# Patient Record
Sex: FEMALE | Race: OTHER | HISPANIC OR LATINO
[De-identification: names, ages, dates, MRNs, and addresses within clinical notes are randomized per-mention and may not be internally consistent; named-entity substitution may affect disease eponyms.]

---

## 2017-01-10 ENCOUNTER — APPOINTMENT (OUTPATIENT)
Dept: CARDIOLOGY | Facility: CLINIC | Age: 72
End: 2017-01-10

## 2017-01-10 VITALS
BODY MASS INDEX: 23.9 KG/M2 | SYSTOLIC BLOOD PRESSURE: 128 MMHG | DIASTOLIC BLOOD PRESSURE: 78 MMHG | HEART RATE: 55 BPM | OXYGEN SATURATION: 100 % | HEIGHT: 64 IN | WEIGHT: 140 LBS

## 2017-04-25 ENCOUNTER — APPOINTMENT (OUTPATIENT)
Dept: CARDIOLOGY | Facility: CLINIC | Age: 72
End: 2017-04-25

## 2017-04-25 VITALS
BODY MASS INDEX: 23.05 KG/M2 | DIASTOLIC BLOOD PRESSURE: 60 MMHG | WEIGHT: 135 LBS | HEART RATE: 64 BPM | HEIGHT: 64 IN | OXYGEN SATURATION: 100 % | SYSTOLIC BLOOD PRESSURE: 98 MMHG

## 2017-04-25 VITALS — SYSTOLIC BLOOD PRESSURE: 110 MMHG | DIASTOLIC BLOOD PRESSURE: 70 MMHG

## 2017-04-25 DIAGNOSIS — A69.20 LYME DISEASE, UNSPECIFIED: ICD-10-CM

## 2017-04-26 ENCOUNTER — OUTPATIENT (OUTPATIENT)
Dept: OUTPATIENT SERVICES | Facility: HOSPITAL | Age: 72
LOS: 1 days | Discharge: HOME | End: 2017-04-26

## 2017-06-28 DIAGNOSIS — Z12.31 ENCOUNTER FOR SCREENING MAMMOGRAM FOR MALIGNANT NEOPLASM OF BREAST: ICD-10-CM

## 2017-06-28 DIAGNOSIS — Z78.0 ASYMPTOMATIC MENOPAUSAL STATE: ICD-10-CM

## 2017-06-28 DIAGNOSIS — M89.9 DISORDER OF BONE, UNSPECIFIED: ICD-10-CM

## 2017-06-28 DIAGNOSIS — Z13.820 ENCOUNTER FOR SCREENING FOR OSTEOPOROSIS: ICD-10-CM

## 2017-06-30 ENCOUNTER — OUTPATIENT (OUTPATIENT)
Dept: OUTPATIENT SERVICES | Facility: HOSPITAL | Age: 72
LOS: 1 days | Discharge: HOME | End: 2017-06-30

## 2017-06-30 DIAGNOSIS — I42.9 CARDIOMYOPATHY, UNSPECIFIED: ICD-10-CM

## 2017-06-30 DIAGNOSIS — R94.31 ABNORMAL ELECTROCARDIOGRAM [ECG] [EKG]: ICD-10-CM

## 2017-06-30 DIAGNOSIS — I10 ESSENTIAL (PRIMARY) HYPERTENSION: ICD-10-CM

## 2017-07-31 ENCOUNTER — APPOINTMENT (OUTPATIENT)
Dept: CARDIOLOGY | Facility: CLINIC | Age: 72
End: 2017-07-31

## 2017-07-31 VITALS
BODY MASS INDEX: 23.05 KG/M2 | HEART RATE: 75 BPM | WEIGHT: 135 LBS | DIASTOLIC BLOOD PRESSURE: 54 MMHG | HEIGHT: 64 IN | SYSTOLIC BLOOD PRESSURE: 93 MMHG | OXYGEN SATURATION: 100 %

## 2017-10-27 ENCOUNTER — OUTPATIENT (OUTPATIENT)
Dept: OUTPATIENT SERVICES | Facility: HOSPITAL | Age: 72
LOS: 1 days | Discharge: HOME | End: 2017-10-27

## 2017-11-07 DIAGNOSIS — K64.8 OTHER HEMORRHOIDS: ICD-10-CM

## 2017-11-07 DIAGNOSIS — Z12.11 ENCOUNTER FOR SCREENING FOR MALIGNANT NEOPLASM OF COLON: ICD-10-CM

## 2017-11-07 DIAGNOSIS — K64.4 RESIDUAL HEMORRHOIDAL SKIN TAGS: ICD-10-CM

## 2017-11-07 DIAGNOSIS — D12.2 BENIGN NEOPLASM OF ASCENDING COLON: ICD-10-CM

## 2018-01-03 ENCOUNTER — APPOINTMENT (OUTPATIENT)
Dept: CARDIOLOGY | Facility: CLINIC | Age: 73
End: 2018-01-03

## 2018-01-03 VITALS — SYSTOLIC BLOOD PRESSURE: 118 MMHG | DIASTOLIC BLOOD PRESSURE: 72 MMHG

## 2018-01-03 VITALS — BODY MASS INDEX: 23.69 KG/M2 | WEIGHT: 138 LBS

## 2018-02-09 ENCOUNTER — OUTPATIENT (OUTPATIENT)
Dept: OUTPATIENT SERVICES | Facility: HOSPITAL | Age: 73
LOS: 1 days | Discharge: HOME | End: 2018-02-09

## 2018-02-09 DIAGNOSIS — E78.5 HYPERLIPIDEMIA, UNSPECIFIED: ICD-10-CM

## 2018-02-09 DIAGNOSIS — I10 ESSENTIAL (PRIMARY) HYPERTENSION: ICD-10-CM

## 2018-02-09 DIAGNOSIS — R94.31 ABNORMAL ELECTROCARDIOGRAM [ECG] [EKG]: ICD-10-CM

## 2018-02-09 DIAGNOSIS — I42.9 CARDIOMYOPATHY, UNSPECIFIED: ICD-10-CM

## 2018-04-13 ENCOUNTER — APPOINTMENT (OUTPATIENT)
Dept: CARDIOLOGY | Facility: CLINIC | Age: 73
End: 2018-04-13

## 2018-04-13 VITALS
HEART RATE: 69 BPM | DIASTOLIC BLOOD PRESSURE: 74 MMHG | OXYGEN SATURATION: 98 % | SYSTOLIC BLOOD PRESSURE: 119 MMHG | BODY MASS INDEX: 24.03 KG/M2 | WEIGHT: 140 LBS

## 2018-09-07 ENCOUNTER — OUTPATIENT (OUTPATIENT)
Dept: OUTPATIENT SERVICES | Facility: HOSPITAL | Age: 73
LOS: 1 days | Discharge: HOME | End: 2018-09-07

## 2018-09-07 DIAGNOSIS — E11.9 TYPE 2 DIABETES MELLITUS WITHOUT COMPLICATIONS: ICD-10-CM

## 2018-09-07 DIAGNOSIS — E78.00 PURE HYPERCHOLESTEROLEMIA, UNSPECIFIED: ICD-10-CM

## 2018-09-07 DIAGNOSIS — I11.9 HYPERTENSIVE HEART DISEASE WITHOUT HEART FAILURE: ICD-10-CM

## 2018-10-23 ENCOUNTER — APPOINTMENT (OUTPATIENT)
Dept: CARDIOLOGY | Facility: CLINIC | Age: 73
End: 2018-10-23

## 2018-10-23 VITALS — BODY MASS INDEX: 25.23 KG/M2 | WEIGHT: 147 LBS | DIASTOLIC BLOOD PRESSURE: 67 MMHG | SYSTOLIC BLOOD PRESSURE: 108 MMHG

## 2019-02-26 ENCOUNTER — APPOINTMENT (OUTPATIENT)
Dept: CARDIOLOGY | Facility: CLINIC | Age: 74
End: 2019-02-26

## 2019-03-14 ENCOUNTER — OUTPATIENT (OUTPATIENT)
Dept: OUTPATIENT SERVICES | Facility: HOSPITAL | Age: 74
LOS: 1 days | Discharge: HOME | End: 2019-03-14

## 2019-03-14 DIAGNOSIS — I10 ESSENTIAL (PRIMARY) HYPERTENSION: ICD-10-CM

## 2019-03-14 DIAGNOSIS — R94.31 ABNORMAL ELECTROCARDIOGRAM [ECG] [EKG]: ICD-10-CM

## 2019-03-14 DIAGNOSIS — I42.9 CARDIOMYOPATHY, UNSPECIFIED: ICD-10-CM

## 2019-03-14 DIAGNOSIS — E78.5 HYPERLIPIDEMIA, UNSPECIFIED: ICD-10-CM

## 2019-04-05 ENCOUNTER — APPOINTMENT (OUTPATIENT)
Dept: CARDIOLOGY | Facility: CLINIC | Age: 74
End: 2019-04-05
Payer: MEDICARE

## 2019-04-05 VITALS
SYSTOLIC BLOOD PRESSURE: 110 MMHG | HEIGHT: 64 IN | HEART RATE: 75 BPM | BODY MASS INDEX: 25.1 KG/M2 | WEIGHT: 147 LBS | DIASTOLIC BLOOD PRESSURE: 60 MMHG

## 2019-04-05 DIAGNOSIS — Z87.39 PERSONAL HISTORY OF OTHER DISEASES OF THE MUSCULOSKELETAL SYSTEM AND CONNECTIVE TISSUE: ICD-10-CM

## 2019-04-05 PROCEDURE — 99213 OFFICE O/P EST LOW 20 MIN: CPT

## 2019-04-05 PROCEDURE — 93282 PRGRMG EVAL IMPLANTABLE DFB: CPT

## 2019-04-05 NOTE — PHYSICAL EXAM
[General Appearance - Well Developed] : well developed [General Appearance - Well Nourished] : well nourished [Heart Rate And Rhythm] : heart rate and rhythm were normal [Heart Sounds] : normal S1 and S2 [Systolic grade ___/6] : A grade [unfilled]/6 systolic murmur was heard. [] : no respiratory distress [Respiration, Rhythm And Depth] : normal respiratory rhythm and effort [Auscultation Breath Sounds / Voice Sounds] : lungs were clear to auscultation bilaterally [Left Infraclavicular] : left infraclavicular area [Clean] : clean [Dry] : dry [Well-Healed] : well-healed [Bowel Sounds] : normal bowel sounds [Nail Clubbing] : no clubbing of the fingernails [Cyanosis, Localized] : no localized cyanosis

## 2019-04-05 NOTE — PROCEDURE
[No] : not [NSR] : normal sinus rhythm [See Scanned Paceart Report] : See scanned paceart report [ICD] : Implantable cardioverter-defibrillator [VVI] : VVI [Voltage: ___ volts] : Voltage was [unfilled] volts [Charge Time: ___ sec] : charge time was [unfilled] seconds [Longevity: ___ months] : The estimated remaining battery life is [unfilled] months [Threshold Testing Performed] : Threshold testing was performed [Lead Imp:  ___ohms] : lead impedance was [unfilled] ohms [Sensing Amplitude ___mv] : sensing amplitude was [unfilled] mv [___V @] : [unfilled] V [___ ms] : [unfilled] ms [None] : none [Counters Reset] : the counters were reset [Sense ___ %] : Sense [unfilled]% [Pace ___ %] : Pace [unfilled]% [de-identified] : 72 bpm [de-identified] : [facility] [de-identified] : D179 [de-identified] : [id number] [de-identified] : 05/09/2016 [de-identified] : 40 [de-identified] : No events.\par

## 2019-04-05 NOTE — HISTORY OF PRESENT ILLNESS
[de-identified] : \par Cardiologist: Dr. uJstin Mckeon\par \par 72 y/o female with pmh of NICM/CHF/ NYHA II , S/P AICD, S/P AICD generator replacement on 5/9/2016, presents for a routine F/u, ICD interrogation \par \par Denies CP/SOB/palpitations \par No dizziness or syncope \par No changes in health status since her last visit . Good activity tolerance .\par No CRISTINA \par  ECG (4/5/2019) - SR at 71 bpm pr 124ms, QRS 102ms, QTC 419ms, non specific TW abnormality\par ECG ( 10/23/2018) - SR at 67 bpm, non-specific TWI , QTc 398 ms \par

## 2019-04-05 NOTE — REVIEW OF SYSTEMS
[Negative] : Psychiatric [Recent Weight Gain (___ Lbs)] : recent [unfilled] ~Ulb weight gain [Fever] : no fever [Eye Pain] : no eye pain [Eyeglasses] : not currently wearing eyeglasses [Mouth Sores] : no mouth sores [Shortness Of Breath] : no shortness of breath [Dyspnea on exertion] : not dyspnea during exertion [Chest  Pressure] : no chest pressure [Chest Pain] : no chest pain [Lower Ext Edema] : no extremity edema [Leg Claudication] : no intermittent leg claudication [Palpitations] : no palpitations [Cough] : no cough [Abdominal Pain] : no abdominal pain [Dysuria] : no dysuria [Joint Pain] : no joint pain [Joint Stiffness] : no joint stiffness [Skin: A Rash] : no rash: [Skin Lesions] : no skin lesions [Dizziness] : no dizziness [Depression] : no depression [Anxiety] : no anxiety [Under Stress] : not under stress [Excessive Thirst] : no polydipsia [Easy Bleeding] : no tendency for easy bleeding [Easy Bruising] : no tendency for easy bruising

## 2019-04-05 NOTE — END OF VISIT
[FreeTextEntry3] : I was physically present for the key portion of the evaluation and management service provided. I agree with the above history, physical and plan which I have reviewed and edited where appropriate.\par \par

## 2019-04-05 NOTE — DISCUSSION/SUMMARY
[AICD Function Normal] : normal AICD function [Patient] : the patient [FreeTextEntry1] : HFRef /NYHA class 11\par -Normal Single Chamber ICD function \par -No arrhythmia's recorded \par -All parameters stable \par - 0.1% \par -Continue OMT for NICM with ACE/BBlockers/diuretics and was started on started on Entresto by Dr. Mckeon\par - F/up in 6 mos.\par Decline remote monitoring

## 2019-04-10 ENCOUNTER — OUTPATIENT (OUTPATIENT)
Dept: OUTPATIENT SERVICES | Facility: HOSPITAL | Age: 74
LOS: 1 days | Discharge: HOME | End: 2019-04-10

## 2019-04-10 DIAGNOSIS — E11.9 TYPE 2 DIABETES MELLITUS WITHOUT COMPLICATIONS: ICD-10-CM

## 2019-04-10 DIAGNOSIS — I11.9 HYPERTENSIVE HEART DISEASE WITHOUT HEART FAILURE: ICD-10-CM

## 2019-08-22 ENCOUNTER — OUTPATIENT (OUTPATIENT)
Dept: OUTPATIENT SERVICES | Facility: HOSPITAL | Age: 74
LOS: 1 days | Discharge: HOME | End: 2019-08-22

## 2019-08-22 DIAGNOSIS — E78.2 MIXED HYPERLIPIDEMIA: ICD-10-CM

## 2019-08-22 DIAGNOSIS — Z00.00 ENCOUNTER FOR GENERAL ADULT MEDICAL EXAMINATION WITHOUT ABNORMAL FINDINGS: ICD-10-CM

## 2019-08-22 DIAGNOSIS — I10 ESSENTIAL (PRIMARY) HYPERTENSION: ICD-10-CM

## 2019-10-04 ENCOUNTER — APPOINTMENT (OUTPATIENT)
Dept: CARDIOLOGY | Facility: CLINIC | Age: 74
End: 2019-10-04
Payer: MEDICARE

## 2019-10-04 VITALS
DIASTOLIC BLOOD PRESSURE: 68 MMHG | HEART RATE: 74 BPM | SYSTOLIC BLOOD PRESSURE: 112 MMHG | WEIGHT: 143 LBS | BODY MASS INDEX: 24.55 KG/M2

## 2019-10-04 PROCEDURE — 93282 PRGRMG EVAL IMPLANTABLE DFB: CPT

## 2019-10-04 PROCEDURE — 99213 OFFICE O/P EST LOW 20 MIN: CPT

## 2019-10-04 RX ORDER — SACUBITRIL AND VALSARTAN 24; 26 MG/1; MG/1
24-26 TABLET, FILM COATED ORAL TWICE DAILY
Refills: 0 | Status: DISCONTINUED | COMMUNITY
End: 2019-10-04

## 2019-11-30 NOTE — PROCEDURE
[No] : not [NSR] : normal sinus rhythm [ICD] : Implantable cardioverter-defibrillator [VVI] : VVI [Voltage: ___ volts] : Voltage was [unfilled] volts [Charge Time: ___ sec] : charge time was [unfilled] seconds [Longevity: ___ months] : The estimated remaining battery life is [unfilled] months [Normal] : The battery status is normal. [Lead Imp:  ___ohms] : lead impedance was [unfilled] ohms [Sensing Amplitude ___mv] : sensing amplitude was [unfilled] mv [___ ms] : [unfilled] ms [___V @] : [unfilled] V [None] : none [Pace ___ %] : Pace [unfilled]% [Threshold Testing Performed] : Threshold testing was not performed [Programmed for Longevity] : output reprogrammed for improved battery longevity [Counters Reset] : the counters were not reset [de-identified] [place] [facility] [place]de[place]identified] : D163 [de-identified] : [id number] [de-identified] : 5/9/2016 [de-identified] : 40 [de-identified] : NO ARRHYTHMIAS. \par NORMAL VVI  ICD SYSTEM FUNCTION TODAY.

## 2019-11-30 NOTE — HISTORY OF PRESENT ILLNESS
[de-identified] : \par Cardiologist: Dr. Justin Mckeon\par \par 75 y/o female with pmh of NICM/CHF/ NYHA II , S/P AICD, S/P AICD generator replacement on 5/9/2016, presents for a routine F/u, ICD interrogation \par \par Denies CP/SOB/palpitations \par No dizziness or syncope \par No changes in health status since her last visit . Good activity tolerance .\par  \par ECG (10/4/2019) 74 bpm NSR\par  ECG (4/5/2019) - SR at 71 bpm pr 124ms, QRS 102ms, QTC 419ms, non specific TW abnormality\par ECG ( 10/23/2018) - SR at 67 bpm, non-specific TWI , QTc 398 ms \par

## 2019-11-30 NOTE — ASSESSMENT
[FreeTextEntry1] : Patient was seen and examined with Dr. Carvalho\par Patient report feeling good\par \par Device interrogation showed normal single chamber  ICD parameter\par No event, no shock.\par Patient on coreg, entresto and spironolactone\par \par Patient refused remote monitoring\par \par Next office follow up in 6 months\par

## 2019-11-30 NOTE — REVIEW OF SYSTEMS
[Recent Weight Gain (___ Lbs)] : recent [unfilled] ~Ulb weight gain [Negative] : Neurological [Fever] : no fever [Eye Pain] : no eye pain [Mouth Sores] : no mouth sores [Eyeglasses] : not currently wearing eyeglasses [Shortness Of Breath] : no shortness of breath [Dyspnea on exertion] : not dyspnea during exertion [Chest  Pressure] : no chest pressure [Chest Pain] : no chest pain [Lower Ext Edema] : no extremity edema [Leg Claudication] : no intermittent leg claudication [Cough] : no cough [Palpitations] : no palpitations [Abdominal Pain] : no abdominal pain [Dysuria] : no dysuria [Joint Pain] : no joint pain [Joint Stiffness] : no joint stiffness [Skin: A Rash] : no rash: [Skin Lesions] : no skin lesions [Dizziness] : no dizziness [Depression] : no depression [Anxiety] : no anxiety [Under Stress] : not under stress [Excessive Thirst] : no polydipsia [Easy Bleeding] : no tendency for easy bleeding [Easy Bruising] : no tendency for easy bruising

## 2020-08-28 ENCOUNTER — APPOINTMENT (OUTPATIENT)
Dept: CARDIOLOGY | Facility: CLINIC | Age: 75
End: 2020-08-28

## 2020-09-16 ENCOUNTER — APPOINTMENT (OUTPATIENT)
Dept: CARDIOLOGY | Facility: CLINIC | Age: 75
End: 2020-09-16
Payer: MEDICARE

## 2020-09-16 VITALS
TEMPERATURE: 97.1 F | DIASTOLIC BLOOD PRESSURE: 63 MMHG | WEIGHT: 140 LBS | SYSTOLIC BLOOD PRESSURE: 114 MMHG | BODY MASS INDEX: 23.9 KG/M2 | HEIGHT: 64 IN

## 2020-09-16 PROCEDURE — 93282 PRGRMG EVAL IMPLANTABLE DFB: CPT

## 2020-09-16 PROCEDURE — 99213 OFFICE O/P EST LOW 20 MIN: CPT

## 2020-09-16 NOTE — REVIEW OF SYSTEMS
[Fever] : no fever [Recent Weight Gain (___ Lbs)] : recent [unfilled] ~Ulb weight gain [Eyeglasses] : not currently wearing eyeglasses [Eye Pain] : no eye pain [Mouth Sores] : no mouth sores [Shortness Of Breath] : no shortness of breath [Dyspnea on exertion] : not dyspnea during exertion [Chest Pain] : no chest pain [Lower Ext Edema] : no extremity edema [Chest  Pressure] : no chest pressure [Leg Claudication] : no intermittent leg claudication [Palpitations] : no palpitations [Cough] : no cough [Dysuria] : no dysuria [Abdominal Pain] : no abdominal pain [Joint Pain] : no joint pain [Joint Stiffness] : no joint stiffness [Skin: A Rash] : no rash: [Skin Lesions] : no skin lesions [Dizziness] : no dizziness [Depression] : no depression [Anxiety] : no anxiety [Under Stress] : not under stress [Excessive Thirst] : no polydipsia [Easy Bleeding] : no tendency for easy bleeding [Easy Bruising] : no tendency for easy bruising [Negative] : Neurological

## 2020-09-16 NOTE — PHYSICAL EXAM
[General Appearance - Well Developed] : well developed [General Appearance - Well Nourished] : well nourished [Heart Sounds] : normal S1 and S2 [Heart Rate And Rhythm] : heart rate and rhythm were normal [] : no respiratory distress [Systolic grade ___/6] : A grade [unfilled]/6 systolic murmur was heard. [Auscultation Breath Sounds / Voice Sounds] : lungs were clear to auscultation bilaterally [Left Infraclavicular] : left infraclavicular area [Respiration, Rhythm And Depth] : normal respiratory rhythm and effort [Clean] : clean [Dry] : dry [Well-Healed] : well-healed [Bowel Sounds] : normal bowel sounds [Cyanosis, Localized] : no localized cyanosis [Nail Clubbing] : no clubbing of the fingernails

## 2020-09-16 NOTE — HISTORY OF PRESENT ILLNESS
[de-identified] : \par \par 76 y/o female with pmh of NICM/CHF/ NYHA II , S/P AICD, S/P AICD generator replacement on 5/9/2016, presents for a routine F/u, ICD interrogation \par \par Denies CP/SOB/palpitations \par No dizziness or syncope \par No changes in health status since her last visit . Good activity tolerance .\par  \par ECG (10/4/2019) 74 bpm NSR\par  ECG (4/5/2019) - SR at 71 bpm pr 124ms, QRS 102ms, QTC 419ms, non specific TW abnormality\par ECG ( 10/23/2018) - SR at 67 bpm, non-specific TWI , QTc 398 ms \par

## 2020-09-16 NOTE — PROCEDURE
[NSR] : normal sinus rhythm [No] : not [VVI] : VVI [ICD] : Implantable cardioverter-defibrillator [Voltage: ___ volts] : Voltage was [unfilled] volts [Charge Time: ___ sec] : charge time was [unfilled] seconds [Threshold Testing Performed] : Threshold testing was not performed [Longevity: ___ months] : The estimated remaining battery life is [unfilled] months [Normal] : The battery status is normal. [Programmed for Longevity] : output reprogrammed for improved battery longevity [Counters Reset] : the counters were not reset [Pace ___ %] : Pace [unfilled]% [de-identified] : D157 [de-identified] : [facility] [de-identified] : [id number] [de-identified] : 5/9/2016 [de-identified] : 40 [de-identified] : NO ARRHYTHMIAS. \par NORMAL VVI  ICD SYSTEM FUNCTION TODAY. \par pt refused remote

## 2021-01-19 ENCOUNTER — APPOINTMENT (OUTPATIENT)
Dept: CARDIOLOGY | Facility: CLINIC | Age: 76
End: 2021-01-19
Payer: MEDICARE

## 2021-01-19 VITALS — BODY MASS INDEX: 24.41 KG/M2 | WEIGHT: 143 LBS | HEIGHT: 64 IN | TEMPERATURE: 97.3 F

## 2021-01-19 PROCEDURE — 99072 ADDL SUPL MATRL&STAF TM PHE: CPT

## 2021-01-19 PROCEDURE — 93290 INTERROG DEV EVAL ICPMS IP: CPT

## 2021-01-19 PROCEDURE — 93282 PRGRMG EVAL IMPLANTABLE DFB: CPT

## 2021-01-19 NOTE — PROCEDURE
[No] : not [NSR] : normal sinus rhythm [ICD] : Implantable cardioverter-defibrillator [VVI] : VVI [Longevity: ___ months] : The estimated remaining battery life is [unfilled] months [Threshold Testing Performed] : Threshold testing was performed [Lead Imp:  ___ohms] : lead impedance was [unfilled] ohms [Sensing Amplitude ___mv] : sensing amplitude was [unfilled] mv [___V @] : [unfilled] V [___ ms] : [unfilled] ms [Programmed for Longevity] : output reprogrammed for improved battery longevity [Pace ___ %] : Pace [unfilled]% [de-identified] : [facility]  [de-identified] : D148 [de-identified] : [id number] [de-identified] : 08/09/2016 [de-identified] : 40 [de-identified] : Normal device function. No events noted.

## 2021-05-18 ENCOUNTER — APPOINTMENT (OUTPATIENT)
Dept: CARDIOLOGY | Facility: CLINIC | Age: 76
End: 2021-05-18

## 2021-08-04 ENCOUNTER — INPATIENT (INPATIENT)
Facility: HOSPITAL | Age: 76
LOS: 1 days | Discharge: HOME | End: 2021-08-06
Attending: INTERNAL MEDICINE | Admitting: INTERNAL MEDICINE
Payer: MEDICARE

## 2021-08-04 VITALS
OXYGEN SATURATION: 96 % | HEART RATE: 105 BPM | RESPIRATION RATE: 17 BRPM | SYSTOLIC BLOOD PRESSURE: 130 MMHG | TEMPERATURE: 98 F | DIASTOLIC BLOOD PRESSURE: 77 MMHG

## 2021-08-04 LAB
A1C WITH ESTIMATED AVERAGE GLUCOSE RESULT: 5.9 % — HIGH (ref 4–5.6)
ALBUMIN SERPL ELPH-MCNC: 3.9 G/DL — SIGNIFICANT CHANGE UP (ref 3.5–5.2)
ALP SERPL-CCNC: 88 U/L — SIGNIFICANT CHANGE UP (ref 30–115)
ALT FLD-CCNC: 10 U/L — SIGNIFICANT CHANGE UP (ref 0–41)
ANION GAP SERPL CALC-SCNC: 13 MMOL/L — SIGNIFICANT CHANGE UP (ref 7–14)
AST SERPL-CCNC: 18 U/L — SIGNIFICANT CHANGE UP (ref 0–41)
BASOPHILS # BLD AUTO: 0.02 K/UL — SIGNIFICANT CHANGE UP (ref 0–0.2)
BASOPHILS NFR BLD AUTO: 0.2 % — SIGNIFICANT CHANGE UP (ref 0–1)
BILIRUB SERPL-MCNC: 0.8 MG/DL — SIGNIFICANT CHANGE UP (ref 0.2–1.2)
BUN SERPL-MCNC: 13 MG/DL — SIGNIFICANT CHANGE UP (ref 10–20)
CALCIUM SERPL-MCNC: 9.5 MG/DL — SIGNIFICANT CHANGE UP (ref 8.5–10.1)
CHLORIDE SERPL-SCNC: 102 MMOL/L — SIGNIFICANT CHANGE UP (ref 98–110)
CO2 SERPL-SCNC: 22 MMOL/L — SIGNIFICANT CHANGE UP (ref 17–32)
CREAT SERPL-MCNC: 0.8 MG/DL — SIGNIFICANT CHANGE UP (ref 0.7–1.5)
EOSINOPHIL # BLD AUTO: 0.02 K/UL — SIGNIFICANT CHANGE UP (ref 0–0.7)
EOSINOPHIL NFR BLD AUTO: 0.2 % — SIGNIFICANT CHANGE UP (ref 0–8)
ESTIMATED AVERAGE GLUCOSE: 123 MG/DL — HIGH (ref 68–114)
GLUCOSE SERPL-MCNC: 178 MG/DL — HIGH (ref 70–99)
HCT VFR BLD CALC: 38.5 % — SIGNIFICANT CHANGE UP (ref 37–47)
HGB BLD-MCNC: 12.1 G/DL — SIGNIFICANT CHANGE UP (ref 12–16)
IMM GRANULOCYTES NFR BLD AUTO: 0.3 % — SIGNIFICANT CHANGE UP (ref 0.1–0.3)
LYMPHOCYTES # BLD AUTO: 1.31 K/UL — SIGNIFICANT CHANGE UP (ref 1.2–3.4)
LYMPHOCYTES # BLD AUTO: 14 % — LOW (ref 20.5–51.1)
MAGNESIUM SERPL-MCNC: 1.7 MG/DL — LOW (ref 1.8–2.4)
MCHC RBC-ENTMCNC: 30.9 PG — SIGNIFICANT CHANGE UP (ref 27–31)
MCHC RBC-ENTMCNC: 31.4 G/DL — LOW (ref 32–37)
MCV RBC AUTO: 98.5 FL — SIGNIFICANT CHANGE UP (ref 81–99)
MONOCYTES # BLD AUTO: 0.54 K/UL — SIGNIFICANT CHANGE UP (ref 0.1–0.6)
MONOCYTES NFR BLD AUTO: 5.8 % — SIGNIFICANT CHANGE UP (ref 1.7–9.3)
NEUTROPHILS # BLD AUTO: 7.45 K/UL — HIGH (ref 1.4–6.5)
NEUTROPHILS NFR BLD AUTO: 79.5 % — HIGH (ref 42.2–75.2)
NRBC # BLD: 0 /100 WBCS — SIGNIFICANT CHANGE UP (ref 0–0)
NT-PROBNP SERPL-SCNC: 4943 PG/ML — HIGH (ref 0–300)
PLATELET # BLD AUTO: 414 K/UL — HIGH (ref 130–400)
POTASSIUM SERPL-MCNC: 4 MMOL/L — SIGNIFICANT CHANGE UP (ref 3.5–5)
POTASSIUM SERPL-SCNC: 4 MMOL/L — SIGNIFICANT CHANGE UP (ref 3.5–5)
PROT SERPL-MCNC: 6.8 G/DL — SIGNIFICANT CHANGE UP (ref 6–8)
RBC # BLD: 3.91 M/UL — LOW (ref 4.2–5.4)
RBC # FLD: 13 % — SIGNIFICANT CHANGE UP (ref 11.5–14.5)
SARS-COV-2 RNA SPEC QL NAA+PROBE: SIGNIFICANT CHANGE UP
SODIUM SERPL-SCNC: 137 MMOL/L — SIGNIFICANT CHANGE UP (ref 135–146)
TROPONIN T SERPL-MCNC: 0.02 NG/ML — HIGH
TROPONIN T SERPL-MCNC: 0.07 NG/ML — CRITICAL HIGH
WBC # BLD: 9.37 K/UL — SIGNIFICANT CHANGE UP (ref 4.8–10.8)
WBC # FLD AUTO: 9.37 K/UL — SIGNIFICANT CHANGE UP (ref 4.8–10.8)

## 2021-08-04 PROCEDURE — 93010 ELECTROCARDIOGRAM REPORT: CPT

## 2021-08-04 PROCEDURE — 99285 EMERGENCY DEPT VISIT HI MDM: CPT

## 2021-08-04 PROCEDURE — 71045 X-RAY EXAM CHEST 1 VIEW: CPT | Mod: 26

## 2021-08-04 PROCEDURE — 93282 PRGRMG EVAL IMPLANTABLE DFB: CPT | Mod: 26

## 2021-08-04 PROCEDURE — 99223 1ST HOSP IP/OBS HIGH 75: CPT

## 2021-08-04 RX ORDER — SACUBITRIL AND VALSARTAN 24; 26 MG/1; MG/1
1 TABLET, FILM COATED ORAL
Qty: 0 | Refills: 0 | DISCHARGE

## 2021-08-04 RX ORDER — SPIRONOLACTONE 25 MG/1
1 TABLET, FILM COATED ORAL
Qty: 0 | Refills: 0 | DISCHARGE

## 2021-08-04 RX ORDER — SACUBITRIL AND VALSARTAN 24; 26 MG/1; MG/1
1 TABLET, FILM COATED ORAL
Refills: 0 | Status: DISCONTINUED | OUTPATIENT
Start: 2021-08-04 | End: 2021-08-06

## 2021-08-04 RX ORDER — CHLORHEXIDINE GLUCONATE 213 G/1000ML
1 SOLUTION TOPICAL
Refills: 0 | Status: DISCONTINUED | OUTPATIENT
Start: 2021-08-04 | End: 2021-08-06

## 2021-08-04 RX ORDER — ENOXAPARIN SODIUM 100 MG/ML
40 INJECTION SUBCUTANEOUS AT BEDTIME
Refills: 0 | Status: DISCONTINUED | OUTPATIENT
Start: 2021-08-04 | End: 2021-08-06

## 2021-08-04 RX ORDER — CARVEDILOL PHOSPHATE 80 MG/1
6.25 CAPSULE, EXTENDED RELEASE ORAL EVERY 12 HOURS
Refills: 0 | Status: DISCONTINUED | OUTPATIENT
Start: 2021-08-04 | End: 2021-08-05

## 2021-08-04 RX ORDER — FUROSEMIDE 40 MG
40 TABLET ORAL DAILY
Refills: 0 | Status: DISCONTINUED | OUTPATIENT
Start: 2021-08-05 | End: 2021-08-05

## 2021-08-04 RX ORDER — ATORVASTATIN CALCIUM 80 MG/1
1 TABLET, FILM COATED ORAL
Qty: 0 | Refills: 0 | DISCHARGE

## 2021-08-04 RX ORDER — ATORVASTATIN CALCIUM 80 MG/1
20 TABLET, FILM COATED ORAL AT BEDTIME
Refills: 0 | Status: DISCONTINUED | OUTPATIENT
Start: 2021-08-04 | End: 2021-08-06

## 2021-08-04 RX ORDER — FUROSEMIDE 40 MG
60 TABLET ORAL ONCE
Refills: 0 | Status: COMPLETED | OUTPATIENT
Start: 2021-08-04 | End: 2021-08-04

## 2021-08-04 RX ORDER — SPIRONOLACTONE 25 MG/1
25 TABLET, FILM COATED ORAL DAILY
Refills: 0 | Status: DISCONTINUED | OUTPATIENT
Start: 2021-08-04 | End: 2021-08-06

## 2021-08-04 RX ORDER — MAGNESIUM SULFATE 500 MG/ML
2 VIAL (ML) INJECTION ONCE
Refills: 0 | Status: COMPLETED | OUTPATIENT
Start: 2021-08-04 | End: 2021-08-04

## 2021-08-04 RX ADMIN — Medication 50 GRAM(S): at 09:06

## 2021-08-04 RX ADMIN — CARVEDILOL PHOSPHATE 6.25 MILLIGRAM(S): 80 CAPSULE, EXTENDED RELEASE ORAL at 20:05

## 2021-08-04 RX ADMIN — Medication 60 MILLIGRAM(S): at 08:56

## 2021-08-04 RX ADMIN — ENOXAPARIN SODIUM 40 MILLIGRAM(S): 100 INJECTION SUBCUTANEOUS at 21:36

## 2021-08-04 RX ADMIN — ATORVASTATIN CALCIUM 20 MILLIGRAM(S): 80 TABLET, FILM COATED ORAL at 21:36

## 2021-08-04 RX ADMIN — SACUBITRIL AND VALSARTAN 1 TABLET(S): 24; 26 TABLET, FILM COATED ORAL at 20:05

## 2021-08-04 NOTE — H&P ADULT - NSHPLABSRESULTS_GEN_ALL_CORE
12.1   9.37  )-----------( 414      ( 04 Aug 2021 07:51 )             38.5       08-04    137  |  102  |  13  ----------------------------<  178<H>  4.0   |  22  |  0.8    Ca    9.5      04 Aug 2021 07:51  Mg     1.7     08-04    TPro  6.8  /  Alb  3.9  /  TBili  0.8  /  DBili  x   /  AST  18  /  ALT  10  /  AlkPhos  88  08-04          CARDIAC MARKERS ( 04 Aug 2021 07:51 )  x     / 0.02 ng/mL / x     / x     / x

## 2021-08-04 NOTE — H&P ADULT - ATTENDING COMMENTS
Patient seen and evaluated with son at bedside. Has h/o cardiomyopathy s/p PPM, h/o combined systolic and diastolic CHF. her cardiologist is Dr OLEGARIO manning.     Acute on chronic combined CHF/ Cardiomyopathy s/p PPM  Consult her cardiologist  Continue cardiac medications  IV lasix  Patient and son educated at bedside  She is full code

## 2021-08-04 NOTE — ED PROVIDER NOTE - CLINICAL SUMMARY MEDICAL DECISION MAKING FREE TEXT BOX
76 yr old f that presents with sob, concern for CHF exacerbation   -labs  -ekg  -cxr  -lasix  -consider admission

## 2021-08-04 NOTE — H&P ADULT - HISTORY OF PRESENT ILLNESS
75 yo F with PMHx of HTN, HFrEF s/p ICD and PPM presents to the ED for worsening shortness of breath and fatigue. SOB started one week ago and got worse today, endorses orthopnea and feeling tired this morning, her son called EMS. She states she feels short of breath after talking for long, much better after coming to the hospital. Adheres to a low salt diet, checks weight at home and denies noticing any change.  In ED, VS within normal limits except , spo2 96% on RA. CXR shows ICD and BL pleural effusions. Pt received 60mg IV lasix and Mg 2g.

## 2021-08-04 NOTE — H&P ADULT - ASSESSMENT
75 yo F with PMHx of HTN, HFrEF s/p ICD and PPM presents to the ED for worsening shortness of breath, orthopnea and fatigue.    #Acute HFrEF Exacerbation (EF unknown) s/p AICD   #Stage C, NYHA class II-III  #Troponemia, no acute ischemic changes on EKG   BNP 4943, CXR shows BL pleural effusions (f/u official read), EKG shows sinus tachycardia, SOB is better after IV lasix in ED  Daily weights, Strict Is and Os, fluid restriction, trend Cardiac enzymes, f/u EKG in am. Monitor on tele  Lasix 40mg IV push daily  Cardio consult, Dr. Frank   EP consult for interrogation of device   C/w entresto, carvedilol, spironolactone     #HTN  c/w home meds    #HLD  c/w atorvastatin  check lipid profile     #Misc  - DVT Prophylaxis: lovenox   - GI Prophylaxis: Not indicated   - Diet: DASH, fluid restriction   - Activity: AAT  - Code Status: Full code  77 yo F with PMHx of HTN, HFrEF s/p ICD and PPM presents to the ED for worsening shortness of breath, orthopnea and fatigue.    #Acute HFrEF Exacerbation (EF unknown) s/p AICD   #Stage C, NYHA class II-III  #Troponemia, no acute ischemic changes on EKG   BNP 4943, CXR shows BL pleural effusions (f/u official read), EKG shows sinus tachycardia, SOB is better after IV lasix in ED  Daily weights, Strict Is and Os, fluid restriction, trend Cardiac enzymes, f/u EKG in am. F/u Echo. Monitor on tele  Lasix 40mg IV push daily  Cardio consult, Dr. Frank   EP consult for interrogation of device   C/w entresto, carvedilol, spironolactone     #HTN  c/w home meds    #HLD  c/w atorvastatin  check lipid profile     #Misc  - DVT Prophylaxis: lovenox   - GI Prophylaxis: Not indicated   - Diet: DASH, fluid restriction   - Activity: AAT  - Code Status: Full code  75 yo F with PMHx of HTN, HFrEF s/p ICD and PPM presents to the ED for worsening shortness of breath, orthopnea and fatigue.    #Acute HFrEF Exacerbation (EF unknown) s/p AICD   #Stage C, NYHA class II-III  #Troponemia, no acute ischemic changes on EKG   BNP 4943, CXR shows BL pleural effusions (f/u official read), EKG shows sinus tachycardia, SOB is better after IV lasix in ED  Daily weights, Strict Is and Os, fluid restriction, trend Cardiac enzymes, f/u EKG in am. F/u Echo. Monitor on tele  Patient does not appear to be in fluid overload on my exam, no JVD, no LE edema, only mild crackles and blunting of costophrenic angles on CXR. US exam of IVC shows that it is >50% collapsable with breathing   Lasix 40mg IV push daily, Assess need for IV lasix based on exam tomorrow   Cardio consult, Dr. Frank   EP consult for interrogation of device   C/w entresto, carvedilol, spironolactone     #HTN  c/w home meds    #HLD  c/w atorvastatin  check lipid profile     #Misc  - DVT Prophylaxis: lovenox   - GI Prophylaxis: Not indicated   - Diet: DASH, fluid restriction   - Activity: AAT  - Code Status: Full code

## 2021-08-04 NOTE — H&P ADULT - NSICDXFAMILYHX_GEN_ALL_CORE_FT
FAMILY HISTORY:  Mother  Still living? Unknown  FH: heart failure, Age at diagnosis: Age Unknown

## 2021-08-04 NOTE — ED ADULT TRIAGE NOTE - CHIEF COMPLAINT QUOTE
Pt BIBA with hx CHF, pacemaker with defib complaining of SOB and weakness x 1  week and dyspnea on exertion worsening today. denies chest pain. denies fever.

## 2021-08-04 NOTE — ED PROVIDER NOTE - NS ED ROS FT
CONST: No fever, chills or bodyaches  EYES: No pain, redness, drainage or visual changes.  ENT: No ear pain or discharge, nasal discharge or congestion. No sore throat  CARD: No chest pain, palpitations  RESP: see HPI  GI: No abdominal pain, N/V/D  MS: No joint pain, back pain or extremity pain/injury  SKIN: No rashes  NEURO: No headache, dizziness, paresthesias or LOC

## 2021-08-04 NOTE — H&P ADULT - NSHPPHYSICALEXAM_GEN_ALL_CORE
PHYSICAL EXAM:  GENERAL: NAD, well-groomed, well-developed  HEAD:  Atraumatic, Normocephalic  EYES: EOMI, PERRLA, conjunctiva and sclera clear  NECK: Supple, No JVD, Normal thyroid  HEART: slightly tachycardic   RESPIRATORY: Mild crackles in BL lower lung fields posteriorly   ABDOMEN: Soft, Nontender, Nondistended; Bowel sounds present  NEUROLOGY: A&Ox3, nonfocal, moving all extremities  EXTREMITIES: No clubbing, cyanosis, or edema Vital Signs Last 24 Hrs  T(C): 36.7 (04 Aug 2021 07:14), Max: 36.7 (04 Aug 2021 07:14)  T(F): 98 (04 Aug 2021 07:14), Max: 98 (04 Aug 2021 07:14)  HR: 112 (04 Aug 2021 07:45) (105 - 112)  BP: 130/77 (04 Aug 2021 07:14) (130/77 - 130/77)  BP(mean): --  RR: 18 (04 Aug 2021 07:45) (17 - 18)  SpO2: 97% (04 Aug 2021 07:45) (96% - 97%)    PHYSICAL EXAM:  GENERAL: NAD, well-groomed, well-developed  HEAD:  Atraumatic, Normocephalic  EYES: EOMI, PERRLA, conjunctiva and sclera clear  NECK: Supple, No JVD, Normal thyroid  HEART: slightly tachycardic   RESPIRATORY: Mild crackles in BL lower lung fields posteriorly   ABDOMEN: Soft, Nontender, Nondistended; Bowel sounds present  NEUROLOGY: A&Ox3, nonfocal, moving all extremities  EXTREMITIES: No clubbing, cyanosis, or edema

## 2021-08-04 NOTE — CHART NOTE - NSCHARTNOTEFT_GEN_A_CORE
Electrophysiology    Pts device ___single lead ICD ([place])___ was interrogated on 08-04-21  Device working properly  Events: no events  Preliminary results d/w with primary team  Awaiting / Reviewed by attending    Contact EP ACP with any questions [phone]    follow up with Ann Joshi NP Electrophysiology   on 9/16 @[address] [place] [address], [address]  [phone]  Dr Carvalho office

## 2021-08-04 NOTE — ED PROVIDER NOTE - OBJECTIVE STATEMENT
75yo female with PMHx cardiomyopathy, PPM, CHF presents c/o worsening SOB x 1 week, and acutely worsened this morning that woke her from her sleep, associated with dizziness. Pt tried to place multiple pillows under head without relief. Son noted seemingly losing breath when conversing with him x 1 week. Pt denies CP, syncope, back pain, cough, fever or chills. Takes Lasix 20mg daily. Follows with Dr. Guerrero, reports had echo approx 2mths prior

## 2021-08-04 NOTE — ED PROVIDER NOTE - ATTENDING CONTRIBUTION TO CARE
76 yr old f w/ a pmh significant for htn, hld, cardiomyopathy, chf who presents with sob. Pt states that this morning she woke feeling sob. Pt attempted to place pillows underneath her head without any relief of symptoms. Pt denies any chest pain, however, also states that she has been tachypneic for the past week. Pt denies any other medical complaints.     VITAL SIGNS: I have reviewed nursing notes and confirm.  CONSTITUTIONAL: non-toxic, well appearing  SKIN: no rash, no petechiae.  EYES: EOMI, pink conjunctiva, anicteric  ENT: tongue midline, no exudates, MMM  NECK: Supple; no meningismus, no JVD  CARD: RRR, no murmurs, equal radial pulses bilaterally 2+  RESP: tachypneic, crackles noted at the base   ABD: Soft, non-tender, non-distended, no peritoneal signs, no HSM, no CVA tenderness  EXT: Normal ROM x4. No edema. No calves tenderness  NEURO: Alert, oriented. CN2-12 intact, equal strength bilaterally.  PSYCH: Cooperative, appropriate.    a/p  76 yr old f that presents with sob, concern for CHF exacerbation   -labs  -ekg  -cxr  -lasix  -consider admission

## 2021-08-04 NOTE — ED PROVIDER NOTE - PHYSICAL EXAMINATION
CONST: Well appearing in NAD  EYES: PERRL, EOMI, Sclera and conjunctiva clear.   ENT: No nasal discharge.   NECK: Non-tender  CARD: Normal S1 S2; Normal rate and rhythm  RESP: Equal BS B/L, tachypneic completing sentences, soft crackles bilateral bases  GI: Soft, non-tender, non-distended.  MS: Normal ROM in all extremities. No midline spinal tenderness.  SKIN: Warm, dry, no acute rashes. Good turgor  PVS: No pedal edema  NEURO: A&Ox3, No focal deficits. Strength 5/5 with no sensory deficits.

## 2021-08-04 NOTE — ED PROVIDER NOTE - CARE PLAN
Principal Discharge DX:	Fluid overload   Principal Discharge DX:	Fluid overload  Secondary Diagnosis:	Dyspnea on exertion   Principal Discharge DX:	Fluid overload  Secondary Diagnosis:	Dyspnea on exertion  Secondary Diagnosis:	Hypomagnesemia

## 2021-08-05 DIAGNOSIS — I50.9 HEART FAILURE, UNSPECIFIED: ICD-10-CM

## 2021-08-05 DIAGNOSIS — I10 ESSENTIAL (PRIMARY) HYPERTENSION: ICD-10-CM

## 2021-08-05 LAB
ALBUMIN SERPL ELPH-MCNC: 3.5 G/DL — SIGNIFICANT CHANGE UP (ref 3.5–5.2)
ALP SERPL-CCNC: 78 U/L — SIGNIFICANT CHANGE UP (ref 30–115)
ALT FLD-CCNC: 10 U/L — SIGNIFICANT CHANGE UP (ref 0–41)
ANION GAP SERPL CALC-SCNC: 8 MMOL/L — SIGNIFICANT CHANGE UP (ref 7–14)
AST SERPL-CCNC: 17 U/L — SIGNIFICANT CHANGE UP (ref 0–41)
BASOPHILS # BLD AUTO: 0.03 K/UL — SIGNIFICANT CHANGE UP (ref 0–0.2)
BASOPHILS NFR BLD AUTO: 0.4 % — SIGNIFICANT CHANGE UP (ref 0–1)
BILIRUB SERPL-MCNC: 0.8 MG/DL — SIGNIFICANT CHANGE UP (ref 0.2–1.2)
BUN SERPL-MCNC: 15 MG/DL — SIGNIFICANT CHANGE UP (ref 10–20)
CALCIUM SERPL-MCNC: 9 MG/DL — SIGNIFICANT CHANGE UP (ref 8.5–10.1)
CHLORIDE SERPL-SCNC: 104 MMOL/L — SIGNIFICANT CHANGE UP (ref 98–110)
CHOLEST SERPL-MCNC: 150 MG/DL — SIGNIFICANT CHANGE UP
CK MB CFR SERPL CALC: 3.2 NG/ML — SIGNIFICANT CHANGE UP (ref 0.6–6.3)
CK SERPL-CCNC: 121 U/L — SIGNIFICANT CHANGE UP (ref 0–225)
CO2 SERPL-SCNC: 27 MMOL/L — SIGNIFICANT CHANGE UP (ref 17–32)
COVID-19 SPIKE DOMAIN AB INTERP: POSITIVE
COVID-19 SPIKE DOMAIN ANTIBODY RESULT: >250 U/ML — HIGH
CREAT SERPL-MCNC: 0.7 MG/DL — SIGNIFICANT CHANGE UP (ref 0.7–1.5)
EOSINOPHIL # BLD AUTO: 0.11 K/UL — SIGNIFICANT CHANGE UP (ref 0–0.7)
EOSINOPHIL NFR BLD AUTO: 1.4 % — SIGNIFICANT CHANGE UP (ref 0–8)
GLUCOSE SERPL-MCNC: 106 MG/DL — HIGH (ref 70–99)
HCT VFR BLD CALC: 34.8 % — LOW (ref 37–47)
HDLC SERPL-MCNC: 46 MG/DL — LOW
HGB BLD-MCNC: 11 G/DL — LOW (ref 12–16)
IMM GRANULOCYTES NFR BLD AUTO: 0.4 % — HIGH (ref 0.1–0.3)
LIPID PNL WITH DIRECT LDL SERPL: 90 MG/DL — SIGNIFICANT CHANGE UP
LYMPHOCYTES # BLD AUTO: 1.99 K/UL — SIGNIFICANT CHANGE UP (ref 1.2–3.4)
LYMPHOCYTES # BLD AUTO: 26.1 % — SIGNIFICANT CHANGE UP (ref 20.5–51.1)
MAGNESIUM SERPL-MCNC: 2 MG/DL — SIGNIFICANT CHANGE UP (ref 1.8–2.4)
MCHC RBC-ENTMCNC: 30.9 PG — SIGNIFICANT CHANGE UP (ref 27–31)
MCHC RBC-ENTMCNC: 31.6 G/DL — LOW (ref 32–37)
MCV RBC AUTO: 97.8 FL — SIGNIFICANT CHANGE UP (ref 81–99)
MONOCYTES # BLD AUTO: 0.78 K/UL — HIGH (ref 0.1–0.6)
MONOCYTES NFR BLD AUTO: 10.2 % — HIGH (ref 1.7–9.3)
NEUTROPHILS # BLD AUTO: 4.69 K/UL — SIGNIFICANT CHANGE UP (ref 1.4–6.5)
NEUTROPHILS NFR BLD AUTO: 61.5 % — SIGNIFICANT CHANGE UP (ref 42.2–75.2)
NON HDL CHOLESTEROL: 104 MG/DL — SIGNIFICANT CHANGE UP
NRBC # BLD: 0 /100 WBCS — SIGNIFICANT CHANGE UP (ref 0–0)
PLATELET # BLD AUTO: 395 K/UL — SIGNIFICANT CHANGE UP (ref 130–400)
POTASSIUM SERPL-MCNC: 3.8 MMOL/L — SIGNIFICANT CHANGE UP (ref 3.5–5)
POTASSIUM SERPL-SCNC: 3.8 MMOL/L — SIGNIFICANT CHANGE UP (ref 3.5–5)
PROT SERPL-MCNC: 5.8 G/DL — LOW (ref 6–8)
RBC # BLD: 3.56 M/UL — LOW (ref 4.2–5.4)
RBC # FLD: 12.9 % — SIGNIFICANT CHANGE UP (ref 11.5–14.5)
SARS-COV-2 IGG+IGM SERPL QL IA: >250 U/ML — HIGH
SARS-COV-2 IGG+IGM SERPL QL IA: POSITIVE
SODIUM SERPL-SCNC: 139 MMOL/L — SIGNIFICANT CHANGE UP (ref 135–146)
TRIGL SERPL-MCNC: 82 MG/DL — SIGNIFICANT CHANGE UP
TROPONIN T SERPL-MCNC: 0.01 NG/ML — SIGNIFICANT CHANGE UP
WBC # BLD: 7.63 K/UL — SIGNIFICANT CHANGE UP (ref 4.8–10.8)
WBC # FLD AUTO: 7.63 K/UL — SIGNIFICANT CHANGE UP (ref 4.8–10.8)

## 2021-08-05 PROCEDURE — 99233 SBSQ HOSP IP/OBS HIGH 50: CPT

## 2021-08-05 PROCEDURE — 71045 X-RAY EXAM CHEST 1 VIEW: CPT | Mod: 26

## 2021-08-05 RX ORDER — FUROSEMIDE 40 MG
40 TABLET ORAL DAILY
Refills: 0 | Status: DISCONTINUED | OUTPATIENT
Start: 2021-08-06 | End: 2021-08-06

## 2021-08-05 RX ORDER — CARVEDILOL PHOSPHATE 80 MG/1
3.12 CAPSULE, EXTENDED RELEASE ORAL EVERY 12 HOURS
Refills: 0 | Status: DISCONTINUED | OUTPATIENT
Start: 2021-08-05 | End: 2021-08-06

## 2021-08-05 RX ADMIN — SACUBITRIL AND VALSARTAN 1 TABLET(S): 24; 26 TABLET, FILM COATED ORAL at 05:33

## 2021-08-05 RX ADMIN — CHLORHEXIDINE GLUCONATE 1 APPLICATION(S): 213 SOLUTION TOPICAL at 05:16

## 2021-08-05 RX ADMIN — SACUBITRIL AND VALSARTAN 1 TABLET(S): 24; 26 TABLET, FILM COATED ORAL at 17:30

## 2021-08-05 RX ADMIN — SPIRONOLACTONE 25 MILLIGRAM(S): 25 TABLET, FILM COATED ORAL at 05:33

## 2021-08-05 RX ADMIN — CARVEDILOL PHOSPHATE 3.12 MILLIGRAM(S): 80 CAPSULE, EXTENDED RELEASE ORAL at 17:30

## 2021-08-05 RX ADMIN — ATORVASTATIN CALCIUM 20 MILLIGRAM(S): 80 TABLET, FILM COATED ORAL at 21:18

## 2021-08-05 NOTE — PROGRESS NOTE ADULT - ASSESSMENT
75 yo F with PMHx of HTN, HFrEF s/p ICD and PPM presents to the ED for worsening shortness of breath, orthopnea and fatigue.    # Acute HFrEF Exacerbation (EF unknown) s/p AICD   # Stage C, NYHA class II-III  # Troponemia, no acute ischemic changes on EKG   - BNP 4943, CXR shows BL pleural effusions (f/u official read), EKG shows sinus tachycardia, SOB is better after IV lasix in ED  - Daily weights, Strict Is and Os, fluid restriction, trend Cardiac enzymes, f/u EKG in am. F/u Echo. Monitor on tele  - Euvolemic on exam today  - Lasix IV - switch to PO  - Follow up cardio consult  - EP eval appreciated - Device interrogation done - no events; Follow up outpatient  - C/w entresto, spironolactone   - Decrease carvedilol dose to 3.125 BID    # HTN  - c/w home meds    # HLD  - c/w atorvastatin  - check lipid profile     #Misc  - DVT Prophylaxis: lovenox   - GI Prophylaxis: Not indicated   - Diet: DASH, fluid restriction   - Activity: AAT  - Code Status: Full code

## 2021-08-05 NOTE — CONSULT NOTE ADULT - PROBLEM SELECTOR RECOMMENDATION 9
pt with non ischemic cm > 10 yrs with first chf exacerbation since dx.  pt has done very well until now.  pt will require a rt and left heart cath possibly out pt  echo  agrees change lasix to 40 po, decrease coreg to 3.125 po q12, cont entresto and aldactone and lipitor  bmp in am

## 2021-08-05 NOTE — PROGRESS NOTE ADULT - ASSESSMENT
77 yo F with PMHx of HTN, HFrEF s/p ICD and PPM presents to the ED for worsening shortness of breath, orthopnea and fatigue.    # Acute HFrEF Exacerbation (EF unknown) s/p AICD   # Stage C, NYHA class II-III  - Troponin have trended down   - ST depression seen on EKG, no previous EKG to compare, this was discussed with cardiologist Dr. Gonzalez who said he would compare to previous EKG he has access to  - BNP 4943, CXR shows BL pleural effusions (f/u official read), EKG shows sinus tachycardia, SOB is better after IV lasix in ED  - Daily weights, Strict Is and Os, fluid restriction, trend Cardiac enzymes, f/u EKG in am. F/u Echo. Monitor on tele  - near Euvolemic on exam today  - Lasix IV - switch to PO  - Follow up cardio consult  - EP eval appreciated - Device interrogation done - no events; Follow up outpatient  - C/w entresto, spironolactone   - Decrease carvedilol dose to 3.125 BID during diuresis     # HTN  - c/w home meds    # HLD  - c/w atorvastatin  - check lipid profile     #Misc  - DVT Prophylaxis: lovenox   - GI Prophylaxis: Not indicated   - Diet: DASH, fluid restriction   - Activity: AAT  - Code Status: Full code   Dispo: Home pending review of previous ekg, fluid status in the AM and BP status in the am

## 2021-08-06 ENCOUNTER — TRANSCRIPTION ENCOUNTER (OUTPATIENT)
Age: 76
End: 2021-08-06

## 2021-08-06 VITALS
RESPIRATION RATE: 18 BRPM | DIASTOLIC BLOOD PRESSURE: 75 MMHG | HEART RATE: 101 BPM | SYSTOLIC BLOOD PRESSURE: 111 MMHG | TEMPERATURE: 96 F

## 2021-08-06 PROCEDURE — 99239 HOSP IP/OBS DSCHRG MGMT >30: CPT

## 2021-08-06 PROCEDURE — 70450 CT HEAD/BRAIN W/O DYE: CPT | Mod: 26

## 2021-08-06 PROCEDURE — 93306 TTE W/DOPPLER COMPLETE: CPT | Mod: 26

## 2021-08-06 PROCEDURE — 99221 1ST HOSP IP/OBS SF/LOW 40: CPT

## 2021-08-06 RX ORDER — CARVEDILOL PHOSPHATE 80 MG/1
1 CAPSULE, EXTENDED RELEASE ORAL
Qty: 28 | Refills: 0
Start: 2021-08-06 | End: 2021-08-19

## 2021-08-06 RX ORDER — CARVEDILOL PHOSPHATE 80 MG/1
1 CAPSULE, EXTENDED RELEASE ORAL
Qty: 0 | Refills: 0 | DISCHARGE

## 2021-08-06 RX ORDER — FUROSEMIDE 40 MG
1 TABLET ORAL
Qty: 14 | Refills: 0
Start: 2021-08-06 | End: 2021-08-19

## 2021-08-06 RX ORDER — FUROSEMIDE 40 MG
1 TABLET ORAL
Qty: 0 | Refills: 0 | DISCHARGE

## 2021-08-06 RX ADMIN — SACUBITRIL AND VALSARTAN 1 TABLET(S): 24; 26 TABLET, FILM COATED ORAL at 17:16

## 2021-08-06 RX ADMIN — CARVEDILOL PHOSPHATE 3.12 MILLIGRAM(S): 80 CAPSULE, EXTENDED RELEASE ORAL at 05:18

## 2021-08-06 RX ADMIN — Medication 40 MILLIGRAM(S): at 05:20

## 2021-08-06 RX ADMIN — CARVEDILOL PHOSPHATE 3.12 MILLIGRAM(S): 80 CAPSULE, EXTENDED RELEASE ORAL at 17:16

## 2021-08-06 RX ADMIN — SACUBITRIL AND VALSARTAN 1 TABLET(S): 24; 26 TABLET, FILM COATED ORAL at 05:18

## 2021-08-06 RX ADMIN — SPIRONOLACTONE 25 MILLIGRAM(S): 25 TABLET, FILM COATED ORAL at 05:20

## 2021-08-06 NOTE — DISCHARGE NOTE PROVIDER - CARE PROVIDERS DIRECT ADDRESSES
,[email or web address] [email or web address]

## 2021-08-06 NOTE — DISCHARGE NOTE PROVIDER - CARE PROVIDER_API CALL
Justin Bosch  CARDIOVASCULAR DISEASE  [address] [facility] [address]  [place] [address]  Phone: [phone]  Fax: [phone]  Follow Up Time:     Emi Seals (DO)  Family Medicine  [address] [place] [id number]  Phone: [phone]  Fax: [phone]  Follow Up Time:

## 2021-08-06 NOTE — CONSULT NOTE ADULT - ASSESSMENT
76F w/ h/o HTN and HFrEF admitted to the hospital for acute HF exacerbation. Hospital course c/b single transient episode of AMS. Physical exam shows no focal neurologic deficits in cognition. Given transient and isolated nature of episode and pt's age, pt's confusion likely explained by hospital-induced delirium. Must still r/o other cause though, such as intracranial hemorrhage.    Plan  1. CT Head w/o contrast  2. If CTH show no acute intracranial pathology, then there is no contraindication for discharge from a neurologic standpoint

## 2021-08-06 NOTE — PROGRESS NOTE ADULT - ASSESSMENT
75 yo F with PMHx of HTN, HFrEF s/p ICD and PPM presents to the ED for worsening shortness of breath, orthopnea and fatigue.    # Acute HFrEF Exacerbation (EF unknown) s/p AICD   # Stage C, NYHA class II-III  - Troponin have trended down   - ST depression seen on EKG, patient's cardiologist Dr. Gonzalez reports these are not new  - BNP 4943, CXR shows BL pleural effusions (f/u official read), EKG shows sinus tachycardia, SOB is better after IV lasix in ED  - Daily weights, Strict Is and Os, fluid restriction, trend Cardiac enzymes, f/u EKG in am. F/u Echo. Monitor on tele  -  Euvolemic on exam today  - Lasix IV - switcedh to PO 8/5  - EP eval appreciated - Device interrogation done - no events; Follow up outpatient  - C/w entresto, spironolactone   - Decrease carvedilol dose to 3.125 BID during diuresis   - TTE shows EF 20-25%    # HTN  - reduced carvedilol to 3.125mg bid    #Hospital acquired delirium  - patient became confused 8/5 during the evening  - limit stimuli  - neuro consulted      # HLD  - c/w atorvastatin    #Misc  - DVT Prophylaxis: lovenox   - GI Prophylaxis: Not indicated   - Diet: DASH, fluid restriction   - Activity: AAT  - Code Status: Full code   Dispo: discharge home today pending neuro consult  77 yo F with PMHx of HTN, HFrEF s/p ICD and PPM presents to the ED for worsening shortness of breath, orthopnea and fatigue.    # Acute HFrEF Exacerbation (EF unknown) s/p AICD   # Stage C, NYHA class II-III  - Troponin have trended down   - ST depression seen on EKG, patient's cardiologist Dr. Frank reports these are not new  - BNP 4943, CXR shows BL pleural effusions (f/u official read), EKG shows sinus tachycardia, SOB is better after IV lasix in ED  - Daily weights, Strict Is and Os, fluid restriction, trend Cardiac enzymes, f/u EKG in am. F/u Echo. Monitor on tele  -  Euvolemic on exam today  - Lasix IV - switcedh to PO 8/5  - EP eval appreciated - Device interrogation done - no events; Follow up outpatient  - C/w entresto, spironolactone   - Decrease carvedilol dose to 3.125 BID during diuresis   - TTE shows EF 20-25%    # HTN  - reduced carvedilol to 3.125mg bid    #Hospital acquired delirium  - patient became confused 8/5 during the evening  - limit stimuli  - neuro consulted      # HLD  - c/w atorvastatin    #Misc  - DVT Prophylaxis: lovenox   - GI Prophylaxis: Not indicated   - Diet: DASH, fluid restriction   - Activity: AAT  - Code Status: Full code   Dispo: discharge home today pending neuro consult

## 2021-08-06 NOTE — DISCHARGE NOTE PROVIDER - NSDCCPCAREPLAN_GEN_ALL_CORE_FT
PRINCIPAL DISCHARGE DIAGNOSIS  Diagnosis: Fluid overload  Assessment and Plan of Treatment: Your fluid overload was secondary to an exacerbation of your heart failure.  You were treated with IV lasix and responded well to this therapy.  On discharge you are asked to take lasix 40mg instead of your regular 20mg.  And reduce your carvedilol from 6.25mg twice a day to 3.125mg twice a day.  Please follow up with your cardiologist.      SECONDARY DISCHARGE DIAGNOSES  Diagnosis: Dyspnea on exertion  Assessment and Plan of Treatment: Your shortness of breath was secondary to your fluid overload.    Diagnosis: Hypomagnesemia  Assessment and Plan of Treatment: Your magnesium was low and we gave you magnesemia supplementation.    Diagnosis: Confusion  Assessment and Plan of Treatment: You became confused in the hospital.  You were seen by a neurologist and had a CT of your brain.  Your confusion cleared up.

## 2021-08-06 NOTE — PROGRESS NOTE ADULT - SUBJECTIVE AND OBJECTIVE BOX
MIKHAIL JOHNSON 76y Female  MRN#: [id number]   CODE STATUS:    Hospital Day: 1d    Pt is currently admitted with the primary diagnosis of acute exacerbation of CHF      SUBJECTIVE    75 yo F with PMHx of HTN, HFrEF s/p ICD and PPM presents to the ED for worsening shortness of breath and fatigue. SOB started one week ago and got worse today, endorses orthopnea and feeling tired this morning, her son called EMS. She states she feels short of breath after talking for long, much better after coming to the hospital. Adheres to a low salt diet, checks weight at home and denies noticing any change.  In ED, VS within normal limits except , spo2 96% on RA. CXR shows ICD and BL pleural effusions. Pt received 60mg IV lasix and Mg 2g.  (04 Aug 2021 10:00)      Overnight events/Subjective complaints  Patient was seen at the bedside this morning. She is lying comfortably on the bed. There were no acute events overnight.   She denies any chest pain, shortness of breath, cough, fever, chills, abdominal pain, nausea, vomiting, diarrhea, dizziness or headache.                                               ----------------------------------------------------------  OBJECTIVE  PAST MEDICAL & SURGICAL HISTORY  CHF, stage C    HTN (hypertension)                                              -----------------------------------------------------------  ALLERGIES:  epinephrine (Unknown)                                            ------------------------------------------------------------    HOME MEDICATIONS  Home Medications:  atorvastatin 20 mg oral tablet: 1 tab(s) orally once a day (04 Aug 2021 10:05)  carvedilol 6.25 mg oral tablet: 1 tab(s) orally 2 times a day (04 Aug 2021 10:05)  Entresto 49 mg-51 mg oral tablet: 1 tab(s) orally 2 times a day (04 Aug 2021 10:05)  Lasix 20 mg oral tablet: 1 tab(s) orally once a day (04 Aug 2021 10:05)  spironolactone 25 mg oral tablet: 1 tab(s) orally once a day (04 Aug 2021 10:05)                           MEDICATIONS:  STANDING MEDICATIONS  atorvastatin 20 milliGRAM(s) Oral at bedtime  carvedilol 3.125 milliGRAM(s) Oral every 12 hours  chlorhexidine 4% Liquid 1 Application(s) Topical <User Schedule>  enoxaparin Injectable 40 milliGRAM(s) SubCutaneous at bedtime  sacubitril 49 mG/valsartan 51 mG 1 Tablet(s) Oral two times a day  spironolactone 25 milliGRAM(s) Oral daily    PRN MEDICATIONS                                            ------------------------------------------------------------  VITAL SIGNS: Last 24 Hours  T(C): 35.8 (05 Aug 2021 13:13), Max: 36.9 (05 Aug 2021 05:09)  T(F): 96.4 (05 Aug 2021 13:13), Max: 98.4 (05 Aug 2021 05:09)  HR: 91 (05 Aug 2021 13:13) (81 - 111)  BP: 106/62 (05 Aug 2021 13:13) (97/59 - 127/72)  BP(mean): --  RR: 18 (05 Aug 2021 13:13) (18 - 18)  SpO2: 98% (04 Aug 2021 21:11) (98% - 98%)      08-04-21 @ 07:01  -  08-05-21 @ 07:00  --------------------------------------------------------  IN: 160 mL / OUT: 0 mL / NET: 160 mL    08-05-21 @ 07:01  -  08-05-21 @ 13:43  --------------------------------------------------------  IN: 360 mL / OUT: 0 mL / NET: 360 mL        Daily Height in cm: 162.56 (04 Aug 2021 21:11), Height in cm: 162.56 (04 Aug 2021 07:57), Height in cm: 162.56 (04 Aug 2021 07:57)    Daily                                         --------------------------------------------------------------  LABS:                        11.0   7.63  )-----------( 395      ( 05 Aug 2021 07:52 )             34.8                         12.1   9.37  )-----------( 414      ( 04 Aug 2021 07:51 )             38.5     08-05 @ 07:52    139  |  104  |  15  ----------------------------<  106<H>  3.8   |  27  |  0.7  08-04 @ 07:51    137  |  102  |  13  ----------------------------<  178<H>  4.0   |  22  |  0.8    Ca    9.0      08-05 @ 07:52  Ca    9.5      08-04 @ 07:51  Mg     2.0     08-05  Mg     1.7     08-04    TPro  5.8<L>  /  Alb  3.5  /  TBili  0.8  /  DBili  x   /  AST  17  /  ALT  10  /  AlkPhos  78  08-05  TPro  6.8  /  Alb  3.9  /  TBili  0.8  /  DBili  x   /  AST  18  /  ALT  10  /  AlkPhos  88  08-04    Creatine Kinase, Serum: 121 (08-05 @ 07:52)    CARDIAC MARKERS ( 08-05-21 @ 07:52 )  x     / 0.01 ng/mL / 121 U/L / x     / 3.2 ng/mL  CARDIAC MARKERS ( 08-04-21 @ 11:16 )  x     / 0.07 ng/mL / x     / x     / x      CARDIAC MARKERS ( 08-04-21 @ 07:51 )  x     / 0.02 ng/mL / x     / x     / x                                                  -------------------------------------------------------------  RADIOLOGY:                                            --------------------------------------------------------------    PHYSICAL EXAM:  General: No acute distress; Pallor (-), Icterus (-), Cyanosis (-), Clubbing (-)  HEENT: Normocephalic, atraumatic, PERRLA, EOMI  PULM: Bilaterally equal and clear breath sounds, wheeze (-), rubs (-), mild bassal crackles (+)  CVS: Normal S1 and S2, murmurs (-), rubs (-), gallops (-)   GI: Soft, nondistended, nontender, BS +  MSK: Edema (-), no muscle, bone or joint tenderness noted  SKIN: Warm and well perfused, no rashes noted  NEURO:  Alert and Oriented x 3; No gross focal neurological deficit noted                                           --------------------------------------------------------------
Patient is a 76y old  Female who presents with a chief complaint of SOB (05 Aug 2021 17:09)      Patient seen and examined at bedside.  Patient denies any chest pain and reports that breathing is much easier.  Reports that she has been urinating a significant amount.   ALLERGIES:  epinephrine (Unknown)    MEDICATIONS:  atorvastatin 20 milliGRAM(s) Oral at bedtime  carvedilol 3.125 milliGRAM(s) Oral every 12 hours  chlorhexidine 4% Liquid 1 Application(s) Topical <User Schedule>  enoxaparin Injectable 40 milliGRAM(s) SubCutaneous at bedtime  sacubitril 49 mG/valsartan 51 mG 1 Tablet(s) Oral two times a day  spironolactone 25 milliGRAM(s) Oral daily    Vital Signs Last 24 Hrs  T(F): 96.4 (05 Aug 2021 13:13), Max: 98.4 (05 Aug 2021 05:09)  HR: 95 (05 Aug 2021 17:31) (81 - 111)  BP: 106/67 (05 Aug 2021 17:31) (97/59 - 127/72)  RR: 18 (05 Aug 2021 17:31) (18 - 18)  SpO2: 98% (04 Aug 2021 21:11) (98% - 98%)  I&O's Summary    04 Aug 2021 07:01  -  05 Aug 2021 07:00  --------------------------------------------------------  IN: 160 mL / OUT: 0 mL / NET: 160 mL    05 Aug 2021 07:01  -  05 Aug 2021 18:30  --------------------------------------------------------  IN: 700 mL / OUT: 0 mL / NET: 700 mL        PHYSICAL EXAM:  General: NAD, A/O x 3  ENT: MMM  Neck: Supple, No JVD  Lungs: right lower crackle otherwise lungs sound clear   Cardio: RRR, S1/S2, 2/6 systolic murmur   Abdomen: Soft, Nontender, Nondistended; Bowel sounds present  Extremities: No cyanosis, No edema    LABS:                        11.0   7.63  )-----------( 395      ( 05 Aug 2021 07:52 )             34.8     08-05    139  |  104  |  15  ----------------------------<  106  3.8   |  27  |  0.7    Ca    9.0      05 Aug 2021 07:52  Mg     2.0     08-05    TPro  5.8  /  Alb  3.5  /  TBili  0.8  /  DBili  x   /  AST  17  /  ALT  10  /  AlkPhos  78  08-05    eGFR if Non African American: 84 mL/min/1.73M2 (08-05-21 @ 07:52)  eGFR if : 97 mL/min/1.73M2 (08-05-21 @ 07:52)        CARDIAC MARKERS ( 05 Aug 2021 07:52 )  x     / 0.01 ng/mL / 121 U/L / x     / 3.2 ng/mL  CARDIAC MARKERS ( 04 Aug 2021 11:16 )  x     / 0.07 ng/mL / x     / x     / x      CARDIAC MARKERS ( 04 Aug 2021 07:51 )  x     / 0.02 ng/mL / x     / x     / x          08-05 Chol 150 mg/dL LDL -- HDL 46 mg/dL Trig 82 mg/dL                < from: 12 Lead ECG (08.04.21 @ 07:23) >  Diagnosis Line Sinus tachycardia  Nonspecific ST and T wave abnormality  Abnormal ECG    < end of copied text >        COVID-19 PCR: Kayla (08-04-21 @ 09:34)      RADIOLOGY & ADDITIONAL TESTS:    Care Discussed with Consultants/Other Providers: 
Patient is a 76y old  Female who presents with a chief complaint of SOB (06 Aug 2021 08:57)      Patient seen and examined at bedside.  Patient denies any chest pain or shortness of breath.    ALLERGIES:  epinephrine (Unknown)    MEDICATIONS:  atorvastatin 20 milliGRAM(s) Oral at bedtime  carvedilol 3.125 milliGRAM(s) Oral every 12 hours  chlorhexidine 4% Liquid 1 Application(s) Topical <User Schedule>  enoxaparin Injectable 40 milliGRAM(s) SubCutaneous at bedtime  furosemide    Tablet 40 milliGRAM(s) Oral daily  sacubitril 49 mG/valsartan 51 mG 1 Tablet(s) Oral two times a day  spironolactone 25 milliGRAM(s) Oral daily    Vital Signs Last 24 Hrs  T(F): 96.3 (06 Aug 2021 13:12), Max: 96.3 (06 Aug 2021 05:34)  HR: 101 (06 Aug 2021 13:12) (92 - 101)  BP: 111/75 (06 Aug 2021 13:12) (106/67 - 117/76)  RR: 18 (06 Aug 2021 13:12) (18 - 18)  SpO2: --  I&O's Summary    05 Aug 2021 07:01  -  06 Aug 2021 07:00  --------------------------------------------------------  IN: 700 mL / OUT: 0 mL / NET: 700 mL        PHYSICAL EXAM:  General: NAD, A/O x 2  ENT: MMM  Neck: Supple, No JVD  Lungs: right lower fine crackle otherwise lungs are clear   Cardio: RRR, S1/S2, 2/6 systolic murmur   Abdomen: Soft, Nontender, Nondistended; Bowel sounds present  Extremities: No cyanosis, No edema    LABS:                        11.0   7.63  )-----------( 395      ( 05 Aug 2021 07:52 )             34.8     08-05    139  |  104  |  15  ----------------------------<  106  3.8   |  27  |  0.7    Ca    9.0      05 Aug 2021 07:52  Mg     2.0     08-05    TPro  5.8  /  Alb  3.5  /  TBili  0.8  /  DBili  x   /  AST  17  /  ALT  10  /  AlkPhos  78  08-05    eGFR if Non African American: 84 mL/min/1.73M2 (08-05-21 @ 07:52)  eGFR if : 97 mL/min/1.73M2 (08-05-21 @ 07:52)        CARDIAC MARKERS ( 05 Aug 2021 07:52 )  x     / 0.01 ng/mL / 121 U/L / x     / 3.2 ng/mL  CARDIAC MARKERS ( 04 Aug 2021 11:16 )  x     / 0.07 ng/mL / x     / x     / x      CARDIAC MARKERS ( 04 Aug 2021 07:51 )  x     / 0.02 ng/mL / x     / x     / x          08-05 Chol 150 mg/dL LDL -- HDL 46 mg/dL Trig 82 mg/dL                      COVID-19 PCR: NotDetec (08-04-21 @ 09:34)      RADIOLOGY & ADDITIONAL TESTS:  < from: TTE Echo Complete w/o Contrast w/ Doppler (08.06.21 @ 08:48) >  Summary:   1. Left ventricular ejection fraction, by visual estimation, is 20 to 25%.   2. Multiple left ventricular regional wall motion abnormalities exist. See wall motion findings.   3. The left ventricular diastolic function could not be assessed in this study.   4. Moderately enlarged left atrium.   5. Normal right atrial size.   6. Mild mitral valve regurgitation.   7. The mitral valve leaflets are tethered which is due to reduced systolic function and elevated LVDP.   8. Mild tricuspid regurgitation.   9. Estimated pulmonary artery systolic pressure is 42.9 mmHg assuming a right atrial pressure of3 mmHg, which is consistent with mild pulmonary hypertension.  10. Pulmonary hypertension is present.  11. LA volume Index is 38.5 ml/m² ml/m2.  12. Peak transaortic gradient equals 10.0 mmHg, mean transaortic gradient equals 4.8 mmHg, the calculatedaortic valve area equals 1.44 cm² by the continuity equation consistent with moderate aortic stenosis.    PHYSICIAN INTERPRETATION:  Left Ventricle: Left ventricular ejection fraction, by visual estimation, is 20 to 25%. The left ventricular diastolic function could not be assessed in this study.    < end of copied text >    Care Discussed with Consultants/Other Providers: 
      Subjective: pt feels well and is ambulating with no cp nor sob.  pt wishes to go home      MEDICATIONS  (STANDING):  atorvastatin 20 milliGRAM(s) Oral at bedtime  carvedilol 3.125 milliGRAM(s) Oral every 12 hours  chlorhexidine 4% Liquid 1 Application(s) Topical <User Schedule>  enoxaparin Injectable 40 milliGRAM(s) SubCutaneous at bedtime  furosemide    Tablet 40 milliGRAM(s) Oral daily  sacubitril 49 mG/valsartan 51 mG 1 Tablet(s) Oral two times a day  spironolactone 25 milliGRAM(s) Oral daily    MEDICATIONS  (PRN):            Vital Signs Last 24 Hrs  T(C): 35.7 (06 Aug 2021 05:34), Max: 35.8 (05 Aug 2021 13:13)  T(F): 96.3 (06 Aug 2021 05:34), Max: 96.4 (05 Aug 2021 13:13)  HR: 92 (06 Aug 2021 05:34) (91 - 95)  BP: 117/76 (06 Aug 2021 05:34) (106/62 - 117/76)  BP(mean): --  RR: 18 (06 Aug 2021 05:34) (18 - 18)  SpO2: --             REVIEW OF SYSTEMS:  CONSTITUTIONAL: no fever, no chills, no diaphoresis  CARDIOLOGY: no chest pain, no SOB, no palpitation, no diaphoresis, no faint   RESPIRATORY: no dyspnea, no wheeze, no orthopnea, no PND   NEUROLOGICAL: no dizziness, headache, focal deficits to report.  GI: no abdominal pain, no dyspepsia, no nausea, no vomiting, no diarrhea.    HEENT: no congestion, no nasal bleeding  SKIN: no ecchymosis, no petechia             PHYSICAL EXAM:  · CONSTITUTIONAL: Looks stable, in no dstiress  . NECK: Supple, no JVD, no bruit on either carotid side   · RESPIRATORY: Normal air entry to lung base, no wheeze, no crackle, no wet rales  · CARDIOVASCULAR: Normal S1, A2, P2, no murmur, no click, regular rate,  no rub,  · EXTREMITIES: No cyanosis, no clubbing, no edema  · VASCULAR: Pulses are regular, equal, bilateral in upper and lower extremities  	  TELEMETRY: nsr, ns vt yesterday 5 beats    ECG:  Diagnosis Line Sinus tachycardia  Nonspecific ST and T wave abnormality  Abnormal ECG  no change from prior ecgs    TTE:  pending  LABS:                        11.0   7.63  )-----------( 395      ( 05 Aug 2021 07:52 )             34.8     08-05    139  |  104  |  15  ----------------------------<  106<H>  3.8   |  27  |  0.7    Ca    9.0      05 Aug 2021 07:52  Mg     2.0     08-05    TPro  5.8<L>  /  Alb  3.5  /  TBili  0.8  /  DBili  x   /  AST  17  /  ALT  10  /  AlkPhos  78  08-05    CARDIAC MARKERS ( 05 Aug 2021 07:52 )  x     / 0.01 ng/mL / 121 U/L / x     / 3.2 ng/mL  CARDIAC MARKERS ( 04 Aug 2021 11:16 )  x     / 0.07 ng/mL / x     / x     / x              I&O's Summary    05 Aug 2021 07:01  -  06 Aug 2021 07:00  --------------------------------------------------------  IN: 700 mL / OUT: 0 mL / NET: 700 mL      BNP  RADIOLOGY & ADDITIONAL STUDIES:    IMPRESSION AND PLAN:

## 2021-08-06 NOTE — DISCHARGE NOTE NURSING/CASE MANAGEMENT/SOCIAL WORK - PATIENT PORTAL LINK FT
You can access the FollowMyHealth Patient Portal offered by [facility] by registering at the following website: [email or web address]. By joining [email or web address]’s FollowMyHealth portal, you will also be able to view your health information using other applications (apps) compatible with our system.

## 2021-08-06 NOTE — DISCHARGE NOTE PROVIDER - HOSPITAL COURSE
75 yo F with PMHx of HTN, HFrEF s/p ICD and PPM presents to the ED for worsening shortness of breath and fatigue. SOB started one week ago and got worse today, endorsed orthopnea and feeling weak.  She was diagnosed with acute on chronic systolic CHF.  She diuresed significantly with one dose of IV lasix.  She was then switched to PO lasix up from her home dose of 20mg qd to 40mg qd.  Her carvediolol was reduced from her home dose of 6.25mg bid to 3.125mg.  During her hospital stay she was cared for by her primary cardiologist Dr. Albert.    Troponins were low and trended down, thought to be 2/2 to stress from HFrEF exacerbation. Her EKG showed ST depression that was not new.  A TTE showed an EF of 20-25%.  EP interrogated her ICD and no dysrhythmia was found.  Patient maintained on tele monitoring for over 24 hours and no arrythmia was found.   On the second night of her hospitalization she became confused and could not remember why she was in the hospital.  She was reviewed by neurology and a CT of head was done.      She will follow with her cardiologist Dr. Frank, who is planning an elective heart cath.

## 2021-08-06 NOTE — CONSULT NOTE ADULT - SUBJECTIVE AND OBJECTIVE BOX
Neurology Consult    Patient is a 76y old  Female who presents with a chief complaint of SOB (06 Aug 2021 14:56)      HPI:  77 yo F with PMHx of HTN, HFrEF s/p ICD and PPM presents to the ED for worsening shortness of breath and fatigue. SOB started one week ago and got worse today, endorses orthopnea and feeling tired this morning, her son called EMS. She states she feels short of breath after talking for long, much better after coming to the hospital. Adheres to a low salt diet, checks weight at home and denies noticing any change.  In ED, VS within normal limits except , spo2 96% on RA. CXR shows ICD and BL pleural effusions. Pt received 60mg IV lasix and Mg 2g.  (04 Aug 2021 10:00)    Neurology consulted for single episode of AMS overnight. Patient became transiently confused, unaware of where she was, looking for her . This morning, pt back to baseline and aware that she was not acting normal last night. Episode of confusion was not a/w LOC, HA, or head trauma. Pt reports no prior episodes of transient confusion or delirium. Pt now back to baseline per pt's  and son, who are both at bedside.    PAST MEDICAL & SURGICAL HISTORY:  CHF, stage C    HTN (hypertension)        FAMILY HISTORY:  FH: heart failure (Mother)        Social History: (-) x 3    Allergies    epinephrine (Unknown)    Intolerances        MEDICATIONS  (STANDING):  atorvastatin 20 milliGRAM(s) Oral at bedtime  carvedilol 3.125 milliGRAM(s) Oral every 12 hours  chlorhexidine 4% Liquid 1 Application(s) Topical <User Schedule>  enoxaparin Injectable 40 milliGRAM(s) SubCutaneous at bedtime  furosemide    Tablet 40 milliGRAM(s) Oral daily  sacubitril 49 mG/valsartan 51 mG 1 Tablet(s) Oral two times a day  spironolactone 25 milliGRAM(s) Oral daily    MEDICATIONS  (PRN):      Review of systems:    Constitutional: as per HPI  Eyes: No eye pain or discharge  ENMT:  No difficulty hearing; No sinus or throat pain  Neck: No pain or stiffness  Respiratory: No cough, wheezing, chills or hemoptysis  Cardiovascular: No chest pain, palpitations, shortness of breath, dyspnea on exertion  Gastrointestinal: No abdominal pain, nausea, vomiting or hematemesis; No diarrhea or constipation.   Genitourinary: No dysuria, frequency, hematuria or incontinence  Neurological: As per HPI  Skin: No rashes or lesions   Endocrine: No heat or cold intolerance; No hair loss  Musculoskeletal: No joint pain or swelling  Psychiatric: No depression, anxiety, mood swings  Heme/Lymph: No easy bruising or bleeding gums    Vital Signs Last 24 Hrs  T(C): 35.7 (06 Aug 2021 13:12), Max: 35.7 (06 Aug 2021 05:34)  T(F): 96.3 (06 Aug 2021 13:12), Max: 96.3 (06 Aug 2021 05:34)  HR: 101 (06 Aug 2021 13:12) (92 - 101)  BP: 111/75 (06 Aug 2021 13:12) (106/67 - 117/76)  BP(mean): --  RR: 18 (06 Aug 2021 13:12) (18 - 18)  SpO2: --    Examination:  General:  Appearance is consistent with chronologic age.  No abnormal facies.  Gross skin survey within normal limits.    Cognitive/Language:  The patient is oriented to person, place, time and date.  Recent and remote memory intact.  Fund of knowledge is intact and normal.  Language with normal repetition, comprehension and naming.  Nondysarthric.    Eyes: intact VA, VFF.  EOMI w/o nystagmus, skew or reported double vision.  PERRL.  No ptosis/weakness of eyelid closure.    Face:  Facial sensation normal V1 - 3, no facial asymmetry.    Ears/Nose/Throat:  Hearing grossly intact b/l.  Palate elevates midline.  Tongue and uvula midline.   Motor examination:   Normal tone, bulk and range of motion.  No tenderness, twitching, tremors or involuntary movements.  Formal Muscle Strength Testing: (MRC grade R/L) 5/5 UE; 5/5 LE.  No observable drift.  Reflexes:   2+ b/l pectoralis, biceps, triceps, brachioradialis, patella and Achilles.  Plantar response downgoing b/l.  Jaw jerk, Brooke, clonus absent.  Sensory examination:   Intact to light touch and pinprick, pain, temperature and proprioception and vibration in all extremities.  Cerebellum:   FTN/HKS intact with normal BRISA in all limbs.  No dysmetria or dysdiadokinesia.  Gait narrow based and normal.    Respiratory:  no audible wheezing or inspiratory stridor.  no use of accessory muscles.   Cardiac: pulse palpable, no audible bruits  Abdomen: supple, no guarding, no TTP    Labs:   CBC Full  -  ( 05 Aug 2021 07:52 )  WBC Count : 7.63 K/uL  RBC Count : 3.56 M/uL  Hemoglobin : 11.0 g/dL  Hematocrit : 34.8 %  Platelet Count - Automated : 395 K/uL  Mean Cell Volume : 97.8 fL  Mean Cell Hemoglobin : 30.9 pg  Mean Cell Hemoglobin Concentration : 31.6 g/dL  Auto Neutrophil # : 4.69 K/uL  Auto Lymphocyte # : 1.99 K/uL  Auto Monocyte # : 0.78 K/uL  Auto Eosinophil # : 0.11 K/uL  Auto Basophil # : 0.03 K/uL  Auto Neutrophil % : 61.5 %  Auto Lymphocyte % : 26.1 %  Auto Monocyte % : 10.2 %  Auto Eosinophil % : 1.4 %  Auto Basophil % : 0.4 %    08-05    139  |  104  |  15  ----------------------------<  106<H>  3.8   |  27  |  0.7    Ca    9.0      05 Aug 2021 07:52  Mg     2.0     08-05    TPro  5.8<L>  /  Alb  3.5  /  TBili  0.8  /  DBili  x   /  AST  17  /  ALT  10  /  AlkPhos  78  08-05    LIVER FUNCTIONS - ( 05 Aug 2021 07:52 )  Alb: 3.5 g/dL / Pro: 5.8 g/dL / ALK PHOS: 78 U/L / ALT: 10 U/L / AST: 17 U/L / GGT: x      
Patient is a 76y old  Female who presents with a chief complaint of SOB (05 Aug 2021 13:43)      HPI:  75 yo F with PMHx of HTN, hyperlipidemia, non ischemic cm s/p ICD  presents to the ED for worsening shortness of breath and fatigue. SOB started one week ago and got worse yesterday, endorses orthopnea and feeling tired in am.  pt's son called EMS. She states she feels short of breath after talking for long, much better after coming to the hospital and getting iv lasix. Adheres to a low salt diet, checks weight at home and denies noticing any change.  In ED, VS within normal limits except , spo2 96% on RA. CXR shows ICD and BL pleural effusions. Pt received 60mg IV lasix and Mg 2g.  (04 Aug 2021 10:00)      PAST MEDICAL & SURGICAL HISTORY:  CHF, stage C    HTN (hypertension)        PREVIOUS DIAGNOSTIC TESTING:      ECHO  FINDINGS: ef 25 to 30%    CATHETERIZATION  FINDINGS: no significant cad > 10 yrs ago.     MEDICATIONS  (STANDING):  atorvastatin 20 milliGRAM(s) Oral at bedtime  carvedilol 3.125 milliGRAM(s) Oral every 12 hours  chlorhexidine 4% Liquid 1 Application(s) Topical <User Schedule>  enoxaparin Injectable 40 milliGRAM(s) SubCutaneous at bedtime  sacubitril 49 mG/valsartan 51 mG 1 Tablet(s) Oral two times a day  spironolactone 25 milliGRAM(s) Oral daily    MEDICATIONS  (PRN):      FAMILY HISTORY:  FH: heart failure (Mother)  son avr/mr        SOCIAL HISTORY:  CIGARETTES: none  ALCOHOL: none  DRUGS: none                      REVIEW OF SYSTEMS:  CONSTITUTIONAL: No distress, Looks stable  NECK: No pain or stiffness  RESPIRATORY: No cough, wheezing,(+) shortness of breath  CARDIOVASCULAR: No chest pain, (+)SOB, no  palpitations, leg swelling  GASTROINTESTINAL: No abdominal or epigastric pain. No nausea, vomiting, or hematemesis;  No melena.  NEUROLOGICAL: No dizziness, headaches, memory loss, loss of strength  SKIN: No itching, burning, rashes, or lesions   ENDOCRINE: No heat or cold intolerance  MUSCULOSKELETAL: No joint pain, No  swelling; No muscle pain  PSYCHIATRIC: No depression, anxiety, mood swings, or difficulty sleeping  ALLERGY: No hives, itching, rash          Vital Signs Last 24 Hrs  T(C): 35.8 (05 Aug 2021 13:13), Max: 36.9 (05 Aug 2021 05:09)  T(F): 96.4 (05 Aug 2021 13:13), Max: 98.4 (05 Aug 2021 05:09)  HR: 91 (05 Aug 2021 13:13) (81 - 111)  BP: 106/62 (05 Aug 2021 13:13) (97/59 - 127/72)  BP(mean): --  RR: 18 (05 Aug 2021 13:13) (18 - 18)  SpO2: 98% (04 Aug 2021 21:11) (98% - 98%)                      PHYSICAL EXAM:  GENERAL: No distress, well developed  HEAD:  Atraumatic, Normocephalic  NECK: Supple, No JVD, No Bruit of either carotid arteries  NERVOUS SYSTEM:  Alert, Awake, Oriented to time, place, person; Normal memory and speech; Normal motor Strength 5/5 B/L upper and lower extremities  CHEST/LUNG: Normal air entry ith decreased bs at bases; No wheeze, crackle, rales, rhonchi  HEART: Regular heart beat, S1, A2, P2, No S3, No S4, No gallop, No murmur  ABDOMEN: Soft, Non tender, Non distended; Bowel sounds present  EXTREMITIES:  2+ Peripheral Pulses, No clubbing, No edema  SKIN: No rashes or lesions    TELEMETRY:5 beat vt, nsr    ECG:Diagnosis Line Sinus tachycardia  Nonspecific ST and T wave abnormality  Abnormal ECG      I&O's Detail    04 Aug 2021 07:01  -  05 Aug 2021 07:00  --------------------------------------------------------  IN:    Oral Fluid: 160 mL  Total IN: 160 mL    OUT:  Total OUT: 0 mL    Total NET: 160 mL      05 Aug 2021 07:01  -  05 Aug 2021 17:10  --------------------------------------------------------  IN:    Oral Fluid: 700 mL  Total IN: 700 mL    OUT:  Total OUT: 0 mL    Total NET: 700 mL          LABS:                        11.0   7.63  )-----------( 395      ( 05 Aug 2021 07:52 )             34.8     08-05    139  |  104  |  15  ----------------------------<  106<H>  3.8   |  27  |  0.7    Ca    9.0      05 Aug 2021 07:52  Mg     2.0     08-05    TPro  5.8<L>  /  Alb  3.5  /  TBili  0.8  /  DBili  x   /  AST  17  /  ALT  10  /  AlkPhos  78  08-05    CARDIAC MARKERS ( 05 Aug 2021 07:52 )  x     / 0.01 ng/mL / 121 U/L / x     / 3.2 ng/mL  CARDIAC MARKERS ( 04 Aug 2021 11:16 )  x     / 0.07 ng/mL / x     / x     / x      CARDIAC MARKERS ( 04 Aug 2021 07:51 )  x     / 0.02 ng/mL / x     / x     / x              I&O's Summary    04 Aug 2021 07:01  -  05 Aug 2021 07:00  --------------------------------------------------------  IN: 160 mL / OUT: 0 mL / NET: 160 mL    05 Aug 2021 07:01  -  05 Aug 2021 17:10  --------------------------------------------------------  IN: 700 mL / OUT: 0 mL / NET: 700 mL        RADIOLOGY & ADDITIONAL STUDIES:

## 2021-08-06 NOTE — DISCHARGE NOTE PROVIDER - NSDCMRMEDTOKEN_GEN_ALL_CORE_FT
atorvastatin 20 mg oral tablet: 1 tab(s) orally once a day  carvedilol 3.125 mg oral tablet: 1 tab(s) orally every 12 hours  Entresto 49 mg-51 mg oral tablet: 1 tab(s) orally 2 times a day  furosemide 40 mg oral tablet: 1 tab(s) orally once a day  spironolactone 25 mg oral tablet: 1 tab(s) orally once a day

## 2021-08-06 NOTE — PROGRESS NOTE ADULT - PROBLEM SELECTOR PLAN 1
pt with known non ischemic cm for > 10 yrs with icd who had first chf episode since initially presenting with a cm.  pt responded well to iv lasix and appears euvolemic.    change lasix to 40 po q12  daily wts  cont other meds  f/u on echo and if no change from prior then consider d/c home and possible rt and left heart cath and f/u with heart failure team as an out pt

## 2021-08-06 NOTE — CONSULT NOTE ADULT - ATTENDING COMMENTS
I have personally seen and examined this patient.  I have fully participated in the care of this patient.  I have reviewed all pertinent clinical information, including history, physical exam, plan and note. Patient is alert and oriented with no deficit on exam, Most likely transient delirium. Recommend CT head. No further work up if CT head is normal.   I have reviewed all pertinent clinical information and reviewed all relevant imaging and diagnostic studies personally.  Recommendations as above.  Agree with above assessment except as noted.

## 2021-08-16 DIAGNOSIS — E78.5 HYPERLIPIDEMIA, UNSPECIFIED: ICD-10-CM

## 2021-08-16 DIAGNOSIS — I50.43 ACUTE ON CHRONIC COMBINED SYSTOLIC (CONGESTIVE) AND DIASTOLIC (CONGESTIVE) HEART FAILURE: ICD-10-CM

## 2021-08-16 DIAGNOSIS — Z95.810 PRESENCE OF AUTOMATIC (IMPLANTABLE) CARDIAC DEFIBRILLATOR: ICD-10-CM

## 2021-08-16 DIAGNOSIS — F05 DELIRIUM DUE TO KNOWN PHYSIOLOGICAL CONDITION: ICD-10-CM

## 2021-08-16 DIAGNOSIS — Z88.8 ALLERGY STATUS TO OTHER DRUGS, MEDICAMENTS AND BIOLOGICAL SUBSTANCES: ICD-10-CM

## 2021-08-16 DIAGNOSIS — I11.0 HYPERTENSIVE HEART DISEASE WITH HEART FAILURE: ICD-10-CM

## 2021-08-16 DIAGNOSIS — I42.8 OTHER CARDIOMYOPATHIES: ICD-10-CM

## 2021-08-16 DIAGNOSIS — R06.02 SHORTNESS OF BREATH: ICD-10-CM

## 2021-08-16 DIAGNOSIS — E83.42 HYPOMAGNESEMIA: ICD-10-CM

## 2021-08-16 PROBLEM — I50.9 HEART FAILURE, UNSPECIFIED: Chronic | Status: ACTIVE | Noted: 2021-08-04

## 2021-08-16 PROBLEM — I10 ESSENTIAL (PRIMARY) HYPERTENSION: Chronic | Status: ACTIVE | Noted: 2021-08-04

## 2021-09-16 ENCOUNTER — RX RENEWAL (OUTPATIENT)
Age: 76
End: 2021-09-16

## 2021-09-16 ENCOUNTER — APPOINTMENT (OUTPATIENT)
Dept: CARDIOLOGY | Facility: CLINIC | Age: 76
End: 2021-09-16
Payer: MEDICARE

## 2021-09-16 ENCOUNTER — NON-APPOINTMENT (OUTPATIENT)
Age: 76
End: 2021-09-16

## 2021-09-16 VITALS
HEIGHT: 64 IN | BODY MASS INDEX: 24.41 KG/M2 | WEIGHT: 143 LBS | DIASTOLIC BLOOD PRESSURE: 68 MMHG | HEART RATE: 90 BPM | SYSTOLIC BLOOD PRESSURE: 120 MMHG | TEMPERATURE: 98.1 F | RESPIRATION RATE: 16 BRPM | OXYGEN SATURATION: 100 %

## 2021-09-16 DIAGNOSIS — Z45.02 ENCOUNTER FOR ADJUSTMENT AND MANAGEMENT OF AUTOMATIC IMPLANTABLE CARDIAC DEFIBRILLATOR: ICD-10-CM

## 2021-09-16 PROCEDURE — 93000 ELECTROCARDIOGRAM COMPLETE: CPT | Mod: 59

## 2021-09-16 PROCEDURE — 99214 OFFICE O/P EST MOD 30 MIN: CPT | Mod: 25

## 2021-09-16 PROCEDURE — 93289 INTERROG DEVICE EVAL HEART: CPT

## 2021-09-16 NOTE — PROCEDURE
[No] : not [NSR] : normal sinus rhythm [ICD] : Implantable cardioverter-defibrillator [VVI] : VVI [Voltage: ___ volts] : Voltage was [unfilled] volts [Charge Time: ___ sec] : charge time was [unfilled] seconds [Longevity: ___ months] : The estimated remaining battery life is [unfilled] months [Normal] : The battery status is normal. [Lead Imp:  ___ohms] : lead impedance was [unfilled] ohms [Sensing Amplitude ___mv] : sensing amplitude was [unfilled] mv [___V @] : [unfilled] V [___ ms] : [unfilled] ms [Programmed for Longevity] : output reprogrammed for improved battery longevity [Pace ___ %] : Pace [unfilled]% [Threshold Testing Performed] : Threshold testing was not performed [Counters Reset] : the counters were not reset [de-identified] : [facility] [de-identified] : D141 [de-identified] : [id number] [de-identified] : 5/9/2016 [de-identified] : 40 [de-identified] : NO ARRHYTHMIAS. \par NORMAL VVI  ICD SYSTEM FUNCTION TODAY. \par

## 2021-09-16 NOTE — HISTORY OF PRESENT ILLNESS
[de-identified] : 75 y/o female with pmh of NICM/CHF/ NYHA II , S/P AICD 7/2008, S/P AICD generator replacement on 5/9/2016, presents for a routine follow up/device interrogation. \par Recently was admitted to [facility] for HF exacerbation (8/7/2021) \par \par She denies CP/SOB/palpitations. No dizziness, syncope or AICD shocks. SOB and CRISTINA improved. \par \par Cardiac tests: \par ECG (9/16/2021): SR at 89 bpm, inc RBBB, PVC, non -specific T wave abnormal ( no changes c/w prior)   \par ECG (10/4/2019) 74 bpm NSR\par ECG (4/5/2019) - SR at 71 bpm pr 124ms, QRS 102ms, QTC 419ms, non specific TW abnormality\par ECG ( 10/23/2018) - SR at 67 bpm, non-specific TWI , QTc 398 ms \par

## 2021-09-16 NOTE — ASSESSMENT
[FreeTextEntry1] : ## Non- ischemic cardiomyopathy s/p \par ## HF exacerbation \par \par Device interrogation showed normal single chamber  ICD function. Stable parameters. No events or arrhythmias. \par \par Remote monitoring discussed, patient agreed. Potential co-pays associated with remote service discussed. \par She was enrolled on [employer] website and [employer] chart created.  \par I've ordered a wireless connector, which will be shipped to her house in 1-2 weeks \par \par She reports c/w meds.  Discharge from the hospital med list reviewed. \par Currently is taking Lasix 20 mg instead of 40 mg.  Coreg 6.25 instead of 3.125 mg BID. \par On Entresto and Spironolactone.  \par \par I refilled meds as per hospital discharge. \par \par Low sodium and daily weights discussed.  \par \par \par Plan \par -Continue GDMT for CM \par -Remote monitoring q 3 months\par -RTC in 6 months \par -Follow up with cardio/ . \par \par \par

## 2021-09-16 NOTE — PHYSICAL EXAM
[General Appearance - Well Developed] : well developed [General Appearance - Well Nourished] : well nourished [Heart Rate And Rhythm] : heart rate and rhythm were normal [Heart Sounds] : normal S1 and S2 [Systolic grade ___/6] : A grade [unfilled]/6 systolic murmur was heard. [] : no respiratory distress [Respiration, Rhythm And Depth] : normal respiratory rhythm and effort [Auscultation Breath Sounds / Voice Sounds] : lungs were clear to auscultation bilaterally [Left Infraclavicular] : left infraclavicular area [Clean] : clean [Dry] : dry [Well-Healed] : well-healed [Bowel Sounds] : normal bowel sounds [Cyanosis, Localized] : no localized cyanosis [Nail Clubbing] : no clubbing of the fingernails

## 2021-09-17 NOTE — ED ADULT TRIAGE NOTE - TEMPERATURE IN CELSIUS (DEGREES C)
[facility] Internal Medicine Teaching Service  Progress Note    Patient: Precious Acharya Date: 9/17/2021   YOB: 1945 Admission Date: 9/7/2021   MRN: [id number] Attending: Sudhir Yoder MD     SUMMARY OF HOSPITALIZATION: Precious Acharya is a 76 year old years old female patient with PMHx of ESRD on hemodialysis TTS, hypertension, anemia, multiple sclerosis, secondary hyperparathyroidism, Hx of DVT on warfarin,  urinary retention s/p bilateral neph tubes, and a sacral decubitus ulcer admitted 9/8 with episodes of altered mental status for 2 days per her daughters report. Pertinent ER labs revealed leukocytosis, WBC 15,000, and hypoglycemia with a blood sugar of 58. Chest x-ray with no acute findings, blood cultures in process. CT showed cerebellar cystic mass lesion initially noted in 2015; no records available.  Nephrology, wound care, and I&D consulted. Colorectal to see patient today to consider diverting ostomy bag due to sacral wound location and need for wound vac. Per wound care, patient would need diverting ostomy prior to wound vac placement due to risk for infection. Daughter Charline declines subacute placement at this time, preferring to take patient home. [facility] determined [facility] would not be able to take patient back onto service \"due to her case being too complex and too unsafe to be managed in the home.\"  informed Charline [facility] will not service patient upon hospital discharge. Charline is agreeable to an alternate home care agency. RNCM updated. Per colorectal surgery, given Yaquelin's hx of constipation they recommend screening colonoscopy prior to ostomy placement and Charline not amenable to \"more tubes.\"     OVERNIGHT EVENTS:   No acute events.  This a.m. patient's hemoglobin was 6.2.    PHYSICAL EXAM  Vital signs:    Visit Vitals  /68   Pulse 92   Temp 98.4 °F (36.9 °C) (Oral)   Resp 18   Ht 5' 6\" (1.676 m)   Wt 64.3 kg   SpO2 94%   BMI 22.88 kg/m²     General:  In  no acute distress  HEENT: Extraocular movements intact, no scleral icterus or conjunctival pallor  Cardiovascular:  Normal rate and rhythm   Respiratory:  CTAB  Gastrointestinal:  +BS, soft, no tenderness or distension.  Musculoskeletal:   ROM limited by progressive MS. No clubbing, edema, or cyanosis.  Skin:  No rashes/lesions/ecchymoses/jaundice. Large sacral decubitus ulcer. Dressing clean and dry.   Neurologic: A/O 3. EOMI. No facial droop or dysarthria.   Psychiatric: Normal affect and appropriate mood    I&O'S / LABS / IMAGING  I/Os:      Intake/Output Summary (Last 24 hours) at 9/17/2021 1416  Last data filed at 9/17/2021 1313  Gross per 24 hour   Intake 836 ml   Output 170 ml   Net 666 ml       LAB RESULTS  Recent Labs   Lab 09/17/21 0342 09/16/21  0342   WBC 8.5 8.3   HCT 21.1* 24.4*   HGB 6.2* 7.2*    209     Recent Labs   Lab 09/17/21 0342 09/16/21  0342   SODIUM 137 138   POTASSIUM 4.4 4.1   CHLORIDE 102 103   CO2 30 28   GLUCOSE 83 84   BUN 15 26*   CREATININE 2.46* 3.89*       IMAGING  XR Chest AP or PA  Result Date: 9/7/2021  IMPRESSION: 1.  No acute cardiopulmonary findings. 2.  Persistent left costophrenic and retrocardiac opacification.  If the opacity fails to clear further evaluation with chest CT should be considered. I, Attending Radiologist Elizabeth Anyaa MD, have reviewed the images and report and concur with these findings interpreted by Resident Radiologist, Kyler Sage DO.     XR Chest AP or PA  Result Date: 8/30/2021   IMPRESSION: 1.  Probable small left pleural effusion and adjacent airspace opacification which may represent some adjacent atelectasis. There is pre-existing prominent pericardial fat pad which limits the specificity of this finding. 2.  Unchanged mild cardiomegaly. I, Attending Radiologist Gary Tom MD, have reviewed the images and report and concur with these findings interpreted by Resident Radiologist, Aftab Cohen MD.     XR CHEST PA AND  LATERAL  Result Date: 8/30/2021  IMPRESSION: 1.  Persistent mild opacification in the left costophrenic angle representing a variable combination of small effusion with underlying atelectasis.    XR CHEST PA AND LATERAL  Result Date: 8/20/2021  Narrative: EXAM:  XR CHEST PA AND LATERAL DATE OF EXAM:  8/19/2021 10:52 AM. HISTORY:  Idiopathic sleep related nonobstructive alveolar hypoventilation, Shortness of breath. COMPARISON:  711. FINDINGS:  AP and lateral chest in wheelchair. There is a poor inspiration. There is a dialysis catheter on the right. There is cardiomegaly. There are some atelectatic changes over the lower right hilum. There is some haziness the left lung base related to position and overlying soft tissues   IMPRESSION:  Haziness the left base. Atelectatic changes over the right hilum. Cardiomegaly. The upper lungs appear grossly clear.     CT HEAD WO CONTRAST  Result Date: 9/7/2021  IMPRESSION:   1.  No acute intracranial abnormality. 2.  2.7 x 2.8 x 2.6 cm cystic lesion within the left cerebellar hemisphere with small focus of coarsely calcified eccentric mural soft tissue along the posterior margin. No significant surrounding vasogenic edema, mass effect or fourth ventricular effacement. A similar lesion was described on outside reports dating back to 2015 although comparison images are not available for review. Differential considerations include indolent/low-grade neoplasm (i.e. hemangioblastoma or less likely pilocytic astrocytoma), tumefactive perivascular space, atypical neuroglial cyst or stigmata of prior infection among other etiologies. This could be further evaluated with MRI brain with and without contrast. 3.  Moderately extensive patchy white matter disease, potentially on the basis of patient's known multiple sclerosis and/or small vessel ischemic disease.    IR Nephrostomy Tube  Result Date: 9/3/2021  Impression: IMPRESSION: Bilateral cutaneous nephrostomy tube exchange as detailed  above. Urine cultures were sent from each kidney. DISPOSITION:Maintain tubes to gravity drainage. Await urine culture results.    MRI 9/10/21  IMPRESSION: 1.  No acute intracranial findings. 2.  2.9 cm predominantly cystic lesion in the left cerebellar hemisphere with partially calcified mural nodule and cyst contents which follows CSF on all sequences. Given long-term stability as well as absence of significant edema/mass effect, low-grade neoplasm is felt less likely and stigmata of prior infection/inflammation is favored with tumefactive perivascular space and atypical neuroglial cyst also felt less likely but not excluded. 3.  Moderately extensive nonspecific white matter signal changes likely representing a combination of known multiple sclerosis and superimposed small vessel ischemic disease. 4.  Tiny chronic right occipital lobe infarct.      ASSESSMENT & PLAN  Precious Acharya is a 76 year old female admitted on 9/7/2021 with a diagnosis of multifactorial hypoactive delirium after presenting with AMS.     Further evaluation and work up as follows:     ## Altered mental status/delirium superimposed on? Underlying Cognitive impairment: multifactorial- Hypoglycemia vs. Cefepime neurotoxicity  ## Fraility severe deconditioning and complex medical conditions  ## Stage IV sacral ulcer s/p wound debridement with Dr. Grant 9/1/21  ## Severe protein calorie/poor oral intake  - presented with altered mental status following recent hospital discharge for sepsis secondary to stage IV sacral decubitus ulcer at which time patient was discharged with cefepime (renal dosed) patient was not eating and hypoglycemic in the 50s.  Unremarkable workup including blood cultures from HD sent, ESR/CRP, EMG/EEG, CT head/MRI (which showed cystic cerebellar lesion unchanged from 2015), chest x-ray and initial UA.  - neurology consulted; EMU/EEG video monitoring ordered, planning MRI  - Received IV cefepime at dialysis Saturday; did  not receive today; holding abx for now per ID  -  ID and wound consulted   - Dialysis disequilibrium syndrome is EM use/EEG diagnosis of exclusion, patient has neurologic condition (MS) which is a risk factor  - Geriatrics consulted: multifactorial hypoactive delirium   -Neurology following EEG results and MRI   -CT head without contrast on 09/07/2021 showed no acute intracranial abnormality.     -Agree to hold Cefepime.   -Monitor fluid and electrolyte balance.     -Monitor blood glucose;   -Maintain sleep/wake cycle with nonpharmacologic strategies.  Cognitive dysfunction in the setting of delirium.   -No advance directives scanned in electronic health record.   -Cognitive screening test/MoCA blind per speech therapy, the patient attained a score of 5/22 on 09/04/2021; involve family   - obtain consensus with the health care team and patient's family when formulating treatment plan.  - 9/10/21 MRI Impression: No acute intracranial findings. 2.9 cm predominantly cystic lesion in the left cerebellar hemisphere with partially calcified mural nodule and cyst contents which follows CSF on all sequences most likely f prior infection/inflammation is favored with tumefactive perivascular space and atypical neuroglial cyst also felt less likely but not excluded. Moderately extensive nonspecific white matter signal changes likely representing a combination of known multiple sclerosis and superimposed small vessel ischemic disease. Tiny chronic right occipital lobe infarct.  - 9/11 nephrology ordered U/A due to cloudy urine  - 9/13 U/A from bag came back gram - bacillus; I&D consulted - treating with ceftazidime; appreciate recommendations   - 9/13 neurology signing off; appreciate recommendations    - 9/14 colorectal surgery consulted to consider ostomy diversion due to large ulcer extending towards rectum  - 9/15 colorectal surgery recommends screening colonoscopy 2/2 constipation before further considering diverting  ostomy; per note, Charline is hesitant to add more tubes   - Wound care following, appreciate recommendations.  - will consult palliative Care for further discussion of goals of care    #Hypoglycemia:   - On Hypoglycemia protocol     ## Severe protein calorie/poor oral intake  -  albumin 2.0- consulted nutrition  - nutrition recommended Lex tissue nourishment and cardio/renal nourishment  - feeding tube in place, currently on continuous feeding.    ## Anemia in ESRD  - baseline hemoglobin around 7.3-7.5, current hemoglobin 6.2.  - no ongoing bleeding.  - transfuse 1U PRBC 9/17  - daily CBC, keep hemoglobin above 7   - nephrology managing BONNIE    ## ESRD on HD: management per nephrology  - Access: permcath RIJ   - F180, 3.5hrs, EDW 67.5kg  - Unit: [address]  - On HD TTS,     ## Secondary Hyperparathyroidism- on Hectorol at HD, resume cholecalciferol  ## Hyperphosphatemia- had stopped Renvela because daughter was concerned could be contributing to AMS; however patient can only take crushed/powder meds so will need to switch back to Renvela    ## Hx DVT  - pharmacy to dose and manage warfarin  - INR goal 2-3    ## Hyponatremia, iatrogenic-2/2 D5W,  - resolved    Disposition/discharge planning: [facility] with post acute rehab and speech therapy for potential diet advancement if desired by patient; Daughter Charline wants to take her home         Nutrition:    GI Prophylaxis: currently none; discontinued  DVT Prophylaxis: Warfarin PO  VTE Pharmacologic Prophylaxis: Holding  VTE Mechanical Prophylaxis: Yes                   Quality Indicators     AMI With Heart Failure with Reduced LVEF (<40%)? no  Heart failure?  No  Stroke measures indicated? no  AMI? No  DVT/VTE Prophylaxis:  VTE Pharmacologic Prophylaxis: Holding  VTE Mechanical Prophylaxis: Yes  Severe sepsis with septic shock?  No      This patient was discussed with Sudhir Yoder MD who agrees with the above findings, assessment and plan.    Maddison Zimmerman,  MD  Internal Medicine PGY-3  [phone]  9/17/2021 2:16 PM    Please contact the cross cover pager  for [facility] or () for [place] for questions between 7PM to 7AM Monday through Friday and between 11AM to 7AM on the weekends. Thank you.    Staffing Note:    I have interviewed and evaluated Precious Acharya and discussed her case with resident doctor.  I have also reviewed the note as documented and agree with it.  Allergies, radiology reports, labs and other clinical notes reviewed.   Attempted again to talk with Charline @ about 4:35, left VM.   Please refer to full note above for further details.  Sudhir Yoder MD  DOS:  9/17/2021         36.7

## 2021-09-29 ENCOUNTER — APPOINTMENT (OUTPATIENT)
Dept: CARDIOLOGY | Facility: CLINIC | Age: 76
End: 2021-09-29
Payer: MEDICARE

## 2021-09-29 VITALS
BODY MASS INDEX: 23.39 KG/M2 | HEART RATE: 111 BPM | DIASTOLIC BLOOD PRESSURE: 80 MMHG | HEIGHT: 64 IN | WEIGHT: 137 LBS | SYSTOLIC BLOOD PRESSURE: 120 MMHG | TEMPERATURE: 98.1 F | OXYGEN SATURATION: 98 %

## 2021-09-29 DIAGNOSIS — I50.20 UNSPECIFIED SYSTOLIC (CONGESTIVE) HEART FAILURE: ICD-10-CM

## 2021-09-29 DIAGNOSIS — Z00.00 ENCOUNTER FOR GENERAL ADULT MEDICAL EXAMINATION W/OUT ABNORMAL FINDINGS: ICD-10-CM

## 2021-09-29 PROCEDURE — 99215 OFFICE O/P EST HI 40 MIN: CPT

## 2021-09-29 PROCEDURE — 93000 ELECTROCARDIOGRAM COMPLETE: CPT

## 2021-09-30 PROBLEM — I50.20 HFREF (HEART FAILURE WITH REDUCED EJECTION FRACTION): Status: ACTIVE | Noted: 2019-04-05

## 2021-09-30 NOTE — ASSESSMENT
[FreeTextEntry1] : 77 y/o F with PMH of NICM, NYHA class II s/p AICD since 2007 presented for initial evaluation of congestive heart failure\par \par Assessment:\par NICM, NYHA Class II\par Chronic systolic heart failure\par Sinus tachycardia with PVCs\par Euvolemic on exam\par \par Plan:\par \par Increase coreg to 6.25 mg po BID\par Start Farxiga 10 mg po daily\par Continue Entresto 49-51 mg po BID\par Continue with spironolactone 25 mg po daily \par Decrease furosemide 20 mg po daily\par Check weight daily, take an extra lasix for weight gain of 2 lbs or more\par Counseled on low salt diet\par Continue with aerobic exercise\par Blood work for iron studies\par Follow up with EP and Dr. Mead\par RTO in 6 weeks \par \par \par I spent 62 minutes with the patient. More than half of this time was spent face-to-face with the patient discussing about diagnosis, treatment plan, prognosis, answering questions and providing counseling regarding importance of low sodium diet and monitoring daily weight and vital signs at home. The rest of the time was spent reviewing labs, imaging studies and discussing with other providers.\par \par \par \par Giuliano Pride MD, FACC, [facility] \par Advanced Heart Failure/ Mechanical Circulatory Support\par Pulmonary Hypertension and Cardiac Amyloidosis \par [facility]\par [facility]  \par \par

## 2021-09-30 NOTE — HISTORY OF PRESENT ILLNESS
[FreeTextEntry1] : 77 y/o F with PMH of NICM, NYHA class II s/p AICD since 2007 presented for evaluation of congestive heart failure. Patient was in hospital recently in August with CHF exacerbation. Patient walks up to an hour without significant symptoms. Pt denies chest pain, orthopnea, PND, SOB, cough, abdominal pain, LE swelling. She follows up with Dr. Frank. She is on Entresto, spironolactone and low dose carvedilol.

## 2021-09-30 NOTE — PHYSICAL EXAM
[Well Developed] : well developed [Well Nourished] : well nourished [No Acute Distress] : no acute distress [Normal Conjunctiva] : normal conjunctiva [Normal Venous Pressure] : normal venous pressure [No Carotid Bruit] : no carotid bruit [Normal S1, S2] : normal S1, S2 [No Murmur] : no murmur [Clear Lung Fields] : clear lung fields [Good Air Entry] : good air entry [No Respiratory Distress] : no respiratory distress  [Soft] : abdomen soft [Non Tender] : non-tender [Normal] : no edema, no cyanosis, no clubbing, no varicosities [No Edema] : no edema [No Varicosities] : no varicosities [No Rash] : no rash [No Skin Lesions] : no skin lesions [Moves all extremities] : moves all extremities [No Focal Deficits] : no focal deficits [de-identified] : Tachycardia

## 2021-10-01 ENCOUNTER — APPOINTMENT (OUTPATIENT)
Dept: CARDIOLOGY | Facility: CLINIC | Age: 76
End: 2021-10-01

## 2021-11-19 ENCOUNTER — APPOINTMENT (OUTPATIENT)
Dept: CARDIOLOGY | Facility: CLINIC | Age: 76
End: 2021-11-19

## 2022-03-10 ENCOUNTER — APPOINTMENT (OUTPATIENT)
Dept: CARDIOLOGY | Facility: CLINIC | Age: 77
End: 2022-03-10
Payer: MEDICARE

## 2022-03-10 VITALS
HEIGHT: 64 IN | WEIGHT: 138 LBS | BODY MASS INDEX: 23.56 KG/M2 | TEMPERATURE: 98.1 F | SYSTOLIC BLOOD PRESSURE: 98 MMHG | DIASTOLIC BLOOD PRESSURE: 60 MMHG

## 2022-03-10 PROCEDURE — 93282 PRGRMG EVAL IMPLANTABLE DFB: CPT

## 2022-03-17 ENCOUNTER — APPOINTMENT (OUTPATIENT)
Dept: CARDIOLOGY | Facility: CLINIC | Age: 77
End: 2022-03-17

## 2022-04-16 NOTE — PROCEDURE
[No] : not [ICD] : Implantable cardioverter-defibrillator [VVI] : VVI [Longevity: ___ months] : The estimated remaining battery life is [unfilled] months [Lead Imp:  ___ohms] : lead impedance was [unfilled] ohms [Sensing Amplitude ___mv] : sensing amplitude was [unfilled] mv [___V @] : [unfilled] V [___ ms] : [unfilled] ms [Sense ___ %] : Sense [unfilled]% [Programmed for Longevity] : output reprogrammed for improved battery longevity [de-identified] : Low EF [de-identified] : [place] [de-identified] : D152 [de-identified] : [id number] [de-identified] : 05/09/2016 [de-identified] : 40 [de-identified] :  0%\par No episode \par No fluid\par Normal Device function

## 2022-04-16 NOTE — HISTORY OF PRESENT ILLNESS
[de-identified] : \par Cardiologist: Dr. Justin Mckeon\par \par 74 y/o female with pmh of NICM/CHF/ NYHA II , S/P AICD, S/P AICD generator replacement on 5/9/2016, presents for a routine F/u, ICD interrogation \par \par Denies CP/SOB/palpitations \par No dizziness or syncope \par No changes in health status since her last visit . Good activity tolerance .\par No CRISTINA \par  ECG (4/5/2019) - SR at 71 bpm pr 124ms, QRS 102ms, QTC 419ms, non specific TW abnormality\par ECG ( 10/23/2018) - SR at 67 bpm, non-specific TWI , QTc 398 ms \par

## 2022-09-22 ENCOUNTER — APPOINTMENT (OUTPATIENT)
Dept: CARDIOLOGY | Facility: CLINIC | Age: 77
End: 2022-09-22

## 2022-09-22 VITALS
SYSTOLIC BLOOD PRESSURE: 120 MMHG | DIASTOLIC BLOOD PRESSURE: 70 MMHG | TEMPERATURE: 98 F | RESPIRATION RATE: 16 BRPM | BODY MASS INDEX: 23.56 KG/M2 | WEIGHT: 138 LBS | HEIGHT: 64 IN

## 2022-09-22 DIAGNOSIS — I42.8 OTHER CARDIOMYOPATHIES: ICD-10-CM

## 2022-09-22 DIAGNOSIS — E78.5 HYPERLIPIDEMIA, UNSPECIFIED: ICD-10-CM

## 2022-09-22 DIAGNOSIS — I50.22 CHRONIC SYSTOLIC (CONGESTIVE) HEART FAILURE: ICD-10-CM

## 2022-09-22 PROCEDURE — 93282 PRGRMG EVAL IMPLANTABLE DFB: CPT

## 2022-09-22 PROCEDURE — 99214 OFFICE O/P EST MOD 30 MIN: CPT

## 2022-09-22 RX ORDER — SACUBITRIL AND VALSARTAN 49; 51 MG/1; MG/1
49-51 TABLET, FILM COATED ORAL TWICE DAILY
Refills: 0 | Status: ACTIVE | COMMUNITY

## 2022-09-22 RX ORDER — DAPAGLIFLOZIN 10 MG/1
10 TABLET, FILM COATED ORAL
Qty: 90 | Refills: 3 | Status: ACTIVE | COMMUNITY
Start: 2021-09-29

## 2022-09-22 RX ORDER — CARVEDILOL 3.12 MG/1
3.12 TABLET, FILM COATED ORAL TWICE DAILY
Qty: 360 | Refills: 0 | Status: ACTIVE | COMMUNITY
Start: 2021-09-16

## 2022-09-22 NOTE — ASSESSMENT
[FreeTextEntry1] : CHF\par - cont entresto 49-51 q12\par - cont coreg\par \par HLD\par - cont lipitor 20 mg qd\par - FU with cardiology\par \par ·	I have spent 35 minutes of time on the encounter excluding separately reported services.\par \par

## 2022-09-22 NOTE — HISTORY OF PRESENT ILLNESS
[de-identified] : \par Cardiologist: Dr. Justin delgado\par \par 78 y/o female with pmh of NICM/CHF/ NYHA II , S/P AICD, S/P AICD generator replacement on 5/9/2016, presents for a routine F/u, ICD interrogation \par \par Denies CP/SOB/palpitations \par No dizziness or syncope \par No changes in health status since her last visit . Good activity tolerance .\par No CRISTINA \par  ECG (4/5/2019) - SR at 71 bpm pr 124ms, QRS 102ms, QTC 419ms, non specific TW abnormality\par ECG ( 10/23/2018) - SR at 67 bpm, non-specific TWI , QTc 398 ms \par

## 2022-09-22 NOTE — PROCEDURE
[No] : not [ICD] : Implantable cardioverter-defibrillator [VVI] : VVI [Longevity: ___ months] : The estimated remaining battery life is [unfilled] months [Sensing Amplitude ___mv] : sensing amplitude was [unfilled] mv [Programmed for Longevity] : output reprogrammed for improved battery longevity [Sense ___ %] : Sense [unfilled]% [de-identified] : Low EF [de-identified] : [place] [de-identified] : D104 [de-identified] : [id number] [de-identified] : 05/09/2016 [de-identified] : 40 [de-identified] :  0%\par No episode \par No fluid\par Normal Device function

## 2022-11-19 ENCOUNTER — NON-APPOINTMENT (OUTPATIENT)
Age: 77
End: 2022-11-19

## 2023-03-23 ENCOUNTER — APPOINTMENT (OUTPATIENT)
Dept: ELECTROPHYSIOLOGY | Facility: CLINIC | Age: 78
End: 2023-03-23

## 2023-04-21 ENCOUNTER — INPATIENT (INPATIENT)
Facility: HOSPITAL | Age: 78
LOS: 25 days | DRG: 286 | End: 2023-05-17
Attending: INTERNAL MEDICINE | Admitting: INTERNAL MEDICINE
Payer: MEDICARE

## 2023-04-21 VITALS
TEMPERATURE: 98 F | HEIGHT: 63 IN | SYSTOLIC BLOOD PRESSURE: 116 MMHG | DIASTOLIC BLOOD PRESSURE: 73 MMHG | WEIGHT: 139.99 LBS | RESPIRATION RATE: 16 BRPM | OXYGEN SATURATION: 99 % | HEART RATE: 110 BPM

## 2023-04-21 DIAGNOSIS — R91.8 OTHER NONSPECIFIC ABNORMAL FINDING OF LUNG FIELD: ICD-10-CM

## 2023-04-21 LAB
ALBUMIN SERPL ELPH-MCNC: 3.4 G/DL — LOW (ref 3.5–5.2)
ALP SERPL-CCNC: 135 U/L — HIGH (ref 30–115)
ALT FLD-CCNC: 114 U/L — HIGH (ref 0–41)
ANION GAP SERPL CALC-SCNC: 20 MMOL/L — HIGH (ref 7–14)
APTT BLD: 27 SEC — SIGNIFICANT CHANGE UP (ref 27–39.2)
AST SERPL-CCNC: 151 U/L — HIGH (ref 0–41)
BASE EXCESS BLDV CALC-SCNC: -3.9 MMOL/L — LOW (ref -2–3)
BASOPHILS # BLD AUTO: 0.01 K/UL — SIGNIFICANT CHANGE UP (ref 0–0.2)
BASOPHILS NFR BLD AUTO: 0.1 % — SIGNIFICANT CHANGE UP (ref 0–1)
BILIRUB SERPL-MCNC: 2.3 MG/DL — HIGH (ref 0.2–1.2)
BUN SERPL-MCNC: 42 MG/DL — HIGH (ref 10–20)
CA-I SERPL-SCNC: 1.16 MMOL/L — SIGNIFICANT CHANGE UP (ref 1.15–1.33)
CALCIUM SERPL-MCNC: 9.1 MG/DL — SIGNIFICANT CHANGE UP (ref 8.4–10.5)
CHLORIDE SERPL-SCNC: 92 MMOL/L — LOW (ref 98–110)
CO2 SERPL-SCNC: 16 MMOL/L — LOW (ref 17–32)
CREAT SERPL-MCNC: 1.4 MG/DL — SIGNIFICANT CHANGE UP (ref 0.7–1.5)
D DIMER BLD IA.RAPID-MCNC: 2606 NG/ML DDU — HIGH
EGFR: 39 ML/MIN/1.73M2 — LOW
EOSINOPHIL # BLD AUTO: 0.01 K/UL — SIGNIFICANT CHANGE UP (ref 0–0.7)
EOSINOPHIL NFR BLD AUTO: 0.1 % — SIGNIFICANT CHANGE UP (ref 0–8)
GAS PNL BLDV: 128 MMOL/L — LOW (ref 136–145)
GAS PNL BLDV: SIGNIFICANT CHANGE UP
GLUCOSE SERPL-MCNC: 142 MG/DL — HIGH (ref 70–99)
HCO3 BLDV-SCNC: 21 MMOL/L — LOW (ref 22–29)
HCT VFR BLD CALC: 45 % — SIGNIFICANT CHANGE UP (ref 37–47)
HCT VFR BLDA CALC: 42 % — SIGNIFICANT CHANGE UP (ref 39–51)
HGB BLD CALC-MCNC: 13.9 G/DL — SIGNIFICANT CHANGE UP (ref 12.6–17.4)
HGB BLD-MCNC: 14.7 G/DL — SIGNIFICANT CHANGE UP (ref 12–16)
IMM GRANULOCYTES NFR BLD AUTO: 0.3 % — SIGNIFICANT CHANGE UP (ref 0.1–0.3)
INR BLD: 1.73 RATIO — HIGH (ref 0.65–1.3)
LACTATE BLDV-MCNC: 4.6 MMOL/L — CRITICAL HIGH (ref 0.5–2)
LACTATE SERPL-SCNC: 5.8 MMOL/L — CRITICAL HIGH (ref 0.7–2)
LYMPHOCYTES # BLD AUTO: 1.5 K/UL — SIGNIFICANT CHANGE UP (ref 1.2–3.4)
LYMPHOCYTES # BLD AUTO: 19 % — LOW (ref 20.5–51.1)
MCHC RBC-ENTMCNC: 31.2 PG — HIGH (ref 27–31)
MCHC RBC-ENTMCNC: 32.7 G/DL — SIGNIFICANT CHANGE UP (ref 32–37)
MCV RBC AUTO: 95.5 FL — SIGNIFICANT CHANGE UP (ref 81–99)
MONOCYTES # BLD AUTO: 0.64 K/UL — HIGH (ref 0.1–0.6)
MONOCYTES NFR BLD AUTO: 8.1 % — SIGNIFICANT CHANGE UP (ref 1.7–9.3)
NEUTROPHILS # BLD AUTO: 5.71 K/UL — SIGNIFICANT CHANGE UP (ref 1.4–6.5)
NEUTROPHILS NFR BLD AUTO: 72.4 % — SIGNIFICANT CHANGE UP (ref 42.2–75.2)
NRBC # BLD: 0 /100 WBCS — SIGNIFICANT CHANGE UP (ref 0–0)
NT-PROBNP SERPL-SCNC: HIGH PG/ML (ref 0–300)
PCO2 BLDV: 36 MMHG — LOW (ref 39–42)
PH BLDV: 7.37 — SIGNIFICANT CHANGE UP (ref 7.32–7.43)
PLATELET # BLD AUTO: 139 K/UL — SIGNIFICANT CHANGE UP (ref 130–400)
PMV BLD: 12.9 FL — HIGH (ref 7.4–10.4)
PO2 BLDV: 21 MMHG — SIGNIFICANT CHANGE UP
POTASSIUM BLDV-SCNC: 4.3 MMOL/L — SIGNIFICANT CHANGE UP (ref 3.5–5.1)
POTASSIUM SERPL-MCNC: SIGNIFICANT CHANGE UP MMOL/L (ref 3.5–5)
POTASSIUM SERPL-SCNC: SIGNIFICANT CHANGE UP MMOL/L (ref 3.5–5)
PROT SERPL-MCNC: 6.8 G/DL — SIGNIFICANT CHANGE UP (ref 6–8)
PROTHROM AB SERPL-ACNC: 20.1 SEC — HIGH (ref 9.95–12.87)
RBC # BLD: 4.71 M/UL — SIGNIFICANT CHANGE UP (ref 4.2–5.4)
RBC # FLD: 13.7 % — SIGNIFICANT CHANGE UP (ref 11.5–14.5)
SAO2 % BLDV: 22.8 % — SIGNIFICANT CHANGE UP
SODIUM SERPL-SCNC: 128 MMOL/L — LOW (ref 135–146)
TROPONIN T SERPL-MCNC: 0.01 NG/ML — SIGNIFICANT CHANGE UP
WBC # BLD: 7.89 K/UL — SIGNIFICANT CHANGE UP (ref 4.8–10.8)
WBC # FLD AUTO: 7.89 K/UL — SIGNIFICANT CHANGE UP (ref 4.8–10.8)

## 2023-04-21 PROCEDURE — 97110 THERAPEUTIC EXERCISES: CPT | Mod: GO

## 2023-04-21 PROCEDURE — 86803 HEPATITIS C AB TEST: CPT

## 2023-04-21 PROCEDURE — 80307 DRUG TEST PRSMV CHEM ANLYZR: CPT

## 2023-04-21 PROCEDURE — 86376 MICROSOMAL ANTIBODY EACH: CPT

## 2023-04-21 PROCEDURE — 82330 ASSAY OF CALCIUM: CPT

## 2023-04-21 PROCEDURE — 82803 BLOOD GASES ANY COMBINATION: CPT

## 2023-04-21 PROCEDURE — 71045 X-RAY EXAM CHEST 1 VIEW: CPT

## 2023-04-21 PROCEDURE — 84100 ASSAY OF PHOSPHORUS: CPT

## 2023-04-21 PROCEDURE — 80061 LIPID PANEL: CPT

## 2023-04-21 PROCEDURE — 86644 CMV ANTIBODY: CPT

## 2023-04-21 PROCEDURE — 97166 OT EVAL MOD COMPLEX 45 MIN: CPT | Mod: GO

## 2023-04-21 PROCEDURE — 88172 CYTP DX EVAL FNA 1ST EA SITE: CPT

## 2023-04-21 PROCEDURE — 36415 COLL VENOUS BLD VENIPUNCTURE: CPT

## 2023-04-21 PROCEDURE — 86038 ANTINUCLEAR ANTIBODIES: CPT

## 2023-04-21 PROCEDURE — 86900 BLOOD TYPING SEROLOGIC ABO: CPT

## 2023-04-21 PROCEDURE — 71275 CT ANGIOGRAPHY CHEST: CPT

## 2023-04-21 PROCEDURE — 71045 X-RAY EXAM CHEST 1 VIEW: CPT | Mod: 26

## 2023-04-21 PROCEDURE — 84295 ASSAY OF SERUM SODIUM: CPT

## 2023-04-21 PROCEDURE — 86255 FLUORESCENT ANTIBODY SCREEN: CPT

## 2023-04-21 PROCEDURE — 87799 DETECT AGENT NOS DNA QUANT: CPT

## 2023-04-21 PROCEDURE — 74018 RADEX ABDOMEN 1 VIEW: CPT

## 2023-04-21 PROCEDURE — 86664 EPSTEIN-BARR NUCLEAR ANTIGEN: CPT

## 2023-04-21 PROCEDURE — 84550 ASSAY OF BLOOD/URIC ACID: CPT

## 2023-04-21 PROCEDURE — 82390 ASSAY OF CERULOPLASMIN: CPT

## 2023-04-21 PROCEDURE — 84540 ASSAY OF URINE/UREA-N: CPT

## 2023-04-21 PROCEDURE — C1894: CPT

## 2023-04-21 PROCEDURE — P9047: CPT

## 2023-04-21 PROCEDURE — U0003: CPT

## 2023-04-21 PROCEDURE — 84155 ASSAY OF PROTEIN SERUM: CPT

## 2023-04-21 PROCEDURE — 87641 MR-STAPH DNA AMP PROBE: CPT

## 2023-04-21 PROCEDURE — 83935 ASSAY OF URINE OSMOLALITY: CPT

## 2023-04-21 PROCEDURE — 93010 ELECTROCARDIOGRAM REPORT: CPT | Mod: 77

## 2023-04-21 PROCEDURE — 93456 R HRT CORONARY ARTERY ANGIO: CPT

## 2023-04-21 PROCEDURE — 93975 VASCULAR STUDY: CPT

## 2023-04-21 PROCEDURE — 84466 ASSAY OF TRANSFERRIN: CPT

## 2023-04-21 PROCEDURE — 97530 THERAPEUTIC ACTIVITIES: CPT | Mod: GP

## 2023-04-21 PROCEDURE — 83036 HEMOGLOBIN GLYCOSYLATED A1C: CPT

## 2023-04-21 PROCEDURE — 86787 VARICELLA-ZOSTER ANTIBODY: CPT

## 2023-04-21 PROCEDURE — 93970 EXTREMITY STUDY: CPT

## 2023-04-21 PROCEDURE — C1887: CPT

## 2023-04-21 PROCEDURE — 97116 GAIT TRAINING THERAPY: CPT | Mod: GP

## 2023-04-21 PROCEDURE — 88173 CYTOPATH EVAL FNA REPORT: CPT

## 2023-04-21 PROCEDURE — 84300 ASSAY OF URINE SODIUM: CPT

## 2023-04-21 PROCEDURE — 86665 EPSTEIN-BARR CAPSID VCA: CPT

## 2023-04-21 PROCEDURE — 93005 ELECTROCARDIOGRAM TRACING: CPT

## 2023-04-21 PROCEDURE — 82570 ASSAY OF URINE CREATININE: CPT

## 2023-04-21 PROCEDURE — P9045: CPT

## 2023-04-21 PROCEDURE — 86850 RBC ANTIBODY SCREEN: CPT

## 2023-04-21 PROCEDURE — 99223 1ST HOSP IP/OBS HIGH 75: CPT

## 2023-04-21 PROCEDURE — 82164 ANGIOTENSIN I ENZYME TEST: CPT

## 2023-04-21 PROCEDURE — 94760 N-INVAS EAR/PLS OXIMETRY 1: CPT

## 2023-04-21 PROCEDURE — 83615 LACTATE (LD) (LDH) ENZYME: CPT

## 2023-04-21 PROCEDURE — 84134 ASSAY OF PREALBUMIN: CPT

## 2023-04-21 PROCEDURE — 71275 CT ANGIOGRAPHY CHEST: CPT | Mod: 26,MA

## 2023-04-21 PROCEDURE — 86790 VIRUS ANTIBODY NOS: CPT

## 2023-04-21 PROCEDURE — 84133 ASSAY OF URINE POTASSIUM: CPT

## 2023-04-21 PROCEDURE — 86334 IMMUNOFIX E-PHORESIS SERUM: CPT

## 2023-04-21 PROCEDURE — 87517 HEPATITIS B DNA QUANT: CPT

## 2023-04-21 PROCEDURE — 86692 HEPATITIS DELTA AGENT ANTBDY: CPT

## 2023-04-21 PROCEDURE — 84480 ASSAY TRIIODOTHYRONINE (T3): CPT

## 2023-04-21 PROCEDURE — 87640 STAPH A DNA AMP PROBE: CPT

## 2023-04-21 PROCEDURE — 83605 ASSAY OF LACTIC ACID: CPT

## 2023-04-21 PROCEDURE — 76770 US EXAM ABDO BACK WALL COMP: CPT

## 2023-04-21 PROCEDURE — 86901 BLOOD TYPING SEROLOGIC RH(D): CPT

## 2023-04-21 PROCEDURE — C1751: CPT

## 2023-04-21 PROCEDURE — 85730 THROMBOPLASTIN TIME PARTIAL: CPT

## 2023-04-21 PROCEDURE — 80053 COMPREHEN METABOLIC PANEL: CPT

## 2023-04-21 PROCEDURE — 85018 HEMOGLOBIN: CPT

## 2023-04-21 PROCEDURE — 83930 ASSAY OF BLOOD OSMOLALITY: CPT

## 2023-04-21 PROCEDURE — 97162 PT EVAL MOD COMPLEX 30 MIN: CPT | Mod: GP

## 2023-04-21 PROCEDURE — C1769: CPT

## 2023-04-21 PROCEDURE — 93306 TTE W/DOPPLER COMPLETE: CPT

## 2023-04-21 PROCEDURE — 80048 BASIC METABOLIC PNL TOTAL CA: CPT

## 2023-04-21 PROCEDURE — 83550 IRON BINDING TEST: CPT

## 2023-04-21 PROCEDURE — 92526 ORAL FUNCTION THERAPY: CPT | Mod: GN

## 2023-04-21 PROCEDURE — 83540 ASSAY OF IRON: CPT

## 2023-04-21 PROCEDURE — 84436 ASSAY OF TOTAL THYROXINE: CPT

## 2023-04-21 PROCEDURE — 80321 ALCOHOLS BIOMARKERS 1OR 2: CPT

## 2023-04-21 PROCEDURE — 87635 SARS-COV-2 COVID-19 AMP PRB: CPT

## 2023-04-21 PROCEDURE — 36573 INSJ PICC RS&I 5 YR+: CPT

## 2023-04-21 PROCEDURE — 82728 ASSAY OF FERRITIN: CPT

## 2023-04-21 PROCEDURE — 82248 BILIRUBIN DIRECT: CPT

## 2023-04-21 PROCEDURE — 71250 CT THORAX DX C-: CPT

## 2023-04-21 PROCEDURE — U0005: CPT

## 2023-04-21 PROCEDURE — 86708 HEPATITIS A ANTIBODY: CPT

## 2023-04-21 PROCEDURE — 86663 EPSTEIN-BARR ANTIBODY: CPT

## 2023-04-21 PROCEDURE — 84132 ASSAY OF SERUM POTASSIUM: CPT

## 2023-04-21 PROCEDURE — 85027 COMPLETE CBC AUTOMATED: CPT

## 2023-04-21 PROCEDURE — 86695 HERPES SIMPLEX TYPE 1 TEST: CPT

## 2023-04-21 PROCEDURE — 97167 OT EVAL HIGH COMPLEX 60 MIN: CPT | Mod: GO

## 2023-04-21 PROCEDURE — 82784 ASSAY IGA/IGD/IGG/IGM EACH: CPT

## 2023-04-21 PROCEDURE — 88305 TISSUE EXAM BY PATHOLOGIST: CPT

## 2023-04-21 PROCEDURE — 97535 SELF CARE MNGMENT TRAINING: CPT | Mod: GO

## 2023-04-21 PROCEDURE — 84439 ASSAY OF FREE THYROXINE: CPT

## 2023-04-21 PROCEDURE — 86706 HEP B SURFACE ANTIBODY: CPT

## 2023-04-21 PROCEDURE — 88177 CYTP FNA EVAL EA ADDL: CPT

## 2023-04-21 PROCEDURE — 93970 EXTREMITY STUDY: CPT | Mod: 26

## 2023-04-21 PROCEDURE — 84165 PROTEIN E-PHORESIS SERUM: CPT

## 2023-04-21 PROCEDURE — 86381 MITOCHONDRIAL ANTIBODY EACH: CPT

## 2023-04-21 PROCEDURE — 92612 ENDOSCOPY SWALLOW (FEES) VID: CPT | Mod: GN

## 2023-04-21 PROCEDURE — 84156 ASSAY OF PROTEIN URINE: CPT

## 2023-04-21 PROCEDURE — 82140 ASSAY OF AMMONIA: CPT

## 2023-04-21 PROCEDURE — 74177 CT ABD & PELVIS W/CONTRAST: CPT | Mod: 26,MA

## 2023-04-21 PROCEDURE — 86696 HERPES SIMPLEX TYPE 2 TEST: CPT

## 2023-04-21 PROCEDURE — C1889: CPT

## 2023-04-21 PROCEDURE — 86704 HEP B CORE ANTIBODY TOTAL: CPT

## 2023-04-21 PROCEDURE — 85610 PROTHROMBIN TIME: CPT

## 2023-04-21 PROCEDURE — 83735 ASSAY OF MAGNESIUM: CPT

## 2023-04-21 PROCEDURE — 87340 HEPATITIS B SURFACE AG IA: CPT

## 2023-04-21 PROCEDURE — C1760: CPT

## 2023-04-21 PROCEDURE — 85025 COMPLETE CBC W/AUTO DIFF WBC: CPT

## 2023-04-21 PROCEDURE — 84484 ASSAY OF TROPONIN QUANT: CPT

## 2023-04-21 PROCEDURE — 83520 IMMUNOASSAY QUANT NOS NONAB: CPT

## 2023-04-21 PROCEDURE — 85014 HEMATOCRIT: CPT

## 2023-04-21 PROCEDURE — 99285 EMERGENCY DEPT VISIT HI MDM: CPT

## 2023-04-21 PROCEDURE — 82977 ASSAY OF GGT: CPT

## 2023-04-21 PROCEDURE — 92610 EVALUATE SWALLOWING FUNCTION: CPT | Mod: GN

## 2023-04-21 PROCEDURE — 80076 HEPATIC FUNCTION PANEL: CPT

## 2023-04-21 PROCEDURE — 87389 HIV-1 AG W/HIV-1&-2 AB AG IA: CPT

## 2023-04-21 PROCEDURE — 93451 RIGHT HEART CATH: CPT

## 2023-04-21 PROCEDURE — 84145 PROCALCITONIN (PCT): CPT

## 2023-04-21 PROCEDURE — 84443 ASSAY THYROID STIM HORMONE: CPT

## 2023-04-21 RX ORDER — LANOLIN ALCOHOL/MO/W.PET/CERES
5 CREAM (GRAM) TOPICAL AT BEDTIME
Refills: 0 | Status: DISCONTINUED | OUTPATIENT
Start: 2023-04-21 | End: 2023-05-05

## 2023-04-21 RX ORDER — HEPARIN SODIUM 5000 [USP'U]/ML
5000 INJECTION INTRAVENOUS; SUBCUTANEOUS ONCE
Refills: 0 | Status: DISCONTINUED | OUTPATIENT
Start: 2023-04-21 | End: 2023-04-21

## 2023-04-21 RX ORDER — CHLORHEXIDINE GLUCONATE 213 G/1000ML
1 SOLUTION TOPICAL DAILY
Refills: 0 | Status: DISCONTINUED | OUTPATIENT
Start: 2023-04-21 | End: 2023-05-05

## 2023-04-21 RX ORDER — FAMOTIDINE 10 MG/ML
20 INJECTION INTRAVENOUS ONCE
Refills: 0 | Status: COMPLETED | OUTPATIENT
Start: 2023-04-21 | End: 2023-04-21

## 2023-04-21 RX ORDER — HEPARIN SODIUM 5000 [USP'U]/ML
INJECTION INTRAVENOUS; SUBCUTANEOUS
Qty: 25000 | Refills: 0 | Status: DISCONTINUED | OUTPATIENT
Start: 2023-04-21 | End: 2023-04-21

## 2023-04-21 RX ADMIN — FAMOTIDINE 20 MILLIGRAM(S): 10 INJECTION INTRAVENOUS at 13:18

## 2023-04-21 RX ADMIN — Medication 30 MILLILITER(S): at 13:18

## 2023-04-21 NOTE — ED PROVIDER NOTE - OBJECTIVE STATEMENT
77 year old female with a history of HTN, HFrEF s/p ICD and PPM  follows 77 year old female with a history of HTN, HFrEF s/p ICD and PPM follows with Dr. Frank presents to the ED with shortness of breath for the past few weeks. Shortness of breath started after she drove to [place] a few weeks ago, worse with exertion associated with a dry cough. Admits to epigastric pain described as burning. Denies fever, chills, abdominal pain, nausea, vomiting, diarrhea, constipation, dysuria, hematuria, lower extremity swelling, rash.

## 2023-04-21 NOTE — ED PROVIDER NOTE - PROGRESS NOTE DETAILS
D-Dimar markedly elevated. Bedside US reveals heart strain with wall motion abnormalities. consulted cardio/ccu and ICU.  pending recommendation re: anticoagulation and admission D-Dimar markedly elevated. Bedside US reveals decreased contractility consulted cardio/ccu and ICU.  pending recommendation re: anticoagulation and admission  Consulted CT surgery pending recs. Ultimately approved for Stepdown. Hold AC at this time.

## 2023-04-21 NOTE — CONSULT NOTE ADULT - SUBJECTIVE AND OBJECTIVE BOX
GENERAL SURGERY CONSULT NOTE    Patient: MIKHAIL JOHNSON , 77y ([birth year])Female   MRN: [id number]  Location: [facility] ED Hold 044 A  Visit: 04-21-23 Inpatient  Date: 04-21-23 @ 19:27    HPI:  77 year old woman with pmh of HTN, HLD, nonischemic cardiomyopathy, HFrEF, s/p ICD and PPM presents to the ED with 2 weeks of worsening shortness of breath. Patient and her son state patient has been experiencing shortness of breath that has progressed from present on activity to now shortness of breath during conversation. Patient also notes an increasing dry cough over this time period. Denies history of smoking, fevers/chills, chest pain, nausea/vomiting, bloody sputum, dark/tarry/bloody bowel movements, upper respiratory symptoms. Upon arrival, patient is HD stable and saturating 98% on RA, talking in complete sentences, no increased work of breathing.  CT C/A/P revealing cardiomegaly with evidence of right heart strain, R hilar lymphadenopathy with R perihilar soft tissue density with indentation/possible invasion of right pulmonary artery, and narrowing of proximal R lobar and segmental branches with associated consolidation of right lower lobe. 2.2 x 2.8 cm filling defect noted in apex of left ventricle. Patient follows with Dr. Frank, last ECHO 8/2021 revealing EF 20-25%, mild Mr, mild TR, mild Pulm HTN.    PAST MEDICAL & SURGICAL HISTORY:  CHF, stage C      HTN (hypertension)          Home Medications:  atorvastatin 20 mg oral tablet: 1 tab(s) orally once a day (04 Aug 2021 10:05)  Entresto 49 mg-51 mg oral tablet: 1 tab(s) orally 2 times a day (04 Aug 2021 10:05)  spironolactone 25 mg oral tablet: 1 tab(s) orally once a day (04 Aug 2021 10:05)        VITALS:  T(F): 97.2 (04-21-23 @ 15:26), Max: 97.5 (04-21-23 @ 10:56)  HR: 106 (04-21-23 @ 15:26) (106 - 110)  BP: 112/73 (04-21-23 @ 15:26) (112/73 - 116/73)  RR: 17 (04-21-23 @ 15:26) (16 - 17)  SpO2: 98% (04-21-23 @ 15:26) (98% - 99%)    PHYSICAL EXAM:  General: NAD, AAOx3, calm and cooperative  HEENT: NCAT, WANDA, EOMI, Trachea ML, Neck supple  Cardiac: RRR S1, S2, no Murmurs, rubs or gallops  Respiratory: CTAB, normal respiratory effort, breath sounds equal BL, no wheeze, rhonchi or crackles  Abdomen: Soft, non-distended, non-tender  Ext: warm and well-perfused    MEDICATIONS  (STANDING):    MEDICATIONS  (PRN):      LAB/STUDIES:                        14.7   7.89  )-----------( 139      ( 21 Apr 2023 11:43 )             45.0     04-21    128<L>  |  92<L>  |  42<H>  ----------------------------<  142<H>  TNP   |  16<L>  |  1.4    Ca    9.1      21 Apr 2023 11:43    TPro  6.8  /  Alb  3.4<L>  /  TBili  2.3<H>  /  DBili  x   /  AST  151<H>  /  ALT  114<H>  /  AlkPhos  135<H>  04-21    PT/INR - ( 21 Apr 2023 16:17 )   PT: 20.10 sec;   INR: 1.73 ratio         PTT - ( 21 Apr 2023 16:17 )  PTT:27.0 sec  LIVER FUNCTIONS - ( 21 Apr 2023 11:43 )  Alb: 3.4 g/dL / Pro: 6.8 g/dL / ALK PHOS: 135 U/L / ALT: 114 U/L / AST: 151 U/L / GGT: x             CARDIAC MARKERS ( 21 Apr 2023 11:43 )  x     / 0.01 ng/mL / x     / x     / x                  IMAGING:    < from: CT Angio Chest PE Protocol w/ IV Cont (04.21.23 @ 14:27) >    IMPRESSION:    No evidence of pulmonary embolism. (See discussion below for more   findings.)    Marked cardiomegaly.    There is a 2.2 x 2.8 cm rounded filling defect in the region of the apex   of the left ventricle (3/11; 607/95). Differential diagnosis includes an   intracardiac mass or thrombus.    The main pulmonary artery is dilated, measuring 3.6 cm in diameter, which   may be seen with pulmonary hypertension. There is reflux of contrast   material into the IVC and hepatic veins compatible with right heart   dysfunction.    Right hilar lymphadenopathy and right perihilar soft tissue density is   noted. There is segmental narrowing and consolidation noted in the medial   aspect of the right lower lobe. There is a small right pleural effusion.   There is extrinsic indentation and possible invasion of the right main   pulmonary artery (604/142; 605/129; 2/42). There is associated narrowing   of the proximal right lobar and segmental branches. A right hilar / right   lower lobe mass with postobstructive pneumonitis should be ruled out.      The findings were discussed with Dr. Leone at 4:10 PM 4/21/2023, with   read back.    --- End of Report ---    < end of copied text >

## 2023-04-21 NOTE — CONSULT NOTE ADULT - ASSESSMENT
ASSESSMENT:  77 year old woman with pmh of HTN, HLD, nonischemic cardiomyopathy, HFrEF, s/p ICD and PPM presents to the ED with 2 weeks of worsening shortness of breath. Patient and her son state patient has been experiencing shortness of breath that has progressed from present on activity to now shortness of breath during conversation. Patient also notes an increasing dry cough over this time period. Denies history of smoking, fevers/chills, chest pain, nausea/vomiting, bloody sputum, dark/tarry/bloody bowel movements, upper respiratory symptoms. Upon arrival, patient is HD stable and saturating 98% on RA, talking in complete sentences, no increased work of breathing.  CT C/A/P revealing cardiomegaly with evidence of right heart strain, R hilar lymphadenopathy with R perihilar soft tissue density with indentation/possible invasion of right pulmonary artery, and narrowing of proximal R lobar and segmental branches with associated consolidation of right lower lobe. 2.2 x 2.8 cm filling defect noted in apex of left ventricle. Patient follows with Dr. Frank, last ECHO 8/2021 revealing EF 20-25%, mild Mr, mild TR, mild Pulm HTN.    PLAN:  -Echocardiogram to evaluate right heart strain and mass  -Will likely need bronchoscopy with EBUS and FNA  -attending to see    Lines/Tubes: PIV, Midline, Central Line, A-Line, Chest tubes, Erwin/GRACE drains, Saenz Catheter.    Above plan discussed with Attending Surgeon Dr. Bobo Soler, patient, patient family, and Primary team  04-21-23 @ 19:27    CONSULT SPECTRA: [id number]

## 2023-04-21 NOTE — ED PROVIDER NOTE - ATTENDING APP SHARED VISIT CONTRIBUTION OF CARE
77-year-old female with past medical history of hypertension, nonischemic cardiomyopathy, EF about 25%, pacemaker, sent in by Dr. Frank for worsening shortness of breath.  Patient admits started after driving to and from [place] about 3 to 4 weeks ago.  Patient since then has been complaining of worsening dyspnea on exertion.  Patient denies any leg swelling or leg pain.  Patient denies any chest pain.  Patient denies any fever or chills.  Patient admits mild epigastric pain.  No vomiting or diarrhea.  No urinary symptoms.  Exam: nad, ncat, perrl, eomi, mmm, rrr, ctab, abd soft, minimally tender to palpation epigastrium, no rebound, no guarding, nd aox3, no calf tenderness, no pitting edema impression: Patient with worsening dyspnea on exertion, recent long drive to [place], will check labs, CT chest rule out PE, and CT abdomen, EKG    Attending Statement: I have personally provided the amount of critical care time documented below excluding time spent on separate procedures.     Critical Care Time Spent (min) Must be 30 or more minutes to qualify: 35.

## 2023-04-21 NOTE — CONSULT NOTE ADULT - ASSESSMENT
Pt with, lung mass, post obstructive pneumonia sepsis, possible lv thrombus, non ischemic cm with sob, wheezing.  pt clinically is euvolemic.    echo with contrast eval lv thrombus  iv heparin  cont home cardiac meds  pulmonary eval for possible bx and tx of pneumonia and sepsis  f/u thoracic surgery

## 2023-04-21 NOTE — CONSULT NOTE ADULT - ATTENDING COMMENTS
CT surgery attending note    Covering for Dr. Mirza Soler    Patient was seen and examined along with the resident as described above    In summary the patient is a 77-year-old lady with an extensive cardiac history and a known diagnosis of nonischemic cardiomyopathy with a very low ejection fraction for which she follows up with her cardiologist Dr. Justin Frank at least for the last 15 years.  She has had chronic symptoms of congestive heart failure and has a pacemaker with an AICD in place.    Recently she was complaining of worsening symptoms of shortness of breath with dyspnea on exertion and some vague chest discomfort and she presented with decompensated symptoms.    She is a non-smoker and has no past history of any cancers.    Also present at the patient's bedside with the patient's  who contributed to her history.    She had the CT scan of the chest which is described above which is suspicious for a right hilar mass with possible invasion of the pulmonary artery.  I had a chance to review the CT scan of the chest along with the radiologist.    I explained to the patient and her family that she has this new finding on the CT scan and it will probably need to be investigated and we discussed the overlap of symptoms between cardiac issues and respiratory issues.  Her care will be discussed in the multidisciplinary setting along with the cardiologist and the intensive care physicians and most importantly the pulmonologist and we will come up with a plan and possibly a bronchoscopy.    Also concerning is that her echocardiogram is suspicious for left ventricular thrombus and she is on anticoagulation for the same and this probably also helps in the management of the pulmonary lesion.    I spent a significant amount of time with the patient's bedside as the family and the patient had multiple questions and these were all answered. CT surgery attending note    Covering for Dr. Mirza Soler    Patient was seen and examined along with the resident as described above    In summary the patient is a 77-year-old lady with an extensive cardiac history and a known diagnosis of nonischemic cardiomyopathy with a very low ejection fraction for which she follows up with her cardiologist Dr. Justin Frank at least for the last 15 years.  She has had chronic symptoms of congestive heart failure and has a pacemaker with an AICD in place.    Recently she was complaining of worsening symptoms of shortness of breath with dyspnea on exertion and some vague chest discomfort and she presented with decompensated symptoms.    She is a non-smoker and has no past history of any cancers.    Also present at the patient's bedside with the patient's  who contributed to her history..    She had the CT scan of the chest which is described above which is suspicious for a right hilar mass with possible invasion of the pulmonary artery.  I had a chance to review the CT scan of the chest along with the radiologist.    I explained to the patient and her family that she has this new finding on the CT scan and it will probably need to be investigated and we discussed the overlap of symptoms between cardiac issues and respiratory issues.  Her care will be discussed in the multidisciplinary setting along with the cardiologist and the intensive care physicians and most importantly the pulmonologist and we will come up with a plan and possibly a bronchoscopy.    Also concerning is that her echocardiogram is suspicious for left ventricular thrombus and she is on anticoagulation for the same and this probably also helps in the management of the pulmonary lesion.    I spent a significant amount of time with the patient's bedside as the family and the patient had multiple questions and these were all answered.

## 2023-04-21 NOTE — ED PROVIDER NOTE - CLINICAL SUMMARY MEDICAL DECISION MAKING FREE TEXT BOX
Patient with worsening shortness of breath for 4 weeks, sent in by cardiology for evaluation, patient was found to have a right-sided lung mass that is invading the pulmonary artery, and an LV thrombus versus intracardiac mass.  Cardiology consulted, CCU consulted, ICU consulted.  Patient was accepted to stepdown unit.  Any ordered labs and EKG were reviewed.  Any imaging was ordered and reviewed by me.  Appropriate medications for patient's presenting complaints were ordered and effects were reassessed.  Patient's records (prior hospital, ED visit, and/or nursing home notes if available) were reviewed.  Additional history was obtained from EMS, family, and/or PCP (where available).  Escalation to admission/observation was considered.  Patient requires inpatient hospitalization - monitored setting.

## 2023-04-21 NOTE — PATIENT PROFILE ADULT - FALL HARM RISK - HARM RISK INTERVENTIONS

## 2023-04-21 NOTE — CONSULT NOTE ADULT - SUBJECTIVE AND OBJECTIVE BOX
Patient is a 77y old  Female who presents with a chief complaint of     HPI:      PAST MEDICAL & SURGICAL HISTORY:  CHF, stage C      HTN (hypertension)          PREVIOUS DIAGNOSTIC TESTING:      ECHO  FINDINGS:    STRESS TEST  FINDINGS:    CATHETERIZATION  FINDINGS:    MEDICATIONS  (STANDING):  chlorhexidine 2% Cloths 1 Application(s) Topical daily  melatonin 5 milliGRAM(s) Oral at bedtime    MEDICATIONS  (PRN):      FAMILY HISTORY:  FH: heart failure (Mother)        SOCIAL HISTORY:  CIGARETTES:  ALCOHOL:  DRUGS:                      REVIEW OF SYSTEMS:  CONSTITUTIONAL:coughs with talking  NECK: No pain or stiffnes  RESPIRATORY: (+) cough,(+) wheezing, (+)shortness of breath  CARDIOVASCULAR: No chest pain, (+)SOB, no palpitations,no  leg swelling  GASTROINTESTINAL: No abdominal or epigastric pain. No nausea, vomiting, or hematemesis;  No melena.  NEUROLOGICAL: No dizziness, headaches, memory loss, loss of strength  SKIN: No itching, burning, rashes, or lesions   ENDOCRINE: No heat or cold intolerance  MUSCULOSKELETAL: No joint pain, No  swelling; No muscle pain  PSYCHIATRIC: No depression, anxiety, mood swings, or difficulty sleeping  ALLERGY: No hives, itching, rash          Vital Signs Last 24 Hrs  T(C): 36.2 (21 Apr 2023 22:23), Max: 36.4 (21 Apr 2023 10:56)  T(F): 97.2 (21 Apr 2023 22:23), Max: 97.5 (21 Apr 2023 10:56)  HR: 109 (21 Apr 2023 22:23) (106 - 110)  BP: 124/60 (21 Apr 2023 22:23) (112/73 - 124/60)  BP(mean): --  RR: 20 (21 Apr 2023 22:23) (16 - 20)  SpO2: 100% (21 Apr 2023 22:23) (98% - 100%)    Parameters below as of 21 Apr 2023 10:56  Patient On (Oxygen Delivery Method): room air                          PHYSICAL EXAM:  GENERAL: No distress, well developed  HEAD:  Atraumatic, Normocephalic  NECK: Supple, No JVD, No Bruit of either carotid arteries  NERVOUS SYSTEM:  Alert, Awake, Oriented to time, place, person; Normal memory and speech; Normal motor Strength 5/5 B/L upper and lower extremities  CHEST/LUNG: decreased breath sounds rt base, (+) wheeze, no crackle, rales, rhonchi  HEART: Regular heart beat, S1, A2, P2, No S3, No S4, No gallop, No murmur  ABDOMEN: Soft, Non tender, Non distended; Bowel sounds present  EXTREMITIES:  2+ Peripheral Pulses, No clubbing, No edema  SKIN: No rashes or lesions    TELEMETRY:nsr    ECG:Diagnosis Line NSR with PVC's  Right axis deviation  Nonspecific ST abnormality  Abnormal ECG      I&O's Detail      LABS:                        14.7   7.89  )-----------( 139      ( 21 Apr 2023 11:43 )             45.0     04-21    128<L>  |  92<L>  |  42<H>  ----------------------------<  142<H>  TNP   |  16<L>  |  1.4    Ca    9.1      21 Apr 2023 11:43    TPro  6.8  /  Alb  3.4<L>  /  TBili  2.3<H>  /  DBili  x   /  AST  151<H>  /  ALT  114<H>  /  AlkPhos  135<H>  04-21    CARDIAC MARKERS ( 21 Apr 2023 11:43 )  x     / 0.01 ng/mL / x     / x     / x          PT/INR - ( 21 Apr 2023 16:17 )   PT: 20.10 sec;   INR: 1.73 ratio         PTT - ( 21 Apr 2023 16:17 )  PTT:27.0 sec    I&O's Summary      RADIOLOGY & ADDITIONAL STUDIES:

## 2023-04-22 LAB
ALBUMIN SERPL ELPH-MCNC: 3 G/DL — LOW (ref 3.5–5.2)
ALBUMIN SERPL ELPH-MCNC: 3.3 G/DL — LOW (ref 3.5–5.2)
ALP SERPL-CCNC: 132 U/L — HIGH (ref 30–115)
ALP SERPL-CCNC: 137 U/L — HIGH (ref 30–115)
ALT FLD-CCNC: 692 U/L — HIGH (ref 0–41)
ALT FLD-CCNC: 893 U/L — HIGH (ref 0–41)
ANION GAP SERPL CALC-SCNC: 17 MMOL/L — HIGH (ref 7–14)
ANION GAP SERPL CALC-SCNC: 21 MMOL/L — HIGH (ref 7–14)
APAP SERPL-MCNC: <5 UG/ML — LOW (ref 10–30)
APTT BLD: 105 SEC — CRITICAL HIGH (ref 27–39.2)
AST SERPL-CCNC: 1660 U/L — HIGH (ref 0–41)
AST SERPL-CCNC: 2216 U/L — HIGH (ref 0–41)
BASE EXCESS BLDA CALC-SCNC: -6 MMOL/L — LOW (ref -2–3)
BASOPHILS # BLD AUTO: 0.01 K/UL — SIGNIFICANT CHANGE UP (ref 0–0.2)
BASOPHILS NFR BLD AUTO: 0.1 % — SIGNIFICANT CHANGE UP (ref 0–1)
BILIRUB DIRECT SERPL-MCNC: 1.3 MG/DL — HIGH (ref 0–0.3)
BILIRUB SERPL-MCNC: 3 MG/DL — HIGH (ref 0.2–1.2)
BILIRUB SERPL-MCNC: 3.1 MG/DL — HIGH (ref 0.2–1.2)
BUN SERPL-MCNC: 54 MG/DL — HIGH (ref 10–20)
BUN SERPL-MCNC: 56 MG/DL — HIGH (ref 10–20)
CALCIUM SERPL-MCNC: 8.7 MG/DL — SIGNIFICANT CHANGE UP (ref 8.4–10.5)
CALCIUM SERPL-MCNC: 9.1 MG/DL — SIGNIFICANT CHANGE UP (ref 8.4–10.5)
CHLORIDE SERPL-SCNC: 93 MMOL/L — LOW (ref 98–110)
CHLORIDE SERPL-SCNC: 95 MMOL/L — LOW (ref 98–110)
CO2 SERPL-SCNC: 18 MMOL/L — SIGNIFICANT CHANGE UP (ref 17–32)
CO2 SERPL-SCNC: 19 MMOL/L — SIGNIFICANT CHANGE UP (ref 17–32)
CREAT SERPL-MCNC: 1.5 MG/DL — SIGNIFICANT CHANGE UP (ref 0.7–1.5)
CREAT SERPL-MCNC: 1.5 MG/DL — SIGNIFICANT CHANGE UP (ref 0.7–1.5)
EGFR: 35 ML/MIN/1.73M2 — LOW
EGFR: 35 ML/MIN/1.73M2 — LOW
EOSINOPHIL # BLD AUTO: 0 K/UL — SIGNIFICANT CHANGE UP (ref 0–0.7)
EOSINOPHIL NFR BLD AUTO: 0 % — SIGNIFICANT CHANGE UP (ref 0–8)
GLUCOSE SERPL-MCNC: 119 MG/DL — HIGH (ref 70–99)
GLUCOSE SERPL-MCNC: 122 MG/DL — HIGH (ref 70–99)
HCO3 BLDA-SCNC: 16 MMOL/L — LOW (ref 21–28)
HCT VFR BLD CALC: 40.5 % — SIGNIFICANT CHANGE UP (ref 37–47)
HCT VFR BLD CALC: 41.8 % — SIGNIFICANT CHANGE UP (ref 37–47)
HGB BLD-MCNC: 13.2 G/DL — SIGNIFICANT CHANGE UP (ref 12–16)
HGB BLD-MCNC: 13.8 G/DL — SIGNIFICANT CHANGE UP (ref 12–16)
IMM GRANULOCYTES NFR BLD AUTO: 0.4 % — HIGH (ref 0.1–0.3)
INR BLD: 2.83 RATIO — HIGH (ref 0.65–1.3)
LACTATE SERPL-SCNC: 4.8 MMOL/L — CRITICAL HIGH (ref 0.7–2)
LYMPHOCYTES # BLD AUTO: 1.66 K/UL — SIGNIFICANT CHANGE UP (ref 1.2–3.4)
LYMPHOCYTES # BLD AUTO: 16.6 % — LOW (ref 20.5–51.1)
MAGNESIUM SERPL-MCNC: 2.2 MG/DL — SIGNIFICANT CHANGE UP (ref 1.8–2.4)
MCHC RBC-ENTMCNC: 30.6 PG — SIGNIFICANT CHANGE UP (ref 27–31)
MCHC RBC-ENTMCNC: 31 PG — SIGNIFICANT CHANGE UP (ref 27–31)
MCHC RBC-ENTMCNC: 32.6 G/DL — SIGNIFICANT CHANGE UP (ref 32–37)
MCHC RBC-ENTMCNC: 33 G/DL — SIGNIFICANT CHANGE UP (ref 32–37)
MCV RBC AUTO: 93.8 FL — SIGNIFICANT CHANGE UP (ref 81–99)
MCV RBC AUTO: 93.9 FL — SIGNIFICANT CHANGE UP (ref 81–99)
MONOCYTES # BLD AUTO: 0.87 K/UL — HIGH (ref 0.1–0.6)
MONOCYTES NFR BLD AUTO: 8.7 % — SIGNIFICANT CHANGE UP (ref 1.7–9.3)
MRSA PCR RESULT.: NEGATIVE — SIGNIFICANT CHANGE UP
NEUTROPHILS # BLD AUTO: 7.44 K/UL — HIGH (ref 1.4–6.5)
NEUTROPHILS NFR BLD AUTO: 74.2 % — SIGNIFICANT CHANGE UP (ref 42.2–75.2)
NRBC # BLD: 0 /100 WBCS — SIGNIFICANT CHANGE UP (ref 0–0)
NRBC # BLD: 0 /100 WBCS — SIGNIFICANT CHANGE UP (ref 0–0)
OSMOLALITY SERPL: 292 MOS/KG — SIGNIFICANT CHANGE UP (ref 280–301)
PCO2 BLDA: 23 MMHG — LOW (ref 25–48)
PH BLDA: 7.45 — SIGNIFICANT CHANGE UP (ref 7.35–7.45)
PLATELET # BLD AUTO: 117 K/UL — LOW (ref 130–400)
PLATELET # BLD AUTO: 129 K/UL — LOW (ref 130–400)
PMV BLD: 13 FL — HIGH (ref 7.4–10.4)
PMV BLD: 13 FL — HIGH (ref 7.4–10.4)
PO2 BLDA: 106 MMHG — SIGNIFICANT CHANGE UP (ref 83–108)
POTASSIUM SERPL-MCNC: 5 MMOL/L — SIGNIFICANT CHANGE UP (ref 3.5–5)
POTASSIUM SERPL-MCNC: 5.1 MMOL/L — HIGH (ref 3.5–5)
POTASSIUM SERPL-SCNC: 5 MMOL/L — SIGNIFICANT CHANGE UP (ref 3.5–5)
POTASSIUM SERPL-SCNC: 5.1 MMOL/L — HIGH (ref 3.5–5)
PROCALCITONIN SERPL-MCNC: 1.77 NG/ML — HIGH (ref 0.02–0.1)
PROT SERPL-MCNC: 5.7 G/DL — LOW (ref 6–8)
PROT SERPL-MCNC: 6 G/DL — SIGNIFICANT CHANGE UP (ref 6–8)
PROTHROM AB SERPL-ACNC: 33.3 SEC — HIGH (ref 9.95–12.87)
RBC # BLD: 4.32 M/UL — SIGNIFICANT CHANGE UP (ref 4.2–5.4)
RBC # BLD: 4.45 M/UL — SIGNIFICANT CHANGE UP (ref 4.2–5.4)
RBC # FLD: 13.7 % — SIGNIFICANT CHANGE UP (ref 11.5–14.5)
RBC # FLD: 13.8 % — SIGNIFICANT CHANGE UP (ref 11.5–14.5)
SAO2 % BLDA: 99.4 % — HIGH (ref 94–98)
SODIUM SERPL-SCNC: 129 MMOL/L — LOW (ref 135–146)
SODIUM SERPL-SCNC: 134 MMOL/L — LOW (ref 135–146)
WBC # BLD: 10.02 K/UL — SIGNIFICANT CHANGE UP (ref 4.8–10.8)
WBC # BLD: 9.59 K/UL — SIGNIFICANT CHANGE UP (ref 4.8–10.8)
WBC # FLD AUTO: 10.02 K/UL — SIGNIFICANT CHANGE UP (ref 4.8–10.8)
WBC # FLD AUTO: 9.59 K/UL — SIGNIFICANT CHANGE UP (ref 4.8–10.8)

## 2023-04-22 PROCEDURE — 99221 1ST HOSP IP/OBS SF/LOW 40: CPT

## 2023-04-22 PROCEDURE — 99223 1ST HOSP IP/OBS HIGH 75: CPT

## 2023-04-22 PROCEDURE — 99233 SBSQ HOSP IP/OBS HIGH 50: CPT

## 2023-04-22 PROCEDURE — 76770 US EXAM ABDO BACK WALL COMP: CPT | Mod: 26

## 2023-04-22 PROCEDURE — 71045 X-RAY EXAM CHEST 1 VIEW: CPT | Mod: 26

## 2023-04-22 PROCEDURE — 93306 TTE W/DOPPLER COMPLETE: CPT | Mod: 26

## 2023-04-22 RX ORDER — PIPERACILLIN AND TAZOBACTAM 4; .5 G/20ML; G/20ML
3.38 INJECTION, POWDER, LYOPHILIZED, FOR SOLUTION INTRAVENOUS ONCE
Refills: 0 | Status: COMPLETED | OUTPATIENT
Start: 2023-04-22 | End: 2023-04-22

## 2023-04-22 RX ORDER — FUROSEMIDE 40 MG
1 TABLET ORAL
Refills: 0 | DISCHARGE

## 2023-04-22 RX ORDER — HEPARIN SODIUM 5000 [USP'U]/ML
INJECTION INTRAVENOUS; SUBCUTANEOUS
Qty: 25000 | Refills: 0 | Status: DISCONTINUED | OUTPATIENT
Start: 2023-04-22 | End: 2023-04-23

## 2023-04-22 RX ORDER — PANTOPRAZOLE SODIUM 20 MG/1
40 TABLET, DELAYED RELEASE ORAL
Refills: 0 | Status: DISCONTINUED | OUTPATIENT
Start: 2023-04-22 | End: 2023-05-05

## 2023-04-22 RX ORDER — SPIRONOLACTONE 25 MG/1
12.5 TABLET, FILM COATED ORAL ONCE
Refills: 0 | Status: COMPLETED | OUTPATIENT
Start: 2023-04-22 | End: 2023-04-22

## 2023-04-22 RX ORDER — HEPARIN SODIUM 5000 [USP'U]/ML
2500 INJECTION INTRAVENOUS; SUBCUTANEOUS EVERY 6 HOURS
Refills: 0 | Status: DISCONTINUED | OUTPATIENT
Start: 2023-04-22 | End: 2023-04-27

## 2023-04-22 RX ORDER — DAPAGLIFLOZIN 10 MG/1
10 TABLET, FILM COATED ORAL EVERY 24 HOURS
Refills: 0 | Status: DISCONTINUED | OUTPATIENT
Start: 2023-04-22 | End: 2023-04-22

## 2023-04-22 RX ORDER — CARVEDILOL PHOSPHATE 80 MG/1
1 CAPSULE, EXTENDED RELEASE ORAL
Refills: 0 | DISCHARGE

## 2023-04-22 RX ORDER — ATORVASTATIN CALCIUM 80 MG/1
20 TABLET, FILM COATED ORAL AT BEDTIME
Refills: 0 | Status: DISCONTINUED | OUTPATIENT
Start: 2023-04-22 | End: 2023-04-22

## 2023-04-22 RX ORDER — HEPARIN SODIUM 5000 [USP'U]/ML
5000 INJECTION INTRAVENOUS; SUBCUTANEOUS EVERY 6 HOURS
Refills: 0 | Status: DISCONTINUED | OUTPATIENT
Start: 2023-04-22 | End: 2023-04-27

## 2023-04-22 RX ORDER — PIPERACILLIN AND TAZOBACTAM 4; .5 G/20ML; G/20ML
3.38 INJECTION, POWDER, LYOPHILIZED, FOR SOLUTION INTRAVENOUS EVERY 8 HOURS
Refills: 0 | Status: COMPLETED | OUTPATIENT
Start: 2023-04-22 | End: 2023-04-27

## 2023-04-22 RX ORDER — SACUBITRIL AND VALSARTAN 24; 26 MG/1; MG/1
1 TABLET, FILM COATED ORAL
Refills: 0 | Status: DISCONTINUED | OUTPATIENT
Start: 2023-04-22 | End: 2023-04-22

## 2023-04-22 RX ORDER — GUAIFENESIN/DEXTROMETHORPHAN 600MG-30MG
15 TABLET, EXTENDED RELEASE 12 HR ORAL ONCE
Refills: 0 | Status: COMPLETED | OUTPATIENT
Start: 2023-04-22 | End: 2023-04-27

## 2023-04-22 RX ORDER — VANCOMYCIN HCL 1 G
1000 VIAL (EA) INTRAVENOUS EVERY 12 HOURS
Refills: 0 | Status: DISCONTINUED | OUTPATIENT
Start: 2023-04-22 | End: 2023-04-22

## 2023-04-22 RX ORDER — DAPAGLIFLOZIN 10 MG/1
1 TABLET, FILM COATED ORAL
Refills: 0 | DISCHARGE

## 2023-04-22 RX ORDER — FUROSEMIDE 40 MG
40 TABLET ORAL ONCE
Refills: 0 | Status: COMPLETED | OUTPATIENT
Start: 2023-04-22 | End: 2023-04-22

## 2023-04-22 RX ORDER — SODIUM CHLORIDE 9 MG/ML
250 INJECTION INTRAMUSCULAR; INTRAVENOUS; SUBCUTANEOUS ONCE
Refills: 0 | Status: COMPLETED | OUTPATIENT
Start: 2023-04-22 | End: 2023-04-22

## 2023-04-22 RX ORDER — CARVEDILOL PHOSPHATE 80 MG/1
6.25 CAPSULE, EXTENDED RELEASE ORAL EVERY 12 HOURS
Refills: 0 | Status: DISCONTINUED | OUTPATIENT
Start: 2023-04-22 | End: 2023-04-22

## 2023-04-22 RX ADMIN — HEPARIN SODIUM 1000 UNIT(S)/HR: 5000 INJECTION INTRAVENOUS; SUBCUTANEOUS at 17:14

## 2023-04-22 RX ADMIN — SODIUM CHLORIDE 500 MILLILITER(S): 9 INJECTION INTRAMUSCULAR; INTRAVENOUS; SUBCUTANEOUS at 01:50

## 2023-04-22 RX ADMIN — CARVEDILOL PHOSPHATE 6.25 MILLIGRAM(S): 80 CAPSULE, EXTENDED RELEASE ORAL at 08:19

## 2023-04-22 RX ADMIN — PIPERACILLIN AND TAZOBACTAM 25 GRAM(S): 4; .5 INJECTION, POWDER, LYOPHILIZED, FOR SOLUTION INTRAVENOUS at 21:04

## 2023-04-22 RX ADMIN — Medication 5 MILLIGRAM(S): at 21:04

## 2023-04-22 RX ADMIN — DAPAGLIFLOZIN 10 MILLIGRAM(S): 10 TABLET, FILM COATED ORAL at 08:18

## 2023-04-22 RX ADMIN — Medication 40 MILLIGRAM(S): at 21:04

## 2023-04-22 RX ADMIN — PIPERACILLIN AND TAZOBACTAM 25 GRAM(S): 4; .5 INJECTION, POWDER, LYOPHILIZED, FOR SOLUTION INTRAVENOUS at 13:55

## 2023-04-22 RX ADMIN — SPIRONOLACTONE 12.5 MILLIGRAM(S): 25 TABLET, FILM COATED ORAL at 21:30

## 2023-04-22 RX ADMIN — CHLORHEXIDINE GLUCONATE 1 APPLICATION(S): 213 SOLUTION TOPICAL at 13:54

## 2023-04-22 RX ADMIN — HEPARIN SODIUM 1100 UNIT(S)/HR: 5000 INJECTION INTRAVENOUS; SUBCUTANEOUS at 08:26

## 2023-04-22 RX ADMIN — PANTOPRAZOLE SODIUM 40 MILLIGRAM(S): 20 TABLET, DELAYED RELEASE ORAL at 08:18

## 2023-04-22 NOTE — H&P ADULT - NSHPLABSRESULTS_GEN_ALL_CORE
(04-21 @ 11:43)                      14.7  7.89 )-----------( 139                 45.0    Neutrophils = 5.71 (72.4%)  Lymphocytes = 1.50 (19.0%)  Eosinophils = 0.01 (0.1%)  Basophils = 0.01 (0.1%)  Monocytes = 0.64 (8.1%)  Bands = --%    04-21    128<L>  |  92<L>  |  42<H>  ----------------------------<  142<H>  TNP   |  16<L>  |  1.4    Ca    9.1      21 Apr 2023 11:43    TPro  6.8  /  Alb  3.4<L>  /  TBili  2.3<H>  /  DBili  x   /  AST  151<H>  /  ALT  114<H>  /  AlkPhos  135<H>  04-21    ( 21 Apr 2023 16:17 )   PT: 20.10 sec;   INR: 1.73 ratio;       PTT:27.0 sec  CARDIAC MARKERS ( 21 Apr 2023 11:43 )  Trop 0.01 ng/mL / CK x     / CKMB x           RVP:    Venous Blood Gas:  04-21 @ 12:53  7.37/36/21/21/22.8  VBG Lactate: 4.60  Venous Blood Gas:  04-21 @ 11:43  7.38/36/15/21/13.9  VBG Lactate: 6.10        Tox:         < from: CT Abdomen and Pelvis w/ IV Cont (04.21.23 @ 14:26) >    IMPRESSION:    No evidence of pulmonary embolism. (See discussion below for more   findings.)    Marked cardiomegaly.    There is a 2.2 x 2.8 cm rounded filling defect in the region of the apex   of the left ventricle (3/11; 607/95). Differential diagnosis includes an   intracardiac mass or thrombus.    The main pulmonary artery is dilated, measuring 3.6 cm in diameter, which   may be seen with pulmonary hypertension. There is reflux of contrast   material into the IVC and hepatic veins compatible with right heart   dysfunction.    Right hilar lymphadenopathy and right perihilar soft tissue density is   noted. There is segmental narrowing and consolidation noted in the medial   aspect of the right lower lobe. There is a small right pleural effusion.   There is extrinsic indentation and possible invasion of the right main   pulmonary artery (604/142; 605/129; 2/42). There is associated narrowing   of the proximal right lobar and segmental branches. A right hilar / right   lower lobe mass with postobstructive pneumonitis should be ruled out.      The findings were discussed with Dr. Leone at 4:10 PM 4/21/2023, with   read back.    --- End of Report ---    < end of copied text >

## 2023-04-22 NOTE — PROGRESS NOTE ADULT - NS ATTEND AMEND GEN_ALL_CORE FT
Ms. eTllez is a 77-year-old female admitted for worsening shortness of breath, CT chest revealed right hilar adenopathy with possible involvement of the pulmonary artery. Significant pmh of HTN, HLD, nonischemic cardiomyopathy, HFrEF, s/p ICD and PPM. Patient currently in ICU undergoing cardiology assessment and treatment. I reviewed the CT images, not well defined hilar mass and does not seem amenable for EBUS/FNA but once patient is more compensated will benefit from bronchoscopy for diagnosis.   04/22/23: Undergoing cardiology treatment for decompensated CHF, improving clinically. I discussed with the patient CT findings and recommended bronchoscopy when she is more stable  Plan: will follow.

## 2023-04-22 NOTE — PROGRESS NOTE ADULT - ASSESSMENT
77 year old woman with PMHx of HTN, HLD, nonischemic cardiomyopathy, HFrEF s/p AICD presents to the ED with 2 weeks of worsening shortness of breath. Patient and her son state patient has been experiencing shortness of breath that has progressed from present on activity to now shortness of breath during conversation. Patient also notes an increasing dry cough over this time period.     Suspected post obstructive PNA  Lung Mass  Suspected LV thrombus  NICM, HFrEF s/p AICD  - hemodynamically Stable, on room air  CT-  C/A/P revealing cardiomegaly with evidence of right heart strain, R hilar lymphadenopathy with R perihilar soft tissue density with indentation/possible invasion of right pulmonary artery, and narrowing of proximal R lobar and segmental branches with associated consolidation of right lower lobe. 2.2 x 2.8 cm filling defect noted in apex of left ventricle  - Duplex venous LE negative for DVT; CTA chest negative for PE  - seen by CTSx and cardio (Dr Guerrero) in ED  - c/w heparin gtt; Goal PTTs   - F/u ECHO with contrast to evaluate LV thrombus  - Pulmonary following for possible bronch with EBUS and FNA  - Nasal MRSA neg, fu  procal, continue with Zosyn for now   - Strict Is and Os  - hold Lasix, spironolactone and Entresto for now     HAGMA 2/2 elevated lactate  DAVID on CKD  - AG 20, Lactate 5.8-->4.6  - S/p small IVF bolus, repeat lactate  - Scr 1.4, baseline 0.7 in 2021  - Trend lactate and BMP    Hypotonic hyponatremia  - Na+ 129  - F/u serum osmolarity, urine osmolarity, urine sodium  - Trend Na+ daily  - Holding IVF    Markedly elevated LFTs, ischemic v congestive   Elevated INR  - CT A/P showed no dilation of CBD  - check RUQ, trend LFTs, hepatology eval     HTN- BP borderline, decrease coreg to 3.125 Q12H     HLD - hold lipitor 20mg     Full code, overall prognosis is guarded, continue monitoring in SDU

## 2023-04-22 NOTE — CONSULT NOTE ADULT - ASSESSMENT
77 year old woman with PMHx of HTN, HLD, nonischemic cardiomyopathy, HFrEF s/p AICD presents to the ED with 2 weeks of worsening shortness of breath currently admitted for perihilar sift tissue mass in mediastinum. hepatology was consulted for elevated LFT. She denies any previous infections of the liver oe chronic liver disease in family, denies any herbal medications or supplements or abx or travel out side.   Has been taking tylenol intermittently as needed but denies excess intake.     #)Elevated liver enzymes predominantly hepatocellular (AST>ALT) with hyperbilurubinemia r/o ischemia vs DILI vs other CLD   -Normal LFt in 9/2022  -Admitted with LFT 2.3/135/151/114  -LFT inc to 3/132/1660/692  -No hypotensive episodes documented in the system   -Denies any recent abx or herbal meds or OTC medications  -h/o tylenol intake as needed  -CT abdomen showed Hepatic steatosis is noted. A 1 cm hypodensity is noted within the right lobe of the liver, too small for further characterization.  -echo 8/2021 low EF 20 to 25%, mild TR, mod AS   -INR 1.73, no encephalopathy      Recs:   -check US abdomen and CK levels   -check HAV IgM/IgG, HBsAg, HBsAb, HBcAb IgM/IgG, HCV Ab, Anti HEV, Serum Ferritin, Transferrin Saturation, Ceruloplasmin level, ALE, SMA, immunoglobulins panel, AMA, Anti LK microsomal Ab, anti-soluble liver Ag, EBV, HSV, CMV, ACE levels   -Trend LFT, INR  -Avoid hepatotoxic medications   -repeat echo pending        77 year old woman with PMHx of HTN, HLD, nonischemic cardiomyopathy, HFrEF s/p AICD presents to the ED with 2 weeks of worsening shortness of breath currently admitted for perihilar sift tissue mass in mediastinum. hepatology was consulted for elevated LFT. She denies any previous infections of the liver oe chronic liver disease in family, denies any herbal medications or supplements or abx or travel out side.   Has been taking tylenol intermittently as needed but denies excess intake.     #)Elevated liver enzymes predominantly hepatocellular (AST>ALT) with hyperbilurubinemia r/o ischemia vs congestive hepatopathy vs DILI vs other CLD   -Normal LFt in 9/2022  -Admitted with LFT 2.3/135/151/114  -LFT inc to 3/132/1660/692  -No hypotensive episodes documented in the system   -Denies any recent abx or herbal meds or OTC medications  -h/o tylenol intake as needed  -CT abdomen showed Hepatic steatosis is noted. A 1 cm hypodensity is noted within the right lobe of the liver, too small for further characterization.  -echo 8/2021 low EF 20 to 25%, mild TR, mod AS   -INR 1.73, no encephalopathy      Recs:   -check US abdomen and CK levels   -check HAV IgM/IgG, HBsAg, HBsAb, HBcAb IgM/IgG, HCV Ab, Anti HEV, Serum Ferritin, Transferrin Saturation, Ceruloplasmin level, ALE, SMA, immunoglobulins panel, AMA, Anti LK microsomal Ab, anti-soluble liver Ag, EBV, HSV, CMV, ACE levels   -Trend LFT, INR  -Avoid hepatotoxic medications   -repeat echo pending

## 2023-04-22 NOTE — H&P ADULT - ATTENDING COMMENTS
77 year old woman with PMHx of HTN, HLD, nonischemic cardiomyopathy, HFrEF s/p AICD presents to the ED with 2 weeks of worsening shortness of breath. Patient and her son state patient has been experiencing shortness of breath that has progressed from present on activity to now shortness of breath during conversation. Patient also notes an increasing dry cough over this time period. Admitted  for Intracardiac filling defect - tumor Vs. clot complicate by Elevated lactate, Hyponatremia Pneumonia?  lymphadenopathy, DAVID and   suspected right heart Strain. Patient is clinically Asymptomatic.    CTA:  No evidence of pulmonary embolism. (See discussion below for more findings.)  Marked cardiomegaly.  There is a 2.2 x 2.8 cm rounded filling defect in the region of the apex of the left ventricle (3/11; 607/95). Differential diagnosis includes an intracardiac mass or thrombus.  The main pulmonary artery is dilated, measuring 3.6 cm in diameter, which may be seen with pulmonary hypertension. There is reflux of contrast material into the IVC and hepatic veins compatible with right heart dysfunction.  Right hilar lymphadenopathy and right perihilar soft tissue density is noted. There is segmental narrowing and consolidation noted in the medial aspect of the right lower lobe. There is a small right pleural effusion. There is extrinsic indentation and possible invasion of the right main pulmonary artery (604/142; 605/129; 2/42). There is associated narrowing of the proximal right lobar and segmental branches. A right hilar / right lower lobe mass with postobstructive pneumonitis should be ruled out.        VITAL SIGNS: AFebrile, vital signs stable  CONSTITUTIONAL: Well-developed; well-nourished; in no acute distress.  SKIN: Skin exam is warm and dry, no acute rash.  HEAD: Normocephalic; atraumatic.  EYES: Pupils  reactive to light, Extraocular movements intact; conjunctiva and sclera clear.  ENT: No nasal discharge; airway clear. Moist mucus membranes.  NECK: Supple; non tender. No rigidity  CARD: Rregular rate and rhythm. Normal S1, S2; no murmurs, gallops, or rubs.  RESP: CT  auscultation bilaterally. No wheezes, rales or rhonchi.  ABD: Abdomen soft; non-tender; non-distended  EXT: Normal ROM. No clubbing, cyanosis or edema.   NEURO: Alert and oriented x 3. No focal deficits.  PSYCH: cooperative, appropriate.     Vital Signs (24 Hrs):  T(C): 36.2 (04-21-23 @ 22:23), Max: 36.4 (04-21-23 @ 10:56)  HR: 109 (04-21-23 @ 22:23) (106 - 110)  BP: 124/60 (04-21-23 @ 22:23) (112/73 - 124/60)  RR: 20 (04-21-23 @ 22:23) (16 - 20)  SpO2: 100% (04-21-23 @ 22:23) (98% - 100%)  Wt(kg): --  Daily Height in cm: 160.02 (21 Apr 2023 10:56)            Seen on  04/21/23

## 2023-04-22 NOTE — CONSULT NOTE ADULT - SUBJECTIVE AND OBJECTIVE BOX
Patient is a 77y old  Female who presents with a chief complaint of intra cardiac mass (22 Apr 2023 01:10)      HPI:  77 year old woman with PMHx of HTN, HLD, nonischemic cardiomyopathy, HFrEF s/p AICD presents to the ED with 2 weeks of worsening shortness of breath. Patient and her son state patient has been experiencing shortness of breath that has progressed from present on activity to now shortness of breath during conversation. Patient also notes an increasing dry cough over this time period. Denies history of smoking, fevers/chills, chest pain, nausea/vomiting, bloody sputum, dark/tarry/bloody bowel movements, upper respiratory symptoms.     In ED, patient's HR is 110 and saturating 98% on RA  CT C/A/P revealing cardiomegaly with evidence of right heart strain, R hilar lymphadenopathy with R perihilar soft tissue density with indentation/possible invasion of right pulmonary artery, and narrowing of proximal R lobar and segmental branches with associated consolidation of right lower lobe. 2.2 x 2.8 cm filling defect noted in apex of left ventricle.   Last ECHO 8/2021 revealing EF 20-25%, mild Mr, mild TR, mild Pulm HTN  Dimer 2600, trops negative BNP 38648  Duplex venous LE negative for DVT; CTA chest negative for PE  Cr 1.4 (0.7 in 2021), Na+ 128, T.Benjamin 2.3, LFTs elevated (hemolyzed)  Lactate 5.8-->4.6  Admitted to SDU (22 Apr 2023 01:10)      PAST MEDICAL & SURGICAL HISTORY:  CHF, stage C      HTN (hypertension)          SOCIAL HX:   Smoking                         ETOH                            Other    FAMILY HISTORY:  FH: heart failure (Mother)    :  No known cardiovacular family hisotry     Review Of Systems:     All ROS are negative except per HPI       Allergies    epinephrine (Unknown)    Intolerances          PHYSICAL EXAM    ICU Vital Signs Last 24 Hrs  T(C): 36.4 (22 Apr 2023 07:35), Max: 36.4 (21 Apr 2023 10:56)  T(F): 97.5 (22 Apr 2023 07:35), Max: 97.5 (21 Apr 2023 10:56)  HR: 100 (22 Apr 2023 07:35) (100 - 110)  BP: 126/94 (22 Apr 2023 07:35) (108/82 - 126/94)  BP(mean): 106 (22 Apr 2023 07:35) (91 - 106)  ABP: --  ABP(mean): --  RR: 20 (22 Apr 2023 02:00) (16 - 20)  SpO2: 97% (22 Apr 2023 07:35) (96% - 100%)    O2 Parameters below as of 22 Apr 2023 07:35  Patient On (Oxygen Delivery Method): room air            CONSTITUTIONAL:  Well nourished.  NAD    ENT:   Airway patent,   Mouth with normal mucosa.   No thrush    EYES:   pupils equal,   round and reactive to light.    CARDIAC:   Normal rate,   Regular rhythm.    Heart sounds S1, S2.   No edema      Vascular:   normal systolic impulse  no bruits    RESPIRATORY:   No wheezing   Normal chest expansion  No use of accessory muscles    GASTROINTESTINAL:  Abdomen soft   Non-tender,   No guarding,   + BS    GENITOURINARY  normal genitalia for sex  no edema    MUSCULOSKELETAL:   Range of motion is not limited,  Nno clubbing, cyanosis    NEUROLOGICAL:   Alert and oriented   No motor or sensory deficits.  Pertinent DTRs normal    SKIN:   Skin normal color for race,   Warm and dry  No evidence of rash.    PSYCHIATRIC:   Normal mood and affect.   No apparent risk to self or others.    HEME LYMPH:   No cervical  lymphadenopathy.  No inguinal lymphadenopathy              LABS:                          13.8   10.02 )-----------( 129      ( 22 Apr 2023 07:07 )             41.8                                               04-22    134<L>  |  95<L>  |  54<H>  ----------------------------<  119<H>  5.0   |  18  |  1.5    Ca    9.1      22 Apr 2023 07:07  Mg     2.2     04-22    TPro  6.0  /  Alb  3.3<L>  /  TBili  3.0<H>  /  DBili  1.3<H>  /  AST  1660<H>  /  ALT  692<H>  /  AlkPhos  132<H>  04-22      PT/INR - ( 21 Apr 2023 16:17 )   PT: 20.10 sec;   INR: 1.73 ratio         PTT - ( 21 Apr 2023 16:17 )  PTT:27.0 sec                                           CARDIAC MARKERS ( 21 Apr 2023 11:43 )  x     / 0.01 ng/mL / x     / x     / x                                                LIVER FUNCTIONS - ( 22 Apr 2023 07:07 )  Alb: 3.3 g/dL / Pro: 6.0 g/dL / ALK PHOS: 132 U/L / ALT: 692 U/L / AST: 1660 U/L / GGT: x                                                                                                                                       X-Rays reviewed:                                                                                    ECHO    CXR interpreted by me:    MEDICATIONS  (STANDING):  carvedilol 6.25 milliGRAM(s) Oral every 12 hours  chlorhexidine 2% Cloths 1 Application(s) Topical daily  guaifenesin/dextromethorphan Oral Liquid 15 milliLiter(s) Oral once  heparin  Infusion.  Unit(s)/Hr (11 mL/Hr) IV Continuous <Continuous>  melatonin 5 milliGRAM(s) Oral at bedtime  pantoprazole    Tablet 40 milliGRAM(s) Oral before breakfast  piperacillin/tazobactam IVPB.. 3.375 Gram(s) IV Intermittent every 8 hours  vancomycin  IVPB 1000 milliGRAM(s) IV Intermittent every 12 hours    MEDICATIONS  (PRN):  heparin   Injectable 5000 Unit(s) IV Push every 6 hours PRN For aPTT less than 40  heparin   Injectable 2500 Unit(s) IV Push every 6 hours PRN For aPTT between 40 - 57      < from: CT Angio Chest PE Protocol w/ IV Cont (04.21.23 @ 14:27) >    IMPRESSION:    No evidence of pulmonary embolism. (See discussion below for more   findings.)    Marked cardiomegaly.    There is a 2.2 x 2.8 cm rounded filling defect in the region of the apex   of the left ventricle (3/11; 607/95). Differential diagnosis includes an   intracardiac mass or thrombus.    The main pulmonary artery is dilated, measuring 3.6 cm in diameter, which   may be seen with pulmonary hypertension. There is reflux of contrast   material into the IVC and hepatic veins compatible with right heart   dysfunction.    Right hilar lymphadenopathy and right perihilar soft tissue density is   noted. There is segmental narrowing and consolidation noted in the medial   aspect of the right lower lobe. There is a small right pleural effusion.   There is extrinsic indentation and possible invasion of the right main   pulmonary artery (604/142; 605/129; 2/42). There is associated narrowing   of the proximal right lobar and segmental branches. A right hilar / right   lower lobe mass with postobstructive pneumonitis should be ruled out.      The findings were discussed with Dr. Leone at 4:10 PM 4/21/2023, with   read back.    --- End of Report ---      < end of copied text >   Patient is a 77y old  Female who presents with a chief complaint of SOB (22 Apr 2023 01:10)    77 year old woman with PMHx of HTN, HLD, nonischemic cardiomyopathy, HFrEF s/p AICD presents to the ED with 2 weeks of worsening shortness of breath. Patient and her son state patient has been experiencing shortness of breath that has progressed from present on activity to now shortness of breath during conversation. Patient also notes an increasing dry cough over this time period. Denies history of smoking, fevers/chills, chest pain, nausea/vomiting, bloody sputum, dark/tarry/bloody bowel movements, upper respiratory symptoms.     In ED, patient's HR is 110 and saturating 98% on RA  CT C/A/P revealing cardiomegaly with evidence of right heart strain, R hilar lymphadenopathy with R perihilar soft tissue density with indentation/possible invasion of right pulmonary artery, and narrowing of proximal R lobar and segmental branches with associated consolidation of right lower lobe. 2.2 x 2.8 cm filling defect noted in apex of left ventricle.   Last ECHO 8/2021 revealing EF 20-25%, mild Mr, mild TR, mild Pulm HTN  Dimer 2600, trops negative BNP 10815  Duplex venous LE negative for DVT; CTA chest negative for PE  Cr 1.4 (0.7 in 2021), Na+ 128, T.Benjamin 2.3, LFTs elevated (hemolyzed)  Lactate 5.8-->4.6  Admitted to SDU (22 Apr 2023 01:10)      PAST MEDICAL & SURGICAL HISTORY:  CHF      HTN (hypertension)          SOCIAL HX:   Smoking -    FAMILY HISTORY:  FH: heart failure (Mother)    :  No known cardiovacular family hisotry     Review Of Systems:     All ROS are negative except per HPI       Allergies    epinephrine (Unknown)    Intolerances          PHYSICAL EXAM    ICU Vital Signs Last 24 Hrs  T(C): 36.4 (22 Apr 2023 07:35), Max: 36.4 (21 Apr 2023 10:56)  T(F): 97.5 (22 Apr 2023 07:35), Max: 97.5 (21 Apr 2023 10:56)  HR: 100 (22 Apr 2023 07:35) (100 - 110)  BP: 126/94 (22 Apr 2023 07:35) (108/82 - 126/94)  BP(mean): 106 (22 Apr 2023 07:35) (91 - 106)  RR: 20 (22 Apr 2023 02:00) (16 - 20)  SpO2: 97% (22 Apr 2023 07:35) (96% - 100%)    O2 Parameters below as of 22 Apr 2023 07:35  Patient On (Oxygen Delivery Method): room air            CONSTITUTIONAL:  ill looking    ENT:   Airway patent,   Mouth with normal mucosa.   No thrush    EYES:   pupils equal,   round and reactive to light.    CARDIAC:   HIWOT 2.6      RESPIRATORY:   DEC BS R BASE    GASTROINTESTINAL:  Abdomen soft   Non-tender,   No guarding,   + BS        MUSCULOSKELETAL:   Range of motion is not limited,      NEUROLOGICAL:   Alert and oriented   No motor or sensory deficits.  Pertinent DTRs normal    SKIN:   Skin normal color for race,   Warm and dry  No evidence of rash.                  LABS:                          13.8   10.02 )-----------( 129      ( 22 Apr 2023 07:07 )             41.8                                               04-22    134<L>  |  95<L>  |  54<H>  ----------------------------<  119<H>  5.0   |  18  |  1.5    Ca    9.1      22 Apr 2023 07:07  Mg     2.2     04-22    TPro  6.0  /  Alb  3.3<L>  /  TBili  3.0<H>  /  DBili  1.3<H>  /  AST  1660<H>  /  ALT  692<H>  /  AlkPhos  132<H>  04-22      PT/INR - ( 21 Apr 2023 16:17 )   PT: 20.10 sec;   INR: 1.73 ratio         PTT - ( 21 Apr 2023 16:17 )  PTT:27.0 sec                                           CARDIAC MARKERS ( 21 Apr 2023 11:43 )  x     / 0.01 ng/mL / x     / x     / x                                                LIVER FUNCTIONS - ( 22 Apr 2023 07:07 )  Alb: 3.3 g/dL / Pro: 6.0 g/dL / ALK PHOS: 132 U/L / ALT: 692 U/L / AST: 1660 U/L / GGT: x                                                                                                                                     MEDICATIONS  (STANDING):  carvedilol 6.25 milliGRAM(s) Oral every 12 hours  chlorhexidine 2% Cloths 1 Application(s) Topical daily  guaifenesin/dextromethorphan Oral Liquid 15 milliLiter(s) Oral once  heparin  Infusion.  Unit(s)/Hr (11 mL/Hr) IV Continuous <Continuous>  melatonin 5 milliGRAM(s) Oral at bedtime  pantoprazole    Tablet 40 milliGRAM(s) Oral before breakfast  piperacillin/tazobactam IVPB.. 3.375 Gram(s) IV Intermittent every 8 hours  vancomycin  IVPB 1000 milliGRAM(s) IV Intermittent every 12 hours    MEDICATIONS  (PRN):  heparin   Injectable 5000 Unit(s) IV Push every 6 hours PRN For aPTT less than 40  heparin   Injectable 2500 Unit(s) IV Push every 6 hours PRN For aPTT between 40 - 57      < from: CT Angio Chest PE Protocol w/ IV Cont (04.21.23 @ 14:27) >    IMPRESSION:    No evidence of pulmonary embolism. (See discussion below for more   findings.)    Marked cardiomegaly.    There is a 2.2 x 2.8 cm rounded filling defect in the region of the apex   of the left ventricle (3/11; 607/95). Differential diagnosis includes an   intracardiac mass or thrombus.    The main pulmonary artery is dilated, measuring 3.6 cm in diameter, which   may be seen with pulmonary hypertension. There is reflux of contrast   material into the IVC and hepatic veins compatible with right heart   dysfunction.    Right hilar lymphadenopathy and right perihilar soft tissue density is   noted. There is segmental narrowing and consolidation noted in the medial   aspect of the right lower lobe. There is a small right pleural effusion.   There is extrinsic indentation and possible invasion of the right main   pulmonary artery (604/142; 605/129; 2/42). There is associated narrowing   of the proximal right lobar and segmental branches. A right hilar / right   lower lobe mass with postobstructive pneumonitis should be ruled out.      The findings were discussed with Dr. Leone at 4:10 PM 4/21/2023, with   read back.    --- End of Report ---      < end of copied text >

## 2023-04-22 NOTE — H&P ADULT - HISTORY OF PRESENT ILLNESS
77 year old woman with PMHx of HTN, HLD, nonischemic cardiomyopathy, HFrEF s/p AICD presents to the ED with 2 weeks of worsening shortness of breath. Patient and her son state patient has been experiencing shortness of breath that has progressed from present on activity to now shortness of breath during conversation. Patient also notes an increasing dry cough over this time period. Denies history of smoking, fevers/chills, chest pain, nausea/vomiting, bloody sputum, dark/tarry/bloody bowel movements, upper respiratory symptoms.     In ED, patient's HR is 110 and saturating 98% on RA  CT C/A/P revealing cardiomegaly with evidence of right heart strain, R hilar lymphadenopathy with R perihilar soft tissue density with indentation/possible invasion of right pulmonary artery, and narrowing of proximal R lobar and segmental branches with associated consolidation of right lower lobe. 2.2 x 2.8 cm filling defect noted in apex of left ventricle.   Last ECHO 8/2021 revealing EF 20-25%, mild Mr, mild TR, mild Pulm HTN  Dimer 2600, trops negative BNP 51271  Duplex venous LE negative for DVT; CTA chest negative for PE  Cr 1.4 (0.7 in 2021), Na+ 128, T.Benjamin 2.3, LFTs elevated (hemolyzed)  Lactate 5.8-->4.6  Admitted to SDU

## 2023-04-22 NOTE — PROGRESS NOTE ADULT - SUBJECTIVE AND OBJECTIVE BOX
JOHNSONMIKHAIL  77y Female    CHIEF COMPLAINT:    Patient is a 77y old  Female who presents with a chief complaint of intra cardiac mass (22 Apr 2023 11:43)    INTERVAL HPI/OVERNIGHT EVENTS:    Patient seen and examined. Confused overnight, attempting to leave unit    ROS: All other systems are negative.    Vital Signs:    T(F): 97.2 (04-22-23 @ 11:08), Max: 97.5 (04-22-23 @ 07:35)  HR: 83 (04-22-23 @ 11:08) (83 - 109)  BP: 88/61 (04-22-23 @ 11:08) (88/61 - 126/94)  RR: 18 (04-22-23 @ 11:08) (17 - 20)  SpO2: 100% (04-22-23 @ 11:08) (96% - 100%)    PHYSICAL EXAM:    GENERAL:  NAD  SKIN: No rashes or lesions  HEENT: Atraumatic. Normocephalic.   NECK: Supple, No JVD.   PULMONARY: decreased breath sounds B/L. No wheezing.   CVS: Normal S1, S2. Rate and Rhythm are regular.    ABDOMEN/GI: Soft, Nontender, Nondistended   MSK:  No clubbing or cyanosis   NEUROLOGIC:  moves all extremities   PSYCH: Awake and alert, confused     Consultant(s) Notes Reviewed:  [x ] YES  [ ] NO  Care Discussed with Consultants/Other Providers [ x] YES  [ ] NO    LABS:                        13.2   9.59  )-----------( 117      ( 22 Apr 2023 11:24 )             40.5     129<L>  |  93<L>  |  56<H>  ----------------------------<  122<H>  5.1<H>   |  19  |  1.5    Ca    8.7      22 Apr 2023 11:24  Mg     2.2     04-22    TPro  5.7<L>  /  Alb  3.0<L>  /  TBili  3.1<H>  /  DBili  x   /  AST  2216<H>  /  ALT  893<H>  /  AlkPhos  137<H>  04-22    PT/INR - ( 22 Apr 2023 11:24 )   PT: 33.30 sec;   INR: 2.83 ratio       PTT - ( 21 Apr 2023 16:17 )  PTT:27.0 sec    Trop 0.01, CKMB --, CK --, 04-21-23 @ 11:43    RADIOLOGY & ADDITIONAL TESTS:  Imaging or report Personally Reviewed:  [x] YES  [ ] NO  EKG reviewed: [x] YES  [ ] NO    Medications:  Standing  carvedilol 6.25 milliGRAM(s) Oral every 12 hours  chlorhexidine 2% Cloths 1 Application(s) Topical daily  guaifenesin/dextromethorphan Oral Liquid 15 milliLiter(s) Oral once  heparin  Infusion.  Unit(s)/Hr IV Continuous <Continuous>  melatonin 5 milliGRAM(s) Oral at bedtime  pantoprazole    Tablet 40 milliGRAM(s) Oral before breakfast  piperacillin/tazobactam IVPB.. 3.375 Gram(s) IV Intermittent every 8 hours    PRN Meds  heparin   Injectable 5000 Unit(s) IV Push every 6 hours PRN  heparin   Injectable 2500 Unit(s) IV Push every 6 hours PRN             246

## 2023-04-22 NOTE — H&P ADULT - NSHPPHYSICALEXAM_GEN_ALL_CORE
Constitutional: No acute distress.   Eyes: Conjunctiva pink, Sclera clear, PERRLA, EOMI..   Cardiovascular: Regular rate, regular rhythm. No noted murmurs rubs or gallops.  Respiratory: unlabored respiratory effort, clear to auscultation bilaterally no wheezing, rales or rhonchi  Gastrointestinal: Normal bowel sounds. soft, non distended, non-tender abdomen.   Musculoskeletal: supple neck, no midline tenderness. No joint or bony deformity.   Neurologic: awake, alert, cranial nerves II-XII grossly intact, extremities’ motor and sensory functions grossly intact

## 2023-04-22 NOTE — H&P ADULT - ASSESSMENT
77 year old woman with PMHx of HTN, HLD, nonischemic cardiomyopathy, HFrEF s/p AICD presents to the ED with 2 weeks of worsening shortness of breath. Patient and her son state patient has been experiencing shortness of breath that has progressed from present on activity to now shortness of breath during conversation. Patient also notes an increasing dry cough over this time period. Denies history of smoking, fevers/chills, chest pain, nausea/vomiting, bloody sputum, dark/tarry/bloody bowel movements, upper respiratory symptoms.     #Intracardiac filling defect - tumor Vs clot?  #Post obstructive pneumonia  #NICM, HFrEF s/p AICD  - slightly tachy, afebrile and normal WBC  - Appears euvolemic on exam  - Dimer 2600, trops negative BNP 92326  - Duplex venous LE negative for DVT; CTA chest negative for PE  - CT C/A/P revealing cardiomegaly with evidence of right heart strain, R hilar lymphadenopathy with R perihilar soft tissue density with indentation/possible invasion of right pulmonary artery, and narrowing of proximal R lobar and segmental branches with associated consolidation of right lower lobe. 2.2 x 2.8 cm filling defect noted in apex of left ventricle.   - Last ECHO 8/2021 revealing EF 20-25%, mild Mr, mild TR, mild Pulm HTN  - seen by CTSx and cardio (Dr Guerrero) in ED  - c/w heparin gtt; Goal PTTs   - F/u ECHO with contrast to evaluate LV thrombus  - Pulmonary eval for possible bronch with EBUS and FNA  - F/u MRSA nares, procal  - c/w vanc 1g iv q12hr and zosyn 3.375g iv q8hr; D/c vanc if MRSA negative  - Strict Is and Os    #HAGMA 2/2 elevated lactate  - AG 20, Lactate 5.8-->4.6  - will give IVF 250cc bolus only as pt has EF 20-25% (pt is euvolemic on exam tho)  - Trend lactate    #DAVID on CKD Vs progression of CKD  - Cr 1.4 (0.7 in 2021)  - F/u RBUS  - F/u urine lytes  - Trend Cr daily  - will avoid giving maintenance fluids for now concerning HFrEF EF 20-25%    #Hypotonic hyponatremia  - Na+ 128  - calculated serum osmolarity: 279  - F/u serum osmolarity, urine osmolarity, urine sodium  - Trend Na+ daily  - will avoid giving maintenance fluids for now concerning HFrEF EF 20-25%    #Elevated T.Benjamin - likely 2/2 congestive hepatopathy (from right heart strain)  - T.Benjamin 2.3, LFTs elevated (hemolyzed)  - F/u AM CMP  - F/u direct bilirubin  - CT A/P showed no dilation of CBD    #HTN  -     #HLD  -     DVT px - heparin gtt  GI px - protonix  Diet - DASH  Activity - AAT  Dispo - SDU  *MED REC -      77 year old woman with PMHx of HTN, HLD, nonischemic cardiomyopathy, HFrEF s/p AICD presents to the ED with 2 weeks of worsening shortness of breath. Patient and her son state patient has been experiencing shortness of breath that has progressed from present on activity to now shortness of breath during conversation. Patient also notes an increasing dry cough over this time period. Denies history of smoking, fevers/chills, chest pain, nausea/vomiting, bloody sputum, dark/tarry/bloody bowel movements, upper respiratory symptoms.     #Intracardiac filling defect - tumor Vs clot?  #Post obstructive pneumonia  #NICM, HFrEF s/p AICD  - slightly tachy, afebrile and normal WBC  - Appears euvolemic on exam  - Dimer 2600, trops negative BNP 00944  - Duplex venous LE negative for DVT; CTA chest negative for PE  - CT C/A/P revealing cardiomegaly with evidence of right heart strain, R hilar lymphadenopathy with R perihilar soft tissue density with indentation/possible invasion of right pulmonary artery, and narrowing of proximal R lobar and segmental branches with associated consolidation of right lower lobe. 2.2 x 2.8 cm filling defect noted in apex of left ventricle.   - Last ECHO 8/2021 revealing EF 20-25%, mild Mr, mild TR, mild Pulm HTN  - seen by CTSx and cardio (Dr Guerrero) in ED  - c/w heparin gtt; Goal PTTs   - F/u ECHO with contrast to evaluate LV thrombus  - Pulmonary eval for possible bronch with EBUS and FNA  - F/u MRSA nares, procal  - c/w vanc 1g iv q12hr and zosyn 3.375g iv q8hr; D/c vanc if MRSA negative  - Strict Is and Os  - hold Lasix, spironolactone and Entresto for now and reassess (pt appears to be euvolemic but has DAVID and hyponatremia)    #HAGMA 2/2 elevated lactate  - AG 20, Lactate 5.8-->4.6  - will give IVF 250cc bolus only as pt has EF 20-25% (pt is euvolemic on exam tho)  - Trend lactate    #DAVID on CKD Vs progression of CKD  - Cr 1.4 (0.7 in 2021)  - F/u RBUS  - F/u urine lytes  - Trend Cr daily  - will avoid giving maintenance fluids for now concerning HFrEF EF 20-25%    #Hypotonic hyponatremia  - Na+ 128  - calculated serum osmolarity: 279  - F/u serum osmolarity, urine osmolarity, urine sodium  - Trend Na+ daily  - will avoid giving maintenance fluids for now concerning HFrEF EF 20-25%    #Elevated T.Benjamin - likely 2/2 congestive hepatopathy (from right heart strain)  - T.Benjamin 2.3, LFTs elevated (hemolyzed)  - F/u AM CMP  - F/u direct bilirubin  - CT A/P showed no dilation of CBD    #HTN  - c/w carvedilol 6.25mg BID    #HLD  - c/w lipitor 20mg daily    DVT px - heparin gtt  GI px - protonix  Diet - DASH  Activity - AAT  Dispo - SDU  *MED REC - confirmed with pt's spouse     77 year old woman with PMHx of HTN, HLD, nonischemic cardiomyopathy, HFrEF s/p AICD presents to the ED with 2 weeks of worsening shortness of breath. Patient and her son state patient has been experiencing shortness of breath that has progressed from present on activity to now shortness of breath during conversation. Patient also notes an increasing dry cough over this time period. Denies history of smoking, fevers/chills, chest pain, nausea/vomiting, bloody sputum, dark/tarry/bloody bowel movements, upper respiratory symptoms.         #Intracardiac filling defect - tumor Vs clot?  #Post obstructive pneumonia  #NICM, HFrEF s/p AICD  - slightly tachy, afebrile and normal WBC  - Appears euvolemic on exam  - Dimer 2600, trops negative BNP 01004  - Duplex venous LE negative for DVT; CTA chest negative for PE  - CT C/A/P revealing cardiomegaly with evidence of right heart strain, R hilar lymphadenopathy with R perihilar soft tissue density with indentation/possible invasion of right pulmonary artery, and narrowing of proximal R lobar and segmental branches with associated consolidation of right lower lobe. 2.2 x 2.8 cm filling defect noted in apex of left ventricle.   - Last ECHO 8/2021 revealing EF 20-25%, mild Mr, mild TR, mild Pulm HTN  - seen by CTSx and cardio (Dr Guerrero) in ED  - c/w heparin gtt; Goal PTTs   - F/u ECHO with contrast to evaluate LV thrombus  - Pulmonary eval for possible bronch with EBUS and FNA  - F/u MRSA nares, procal  - c/w vanc 1g iv q12hr and zosyn 3.375g iv q8hr; D/c vanc if MRSA negative  - Strict Is and Os  - hold Lasix, spironolactone and Entresto for now and reassess (pt appears to be euvolemic but has DAVID and hyponatremia)    #HAGMA 2/2 elevated lactate  - AG 20, Lactate 5.8-->4.6  - will give IVF 250cc bolus only as pt has EF 20-25% (pt is euvolemic on exam tho)  - Trend lactate    #DAVID on CKD Vs progression of CKD  - likely Prerenal   - Cr 1.4 (0.7 in 2021)  - F/u RBUS  - F/u urine lytes  - Trend Cr daily  - will avoid giving maintenance fluids for now concerning HFrEF EF 20-25%    #Hypotonic hyponatremia  - Na+ 128  - calculated serum osmolarity: 279  - F/u serum osmolarity, urine osmolarity, urine sodium  - Trend Na+ daily  - will avoid giving maintenance fluids for now concerning HFrEF EF 20-25%    #Elevated T.Benjamin - likely 2/2 congestive hepatopathy (from right heart strain)  - T.Benjamin 2.3, LFTs elevated (hemolyzed)  - F/u AM CMP  - F/u direct bilirubin  - CT A/P showed no dilation of CBD    #HTN  - c/w carvedilol 6.25mg BID    #HLD  - c/w lipitor 20mg daily    DVT px - heparin gtt  GI px - protonix  Diet - DASH  Activity - AAT  Dispo - SDU  *MED REC - confirmed with pt's spouse  Patient  Understands the risk and benefits  of  going forward with medical  treatment plan and agrees to plan of care.   Seen  04/21/23   77 year old woman with PMHx of HTN, HLD, nonischemic cardiomyopathy, HFrEF s/p AICD presents to the ED with 2 weeks of worsening shortness of breath. Patient and her son state patient has been experiencing shortness of breath that has progressed from present on activity to now shortness of breath during conversation. Patient also notes an increasing dry cough over this time period. Denies history of smoking, fevers/chills, chest pain, nausea/vomiting, bloody sputum, dark/tarry/bloody bowel movements, upper respiratory symptoms.         #Intracardiac filling defect - tumor Vs clot?  #Post obstructive pneumonia  #NICM, HFrEF s/p AICD  - hemodynamically Stable   - slightly tachy, afebrile and normal WBC  - Appears euvolemic on exam  - Dimer 2600, trops negative BNP 65676  - Duplex venous LE negative for DVT; CTA chest negative for PE  - CT C/A/P revealing cardiomegaly with evidence of right heart strain, R hilar lymphadenopathy with R perihilar soft tissue density with indentation/possible invasion of right pulmonary artery, and narrowing of proximal R lobar and segmental branches with associated consolidation of right lower lobe. 2.2 x 2.8 cm filling defect noted in apex of left ventricle.   - Last ECHO 8/2021 revealing EF 20-25%, mild Mr, mild TR, mild Pulm HTN  - seen by CTSx and cardio (Dr Guerrero) in ED  - c/w heparin gtt; Goal PTTs   - F/u ECHO with contrast to evaluate LV thrombus  - Pulmonary eval for possible bronch with EBUS and FNA  - F/u MRSA nares, procal  - c/w vanc 1g iv q12hr and zosyn 3.375g iv q8hr; D/c vanc if MRSA negative  - Strict Is and Os  - hold Lasix, spironolactone and Entresto for now and reassess (pt appears to be euvolemic but has DAVID and hyponatremia)    #HAGMA 2/2 elevated lactate  - AG 20, Lactate 5.8-->4.6  - will give IVF 250cc bolus only as pt has EF 20-25% (pt is euvolemic on exam tho)  - Trend lactate    #DAVID on CKD Vs progression of CKD  - likely Prerenal   - Cr 1.4 (0.7 in 2021)  - F/u RBUS  - F/u urine lytes  - Trend Cr daily  - will avoid giving maintenance fluids for now concerning HFrEF EF 20-25%    #Hypotonic hyponatremia  - Na+ 128  - calculated serum osmolarity: 279  - F/u serum osmolarity, urine osmolarity, urine sodium  - Trend Na+ daily  - will avoid giving maintenance fluids for now concerning HFrEF EF 20-25%    #Elevated T.Benjamin - likely 2/2 congestive hepatopathy (from right heart strain)  - T.Benjamin 2.3, LFTs elevated (hemolyzed)  - F/u AM CMP  - F/u direct bilirubin  - CT A/P showed no dilation of CBD    #HTN  - c/w carvedilol 6.25mg BID    #HLD  - c/w lipitor 20mg daily    DVT px - heparin gtt  GI px - protonix  Diet - DASH  Activity - AAT  Dispo - SDU  *MED REC - confirmed with pt's spouse  Patient  Understands the risk and benefits  of  going forward with medical  treatment plan and agrees to plan of care.   Seen  04/21/23

## 2023-04-22 NOTE — PROGRESS NOTE ADULT - ASSESSMENT
ASSESSMENT:  77 year old woman with pmh of HTN, HLD, nonischemic cardiomyopathy, HFrEF, s/p ICD and PPM presents to the ED with 2 weeks of worsening shortness of breath. Patient and her son state patient has been experiencing shortness of breath that has progressed from present on activity to now shortness of breath during conversation. Patient also notes an increasing dry cough over this time period. Denies history of smoking, fevers/chills, chest pain, nausea/vomiting, bloody sputum, dark/tarry/bloody bowel movements, upper respiratory symptoms. Upon arrival, patient is HD stable and saturating 98% on RA, talking in complete sentences, no increased work of breathing.  CT C/A/P revealing cardiomegaly with evidence of right heart strain, R hilar lymphadenopathy with R perihilar soft tissue density with indentation/possible invasion of right pulmonary artery, and narrowing of proximal R lobar and segmental branches with associated consolidation of right lower lobe. 2.2 x 2.8 cm filling defect noted in apex of left ventricle. Patient follows with Dr. Frank, last ECHO 8/2021 revealing EF 20-25%, mild Mr, mild TR, mild Pulm HTN.    PLAN:  -Echocardiogram to evaluate right heart strain and mass  -Will likely need bronchoscopy with EBUS and FNA  -attending to see in AM

## 2023-04-22 NOTE — PROGRESS NOTE ADULT - SUBJECTIVE AND OBJECTIVE BOX
GENERAL SURGERY PROGRESS NOTE    Patient: MIKHAIL JOHNSON , 77y ([birth year])Female   MRN: [id number]  Location: [place] [address] A  Visit: 04-21-23 Inpatient  Date: 04-22-23 @ 00:47    Hospital Day #: 2      Events of past 24 hours: CT surgery consulted for perihilar mass with possible right pulm artery invasion / right main bronchus invasion     PAST MEDICAL & SURGICAL HISTORY:  CHF, stage C      HTN (hypertension)          Vitals:   T(F): 97.2 (04-21-23 @ 22:23), Max: 97.5 (04-21-23 @ 10:56)  HR: 109 (04-21-23 @ 22:23)  BP: 124/60 (04-21-23 @ 22:23)  RR: 20 (04-21-23 @ 22:23)  SpO2: 100% (04-21-23 @ 22:23)          Fluids:     I & O's:    Bowel Movement: : [] YES [] NO  Flatus: : [] YES [] NO    PHYSICAL EXAM:  General: NAD, AAOx3, calm and cooperative  HEENT: NCAT, WANDA, EOMI, Trachea ML, Neck supple  Cardiac: RRR S1, S2, no Murmurs, rubs or gallops  Respiratory: CTAB, normal respiratory effort, breath sounds equal BL, no wheeze, rhonchi or crackles  Abdomen: Soft, non-distended, non-tender  Ext: warm and well-perfused    MEDICATIONS  (STANDING):  chlorhexidine 2% Cloths 1 Application(s) Topical daily  melatonin 5 milliGRAM(s) Oral at bedtime    MEDICATIONS  (PRN):      DVT PROPHYLAXIS:   GI PROPHYLAXIS:   ANTICOAGULATION:   ANTIBIOTICS:            LAB/STUDIES:  Labs:  CAPILLARY BLOOD GLUCOSE                              14.7   7.89  )-----------( 139      ( 21 Apr 2023 11:43 )             45.0       Auto Neutrophil %: 72.4 % (04-21-23 @ 11:43)  Auto Immature Granulocyte %: 0.3 % (04-21-23 @ 11:43)    04-21    128<L>  |  92<L>  |  42<H>  ----------------------------<  142<H>  TNP   |  16<L>  |  1.4      Calcium, Total Serum: 9.1 mg/dL (04-21-23 @ 11:43)      LFTs:             6.8  | 2.3  | 151      ------------------[135     ( 21 Apr 2023 11:43 )  3.4  | x    | 114         Lipase:x      Amylase:x         Blood Gas Venous - Lactate: 4.60 mmol/L (04-21-23 @ 12:53)  Lactate, Blood: 5.8 mmol/L (04-21-23 @ 12:11)  Blood Gas Venous - Lactate: 6.10 mmol/L (04-21-23 @ 11:43)      Coags:     20.10  ----< 1.73    ( 21 Apr 2023 16:17 )     27.0        CARDIAC MARKERS ( 21 Apr 2023 11:43 )  x     / 0.01 ng/mL / x     / x     / x                          IMAGING:

## 2023-04-22 NOTE — CONSULT NOTE ADULT - ATTENDING COMMENTS
events noted, HFrEF, SOB, RLL opacity/ hilar mass/ ? clots ? mass left ventricle/ IV hep, abx, diuresis as tolerated, CT sx noted, Hepatology, eval, GOC

## 2023-04-22 NOTE — CONSULT NOTE ADULT - SUBJECTIVE AND OBJECTIVE BOX
Gastroenterology Consultation:    Patient is a 77y old  Female who presents with a chief complaint of intra cardiac mass (22 Apr 2023 09:19)      Admitted on: 04-21-23  HPI:  77 year old woman with PMHx of HTN, HLD, nonischemic cardiomyopathy, HFrEF s/p AICD presents to the ED with 2 weeks of worsening shortness of breath currently admitted for perihilar sift tissue mass in mediastinum. hepatology was consulted for elevated LFT. She denies any previous infections of the liver oe chronic liver disease in family, denies any herbal medications or supplements or abx or travel out side.   Has been taking tylenol intermittently as needed but denies excess intake.           PAST MEDICAL & SURGICAL HISTORY:  CHF, stage C      HTN (hypertension)          FAMILY HISTORY:  FH: heart failure (Mother)        Social History:  Tobacco:N  Alcohol: n  Drugs: n    Home Medications:  atorvastatin 20 mg oral tablet: 1 tab(s) orally once a day (04 Aug 2021 10:05)  carvedilol 6.25 mg oral tablet: 1 tab(s) orally 2 times a day (22 Apr 2023 02:02)  Entresto 49 mg-51 mg oral tablet: 1 tab(s) orally 2 times a day (04 Aug 2021 10:05)  Farxiga 10 mg oral tablet: 1 tab(s) orally once a day (22 Apr 2023 02:05)  Lasix 20 mg oral tablet: 1 tab(s) orally once a day (22 Apr 2023 02:04)  spironolactone 25 mg oral tablet: 1 tab(s) orally once a day (04 Aug 2021 10:05)    MEDICATIONS  (STANDING):  carvedilol 6.25 milliGRAM(s) Oral every 12 hours  chlorhexidine 2% Cloths 1 Application(s) Topical daily  guaifenesin/dextromethorphan Oral Liquid 15 milliLiter(s) Oral once  heparin  Infusion.  Unit(s)/Hr (11 mL/Hr) IV Continuous <Continuous>  melatonin 5 milliGRAM(s) Oral at bedtime  pantoprazole    Tablet 40 milliGRAM(s) Oral before breakfast  piperacillin/tazobactam IVPB.. 3.375 Gram(s) IV Intermittent every 8 hours    MEDICATIONS  (PRN):  heparin   Injectable 5000 Unit(s) IV Push every 6 hours PRN For aPTT less than 40  heparin   Injectable 2500 Unit(s) IV Push every 6 hours PRN For aPTT between 40 - 57      Allergies  epinephrine (Unknown)      Review of Systems:   Constitutional:  No Fever, No Chills  ENT/Mouth:  No Hearing Changes,  No Difficulty Swallowing  Eyes:  No Eye Pain, No Vision Changes  Cardiovascular:  No Chest Pain, No Palpitations  Respiratory:  No Cough, No Dyspnea  Gastrointestinal:  As described in HPI  Musculoskeletal:  No Joint Swelling, No Back Pain  Skin:  No Skin Lesions, No Jaundice  Neuro:  No Syncope, No Dizziness  Heme/Lymph:  No Bruising, No Bleeding.          Physical Examination:  T(C): 36.2 (04-22-23 @ 11:08), Max: 36.4 (04-22-23 @ 07:35)  HR: 83 (04-22-23 @ 11:08) (83 - 109)  BP: 88/61 (04-22-23 @ 11:08) (88/61 - 126/94)  RR: 18 (04-22-23 @ 11:08) (17 - 20)  SpO2: 100% (04-22-23 @ 11:08) (96% - 100%)        Constitutional: No acute distress.  Eyes:. Conjunctivae are clear, Sclera is non-icteric.  Ears Nose and Throat: The external ears are normal appearing,  Oral mucosa is pink and moist.  Respiratory:  No signs of respiratory distress. Lung sounds are clear bilaterally.  Cardiovascular:  S1 S2, Regular rate and rhythm.  GI: Abdomen is soft, symmetric, and non-tender without distention. There are no visible lesions or scars. Bowel sounds are present and normoactive in all four quadrants. No masses, hepatomegaly, or splenomegaly are noted.   Neuro: No Tremor, No involuntary movements  Skin: No rashes, No Jaundice.          Data:                        13.8   10.02 )-----------( 129      ( 22 Apr 2023 07:07 )             41.8     Hgb Trend:  13.8  04-22-23 @ 07:07  14.7  04-21-23 @ 11:43      04-22    134<L>  |  95<L>  |  54<H>  ----------------------------<  119<H>  5.0   |  18  |  1.5    Ca    9.1      22 Apr 2023 07:07  Mg     2.2     04-22    TPro  6.0  /  Alb  3.3<L>  /  TBili  3.0<H>  /  DBili  1.3<H>  /  AST  1660<H>  /  ALT  692<H>  /  AlkPhos  132<H>  04-22    Liver panel trend:  TBili 3.0   /   AST 1660   /      /   AlkP 132   /   Tptn 6.0   /   Alb 3.3    /   DBili 1.3      04-22  TBili 2.3   /      /      /   AlkP 135   /   Tptn 6.8   /   Alb 3.4    /   DBili --      04-21      PT/INR - ( 21 Apr 2023 16:17 )   PT: 20.10 sec;   INR: 1.73 ratio         PTT - ( 21 Apr 2023 16:17 )  PTT:27.0 sec        Radiology:  CT Abdomen and Pelvis w/ IV Cont:   ACC: [id number] EXAM:  CT ANGIO CHEST PULM ART WAWIC   ORDERED BY: TOM APRAICIO     ACC: [id number] EXAM:  CT ABDOMEN AND PELVIS IC   ORDERED BY: ADY BRISENO     PROCEDURE DATE:  04/21/2023          INTERPRETATION:  CLINICAL HISTORY / REASON FOR EXAM: SOB; Epigastric   pain. Pulmonary embolus evaluation.  WBC 7.89   PMHX of HTN, HFrEF s/p  ICD and PPM    TECHNIQUE:   Contiguous axial CT images were obtained from the thoracic   inlet to the pubic symphysis with IV contrast. 100 ml of Omnipaque 350   was administered IV with 0 ml discarded.  Thin sections were   reconstructed through the pulmonary vasculature. Reformatted images in   the coronal and sagittal planes were acquired, as well as 3D, Volume   rendering, and MIP reconstructed images.      COMPARISON: None      FINDINGS / CHEST:    TUBES/LINES: A left-sided ICD and leads are noted.    PULMONARY ARTERY CIRCULATION: No evidence of central or segmental   pulmonary embolus.    GREAT VESSELS/CARDIAC STRUCTURES/PERICARDIUM: Cardiomegaly. Trace   pericardial effusion. Normal caliber aorta. There is no evidence of   aortic aneurysm. Aortic and coronary artery calcifications are noted.    The main pulmonary artery is dilated, measuring 3.6 cm in diameter, which   may be seen with pulmonary hypertension. There is reflux of contrast   material into the IVC and hepatic veins compatible with right heart   dysfunction.    MEDIASTINUM: Right hilar lymphadenopathy. There are no suspicious   mediastinal or axillary lymph nodes. The visualized portion of the   thyroid gland is unremarkable.    AIRWAYS, LUNGS AND PLEURA: The central tracheobronchial tree is patent.    Right hilar lymphadenopathy and right perihilar soft tissue density is   noted. There is segmental narrowing and consolidation noted in the medial   aspect of the right lower lobe. There is a small right pleural effusion.   There is extrinsic indentation and possible invasion of the right main   pulmonary artery (604/142; 605/129; 2/42). There is associated narrowing   of the proximal right lobar and segmental branches. A right hilar / right   lower lobe mass with postobstructive pneumonitis should be ruled out.    There are atelectatic changes at the left lung base and lingula. There is   no pneumothorax.    BONES AND SOFT TISSUES: Degenerative changes of the spine.        FINDINGS / ABDOMEN AND PELVIS    HEPATIC: The liver is normal in size with no evidence of solid mass or   bile duct dilatation. Hepatic steatosis is noted. A 1 cm hypodensity is   noted within the right lobe of the liver, too small for further   characterization. The portal vein is patent. The hepatic veins are   opacified.    BILIARY: Cholelithiasis.    SPLEEN: Unremarkable.    PANCREAS: Moderate pancreatic atrophy. No evidence of mass or   pancreatitis.    ADRENAL GLANDS: Unremarkable.    KIDNEYS: There is symmetric bilateral renal enhancement.  No evidence of   hydronephrosis, calcified stones, or solid mass. Right renal cyst.    ABDOMINOPELVIC NODES: Unremarkable    PELVIC ORGANS:No evidence of pelvic mass, lymphadenopathy, or fluid   collection.    BLADDER: Unremarkable.    PERITONEUM/MESENTERY/BOWEL: No evidence of obstruction, colitis,   inflammatory process within the abdomen or pelvis, or ascites. The   appendix is normal in appearance.    BONES/SOFT TISSUES: Degenerative changes of the spine and hips.  Spinal   stenosis at L4-5.    OTHER: Aortoiliac calcifications with no evidence of abdominal aortic   aneurysm.    IMPRESSION:    No evidence of pulmonary embolism. (See discussion below for more   findings.)    Marked cardiomegaly.    There is a 2.2 x 2.8 cm rounded filling defect in the region of the apex   of the left ventricle (3/11; 607/95). Differential diagnosis includes an   intracardiac mass or thrombus.    The main pulmonary artery is dilated, measuring 3.6 cm in diameter, which   may be seen with pulmonary hypertension. There is reflux of contrast   material into the IVC and hepatic veins compatible with right heart   dysfunction.    Right hilar lymphadenopathy and right perihilar soft tissue density is   noted. There is segmental narrowing and consolidation noted in the medial   aspect of the right lower lobe. There is a small right pleural effusion.   There is extrinsic indentation and possible invasion of the right main   pulmonary artery (604/142; 605/129; 2/42). There is associated narrowing   of the proximal right lobar and segmental branches. A right hilar / right   lower lobe mass with postobstructive pneumonitis should be ruled out.      The findings were discussed with Dr. Briseno at 4:10 PM 4/21/2023, with   read back.    --- End of Report ---            FIDEL ZHANG MD; Attending Interventional Radiologist  This document has been electronically signed. Apr 21 2023  4:12PM (04-21-23 @ 14:26)

## 2023-04-22 NOTE — CONSULT NOTE ADULT - ASSESSMENT
Impression:    Right perihilar mass  Post obstructive pneumonia    PLAN:    CNS: Spontaneous awakening trial    HEENT: Oral care    PULMONARY:  HOB @ 45 degrees.  On RA    CARDIOVASCULAR: FU Echo.     GI: GI prophylaxis.  Feeding.  Bowel regimen . US liver with dupplex. Hepatology eval. Trend LFTs    RENAL:  Follow up lytes.  Correct as needed    INFECTIOUS DISEASE: Follow up cultures  monitor off abx. Hold vanc    HEMATOLOGICAL:  DVT prophylaxis. cw heparin gtt    ENDOCRINE:  Follow up FS.  Insulin protocol if needed.    MUSCULOSKELETAL: OOBTC    SDU     Impression:    SOB/ RLL opacity/ Pneumonia  Right hilar opacity. ro malignancy  left venticle/ clot/mass  worsening transaminitis  HFrEF    PLAN:    CNS: Avoid CNS depressant    HEENT: Oral care    PULMONARY:  HOB @ 45 degrees.  On RA, aspiration precaution, keep Sao2 92 to96%, ct sx already called    CARDIOVASCULAR: FU Echo. ac, fup LA, diuresis as tolerated    GI: GI prophylaxis.  Feeding.  Bowel regimen . US liver with dupplex. Hepatology eval. Trend LFTs    RENAL:  Follow up lytes.  Correct as needed    INFECTIOUS DISEASE: dc vanco    HEMATOLOGICAL:  DVT prophylaxis. cw heparin gtt    ENDOCRINE:  Follow up FS.  Insulin protocol if needed.    MUSCULOSKELETAL: OOBTC    SDU

## 2023-04-22 NOTE — CONSULT NOTE ADULT - ATTENDING COMMENTS
Hepatitis without hepatic failure (INR could be due to cholestasis, vitK def or other). Pattern favors an ischemic etiology such as ischemia due to a hypotensive event or thromboembolic event in light of the vascular involvement in the mediastinum. COntinue to trend the hepatic pannel, obtain an US RUQ and trend mental status along side with the INR. Will follow up with hepatology

## 2023-04-23 LAB
ALBUMIN SERPL ELPH-MCNC: 2.6 G/DL — LOW (ref 3.5–5.2)
ALBUMIN SERPL ELPH-MCNC: 2.9 G/DL — LOW (ref 3.5–5.2)
ALP SERPL-CCNC: 122 U/L — HIGH (ref 30–115)
ALP SERPL-CCNC: 128 U/L — HIGH (ref 30–115)
ALT FLD-CCNC: 722 U/L — HIGH (ref 0–41)
ALT FLD-CCNC: 917 U/L — HIGH (ref 0–41)
ANION GAP SERPL CALC-SCNC: 13 MMOL/L — SIGNIFICANT CHANGE UP (ref 7–14)
ANION GAP SERPL CALC-SCNC: 16 MMOL/L — HIGH (ref 7–14)
ANION GAP SERPL CALC-SCNC: 18 MMOL/L — HIGH (ref 7–14)
APTT BLD: 188.6 SEC — CRITICAL HIGH (ref 27–39.2)
APTT BLD: 47.9 SEC — HIGH (ref 27–39.2)
APTT BLD: 52.1 SEC — HIGH (ref 27–39.2)
AST SERPL-CCNC: 2051 U/L — HIGH (ref 0–41)
AST SERPL-CCNC: 831 U/L — HIGH (ref 0–41)
BASE EXCESS BLDA CALC-SCNC: -2 MMOL/L — SIGNIFICANT CHANGE UP (ref -2–3)
BASE EXCESS BLDMV CALC-SCNC: -0.5 MMOL/L — SIGNIFICANT CHANGE UP
BASE EXCESS BLDV CALC-SCNC: -1.3 MMOL/L — SIGNIFICANT CHANGE UP (ref -2–3)
BASOPHILS # BLD AUTO: 0.01 K/UL — SIGNIFICANT CHANGE UP (ref 0–0.2)
BASOPHILS # BLD AUTO: 0.01 K/UL — SIGNIFICANT CHANGE UP (ref 0–0.2)
BASOPHILS NFR BLD AUTO: 0.1 % — SIGNIFICANT CHANGE UP (ref 0–1)
BASOPHILS NFR BLD AUTO: 0.1 % — SIGNIFICANT CHANGE UP (ref 0–1)
BILIRUB SERPL-MCNC: 2.3 MG/DL — HIGH (ref 0.2–1.2)
BILIRUB SERPL-MCNC: 3 MG/DL — HIGH (ref 0.2–1.2)
BUN SERPL-MCNC: 55 MG/DL — HIGH (ref 10–20)
BUN SERPL-MCNC: 61 MG/DL — CRITICAL HIGH (ref 10–20)
BUN SERPL-MCNC: 63 MG/DL — CRITICAL HIGH (ref 10–20)
CA-I SERPL-SCNC: 1.07 MMOL/L — LOW (ref 1.15–1.33)
CALCIUM SERPL-MCNC: 8.1 MG/DL — LOW (ref 8.4–10.5)
CALCIUM SERPL-MCNC: 8.2 MG/DL — LOW (ref 8.4–10.5)
CALCIUM SERPL-MCNC: 8.6 MG/DL — SIGNIFICANT CHANGE UP (ref 8.4–10.5)
CHLORIDE SERPL-SCNC: 93 MMOL/L — LOW (ref 98–110)
CO2 SERPL-SCNC: 17 MMOL/L — SIGNIFICANT CHANGE UP (ref 17–32)
CO2 SERPL-SCNC: 17 MMOL/L — SIGNIFICANT CHANGE UP (ref 17–32)
CO2 SERPL-SCNC: 22 MMOL/L — SIGNIFICANT CHANGE UP (ref 17–32)
CREAT SERPL-MCNC: 1.3 MG/DL — SIGNIFICANT CHANGE UP (ref 0.7–1.5)
CREAT SERPL-MCNC: 1.7 MG/DL — HIGH (ref 0.7–1.5)
CREAT SERPL-MCNC: 1.7 MG/DL — HIGH (ref 0.7–1.5)
EGFR: 30 ML/MIN/1.73M2 — LOW
EGFR: 30 ML/MIN/1.73M2 — LOW
EGFR: 42 ML/MIN/1.73M2 — LOW
EOSINOPHIL # BLD AUTO: 0 K/UL — SIGNIFICANT CHANGE UP (ref 0–0.7)
EOSINOPHIL # BLD AUTO: 0.01 K/UL — SIGNIFICANT CHANGE UP (ref 0–0.7)
EOSINOPHIL NFR BLD AUTO: 0 % — SIGNIFICANT CHANGE UP (ref 0–8)
EOSINOPHIL NFR BLD AUTO: 0.1 % — SIGNIFICANT CHANGE UP (ref 0–8)
GAS PNL BLDA: SIGNIFICANT CHANGE UP
GAS PNL BLDV: 124 MMOL/L — LOW (ref 136–145)
GAS PNL BLDV: SIGNIFICANT CHANGE UP
GLUCOSE SERPL-MCNC: 115 MG/DL — HIGH (ref 70–99)
GLUCOSE SERPL-MCNC: 124 MG/DL — HIGH (ref 70–99)
GLUCOSE SERPL-MCNC: 93 MG/DL — SIGNIFICANT CHANGE UP (ref 70–99)
HCO3 BLDA-SCNC: 20 MMOL/L — LOW (ref 21–28)
HCO3 BLDMV-SCNC: 23 MMOL/L — SIGNIFICANT CHANGE UP
HCO3 BLDV-SCNC: 23 MMOL/L — SIGNIFICANT CHANGE UP (ref 22–29)
HCT VFR BLD CALC: 37.8 % — SIGNIFICANT CHANGE UP (ref 37–47)
HCT VFR BLD CALC: 38.7 % — SIGNIFICANT CHANGE UP (ref 37–47)
HCT VFR BLD CALC: 38.8 % — SIGNIFICANT CHANGE UP (ref 37–47)
HCT VFR BLDA CALC: 40 % — SIGNIFICANT CHANGE UP (ref 39–51)
HGB BLD CALC-MCNC: 13.3 G/DL — SIGNIFICANT CHANGE UP (ref 12.6–17.4)
HGB BLD-MCNC: 12.7 G/DL — SIGNIFICANT CHANGE UP (ref 12–16)
HGB BLD-MCNC: 13 G/DL — SIGNIFICANT CHANGE UP (ref 12–16)
HGB BLD-MCNC: 13.1 G/DL — SIGNIFICANT CHANGE UP (ref 12–16)
IMM GRANULOCYTES NFR BLD AUTO: 0.4 % — HIGH (ref 0.1–0.3)
IMM GRANULOCYTES NFR BLD AUTO: 0.6 % — HIGH (ref 0.1–0.3)
INR BLD: 2.02 RATIO — HIGH (ref 0.65–1.3)
INR BLD: 2.6 RATIO — HIGH (ref 0.65–1.3)
LACTATE BLDV-MCNC: 2 MMOL/L — SIGNIFICANT CHANGE UP (ref 0.5–2)
LACTATE SERPL-SCNC: 2.1 MMOL/L — HIGH (ref 0.7–2)
LACTATE SERPL-SCNC: 3.1 MMOL/L — HIGH (ref 0.7–2)
LACTATE SERPL-SCNC: 3.2 MMOL/L — HIGH (ref 0.7–2)
LDH SERPL L TO P-CCNC: 1401 — HIGH (ref 50–242)
LYMPHOCYTES # BLD AUTO: 1.19 K/UL — LOW (ref 1.2–3.4)
LYMPHOCYTES # BLD AUTO: 1.75 K/UL — SIGNIFICANT CHANGE UP (ref 1.2–3.4)
LYMPHOCYTES # BLD AUTO: 11.9 % — LOW (ref 20.5–51.1)
LYMPHOCYTES # BLD AUTO: 19.9 % — LOW (ref 20.5–51.1)
MAGNESIUM SERPL-MCNC: 2.3 MG/DL — SIGNIFICANT CHANGE UP (ref 1.8–2.4)
MCHC RBC-ENTMCNC: 30.3 PG — SIGNIFICANT CHANGE UP (ref 27–31)
MCHC RBC-ENTMCNC: 30.9 PG — SIGNIFICANT CHANGE UP (ref 27–31)
MCHC RBC-ENTMCNC: 30.9 PG — SIGNIFICANT CHANGE UP (ref 27–31)
MCHC RBC-ENTMCNC: 33.6 G/DL — SIGNIFICANT CHANGE UP (ref 32–37)
MCHC RBC-ENTMCNC: 33.6 G/DL — SIGNIFICANT CHANGE UP (ref 32–37)
MCHC RBC-ENTMCNC: 33.8 G/DL — SIGNIFICANT CHANGE UP (ref 32–37)
MCV RBC AUTO: 89.6 FL — SIGNIFICANT CHANGE UP (ref 81–99)
MCV RBC AUTO: 91.9 FL — SIGNIFICANT CHANGE UP (ref 81–99)
MCV RBC AUTO: 92 FL — SIGNIFICANT CHANGE UP (ref 81–99)
MONOCYTES # BLD AUTO: 0.64 K/UL — HIGH (ref 0.1–0.6)
MONOCYTES # BLD AUTO: 0.77 K/UL — HIGH (ref 0.1–0.6)
MONOCYTES NFR BLD AUTO: 7.3 % — SIGNIFICANT CHANGE UP (ref 1.7–9.3)
MONOCYTES NFR BLD AUTO: 7.7 % — SIGNIFICANT CHANGE UP (ref 1.7–9.3)
NEUTROPHILS # BLD AUTO: 6.36 K/UL — SIGNIFICANT CHANGE UP (ref 1.4–6.5)
NEUTROPHILS # BLD AUTO: 7.98 K/UL — HIGH (ref 1.4–6.5)
NEUTROPHILS NFR BLD AUTO: 72.1 % — SIGNIFICANT CHANGE UP (ref 42.2–75.2)
NEUTROPHILS NFR BLD AUTO: 79.8 % — HIGH (ref 42.2–75.2)
NRBC # BLD: 0 /100 WBCS — SIGNIFICANT CHANGE UP (ref 0–0)
O2 CT VFR BLD CALC: 37 MMHG — SIGNIFICANT CHANGE UP
PCO2 BLDA: 27 MMHG — SIGNIFICANT CHANGE UP (ref 25–48)
PCO2 BLDMV: 34 MMHG — SIGNIFICANT CHANGE UP
PCO2 BLDV: 35 MMHG — LOW (ref 39–42)
PH BLDA: 7.48 — HIGH (ref 7.35–7.45)
PH BLDMV: 7.44 — SIGNIFICANT CHANGE UP
PH BLDV: 7.42 — SIGNIFICANT CHANGE UP (ref 7.32–7.43)
PLATELET # BLD AUTO: 130 K/UL — SIGNIFICANT CHANGE UP (ref 130–400)
PLATELET # BLD AUTO: 157 K/UL — SIGNIFICANT CHANGE UP (ref 130–400)
PLATELET # BLD AUTO: 160 K/UL — SIGNIFICANT CHANGE UP (ref 130–400)
PMV BLD: 12.8 FL — HIGH (ref 7.4–10.4)
PMV BLD: 13 FL — HIGH (ref 7.4–10.4)
PMV BLD: 13.1 FL — HIGH (ref 7.4–10.4)
PO2 BLDA: 95 MMHG — SIGNIFICANT CHANGE UP (ref 83–108)
PO2 BLDV: 38 MMHG — SIGNIFICANT CHANGE UP
POTASSIUM BLDV-SCNC: 3.6 MMOL/L — SIGNIFICANT CHANGE UP (ref 3.5–5.1)
POTASSIUM SERPL-MCNC: 3.9 MMOL/L — SIGNIFICANT CHANGE UP (ref 3.5–5)
POTASSIUM SERPL-MCNC: 4.7 MMOL/L — SIGNIFICANT CHANGE UP (ref 3.5–5)
POTASSIUM SERPL-MCNC: 4.8 MMOL/L — SIGNIFICANT CHANGE UP (ref 3.5–5)
POTASSIUM SERPL-SCNC: 3.9 MMOL/L — SIGNIFICANT CHANGE UP (ref 3.5–5)
POTASSIUM SERPL-SCNC: 4.7 MMOL/L — SIGNIFICANT CHANGE UP (ref 3.5–5)
POTASSIUM SERPL-SCNC: 4.8 MMOL/L — SIGNIFICANT CHANGE UP (ref 3.5–5)
PROT SERPL-MCNC: 5.3 G/DL — LOW (ref 6–8)
PROT SERPL-MCNC: 5.3 G/DL — LOW (ref 6–8)
PROTHROM AB SERPL-ACNC: 23.5 SEC — HIGH (ref 9.95–12.87)
PROTHROM AB SERPL-ACNC: 30.5 SEC — HIGH (ref 9.95–12.87)
RBC # BLD: 4.11 M/UL — LOW (ref 4.2–5.4)
RBC # BLD: 4.21 M/UL — SIGNIFICANT CHANGE UP (ref 4.2–5.4)
RBC # BLD: 4.33 M/UL — SIGNIFICANT CHANGE UP (ref 4.2–5.4)
RBC # FLD: 13.3 % — SIGNIFICANT CHANGE UP (ref 11.5–14.5)
RBC # FLD: 13.6 % — SIGNIFICANT CHANGE UP (ref 11.5–14.5)
RBC # FLD: 13.8 % — SIGNIFICANT CHANGE UP (ref 11.5–14.5)
SAO2 % BLDA: 98.6 % — HIGH (ref 94–98)
SAO2 % BLDMV: 57.8 % — SIGNIFICANT CHANGE UP
SAO2 % BLDV: 59.8 % — SIGNIFICANT CHANGE UP
SODIUM SERPL-SCNC: 126 MMOL/L — LOW (ref 135–146)
SODIUM SERPL-SCNC: 128 MMOL/L — LOW (ref 135–146)
SODIUM SERPL-SCNC: 128 MMOL/L — LOW (ref 135–146)
WBC # BLD: 10 K/UL — SIGNIFICANT CHANGE UP (ref 4.8–10.8)
WBC # BLD: 8.81 K/UL — SIGNIFICANT CHANGE UP (ref 4.8–10.8)
WBC # BLD: 9.2 K/UL — SIGNIFICANT CHANGE UP (ref 4.8–10.8)
WBC # FLD AUTO: 10 K/UL — SIGNIFICANT CHANGE UP (ref 4.8–10.8)
WBC # FLD AUTO: 8.81 K/UL — SIGNIFICANT CHANGE UP (ref 4.8–10.8)
WBC # FLD AUTO: 9.2 K/UL — SIGNIFICANT CHANGE UP (ref 4.8–10.8)

## 2023-04-23 PROCEDURE — 71045 X-RAY EXAM CHEST 1 VIEW: CPT | Mod: 26

## 2023-04-23 PROCEDURE — 71045 X-RAY EXAM CHEST 1 VIEW: CPT | Mod: 26,77

## 2023-04-23 PROCEDURE — 93975 VASCULAR STUDY: CPT | Mod: 26

## 2023-04-23 PROCEDURE — 99233 SBSQ HOSP IP/OBS HIGH 50: CPT

## 2023-04-23 PROCEDURE — 99231 SBSQ HOSP IP/OBS SF/LOW 25: CPT

## 2023-04-23 RX ORDER — HEPARIN SODIUM 5000 [USP'U]/ML
1400 INJECTION INTRAVENOUS; SUBCUTANEOUS
Qty: 25000 | Refills: 0 | Status: DISCONTINUED | OUTPATIENT
Start: 2023-04-23 | End: 2023-04-23

## 2023-04-23 RX ORDER — FUROSEMIDE 40 MG
60 TABLET ORAL ONCE
Refills: 0 | Status: COMPLETED | OUTPATIENT
Start: 2023-04-23 | End: 2023-04-23

## 2023-04-23 RX ORDER — DOPAMINE HYDROCHLORIDE 40 MG/ML
2.5 INJECTION, SOLUTION, CONCENTRATE INTRAVENOUS
Qty: 400 | Refills: 0 | Status: DISCONTINUED | OUTPATIENT
Start: 2023-04-23 | End: 2023-04-23

## 2023-04-23 RX ORDER — HEPARIN SODIUM 5000 [USP'U]/ML
800 INJECTION INTRAVENOUS; SUBCUTANEOUS
Qty: 25000 | Refills: 0 | Status: DISCONTINUED | OUTPATIENT
Start: 2023-04-23 | End: 2023-04-27

## 2023-04-23 RX ORDER — MILRINONE LACTATE 1 MG/ML
0.12 INJECTION, SOLUTION INTRAVENOUS
Qty: 20 | Refills: 0 | Status: DISCONTINUED | OUTPATIENT
Start: 2023-04-23 | End: 2023-04-24

## 2023-04-23 RX ORDER — LIDOCAINE HCL 20 MG/ML
10 VIAL (ML) INJECTION ONCE
Refills: 0 | Status: COMPLETED | OUTPATIENT
Start: 2023-04-23 | End: 2023-04-23

## 2023-04-23 RX ORDER — HEPARIN SODIUM 5000 [USP'U]/ML
1000 INJECTION INTRAVENOUS; SUBCUTANEOUS
Qty: 25000 | Refills: 0 | Status: DISCONTINUED | OUTPATIENT
Start: 2023-04-23 | End: 2023-04-23

## 2023-04-23 RX ORDER — NOREPINEPHRINE BITARTRATE/D5W 8 MG/250ML
0.05 PLASTIC BAG, INJECTION (ML) INTRAVENOUS
Qty: 8 | Refills: 0 | Status: DISCONTINUED | OUTPATIENT
Start: 2023-04-23 | End: 2023-04-24

## 2023-04-23 RX ADMIN — HEPARIN SODIUM 800 UNIT(S)/HR: 5000 INJECTION INTRAVENOUS; SUBCUTANEOUS at 20:53

## 2023-04-23 RX ADMIN — HEPARIN SODIUM 800 UNIT(S)/HR: 5000 INJECTION INTRAVENOUS; SUBCUTANEOUS at 12:33

## 2023-04-23 RX ADMIN — Medication 10 MILLILITER(S): at 21:01

## 2023-04-23 RX ADMIN — HEPARIN SODIUM 1000 UNIT(S)/HR: 5000 INJECTION INTRAVENOUS; SUBCUTANEOUS at 03:46

## 2023-04-23 RX ADMIN — Medication 60 MILLIGRAM(S): at 20:53

## 2023-04-23 RX ADMIN — Medication 5 MILLIGRAM(S): at 21:54

## 2023-04-23 RX ADMIN — PIPERACILLIN AND TAZOBACTAM 25 GRAM(S): 4; .5 INJECTION, POWDER, LYOPHILIZED, FOR SOLUTION INTRAVENOUS at 05:19

## 2023-04-23 RX ADMIN — PIPERACILLIN AND TAZOBACTAM 25 GRAM(S): 4; .5 INJECTION, POWDER, LYOPHILIZED, FOR SOLUTION INTRAVENOUS at 13:03

## 2023-04-23 RX ADMIN — CHLORHEXIDINE GLUCONATE 1 APPLICATION(S): 213 SOLUTION TOPICAL at 16:40

## 2023-04-23 RX ADMIN — HEPARIN SODIUM 0 UNIT(S)/HR: 5000 INJECTION INTRAVENOUS; SUBCUTANEOUS at 11:14

## 2023-04-23 RX ADMIN — PANTOPRAZOLE SODIUM 40 MILLIGRAM(S): 20 TABLET, DELAYED RELEASE ORAL at 16:39

## 2023-04-23 RX ADMIN — DOPAMINE HYDROCHLORIDE 5.95 MICROGRAM(S)/KG/MIN: 40 INJECTION, SOLUTION, CONCENTRATE INTRAVENOUS at 01:41

## 2023-04-23 RX ADMIN — PIPERACILLIN AND TAZOBACTAM 25 GRAM(S): 4; .5 INJECTION, POWDER, LYOPHILIZED, FOR SOLUTION INTRAVENOUS at 21:54

## 2023-04-23 RX ADMIN — MILRINONE LACTATE 2.38 MICROGRAM(S)/KG/MIN: 1 INJECTION, SOLUTION INTRAVENOUS at 15:16

## 2023-04-23 NOTE — PROGRESS NOTE ADULT - ASSESSMENT
77 year old woman with PMHx of HTN, HLD, nonischemic cardiomyopathy, HFrEF s/p AICD presents to the ED with 2 weeks of worsening shortness of breath. Patient and her son state patient has been experiencing shortness of breath that has progressed from present on activity to now shortness of breath during conversation. Patient also notes an increasing dry cough over this time period.     Possible Cardiogenic SHock   Suspected LV thrombus  NICM, HFrEF s/p AICD  Suspected post obstructive PNA  Lung Mass  -  s/ p lasix x1, started on Dopamine drip overnight  CT-  C/A/P revealing cardiomegaly with evidence of right heart strain, R hilar lymphadenopathy with R perihilar soft tissue density with indentation/possible invasion of right pulmonary artery, and narrowing of proximal R lobar and segmental branches with associated consolidation of right lower lobe. 2.2 x 2.8 cm filling defect noted in apex of left ventricle  - Duplex venous LE negative for DVT; CTA chest negative for PE  - seen by CTSx and cardio (Dr Guerrero) in ED  - c/w heparin gtt; Goal PTTs   - F/u ECHO with contrast to evaluate LV thrombus  -- hold Lasix, spironolactone and Entresto for now   - Pulmonary following for possible bronch with EBUS and FNA\  - Nasal MRSA neg, fu  procal, continue with Zosyn for now   - Strict Is and Os  - due to concern for cardiogenic shock and multiorgan failure patient is being transferred to CCU for further management     HAGMA 2/2 elevated lactate  DAVID on CKD  - AG 20, Lactate 5.8-->4.6  - S/p small IVF bolus, repeat lactate 3.1  - Scr 1.4, baseline 0.7 in 2021  - Trend lactate and BMP    Hypotonic hyponatremia  - Na+ 129  - Trend Na+ daily  - Holding IVF    Markedly elevated LFTs, ischemic v congestive   Elevated INR  - CT A/P showed no dilation of CBD  - check RUQ, trend LFTs, hepatology eval appreciated    HTN- BP low, hold cored    HLD - hold lipitor 20mg     Full code, overall prognosis is poor, high risk of decompensation, pending transfer to CCU

## 2023-04-23 NOTE — PROGRESS NOTE ADULT - ASSESSMENT
77 year old woman with PMHx of HTN, HLD, nonischemic cardiomyopathy, HFrEF s/p AICD presents to the ED with 2 weeks of worsening shortness of breath currently admitted for perihilar sift tissue mass in mediastinum. hepatology was consulted for elevated LFT. She denies any previous infections of the liver oe chronic liver disease in family, denies any herbal medications or supplements or abx or travel out side.   Has been taking tylenol intermittently as needed but denies excess intake.     #)Elevated liver enzymes predominantly hepatocellular (AST>ALT) with hyperbilurubinemia r/o ischemia vs congestive hepatopathy vs DILI vs other CLD   -Normal LFt in 9/2022  -Admitted with LFT 2.3/135/151/114  -No hypotensive episodes documented in the system   -Denies any recent abx or herbal meds or OTC medications  -h/o tylenol intake as needed  -CT abdomen showed Hepatic steatosis is noted. A 1 cm hypodensity is noted within the right lobe of the liver, too small for further characterization.  -echo 8/2021 low EF 20 to 25%, mild TR, mod AS   -inc INR on heparin drip  -No encephalopathy (no asterixis)  -AST trending down     Recs:   -check US abdomen and CK and tylenol levels   -check HAV IgM/IgG, HBsAg, HBsAb, HBcAb IgM/IgG, HCV Ab, Anti HEV, Serum Ferritin, Transferrin Saturation, Ceruloplasmin level, ALE, SMA, immunoglobulins panel, AMA, Anti LK microsomal Ab, anti-soluble liver Ag, EBV, HSV, CMV, ACE levels   -Trend LFT, INR  -Avoid hepatotoxic medications   -repeat echo pending

## 2023-04-23 NOTE — PROGRESS NOTE ADULT - SUBJECTIVE AND OBJECTIVE BOX
Gastroenterology progress note:     Patient is a 77y old  Female who presents with a chief complaint of intra cardiac mass (23 Apr 2023 09:39)       Admitted on: 04-21-23    We are following the patient for elevated LFT      Interval History:  denies any abdominal pain, nausea, vomiting        PAST MEDICAL & SURGICAL HISTORY:  CHF, stage C      HTN (hypertension)          MEDICATIONS  (STANDING):  chlorhexidine 2% Cloths 1 Application(s) Topical daily  DOPamine Infusion 2.499 MICROgram(s)/kG/Min (5.95 mL/Hr) IV Continuous <Continuous>  guaifenesin/dextromethorphan Oral Liquid 15 milliLiter(s) Oral once  heparin  Infusion. 1000 Unit(s)/Hr (10 mL/Hr) IV Continuous <Continuous>  melatonin 5 milliGRAM(s) Oral at bedtime  pantoprazole    Tablet 40 milliGRAM(s) Oral before breakfast  piperacillin/tazobactam IVPB.. 3.375 Gram(s) IV Intermittent every 8 hours    MEDICATIONS  (PRN):  heparin   Injectable 5000 Unit(s) IV Push every 6 hours PRN For aPTT less than 40  heparin   Injectable 2500 Unit(s) IV Push every 6 hours PRN For aPTT between 40 - 57      Allergies  epinephrine (Unknown)      Review of Systems:   Cardiovascular:  No Chest Pain, No Palpitations  Respiratory:  No Cough, No Dyspnea  Gastrointestinal:  As described in HPI    Physical Examination:  T(C): 36.3 (04-23-23 @ 07:46), Max: 36.3 (04-23-23 @ 07:46)  HR: 74 (04-23-23 @ 07:46) (69 - 82)  BP: 90/69 (04-23-23 @ 10:50) (81/55 - 115/58)  RR: 18 (04-23-23 @ 10:50) (18 - 19)  SpO2: 95% (04-23-23 @ 10:50) (92% - 97%)      04-22-23 @ 07:01  -  04-23-23 @ 07:00  --------------------------------------------------------  IN: 165.7 mL / OUT: 0 mL / NET: 165.7 mL    04-23-23 @ 07:01  -  04-23-23 @ 11:49  --------------------------------------------------------  IN: 0 mL / OUT: 300 mL / NET: -300 mL      Constitutional: No acute distress.  Respiratory:  No signs of respiratory distress. Lung sounds are clear bilaterally.  Cardiovascular:  S1 S2, Regular rate and rhythm.  Abdominal: Abdomen is soft, symmetric, and non-tender without distention        Data:                        12.7   8.81  )-----------( 130      ( 23 Apr 2023 02:04 )             37.8     Hgb trend:  12.7  04-23-23 @ 02:04  13.2  04-22-23 @ 11:24  13.8  04-22-23 @ 07:07  14.7  04-21-23 @ 11:43        04-23    126<L>  |  93<L>  |  61<HH>  ----------------------------<  124<H>  4.7   |  17  |  1.7<H>    Ca    8.2<L>      23 Apr 2023 02:04  Mg     2.3     04-23    TPro  5.3<L>  /  Alb  2.9<L>  /  TBili  3.0<H>  /  DBili  x   /  AST  2051<H>  /  ALT  917<H>  /  AlkPhos  128<H>  04-22    Liver panel trend:  TBili 3.0   /   AST 2051   /      /   AlkP 128   /   Tptn 5.3   /   Alb 2.9    /   DBili --      04-22  TBili 3.1   /   AST 2216   /      /   AlkP 137   /   Tptn 5.7   /   Alb 3.0    /   DBili --      04-22  TBili 3.0   /   AST 1660   /      /   AlkP 132   /   Tptn 6.0   /   Alb 3.3    /   DBili 1.3      04-22  TBili 2.3   /      /      /   AlkP 135   /   Tptn 6.8   /   Alb 3.4    /   DBili --      04-21      PT/INR - ( 23 Apr 2023 02:04 )   PT: 30.50 sec;   INR: 2.60 ratio         PTT - ( 23 Apr 2023 08:14 )  PTT:188.6 sec       Radiology:

## 2023-04-23 NOTE — PROGRESS NOTE ADULT - SUBJECTIVE AND OBJECTIVE BOX
CHIEF COMPLAINT:  Patient is a 77y old  Female who presents with a chief complaint of intra cardiac mass (23 Apr 2023 11:49)      INTERVAL HISTORY/OVERNIGHT EVENTS:  Hypotensive overnight. Started on Dopamine ggt.     ======================  MEDICATIONS:  chlorhexidine 2% Cloths 1 Application(s) Topical daily  guaifenesin/dextromethorphan Oral Liquid 15 milliLiter(s) Oral once  melatonin 5 milliGRAM(s) Oral at bedtime  pantoprazole    Tablet 40 milliGRAM(s) Oral before breakfast  piperacillin/tazobactam IVPB.. 3.375 Gram(s) IV Intermittent every 8 hours    DRIPS:  DOPamine Infusion 2.499 MICROgram(s)/kG/Min (5.95 mL/Hr) IV Continuous <Continuous>  heparin  Infusion. 1000 Unit(s)/Hr (10 mL/Hr) IV Continuous <Continuous>  milrinone Infusion 0.125 MICROgram(s)/kG/Min (2.38 mL/Hr) IV Continuous <Continuous>    ======================  PHYSICAL EXAMINATION:  GEN:  nad.   HEENT:  eomi. ncat  PULM:  b/l lung sounds   CARD: s1, s2  ABD: +bs. ntnd  EXT:  no new rashes.    NEURO:  no new focal deficits.   ======================  OBJECTIVE:        VS:  T(F): 97.3 (04-23 @ 07:46), Max: 97.3 (04-23 @ 07:46)  HR: 75 (04-23 @ 12:46) (69 - 82)  BP: 91/63 (04-23 @ 12:46) (81/55 - 115/58)  RR: 18 (04-23 @ 12:46) (18 - 19)  SpO2: 96% (04-23 @ 12:46) (92% - 97%)      I/O:      04-22 @ 07:01  -  04-23 @ 07:00  --------------------------------------------------------  IN: 165.7 mL / OUT: 0 mL / NET: 165.7 mL    04-23 @ 07:01  -  04-23 @ 14:34  --------------------------------------------------------  IN: 0 mL / OUT: 300 mL / NET: -300 mL        Weight trend:  Weight (kg): 63.5 (04-21)    ======================    LABS:  ABG - ( 22 Apr 2023 16:29 )  pH, Arterial: 7.45  pH, Blood: x     /  pCO2: 23    /  pO2: 106   / HCO3: 16    / Base Excess: -6.0  /  SaO2: 99.4                                    12.7   8.81  )-----------( 130      ( 23 Apr 2023 02:04 )             37.8     04-23    126<L>  |  93<L>  |  61<HH>  ----------------------------<  124<H>  4.7   |  17  |  1.7<H>    Ca    8.2<L>      23 Apr 2023 02:04  Mg     2.3     04-23    TPro  5.3<L>  /  Alb  2.9<L>  /  TBili  3.0<H>  /  DBili  x   /  AST  2051<H>  /  ALT  917<H>  /  AlkPhos  128<H>  04-22    LIVER FUNCTIONS - ( 22 Apr 2023 20:54 )  Alb: 2.9 g/dL / Pro: 5.3 g/dL / ALK PHOS: 128 U/L / ALT: 917 U/L / AST: 2051 U/L / GGT: x           PT/INR - ( 23 Apr 2023 02:04 )   PT: 30.50 sec;   INR: 2.60 ratio         PTT - ( 23 Apr 2023 08:14 )  PTT:188.6 sec              Cultures:         CHIEF COMPLAINT:    Patient is a 77y old  Female who presents with a chief complaint of intra cardiac mass (23 Apr 2023 11:49)      INTERVAL HISTORY/OVERNIGHT EVENTS:  Hypotensive overnight. Started on Dopamine ggt. LFT's severely elevated. Transfered to CCU for concern of cardiogenic shock.    ======================  MEDICATIONS:  chlorhexidine 2% Cloths 1 Application(s) Topical daily  guaifenesin/dextromethorphan Oral Liquid 15 milliLiter(s) Oral once  melatonin 5 milliGRAM(s) Oral at bedtime  pantoprazole    Tablet 40 milliGRAM(s) Oral before breakfast  piperacillin/tazobactam IVPB.. 3.375 Gram(s) IV Intermittent every 8 hours    DRIPS:  DOPamine Infusion 2.499 MICROgram(s)/kG/Min (5.95 mL/Hr) IV Continuous <Continuous>  heparin  Infusion. 1000 Unit(s)/Hr (10 mL/Hr) IV Continuous <Continuous>  milrinone Infusion 0.125 MICROgram(s)/kG/Min (2.38 mL/Hr) IV Continuous <Continuous>    ======================  PHYSICAL EXAMINATION:  GEN:  nad.   HEENT:  eomi. ncat  PULM:  b/l lung sounds   CARD: s1, s2  ABD: +bs. ntnd  EXT:  no new rashes.    NEURO:  no new focal deficits.   ======================  OBJECTIVE:        VS:  T(F): 97.3 (04-23 @ 07:46), Max: 97.3 (04-23 @ 07:46)  HR: 75 (04-23 @ 12:46) (69 - 82)  BP: 91/63 (04-23 @ 12:46) (81/55 - 115/58)  RR: 18 (04-23 @ 12:46) (18 - 19)  SpO2: 96% (04-23 @ 12:46) (92% - 97%)      I/O:      04-22 @ 07:01  -  04-23 @ 07:00  --------------------------------------------------------  IN: 165.7 mL / OUT: 0 mL / NET: 165.7 mL    04-23 @ 07:01  -  04-23 @ 14:34  --------------------------------------------------------  IN: 0 mL / OUT: 300 mL / NET: -300 mL        Weight trend:  Weight (kg): 63.5 (04-21)    ======================    LABS:  ABG - ( 22 Apr 2023 16:29 )  pH, Arterial: 7.45  pH, Blood: x     /  pCO2: 23    /  pO2: 106   / HCO3: 16    / Base Excess: -6.0  /  SaO2: 99.4                                    12.7   8.81  )-----------( 130      ( 23 Apr 2023 02:04 )             37.8     04-23    126<L>  |  93<L>  |  61<HH>  ----------------------------<  124<H>  4.7   |  17  |  1.7<H>    Ca    8.2<L>      23 Apr 2023 02:04  Mg     2.3     04-23    TPro  5.3<L>  /  Alb  2.9<L>  /  TBili  3.0<H>  /  DBili  x   /  AST  2051<H>  /  ALT  917<H>  /  AlkPhos  128<H>  04-22    LIVER FUNCTIONS - ( 22 Apr 2023 20:54 )  Alb: 2.9 g/dL / Pro: 5.3 g/dL / ALK PHOS: 128 U/L / ALT: 917 U/L / AST: 2051 U/L / GGT: x           PT/INR - ( 23 Apr 2023 02:04 )   PT: 30.50 sec;   INR: 2.60 ratio         PTT - ( 23 Apr 2023 08:14 )  PTT:188.6 sec              Cultures:

## 2023-04-23 NOTE — CHART NOTE - NSCHARTNOTEFT_GEN_A_CORE
CEU Transfer Note    Transfer from: CEU    Transfer to: (  ) Medicine    (  ) Telemetry    (  ) RCU                               (  ) Palliative    (  ) Stroke Unit    (  ) MICU    (X)  CCU    Accepting Physician: Dr. Hensley     Signout given to:     HPI / CEU COURSE:    77 year old woman with PMHx of HTN, HLD, nonischemic cardiomyopathy, HFrEF s/p AICD presents to the ED with 2 weeks of worsening shortness of breath. Patient and her son state patient has been experiencing shortness of breath that has progressed from present on activity to now shortness of breath during conversation. Patient also notes an increasing dry cough over this time period. Denies history of smoking, fevers/chills, chest pain, nausea/vomiting, bloody sputum, dark/tarry/bloody bowel movements, upper respiratory symptoms.     In ED, patient's HR is 110 and saturating 98% on RA  CT C/A/P revealing cardiomegaly with evidence of right heart strain, R hilar lymphadenopathy with R perihilar soft tissue density with indentation/possible invasion of right pulmonary artery, and narrowing of proximal R lobar and segmental branches with associated consolidation of right lower lobe. 2.2 x 2.8 cm filling defect noted in apex of left ventricle.   Last ECHO 8/2021 revealing EF 20-25%, mild Mr, mild TR, mild Pulm HTN  Dimer 2600, trops negative BNP 22138  Duplex venous LE negative for DVT; CTA chest negative for PE  Cr 1.4 (0.7 in 2021), Na+ 128, T.Benjamin 2.3, LFTs elevated (hemolyzed)  Lactate 5.8-->4.6  Admitted to SDU    CEU Course:   - Cardiology O'[facility] consulted, TTE confirmed EF <20% w/ LV thrombus - started on heparin IV  - Pulmonary team consulted for eval of EBUS - pending clinical improvement  - Overnight patient developed worsening DAVID and suspected ischemic hepatitis in the setting of reduced BP ~70s/50s  - Patient started on Dopamine ggt with improvement of BP   - 4/23 AM - patient asymptomatic - bedside echo revealed non-compressible IVC and reduced EF  - Spoke w/ Dr. Hensley and Dr. Yeager - accepted to CCU   - plan follows below       Vital Signs Last 24 Hrs  T(C): 36.3 (23 Apr 2023 07:46), Max: 36.3 (23 Apr 2023 07:46)  T(F): 97.3 (23 Apr 2023 07:46), Max: 97.3 (23 Apr 2023 07:46)  HR: 75 (23 Apr 2023 12:46) (69 - 82)  BP: 91/63 (23 Apr 2023 12:46) (81/55 - 115/58)  BP(mean): 72 (23 Apr 2023 10:50) (64 - 74)  RR: 18 (23 Apr 2023 12:46) (18 - 19)  SpO2: 96% (23 Apr 2023 12:46) (92% - 97%)    Parameters below as of 23 Apr 2023 12:46  Patient On (Oxygen Delivery Method): room air        I&O's Summary    22 Apr 2023 07:01  -  23 Apr 2023 07:00  --------------------------------------------------------  IN: 165.7 mL / OUT: 0 mL / NET: 165.7 mL    23 Apr 2023 07:01  -  23 Apr 2023 13:12  --------------------------------------------------------  IN: 0 mL / OUT: 300 mL / NET: -300 mL        Physical Exam:     GENERAL:  NAD  SKIN: No rashes or lesions  HEENT: Atraumatic. Normocephalic.   NECK: Supple, No JVD.   PULMONARY: decreased breath sounds B/L. No wheezing.   CVS: Normal S1, S2. Rate and Rhythm are regular.    ABDOMEN/GI: Soft, Nontender, Nondistended   MSK:  No clubbing or cyanosis   NEUROLOGIC:  moves all extremities   PSYCH: Awake and alert, confused     LABS:                               12.7   8.81  )-----------( 130      ( 23 Apr 2023 02:04 )             37.8       04-23    126<L>  |  93<L>  |  61<HH>  ----------------------------<  124<H>  4.7   |  17  |  1.7<H>    Ca    8.2<L>      23 Apr 2023 02:04  Mg     2.3     04-23    TPro  5.3<L>  /  Alb  2.9<L>  /  TBili  3.0<H>  /  DBili  x   /  AST  2051<H>  /  ALT  917<H>  /  AlkPhos  128<H>  04-22      PT/INR - ( 23 Apr 2023 02:04 )   PT: 30.50 sec;   INR: 2.60 ratio         PTT - ( 23 Apr 2023 08:14 )  PTT:188.6 sec    ABG - ( 22 Apr 2023 16:29 )  pH, Arterial: 7.45  pH, Blood: x     /  pCO2: 23    /  pO2: 106   / HCO3: 16    / Base Excess: -6.0  /  SaO2: 99.4                    Telemetry: intermittent PVCs overnight     Imaging:    CTA Chest/abdomen/pelvis 4/21  No evidence of pulmonary embolism. (See discussion below for more   findings.)    Marked cardiomegaly.    There is a 2.2 x 2.8 cm rounded filling defect in the region of the apex   of the left ventricle (3/11; 607/95). Differential diagnosis includes an   intracardiac mass or thrombus.    The main pulmonary artery is dilated, measuring 3.6 cm in diameter, which   may be seen with pulmonary hypertension. There is reflux of contrast   material into the IVC and hepatic veins compatible with right heart   dysfunction.    Right hilar lymphadenopathy and right perihilar soft tissue density is   noted. There is segmental narrowing and consolidation noted in the medial   aspect of the right lower lobe. There is a small right pleural effusion.   There is extrinsic indentation and possible invasion of the right main   pulmonary artery (604/142; 605/129; 2/42). There is associated narrowing   of the proximal right lobar and segmental branches. A right hilar / right   lower lobe mass with postobstructive pneumonitis should be ruled out.    US Kidney Bladder 4/22  No sonographic evidence of hydronephrosis.    b/l le venous duplex 4/21  No evidence of deep venous thrombosis in either lower extremity.    CXR 4/23  Increased bibasilar opacities.    TTE 4/22:   1. Left ventricular ejection fraction, by visual estimation, is <20%.   2. Severely decreased global left ventricular systolic function.   3. Severely enlarged left atrium.   4. Elevated mean left atrial pressure.   5. Large, fixed thrombus vs. mass on the apical wall of the left   ventricle.   6. Spectral Doppler shows pseudonormal pattern of left ventricular   myocardial filling (Grade II diastolic dysfunction).   7. Moderately reduced RV systolic function.   8. Mildly enlarged right atrium.   9. Moderate mitral valve regurgitation.  10. Moderate-severe tricuspid regurgitation.  11. Mild pulmonic valve regurgitation.  12. Estimated pulmonary artery systolic pressure is 42.9 mmHg assuming a   right atrial pressure of 15 mmHg, which is consistent with mild pulmonary   hypertension.  13. Endocardial visualization was enhanced with intravenous echo contrast.      ASSESSMENT & PLAN:     77y F CEU Transfer Note    Transfer from: CEU    Transfer to: (  ) Medicine    (  ) Telemetry    (  ) RCU                               (  ) Palliative    (  ) Stroke Unit    (  ) MICU    (X)  CCU    Accepting Physician: Dr. Hensley     Signout given to:     HPI / CEU COURSE:    77 year old woman with PMHx of HTN, HLD, nonischemic cardiomyopathy, HFrEF s/p AICD presents to the ED with 2 weeks of worsening shortness of breath. Patient and her son state patient has been experiencing shortness of breath that has progressed from present on activity to now shortness of breath during conversation. Patient also notes an increasing dry cough over this time period. Denies history of smoking, fevers/chills, chest pain, nausea/vomiting, bloody sputum, dark/tarry/bloody bowel movements, upper respiratory symptoms.     In ED, patient's HR is 110 and saturating 98% on RA  CT C/A/P revealing cardiomegaly with evidence of right heart strain, R hilar lymphadenopathy with R perihilar soft tissue density with indentation/possible invasion of right pulmonary artery, and narrowing of proximal R lobar and segmental branches with associated consolidation of right lower lobe. 2.2 x 2.8 cm filling defect noted in apex of left ventricle.   Last ECHO 8/2021 revealing EF 20-25%, mild Mr, mild TR, mild Pulm HTN  Dimer 2600, trops negative BNP 88941  Duplex venous LE negative for DVT; CTA chest negative for PE  Cr 1.4 (0.7 in 2021), Na+ 128, T.Benjamin 2.3, LFTs elevated (hemolyzed)  Lactate 5.8-->4.6  Admitted to SDU    CEU Course:   - Cardiology [facility] consulted, TTE confirmed EF <20% w/ LV thrombus - started on heparin IV  - Pulmonary team consulted for eval of EBUS - pending clinical improvement  - Overnight patient developed worsening DAVID and suspected ischemic hepatitis in the setting of reduced BP ~70s/50s  - Patient started on Dopamine ggt with improvement of BP   - 4/23 AM - patient asymptomatic - bedside echo revealed non-compressible IVC and reduced EF  - Spoke w/ Dr. Hensley and Dr. Yeager - accepted to CCU   - plan follows below       Vital Signs Last 24 Hrs  T(C): 36.3 (23 Apr 2023 07:46), Max: 36.3 (23 Apr 2023 07:46)  T(F): 97.3 (23 Apr 2023 07:46), Max: 97.3 (23 Apr 2023 07:46)  HR: 75 (23 Apr 2023 12:46) (69 - 82)  BP: 91/63 (23 Apr 2023 12:46) (81/55 - 115/58)  BP(mean): 72 (23 Apr 2023 10:50) (64 - 74)  RR: 18 (23 Apr 2023 12:46) (18 - 19)  SpO2: 96% (23 Apr 2023 12:46) (92% - 97%)    Parameters below as of 23 Apr 2023 12:46  Patient On (Oxygen Delivery Method): room air        I&O's Summary    22 Apr 2023 07:01  -  23 Apr 2023 07:00  --------------------------------------------------------  IN: 165.7 mL / OUT: 0 mL / NET: 165.7 mL    23 Apr 2023 07:01  -  23 Apr 2023 13:12  --------------------------------------------------------  IN: 0 mL / OUT: 300 mL / NET: -300 mL        Physical Exam:     GENERAL:  NAD  SKIN: No rashes or lesions  HEENT: Atraumatic. Normocephalic.   NECK: Supple, No JVD.   PULMONARY: decreased breath sounds B/L. No wheezing.   CVS: Normal S1, S2. Rate and Rhythm are regular.    ABDOMEN/GI: Soft, Nontender, Nondistended   MSK:  No clubbing or cyanosis   NEUROLOGIC:  moves all extremities   PSYCH: Awake and alert, confused     LABS:                               12.7   8.81  )-----------( 130      ( 23 Apr 2023 02:04 )             37.8       04-23    126<L>  |  93<L>  |  61<HH>  ----------------------------<  124<H>  4.7   |  17  |  1.7<H>    Ca    8.2<L>      23 Apr 2023 02:04  Mg     2.3     04-23    TPro  5.3<L>  /  Alb  2.9<L>  /  TBili  3.0<H>  /  DBili  x   /  AST  2051<H>  /  ALT  917<H>  /  AlkPhos  128<H>  04-22      PT/INR - ( 23 Apr 2023 02:04 )   PT: 30.50 sec;   INR: 2.60 ratio         PTT - ( 23 Apr 2023 08:14 )  PTT:188.6 sec    ABG - ( 22 Apr 2023 16:29 )  pH, Arterial: 7.45  pH, Blood: x     /  pCO2: 23    /  pO2: 106   / HCO3: 16    / Base Excess: -6.0  /  SaO2: 99.4                    Telemetry: intermittent PVCs overnight     Imaging:    CTA Chest/abdomen/pelvis 4/21  No evidence of pulmonary embolism. (See discussion below for more   findings.)    Marked cardiomegaly.    There is a 2.2 x 2.8 cm rounded filling defect in the region of the apex   of the left ventricle (3/11; 607/95). Differential diagnosis includes an   intracardiac mass or thrombus.    The main pulmonary artery is dilated, measuring 3.6 cm in diameter, which   may be seen with pulmonary hypertension. There is reflux of contrast   material into the IVC and hepatic veins compatible with right heart   dysfunction.    Right hilar lymphadenopathy and right perihilar soft tissue density is   noted. There is segmental narrowing and consolidation noted in the medial   aspect of the right lower lobe. There is a small right pleural effusion.   There is extrinsic indentation and possible invasion of the right main   pulmonary artery (604/142; 605/129; 2/42). There is associated narrowing   of the proximal right lobar and segmental branches. A right hilar / right   lower lobe mass with postobstructive pneumonitis should be ruled out.    US Kidney Bladder 4/22  No sonographic evidence of hydronephrosis.    b/l le venous duplex 4/21  No evidence of deep venous thrombosis in either lower extremity.    CXR 4/23  Increased bibasilar opacities.    TTE 4/22:   1. Left ventricular ejection fraction, by visual estimation, is <20%.   2. Severely decreased global left ventricular systolic function.   3. Severely enlarged left atrium.   4. Elevated mean left atrial pressure.   5. Large, fixed thrombus vs. mass on the apical wall of the left   ventricle.   6. Spectral Doppler shows pseudonormal pattern of left ventricular   myocardial filling (Grade II diastolic dysfunction).   7. Moderately reduced RV systolic function.   8. Mildly enlarged right atrium.   9. Moderate mitral valve regurgitation.  10. Moderate-severe tricuspid regurgitation.  11. Mild pulmonic valve regurgitation.  12. Estimated pulmonary artery systolic pressure is 42.9 mmHg assuming a   right atrial pressure of 15 mmHg, which is consistent with mild pulmonary   hypertension.  13. Endocardial visualization was enhanced with intravenous echo contrast.      ASSESSMENT & PLAN:     77y F pmh htn, hld, NICM, HFrEF s/p AICD EF<25% presenting with SOB x2 weeks admitted for suspected post obstructive PNA c/b LV thrombus and cardiogenic shock.     #Suspected Cardiogenic Shock   #LV thrombus  #NICM  #HFrEF 25% s/p AICD  #HTN/HLD  - SOB x2 weeks  - CT CAP - cardiomegaly w/ RH strain  - TTE EF <20%, large fixed thrombus vs mass on apical LV wall, GIIDD, mod-sev TR, mod MR, mild MO, mild PH  - LE venous duplex -ve  - overnight BP 80s/60s -> given 1x dose lasix, started on dopamine ggt  - CCU consulted - now upgraded  - R IJ placed, started on milrinone ggt .125mcg/kg  - Current BP 110s/80s  - c/w heparin ggt  - holding lasix, spironolactone, entresto, coreg  - CTS and Cardio following ([place])    #Post obstructive pneumonia  #Suspected lung mass   - CT CAP - R hilar LAD, R perihilar soft tissue density w/ indentation/possible invasion of R pulmonary artery and narrowing of proximal R lobar and segmental branches w/ consolidation of RLL  - MRSA -ve, procal 1.77  - c/w zosyn   - pulm following for EBUS - needs medical optimization     #HAGMA  #Lactic Acidosis  #DAVID on CKD   #Ischemic hepatitis   - AG 20, lactate 5.8-->4.6 -> 3.1   - Cr 1.4-> 1.7, BUN 42-> 61 - bsl Cr 0.7 2021  - AST/ALT 2051/917, LDH 1401 -> previous AST//114  - INR 2.6  - s/p IVF bolus - bedside echo non-collapsable IVC - hold on IVF for now   - repeat lactate and bmp   - pending RUQ US, CK, tylenol level   - pending HAV IgM/IgG, HBsAg, HBsAb, HBcAb IgM/IgG, HCV Ab, Anti HEV, Serum Ferritin, Transferrin Saturation, Ceruloplasmin level, ALE, SMA, immunoglobulins panel, AMA, Anti LK microsomal Ab, anti-soluble liver Ag, EBV, HSV, CMV, ACE levels   - GI following   - holding lipitor    #Hypotonic Hyponatremia   - Na 129, holding IVF  - trend Na     # To follow up  - will confirm central line placement  - repeat lactate, hepatitis workup, CMP   - monitor BP  - RUQ US     # Misc  - DVT Prophylaxis: heparin  Infusion.  - GI Prophylaxis: pantoprazole    Tablet 40 milliGRAM(s) Oral before breakfast  - Diet: Diet, DASH/TLC  - Activity: Activity - Ambulate as Tolerated  - Code Status: Full     Edward Chin  PGY1, Internal Medicine   [facility]

## 2023-04-23 NOTE — PROGRESS NOTE ADULT - ASSESSMENT
IMPRESSION  Cardiogenic shock on dopamine and milrinone likely exacerbated by underlying sepsis PNA  HFrEF 20%, NICM  RLL Mass/opacity/ possible Pneumonia r/o malignancy  LV Mass likely Thrombus  worsening transaminitis in the setting of hypotension, shock liver      PLAN:    CNS: Avoid CNS depressant    HEENT: Oral care    PULMONARY: HOB @ 45 degrees. On RA, aspiration precaution, keep Sao2 92 to96%, ct sx following     CARDIOVASCULAR: c/w dopamine ggt @ 2.5, start milrinone ggt @ 0.25 mcg/kg/min  repeat lactate, check central venous O2 to calculate Radha CO  I/V Lasix once BP is better  Heparin ggt for LV Thrombus    GI: GI prophylaxis. Feeding. Bowel regimen. F/U US liver with duplex. Hepatology eval. Trend LFTs    RENAL: Follow up lytes. Correct as needed    INFECTIOUS DISEASE: Continue zosyn - renal dose. F/u cultures.    HEMATOLOGICAL: DVT prophylaxis. cw heparin gtt    ENDOCRINE: Follow up FS. Insulin protocol if needed.    MUSCULOSKELETAL: OOBTC    CCU

## 2023-04-23 NOTE — PROGRESS NOTE ADULT - SUBJECTIVE AND OBJECTIVE BOX
Patient is a 77y old  Female who presents with a chief complaint of intra cardiac mass (23 Apr 2023 00:50)        Over Night Events: On dopamine 2.4. Off oxygen.        ROS:     All pertinent ROS are negative except HPI         PHYSICAL EXAM    ICU Vital Signs Last 24 Hrs  T(C): 36.3 (23 Apr 2023 07:46), Max: 36.3 (23 Apr 2023 07:46)  T(F): 97.3 (23 Apr 2023 07:46), Max: 97.3 (23 Apr 2023 07:46)  HR: 74 (23 Apr 2023 07:46) (69 - 83)  BP: 94/57 (23 Apr 2023 07:46) (81/55 - 115/58)  BP(mean): 71 (23 Apr 2023 07:46) (64 - 74)  RR: 18 (23 Apr 2023 07:46) (18 - 19)  SpO2: 95% (23 Apr 2023 07:46) (92% - 100%)    O2 Parameters below as of 23 Apr 2023 07:46  Patient On (Oxygen Delivery Method): room air            CONSTITUTIONAL:  NAD    ENT:   Airway patent,   Mouth with normal mucosa.   No thrush    EYES:   Pupils equal,   Round and reactive to light.    CARDIAC:   Normal rate,   Regular rhythm.      RESPIRATORY:   No wheezing  Bilateral BS  Normal chest expansion  Not tachypneic,  No use of accessory muscles    GASTROINTESTINAL:  Abdomen soft,   Non-tender,   No guarding,   + BS    MUSCULOSKELETAL:   Range of motion is not limited,  No clubbing, cyanosis    NEUROLOGICAL:   Alert and oriented   No motor deficits.    SKIN:   Skin normal color for race,   Warm and dry  No evidence of rash.    PSYCHIATRIC:   No apparent risk to self or others.        04-22-23 @ 07:01  -  04-23-23 @ 07:00  --------------------------------------------------------  IN:    DOPamine Infusion: 35.7 mL    Heparin Infusion: 90 mL    Heparin Infusion: 40 mL  Total IN: 165.7 mL    OUT:  Total OUT: 0 mL    Total NET: 165.7 mL      04-23-23 @ 07:01  -  04-23-23 @ 09:41  --------------------------------------------------------  IN:  Total IN: 0 mL    OUT:    Voided (mL): 300 mL  Total OUT: 300 mL    Total NET: -300 mL          LABS:                            12.7   8.81  )-----------( 130      ( 23 Apr 2023 02:04 )             37.8                                               04-23    126<L>  |  93<L>  |  61<HH>  ----------------------------<  124<H>  4.7   |  17  |  1.7<H>    Ca    8.2<L>      23 Apr 2023 02:04  Mg     2.3     04-23    TPro  5.3<L>  /  Alb  2.9<L>  /  TBili  3.0<H>  /  DBili  x   /  AST  2051<H>  /  ALT  917<H>  /  AlkPhos  128<H>  04-22      PT/INR - ( 23 Apr 2023 02:04 )   PT: 30.50 sec;   INR: 2.60 ratio         PTT - ( 22 Apr 2023 15:30 )  PTT:105.0 sec                                           CARDIAC MARKERS ( 21 Apr 2023 11:43 )  x     / 0.01 ng/mL / x     / x     / x                                                LIVER FUNCTIONS - ( 22 Apr 2023 20:54 )  Alb: 2.9 g/dL / Pro: 5.3 g/dL / ALK PHOS: 128 U/L / ALT: 917 U/L / AST: 2051 U/L / GGT: x                                                                                                                                   ABG - ( 22 Apr 2023 16:29 )  pH, Arterial: 7.45  pH, Blood: x     /  pCO2: 23    /  pO2: 106   / HCO3: 16    / Base Excess: -6.0  /  SaO2: 99.4                MEDICATIONS  (STANDING):  chlorhexidine 2% Cloths 1 Application(s) Topical daily  DOPamine Infusion 2.499 MICROgram(s)/kG/Min (5.95 mL/Hr) IV Continuous <Continuous>  guaifenesin/dextromethorphan Oral Liquid 15 milliLiter(s) Oral once  heparin  Infusion. 1000 Unit(s)/Hr (10 mL/Hr) IV Continuous <Continuous>  melatonin 5 milliGRAM(s) Oral at bedtime  pantoprazole    Tablet 40 milliGRAM(s) Oral before breakfast  piperacillin/tazobactam IVPB.. 3.375 Gram(s) IV Intermittent every 8 hours    MEDICATIONS  (PRN):  heparin   Injectable 5000 Unit(s) IV Push every 6 hours PRN For aPTT less than 40  heparin   Injectable 2500 Unit(s) IV Push every 6 hours PRN For aPTT between 40 - 57      New X-rays reviewed:                                                                                  ECHO    CXR interpreted by me:       Patient is a 77y old  Female who presents with a chief complaint of intra cardiac mass (23 Apr 2023 00:50)        Over Night Events: On dopamine 2.4. Off oxygen. afebrile      PHYSICAL EXAM    ICU Vital Signs Last 24 Hrs  T(C): 36.3 (23 Apr 2023 07:46), Max: 36.3 (23 Apr 2023 07:46)  T(F): 97.3 (23 Apr 2023 07:46), Max: 97.3 (23 Apr 2023 07:46)  HR: 74 (23 Apr 2023 07:46) (69 - 83)  BP: 94/57 (23 Apr 2023 07:46) (81/55 - 115/58)  BP(mean): 71 (23 Apr 2023 07:46) (64 - 74)  RR: 18 (23 Apr 2023 07:46) (18 - 19)  SpO2: 95% (23 Apr 2023 07:46) (92% - 100%)    O2 Parameters below as of 23 Apr 2023 07:46  Patient On (Oxygen Delivery Method): room air            CONSTITUTIONAL:  NAD    ENT:   Airway patent,   Mouth with normal mucosa.   No thrush    EYES:   Pupils equal,   Round and reactive to light.    CARDIAC:   HIWOT 2.6    RESPIRATORY:   dec bs both bases    GASTROINTESTINAL:  Abdomen soft,   Non-tender,   No guarding,   + BS    MUSCULOSKELETAL:   Range of motion is not limited,  No clubbing, cyanosis    NEUROLOGICAL:   Alert and oriented   No motor deficits.    SKIN:   Skin normal color for race,   Warm and dry        04-22-23 @ 07:01  -  04-23-23 @ 07:00  --------------------------------------------------------  IN:    DOPamine Infusion: 35.7 mL    Heparin Infusion: 90 mL    Heparin Infusion: 40 mL  Total IN: 165.7 mL    OUT:  Total OUT: 0 mL    Total NET: 165.7 mL      04-23-23 @ 07:01  -  04-23-23 @ 09:41  --------------------------------------------------------  IN:  Total IN: 0 mL    OUT:    Voided (mL): 300 mL  Total OUT: 300 mL    Total NET: -300 mL          LABS:                            12.7   8.81  )-----------( 130      ( 23 Apr 2023 02:04 )             37.8                                               04-23    126<L>  |  93<L>  |  61<HH>  ----------------------------<  124<H>  4.7   |  17  |  1.7<H>    Ca    8.2<L>      23 Apr 2023 02:04  Mg     2.3     04-23    TPro  5.3<L>  /  Alb  2.9<L>  /  TBili  3.0<H>  /  DBili  x   /  AST  2051<H>  /  ALT  917<H>  /  AlkPhos  128<H>  04-22      PT/INR - ( 23 Apr 2023 02:04 )   PT: 30.50 sec;   INR: 2.60 ratio         PTT - ( 22 Apr 2023 15:30 )  PTT:105.0 sec                                           CARDIAC MARKERS ( 21 Apr 2023 11:43 )  x     / 0.01 ng/mL / x     / x     / x                                                LIVER FUNCTIONS - ( 22 Apr 2023 20:54 )  Alb: 2.9 g/dL / Pro: 5.3 g/dL / ALK PHOS: 128 U/L / ALT: 917 U/L / AST: 2051 U/L / GGT: x                                                                                                                                   ABG - ( 22 Apr 2023 16:29 )  pH, Arterial: 7.45  pH, Blood: x     /  pCO2: 23    /  pO2: 106   / HCO3: 16    / Base Excess: -6.0  /  SaO2: 99.4                MEDICATIONS  (STANDING):  chlorhexidine 2% Cloths 1 Application(s) Topical daily  DOPamine Infusion 2.499 MICROgram(s)/kG/Min (5.95 mL/Hr) IV Continuous <Continuous>  guaifenesin/dextromethorphan Oral Liquid 15 milliLiter(s) Oral once  heparin  Infusion. 1000 Unit(s)/Hr (10 mL/Hr) IV Continuous <Continuous>  melatonin 5 milliGRAM(s) Oral at bedtime  pantoprazole    Tablet 40 milliGRAM(s) Oral before breakfast  piperacillin/tazobactam IVPB.. 3.375 Gram(s) IV Intermittent every 8 hours    MEDICATIONS  (PRN):  heparin   Injectable 5000 Unit(s) IV Push every 6 hours PRN For aPTT less than 40  heparin   Injectable 2500 Unit(s) IV Push every 6 hours PRN For aPTT between 40 - 57

## 2023-04-23 NOTE — PROGRESS NOTE ADULT - ASSESSMENT
77 year old woman with pmh of HTN, HLD, nonischemic cardiomyopathy, HFrEF, s/p ICD and PPM presents to the ED with 2 weeks of worsening shortness of breath. Patient and her son state patient has been experiencing shortness of breath that has progressed from present on activity to now shortness of breath during conversation. Patient also notes an increasing dry cough over this time period. Denies history of smoking, fevers/chills, chest pain, nausea/vomiting, bloody sputum, dark/tarry/bloody bowel movements, upper respiratory symptoms. Upon arrival, patient is HD stable and saturating 98% on RA, talking in complete sentences, no increased work of breathing.  CT C/A/P revealing cardiomegaly with evidence of right heart strain, R hilar lymphadenopathy with R perihilar soft tissue density with indentation/possible invasion of right pulmonary artery, and narrowing of proximal R lobar and segmental branches with associated consolidation of right lower lobe. 2.2 x 2.8 cm filling defect noted in apex of left ventricle. Patient follows with Dr. Frank, last ECHO 8/2021 revealing EF 20-25%, mild Mr, mild TR, mild Pulm HTN.    PLAN:  -Echocardiogram completed  -Patient being evaluated for elevated liver enzymes  -Will likely need bronchoscopy with EBUS and FNA  -attending to see in AM

## 2023-04-23 NOTE — PROGRESS NOTE ADULT - SUBJECTIVE AND OBJECTIVE BOX
SURGERY PROGRESS NOTE     Patient: MIKHAIL JOHNSON , 77y ([birth year])Female   MRN: [id number]  Location: [place] [address] A  Visit: 04-21-23 Inpatient  Date: 04-23-23 @ 00:51       Events of past 24 hours:  Patient seen resting comfortably in bed. No acute events overnight. Hemodynamically stable.     PAST MEDICAL & SURGICAL HISTORY:  CHF, stage C      HTN (hypertension)           Vitals:   T(F): 96.7 (04-22-23 @ 17:00), Max: 97.5 (04-22-23 @ 07:35)  HR: 70 (04-22-23 @ 20:40)  BP: 115/58 (04-22-23 @ 20:40)  RR: 18 (04-22-23 @ 20:00)  SpO2: 95% (04-22-23 @ 20:00)      Diet, DASH/TLC:   Sodium & Cholesterol Restricted      Fluids:     I & O's:       PHYSICAL EXAM:   General: NAD, AAOx3, calm and cooperative  Cardiac: RRR S1, S2  Respiratory: CTAB, normal respiratory effort  Abdomen: Soft, non-distended, non-tender, no rebound, no guarding. +BS.    MEDICATIONS  (STANDING):  chlorhexidine 2% Cloths 1 Application(s) Topical daily  guaifenesin/dextromethorphan Oral Liquid 15 milliLiter(s) Oral once  heparin  Infusion.  Unit(s)/Hr (11 mL/Hr) IV Continuous <Continuous>  melatonin 5 milliGRAM(s) Oral at bedtime  pantoprazole    Tablet 40 milliGRAM(s) Oral before breakfast  piperacillin/tazobactam IVPB.. 3.375 Gram(s) IV Intermittent every 8 hours    MEDICATIONS  (PRN):  heparin   Injectable 5000 Unit(s) IV Push every 6 hours PRN For aPTT less than 40  heparin   Injectable 2500 Unit(s) IV Push every 6 hours PRN For aPTT between 40 - 57      DVT PROPHYLAXIS: heparin   Injectable 5000 Unit(s) IV Push every 6 hours PRN  heparin   Injectable 2500 Unit(s) IV Push every 6 hours PRN  heparin  Infusion.  Unit(s)/Hr IV Continuous <Continuous>    GI PROPHYLAXIS: pantoprazole    Tablet 40 milliGRAM(s) Oral before breakfast    ANTICOAGULATION:   ANTIBIOTICS:  piperacillin/tazobactam IVPB.. 3.375 Gram(s)            LAB/STUDIES:  Labs:  CAPILLARY BLOOD GLUCOSE                              13.2   9.59  )-----------( 117      ( 22 Apr 2023 11:24 )             40.5       Auto Neutrophil %: 74.2 % (04-22-23 @ 07:07)  Auto Immature Granulocyte %: 0.4 % (04-22-23 @ 07:07)    04-22    128<L>  |  93<L>  |  x   ----------------------------<  115<H>  4.8   |  17  |  1.7<H>      Calcium, Total Serum: 8.6 mg/dL (04-22-23 @ 20:54)      LFTs:             5.3  | 3.0  | 2051     ------------------[128     ( 22 Apr 2023 20:54 )  2.9  | x    | 917         Lipase:x      Amylase:x         Blood Gas Arterial, Lactate: 3.50 mmol/L (04-22-23 @ 16:29)  Lactate, Blood: 4.8 mmol/L (04-22-23 @ 07:07)  Blood Gas Venous - Lactate: 4.60 mmol/L (04-21-23 @ 12:53)  Lactate, Blood: 5.8 mmol/L (04-21-23 @ 12:11)  Blood Gas Venous - Lactate: 6.10 mmol/L (04-21-23 @ 11:43)    ABG - ( 22 Apr 2023 16:29 )  pH: 7.45  /  pCO2: 23    /  pO2: 106   / HCO3: 16    / Base Excess: -6.0  /  SaO2: 99.4              Coags:     x      ----< x       ( 22 Apr 2023 15:30 )     105.0       CARDIAC MARKERS ( 21 Apr 2023 11:43 )  x     / 0.01 ng/mL / x     / x     / x            IMAGING:   < from: US Kidney and Bladder (04.22.23 @ 16:58) >  IMPRESSION:    No sonographic evidence of hydronephrosis.    --- End ofReport ---    < end of copied text >  < from: Xray Chest 1 View- PORTABLE-Urgent (Xray Chest 1 View- PORTABLE-Urgent .) (04.22.23 @ 16:34) >  Impression:    Right lower lobe opacity unchanged. No pleural effusion or air leak.        --- End of Report ---    < end of copied text >  < from: TTE Echo Complete w/ Contrast w/ Doppler (04.22.23 @ 08:36) >  Summary:   1. Left ventricular ejection fraction, by visual estimation, is <20%.   2. Severely decreased global left ventricular systolic function.   3. Severely enlarged left atrium.   4. Elevated mean left atrial pressure.   5. Large, fixed thrombus vs. mass on the apical wall of the left   ventricle.   6. Spectral Doppler shows pseudonormal pattern of left ventricular   myocardial filling (Grade II diastolic dysfunction).   7. Moderately reduced RV systolic function.   8. Mildly enlarged right atrium.   9. Moderate mitral valve regurgitation.  10. Moderate-severe tricuspid regurgitation.  11. Mild pulmonic valve regurgitation.  12. Estimated pulmonary artery systolic pressure is 42.9 mmHg assuming a   right atrial pressure of 15 mmHg, which is consistent withmild pulmonary   hypertension.  13. Endocardial visualization was enhanced with intravenous echo contrast.    < end of copied text >

## 2023-04-23 NOTE — PROGRESS NOTE ADULT - ASSESSMENT
Impression:    Cardiogenic shock on dopamine  SOB/ RLL opacity/ Pneumonia  Right hilar opacity. ro malignancy  left ventricle/ clot/mass  worsening transaminitis, probably shock liver  HFrEF, severely reduced EF      PLAN:    CNS: Avoid CNS depressant    HEENT: Oral care    PULMONARY: HOB @ 45 degrees. On RA, aspiration precaution, keep Sao2 92 to96%, ct sx following    CARDIOVASCULAR: POCUS shows distended, non-collapsing IVC, very poor EF. Wean dopamine, target MAP 65. FU Echo. ac, fup LA, diuresis as tolerated    GI: GI prophylaxis. Feeding. Bowel regimen. US liver with duplex. Hepatology eval. Trend LFTs    RENAL: Follow up lytes. Correct as needed    INFECTIOUS DISEASE: Continue zosyn. F/u cultures.    HEMATOLOGICAL: DVT prophylaxis. cw heparin gtt    ENDOCRINE: Follow up FS. Insulin protocol if needed.    MUSCULOSKELETAL: OOBTC    SDU. CCU evaluation. Impression:    Cardiogenic shock on dopamine  SOB/ RLL opacity/ possible Pneumonia  Right hilar opacity. ro malignancy  left ventricle/ clot/mass  worsening transaminitis, probably shock liver  HFrEF, severely reduced EF      PLAN:    CNS: Avoid CNS depressant    HEENT: Oral care    PULMONARY: HOB @ 45 degrees. On RA, aspiration precaution, keep Sao2 92 to96%, ct sx following    CARDIOVASCULAR: POCUS shows distended, non-collapsing IVC, very poor EF. Wean dopamine, target MAP 65. FU Echo. ac, fup LA, diuresis as tolerated    GI: GI prophylaxis. Feeding. Bowel regimen. US liver with duplex. Hepatology eval. Trend LFTs    RENAL: Follow up lytes. Correct as needed    INFECTIOUS DISEASE: Continue zosyn. F/u cultures.    HEMATOLOGICAL: DVT prophylaxis. cw heparin gtt    ENDOCRINE: Follow up FS. Insulin protocol if needed.    MUSCULOSKELETAL: OOBTC    SDU. CCU evaluation.

## 2023-04-23 NOTE — PROGRESS NOTE ADULT - SUBJECTIVE AND OBJECTIVE BOX
MIKHAIL JOHNSON  77y Female    CHIEF COMPLAINT:    Patient is a 77y old  Female who presents with a chief complaint of intra cardiac mass (23 Apr 2023 14:33)    INTERVAL HPI/OVERNIGHT EVENTS:    Patient seen and examined. No acute events overnight. Started on Dopamine drip yesterday, pending transfer to CCU    ROS: All other systems are negative.    Vital Signs:    T(F): 97.3 (04-23-23 @ 07:46), Max: 97.3 (04-23-23 @ 07:46)  HR: 75 (04-23-23 @ 12:46) (69 - 82)  BP: 91/63 (04-23-23 @ 12:46) (81/55 - 115/58)  RR: 18 (04-23-23 @ 12:46) (18 - 19)  SpO2: 96% (04-23-23 @ 12:46) (92% - 97%)  I&O's Summary    22 Apr 2023 07:01  -  23 Apr 2023 07:00  --------------------------------------------------------  IN: 165.7 mL / OUT: 0 mL / NET: 165.7 mL    23 Apr 2023 07:01  -  23 Apr 2023 15:13  --------------------------------------------------------  IN: 0 mL / OUT: 700 mL / NET: -700 mL    PHYSICAL EXAM:    GENERAL:  NAD  SKIN: No rashes or lesions  HEENT: Atraumatic. Normocephalic.    NECK: Supple, No JVD.    PULMONARY: decreased breath sounds B/L. No wheezing.    CVS: Normal S1, S2. Rate and Rhythm are regular.    ABDOMEN/GI: Soft, Nontender, Nondistended   MSK:  No clubbing or cyanosis   NEUROLOGIC: moves all extremities   PSYCH: Awake and confused     Consultant(s) Notes Reviewed:  [x ] YES  [ ] NO  Care Discussed with Consultants/Other Providers [ x] YES  [ ] NO    LABS:                        12.7   8.81  )-----------( 130      ( 23 Apr 2023 02:04 )             37.8     126<L>  |  93<L>  |  61<HH>  ----------------------------<  124<H>  4.7   |  17  |  1.7<H>    Ca    8.2<L>      23 Apr 2023 02:04  Mg     2.3     04-23    TPro  5.3<L>  /  Alb  2.9<L>  /  TBili  3.0<H>  /  DBili  x   /  AST  2051<H>  /  ALT  917<H>  /  AlkPhos  128<H>  04-22    PT/INR - ( 23 Apr 2023 02:04 )   PT: 30.50 sec;   INR: 2.60 ratio       PTT - ( 23 Apr 2023 08:14 )  PTT:188.6 sec    Trop 0.01, CKMB --, CK --, 04-21-23 @ 11:43    RADIOLOGY & ADDITIONAL TESTS:  Imaging or report Personally Reviewed:  [x] YES  [ ] NO  EKG reviewed: [x] YES  [ ] NO    Medications:  Standing  chlorhexidine 2% Cloths 1 Application(s) Topical daily  DOPamine Infusion 2.499 MICROgram(s)/kG/Min IV Continuous <Continuous>  guaifenesin/dextromethorphan Oral Liquid 15 milliLiter(s) Oral once  heparin  Infusion. 1000 Unit(s)/Hr IV Continuous <Continuous>  melatonin 5 milliGRAM(s) Oral at bedtime  milrinone Infusion 0.125 MICROgram(s)/kG/Min IV Continuous <Continuous>  pantoprazole    Tablet 40 milliGRAM(s) Oral before breakfast  piperacillin/tazobactam IVPB.. 3.375 Gram(s) IV Intermittent every 8 hours    PRN Meds  heparin   Injectable 5000 Unit(s) IV Push every 6 hours PRN  heparin   Injectable 2500 Unit(s) IV Push every 6 hours PRN

## 2023-04-24 LAB
ALBUMIN SERPL ELPH-MCNC: 2.6 G/DL — LOW (ref 3.5–5.2)
ALBUMIN SERPL ELPH-MCNC: 2.8 G/DL — LOW (ref 3.5–5.2)
ALP SERPL-CCNC: 114 U/L — SIGNIFICANT CHANGE UP (ref 30–115)
ALP SERPL-CCNC: 120 U/L — HIGH (ref 30–115)
ALT FLD-CCNC: 560 U/L — HIGH (ref 0–41)
ALT FLD-CCNC: 673 U/L — HIGH (ref 0–41)
ANION GAP SERPL CALC-SCNC: 12 MMOL/L — SIGNIFICANT CHANGE UP (ref 7–14)
ANION GAP SERPL CALC-SCNC: 16 MMOL/L — HIGH (ref 7–14)
APTT BLD: 70.6 SEC — CRITICAL HIGH (ref 27–39.2)
APTT BLD: 92.2 SEC — CRITICAL HIGH (ref 27–39.2)
AST SERPL-CCNC: 433 U/L — HIGH (ref 0–41)
AST SERPL-CCNC: 710 U/L — HIGH (ref 0–41)
BASE EXCESS BLDMV CALC-SCNC: 0.3 MMOL/L — SIGNIFICANT CHANGE UP
BASE EXCESS BLDMV CALC-SCNC: 0.3 MMOL/L — SIGNIFICANT CHANGE UP
BASE EXCESS BLDMV CALC-SCNC: 2.2 MMOL/L — SIGNIFICANT CHANGE UP
BASE EXCESS BLDV CALC-SCNC: -1.7 MMOL/L — SIGNIFICANT CHANGE UP (ref -2–3)
BILIRUB SERPL-MCNC: 2.4 MG/DL — HIGH (ref 0.2–1.2)
BILIRUB SERPL-MCNC: 2.4 MG/DL — HIGH (ref 0.2–1.2)
BLD GP AB SCN SERPL QL: SIGNIFICANT CHANGE UP
BUN SERPL-MCNC: 49 MG/DL — HIGH (ref 10–20)
BUN SERPL-MCNC: 57 MG/DL — HIGH (ref 10–20)
CA-I SERPL-SCNC: 0.94 MMOL/L — LOW (ref 1.15–1.33)
CALCIUM SERPL-MCNC: 8.1 MG/DL — LOW (ref 8.4–10.5)
CALCIUM SERPL-MCNC: 8.4 MG/DL — SIGNIFICANT CHANGE UP (ref 8.4–10.5)
CHLORIDE SERPL-SCNC: 95 MMOL/L — LOW (ref 98–110)
CHLORIDE SERPL-SCNC: 96 MMOL/L — LOW (ref 98–110)
CO2 BLDV-SCNC: 22.2 MMOL/L — SIGNIFICANT CHANGE UP (ref 22–26)
CO2 SERPL-SCNC: 20 MMOL/L — SIGNIFICANT CHANGE UP (ref 17–32)
CO2 SERPL-SCNC: 24 MMOL/L — SIGNIFICANT CHANGE UP (ref 17–32)
CREAT SERPL-MCNC: 1.3 MG/DL — SIGNIFICANT CHANGE UP (ref 0.7–1.5)
CREAT SERPL-MCNC: 1.3 MG/DL — SIGNIFICANT CHANGE UP (ref 0.7–1.5)
EGFR: 42 ML/MIN/1.73M2 — LOW
EGFR: 42 ML/MIN/1.73M2 — LOW
GAS PNL BLDA: SIGNIFICANT CHANGE UP
GAS PNL BLDMV: SIGNIFICANT CHANGE UP
GAS PNL BLDV: 128 MMOL/L — LOW (ref 136–145)
GAS PNL BLDV: SIGNIFICANT CHANGE UP
GAS PNL BLDV: SIGNIFICANT CHANGE UP
GLUCOSE SERPL-MCNC: 104 MG/DL — HIGH (ref 70–99)
GLUCOSE SERPL-MCNC: 162 MG/DL — HIGH (ref 70–99)
HCO3 BLDMV-SCNC: 25 MMOL/L — SIGNIFICANT CHANGE UP
HCO3 BLDV-SCNC: 21 MMOL/L — LOW (ref 22–29)
HCT VFR BLD CALC: 38.2 % — SIGNIFICANT CHANGE UP (ref 37–47)
HCT VFR BLD CALC: 38.6 % — SIGNIFICANT CHANGE UP (ref 37–47)
HCT VFR BLDA CALC: 35 % — LOW (ref 39–51)
HGB BLD CALC-MCNC: 11.7 G/DL — LOW (ref 12.6–17.4)
HGB BLD-MCNC: 12.7 G/DL — SIGNIFICANT CHANGE UP (ref 12–16)
HGB BLD-MCNC: 13 G/DL — SIGNIFICANT CHANGE UP (ref 12–16)
HOROWITZ INDEX BLDMV+IHG-RTO: 28 — SIGNIFICANT CHANGE UP
LACTATE BLDV-MCNC: 1.5 MMOL/L — SIGNIFICANT CHANGE UP (ref 0.5–2)
MAGNESIUM SERPL-MCNC: 1.9 MG/DL — SIGNIFICANT CHANGE UP (ref 1.8–2.4)
MAGNESIUM SERPL-MCNC: 2.1 MG/DL — SIGNIFICANT CHANGE UP (ref 1.8–2.4)
MCHC RBC-ENTMCNC: 30.2 PG — SIGNIFICANT CHANGE UP (ref 27–31)
MCHC RBC-ENTMCNC: 30.6 PG — SIGNIFICANT CHANGE UP (ref 27–31)
MCHC RBC-ENTMCNC: 32.9 G/DL — SIGNIFICANT CHANGE UP (ref 32–37)
MCHC RBC-ENTMCNC: 34 G/DL — SIGNIFICANT CHANGE UP (ref 32–37)
MCV RBC AUTO: 89.9 FL — SIGNIFICANT CHANGE UP (ref 81–99)
MCV RBC AUTO: 91.7 FL — SIGNIFICANT CHANGE UP (ref 81–99)
NRBC # BLD: 0 /100 WBCS — SIGNIFICANT CHANGE UP (ref 0–0)
NRBC # BLD: 0 /100 WBCS — SIGNIFICANT CHANGE UP (ref 0–0)
O2 CT VFR BLD CALC: 35 MMHG — SIGNIFICANT CHANGE UP
O2 CT VFR BLD CALC: 36 MMHG — SIGNIFICANT CHANGE UP
O2 CT VFR BLD CALC: 41 MMHG — SIGNIFICANT CHANGE UP
PCO2 BLDMV: 33 MMHG — SIGNIFICANT CHANGE UP
PCO2 BLDMV: 38 MMHG — SIGNIFICANT CHANGE UP
PCO2 BLDMV: 38 MMHG — SIGNIFICANT CHANGE UP
PCO2 BLDV: 30 MMHG — LOW (ref 39–42)
PH BLDMV: 7.42 — SIGNIFICANT CHANGE UP
PH BLDMV: 7.42 — SIGNIFICANT CHANGE UP
PH BLDMV: 7.49 — SIGNIFICANT CHANGE UP
PH BLDV: 7.46 — HIGH (ref 7.32–7.43)
PLATELET # BLD AUTO: 156 K/UL — SIGNIFICANT CHANGE UP (ref 130–400)
PLATELET # BLD AUTO: 164 K/UL — SIGNIFICANT CHANGE UP (ref 130–400)
PMV BLD: 12.8 FL — HIGH (ref 7.4–10.4)
PMV BLD: 12.8 FL — HIGH (ref 7.4–10.4)
PO2 BLDV: 53 MMHG — SIGNIFICANT CHANGE UP
POTASSIUM BLDV-SCNC: 2.9 MMOL/L — CRITICAL LOW (ref 3.5–5.1)
POTASSIUM SERPL-MCNC: 3.7 MMOL/L — SIGNIFICANT CHANGE UP (ref 3.5–5)
POTASSIUM SERPL-MCNC: 3.8 MMOL/L — SIGNIFICANT CHANGE UP (ref 3.5–5)
POTASSIUM SERPL-SCNC: 3.7 MMOL/L — SIGNIFICANT CHANGE UP (ref 3.5–5)
POTASSIUM SERPL-SCNC: 3.8 MMOL/L — SIGNIFICANT CHANGE UP (ref 3.5–5)
PROT SERPL-MCNC: 5.3 G/DL — LOW (ref 6–8)
PROT SERPL-MCNC: 5.4 G/DL — LOW (ref 6–8)
RBC # BLD: 4.21 M/UL — SIGNIFICANT CHANGE UP (ref 4.2–5.4)
RBC # BLD: 4.25 M/UL — SIGNIFICANT CHANGE UP (ref 4.2–5.4)
RBC # FLD: 13.4 % — SIGNIFICANT CHANGE UP (ref 11.5–14.5)
RBC # FLD: 13.5 % — SIGNIFICANT CHANGE UP (ref 11.5–14.5)
SAO2 % BLDMV: 56.6 % — SIGNIFICANT CHANGE UP
SAO2 % BLDMV: 64.6 % — SIGNIFICANT CHANGE UP
SAO2 % BLDMV: 65.1 % — SIGNIFICANT CHANGE UP
SAO2 % BLDV: 86.2 % — SIGNIFICANT CHANGE UP
SODIUM SERPL-SCNC: 131 MMOL/L — LOW (ref 135–146)
SODIUM SERPL-SCNC: 132 MMOL/L — LOW (ref 135–146)
WBC # BLD: 10.41 K/UL — SIGNIFICANT CHANGE UP (ref 4.8–10.8)
WBC # BLD: 10.63 K/UL — SIGNIFICANT CHANGE UP (ref 4.8–10.8)
WBC # FLD AUTO: 10.41 K/UL — SIGNIFICANT CHANGE UP (ref 4.8–10.8)
WBC # FLD AUTO: 10.63 K/UL — SIGNIFICANT CHANGE UP (ref 4.8–10.8)

## 2023-04-24 PROCEDURE — 71045 X-RAY EXAM CHEST 1 VIEW: CPT | Mod: 26,77,76

## 2023-04-24 PROCEDURE — 71045 X-RAY EXAM CHEST 1 VIEW: CPT | Mod: 26

## 2023-04-24 PROCEDURE — 93010 ELECTROCARDIOGRAM REPORT: CPT

## 2023-04-24 PROCEDURE — 99232 SBSQ HOSP IP/OBS MODERATE 35: CPT

## 2023-04-24 PROCEDURE — 99291 CRITICAL CARE FIRST HOUR: CPT

## 2023-04-24 PROCEDURE — 99233 SBSQ HOSP IP/OBS HIGH 50: CPT

## 2023-04-24 RX ORDER — POTASSIUM CHLORIDE 20 MEQ
20 PACKET (EA) ORAL
Refills: 0 | Status: COMPLETED | OUTPATIENT
Start: 2023-04-24 | End: 2023-04-24

## 2023-04-24 RX ORDER — DOBUTAMINE HCL 250MG/20ML
5 VIAL (ML) INTRAVENOUS
Qty: 1000 | Refills: 0 | Status: DISCONTINUED | OUTPATIENT
Start: 2023-04-24 | End: 2023-04-25

## 2023-04-24 RX ADMIN — PANTOPRAZOLE SODIUM 40 MILLIGRAM(S): 20 TABLET, DELAYED RELEASE ORAL at 06:50

## 2023-04-24 RX ADMIN — PIPERACILLIN AND TAZOBACTAM 25 GRAM(S): 4; .5 INJECTION, POWDER, LYOPHILIZED, FOR SOLUTION INTRAVENOUS at 21:07

## 2023-04-24 RX ADMIN — HEPARIN SODIUM 800 UNIT(S)/HR: 5000 INJECTION INTRAVENOUS; SUBCUTANEOUS at 11:53

## 2023-04-24 RX ADMIN — CHLORHEXIDINE GLUCONATE 1 APPLICATION(S): 213 SOLUTION TOPICAL at 11:38

## 2023-04-24 RX ADMIN — Medication 5 MILLIGRAM(S): at 21:06

## 2023-04-24 RX ADMIN — Medication 50 MILLIEQUIVALENT(S): at 04:00

## 2023-04-24 RX ADMIN — HEPARIN SODIUM 800 UNIT(S)/HR: 5000 INJECTION INTRAVENOUS; SUBCUTANEOUS at 03:58

## 2023-04-24 RX ADMIN — PIPERACILLIN AND TAZOBACTAM 25 GRAM(S): 4; .5 INJECTION, POWDER, LYOPHILIZED, FOR SOLUTION INTRAVENOUS at 05:30

## 2023-04-24 RX ADMIN — Medication 50 MILLIEQUIVALENT(S): at 01:04

## 2023-04-24 RX ADMIN — Medication 1.91 MICROGRAM(S)/KG/MIN: at 16:37

## 2023-04-24 RX ADMIN — MILRINONE LACTATE 2.38 MICROGRAM(S)/KG/MIN: 1 INJECTION, SOLUTION INTRAVENOUS at 01:04

## 2023-04-24 RX ADMIN — PIPERACILLIN AND TAZOBACTAM 25 GRAM(S): 4; .5 INJECTION, POWDER, LYOPHILIZED, FOR SOLUTION INTRAVENOUS at 15:43

## 2023-04-24 RX ADMIN — Medication 5.95 MICROGRAM(S)/KG/MIN: at 01:04

## 2023-04-24 NOTE — PROGRESS NOTE ADULT - SUBJECTIVE AND OBJECTIVE BOX
THORACIC SURGERY PROGRESS NOTE    Admission: Nonspecific abnormal findings on radiological and other examination of lung field    24 Hour Events:  Upgrade to CCU for hypotension, cardiac workup    Vitals:  T(F): 98 (04-24-23 @ 04:00), Max: 98 (04-24-23 @ 04:00)  HR: 81 (04-24-23 @ 07:00)  BP: 100/57 (04-23-23 @ 20:00)  RR: 27 (04-24-23 @ 07:00)  SpO2: 100% (04-24-23 @ 07:00)    Diet, DASH/TLC:   Sodium & Cholesterol Restricted    Fluids:   I & O's:    04-23-23 @ 07:01  -  04-24-23 @ 07:00  --------------------------------------------------------  IN:    DOPamine Infusion: 11.8 mL    Heparin Infusion: 16 mL    Heparin Infusion: 88 mL    IV PiggyBack: 200 mL    Milrinone: 33.3 mL    Norepinephrine: 83 mL    Oral Fluid: 150 mL  Total IN: 582.1 mL    OUT:    Ureteral Catheter (mL): 1435 mL    Voided (mL): 700 mL  Total OUT: 2135 mL    Total NET: -1552.9 mL    PHYSICAL EXAM:  General: NAD, AAOx3, calm and cooperative  HEENT: NCAT, WANDA, EOMI, Trachea ML, Neck supple  Cardiac: RRR S1, S2, no Murmurs, rubs or gallops  Respiratory: CTAB, normal respiratory effort, breath sounds equal BL, no wheeze, rhonchi or crackles  Abdomen: Soft, non-distended, non-tender  Ext: warm and well-perfused    MEDICATIONS  (STANDING):  chlorhexidine 2% Cloths 1 Application(s) Topical daily  guaifenesin/dextromethorphan Oral Liquid 15 milliLiter(s) Oral once  heparin  Infusion. 800 Unit(s)/Hr (8 mL/Hr) IV Continuous <Continuous>  melatonin 5 milliGRAM(s) Oral at bedtime  milrinone Infusion 0.125 MICROgram(s)/kG/Min (2.38 mL/Hr) IV Continuous <Continuous>  norepinephrine Infusion 0.05 MICROgram(s)/kG/Min (5.95 mL/Hr) IV Continuous <Continuous>  pantoprazole    Tablet 40 milliGRAM(s) Oral before breakfast  piperacillin/tazobactam IVPB.. 3.375 Gram(s) IV Intermittent every 8 hours    MEDICATIONS  (PRN):  heparin   Injectable 5000 Unit(s) IV Push every 6 hours PRN For aPTT less than 40  heparin   Injectable 2500 Unit(s) IV Push every 6 hours PRN For aPTT between 40 - 57    DVT PROPHYLAXIS: heparin   Injectable 5000 Unit(s) IV Push every 6 hours PRN  heparin   Injectable 2500 Unit(s) IV Push every 6 hours PRN  heparin  Infusion. 800 Unit(s)/Hr IV Continuous <Continuous>    GI PROPHYLAXIS: pantoprazole    Tablet 40 milliGRAM(s) Oral before breakfast    ANTICOAGULATION:   ANTIBIOTICS:  piperacillin/tazobactam IVPB.. 3.375 Gram(s)        LAB/STUDIES:  Labs:  CAPILLARY BLOOD GLUCOSE                              13.0   10.41 )-----------( 156      ( 24 Apr 2023 02:29 )             38.2       Auto Neutrophil %: 79.8 % (04-23-23 @ 23:00)  Auto Immature Granulocyte %: 0.4 % (04-23-23 @ 23:00)    04-23    132<L>  |  96<L>  |  57<H>  ----------------------------<  104<H>  3.7   |  20  |  1.3      Calcium, Total Serum: 8.4 mg/dL (04-23-23 @ 23:00)      LFTs:             5.4  | 2.4  | 710      ------------------[120     ( 23 Apr 2023 23:00 )  2.8  | x    | 673         Lipase:x      Amylase:x         Blood Gas Arterial, Lactate: 1.70 mmol/L (04-24-23 @ 06:14)  Blood Gas Arterial, Lactate: 1.70 mmol/L (04-24-23 @ 02:14)  Blood Gas Arterial, Lactate: 2.00 mmol/L (04-23-23 @ 22:50)  Lactate, Blood: 2.1 mmol/L (04-23-23 @ 18:32)  Blood Gas Arterial, Lactate: 1.90 mmol/L (04-23-23 @ 18:04)  Blood Gas Venous - Lactate: 2.00 mmol/L (04-23-23 @ 18:04)  Lactate, Blood: 3.1 mmol/L (04-23-23 @ 02:04)  Lactate, Blood: 3.2 mmol/L (04-23-23 @ 02:04)  Blood Gas Arterial, Lactate: 3.50 mmol/L (04-22-23 @ 16:29)  Lactate, Blood: 4.8 mmol/L (04-22-23 @ 07:07)  Blood Gas Venous - Lactate: 4.60 mmol/L (04-21-23 @ 12:53)  Lactate, Blood: 5.8 mmol/L (04-21-23 @ 12:11)  Blood Gas Venous - Lactate: 6.10 mmol/L (04-21-23 @ 11:43)    ABG - ( 24 Apr 2023 06:14 )  pH: 7.48  /  pCO2: 28    /  pO2: 146   / HCO3: 21    / Base Excess: -1.4  /  SaO2: 99.6      ABG - ( 24 Apr 2023 02:14 )  pH: 7.49  /  pCO2: 27    /  pO2: 123   / HCO3: 21    / Base Excess: -1.4  /  SaO2: 99.3      ABG - ( 23 Apr 2023 22:50 )  pH: 7.49  /  pCO2: 26    /  pO2: 165   / HCO3: 20    / Base Excess: -2.1  /  SaO2: 99.7        Coags:     x      ----< x       ( 24 Apr 2023 02:29 )     70.6

## 2023-04-24 NOTE — PROGRESS NOTE ADULT - ASSESSMENT
77 year old woman with PMHx of HTN, HLD, nonischemic cardiomyopathy, HFrEF s/p AICD presents to the ED with 2 weeks of worsening shortness of breath currently admitted for perihilar sift tissue mass in mediastinum. hepatology was consulted for elevated LFT. She denies any previous infections of the liver oe chronic liver disease in family, denies any herbal medications or supplements or abx or travel out side.   Has been taking tylenol intermittently as needed but denies excess intake.     #)Elevated liver enzymes predominantly hepatocellular (AST>ALT) with hyperbilurubinemia r/o ischemia vs congestive hepatopathy vs DILI vs other CLD   -Normal LFt in 9/2022  -Admitted with LFT 2.3/135/151/114  -No hypotensive episodes documented in the system   -Denies any recent abx or herbal meds or OTC medications  -h/o tylenol intake as needed  -CT abdomen showed Hepatic steatosis is noted. A 1 cm hypodensity is noted within the right lobe of the liver, too small for further characterization.  -echo 8/2021 low EF 20 to 25%, mild TR, mod AS   -inc INR on heparin drip  -No encephalopathy (no asterixis)  -LFTs are trending down  - On levophed for cardiogenic shock  - duplex liver vasculature unremarkable    Recs:   -check HAV IgM/IgG, HBsAg, HBsAb, HBcAb IgM/IgG, HCV Ab, Anti HEV, Serum Ferritin, Transferrin Saturation, Ceruloplasmin level, ALE, SMA, immunoglobulins panel, AMA, Anti LK microsomal Ab, anti-soluble liver Ag, EBV, HSV, CMV, ACE levels   - Check CMV, EBV and HSV PCR  -Trend LFT, INR  -Avoid hepatotoxic medications   - Pulmonary and cardiothoracic surgery follow up for possible bronchoscopy with EBUS and FNA,  - rest of care by cardiology team

## 2023-04-24 NOTE — PROGRESS NOTE ADULT - ASSESSMENT
77 year old woman with PMHx of HTN, HLD, nonischemic cardiomyopathy, HFrEF s/p AICD presents to the ED with 2 weeks of worsening shortness of breath. Patient and her son state patient has been experiencing shortness of breath that has progressed from present on activity to now shortness of breath during conversation. Patient also notes an increasing dry cough over this time period. Denies history of smoking, fevers/chills, chest pain, nausea/vomiting, bloody sputum, dark/tarry/bloody bowel movements, upper respiratory symptoms.     In ED, patient's HR is 110 and saturating 98% on RA  CT C/A/P revealing cardiomegaly with evidence of right heart strain, R hilar lymphadenopathy with R perihilar soft tissue density with indentation/possible invasion of right pulmonary artery, and narrowing of proximal R lobar and segmental branches with associated consolidation of right lower lobe. 2.2 x 2.8 cm filling defect noted in apex of left ventricle.   Last ECHO 8/2021 revealing EF 20-25%, mild Mr, mild TR, mild Pulm HTN  Dimer 2600, trops negative BNP 42530  Duplex venous LE negative for DVT; CTA chest negative for PE  Cr 1.4 (0.7 in 2021), Na+ 128, T.Benjamin 2.3, LFTs elevated (hemolyzed)  Lactate 5.8-->4.6  Admitted to SDU    CEU Course:   - Cardiology [facility] consulted, TTE confirmed EF <20% w/ LV thrombus - started on heparin IV  - Pulmonary team consulted for eval of EBUS - pending clinical improvement  - Overnight patient developed worsening DAVID and suspected ischemic hepatitis in the setting of reduced BP ~70s/50s  - Patient started on Dopamine ggt with improvement of BP   - 4/23 AM - patient asymptomatic - bedside echo revealed non-compressible IVC and reduced EF  - Spoke w/ Dr. Hensley and Dr. Yeager - accepted to CCU   - plan follows below         Suspected Cardiogenic Shock   #LV thrombus  #NICM  #HFrEF 25% s/p AICD  #HTN/HLD  - SOB x2 weeks  - CT CAP - cardiomegaly w/ RH strain  - TTE EF <20%, large fixed thrombus vs mass on apical LV wall, GIIDD, mod-sev TR, mod MR, mild TN, mild PH  - LE venous duplex -ve  - overnight BP 80s/60s -> given 1x dose lasix, started on dopamine ggt  - CCU consulted - now upgraded  - R IJ placed, started on milrinone ggt .125mcg/kg  - Current BP 110s/80s  - c/w heparin ggt  - holding lasix, spironolactone, entresto, coreg  - CTS and Cardio following ([facility])    #Post obstructive pneumonia  #Suspected lung mass   - CT CAP - R hilar LAD, R perihilar soft tissue density w/ indentation/possible invasion of R pulmonary artery and narrowing of proximal R lobar and segmental branches w/ consolidation of RLL  - MRSA -ve, procal 1.77  - c/w zosyn   - pulm following for EBUS - needs medical optimization     #HAGMA  #Lactic Acidosis  #DAVID on CKD   #Ischemic hepatitis   - AG 20, lactate 5.8-->4.6 -> 3.1   - Cr 1.4-> 1.7, BUN 42-> 61 - bsl Cr 0.7 2021  - AST/ALT 2051/917, LDH 1401 -> previous AST//114  - INR 2.6  - s/p IVF bolus - bedside echo non-collapsable IVC - hold on IVF for now   - repeat lactate and bmp   - pending RUQ US, CK, tylenol level   - pending HAV IgM/IgG, HBsAg, HBsAb, HBcAb IgM/IgG, HCV Ab, Anti HEV, Serum Ferritin, Transferrin Saturation, Ceruloplasmin level, ALE, SMA, immunoglobulins panel, AMA, Anti LK microsomal Ab, anti-soluble liver Ag, EBV, HSV, CMV, ACE levels   - GI following   - holding lipitor    #Hypotonic Hyponatremia   - Na 129, holding IVF  - trend Na     # To follow up  - will confirm central line placement  - repeat lactate, hepatitis workup, CMP   - monitor BP  - RUQ US     # Misc  - DVT Prophylaxis: heparin  Infusion.  - GI Prophylaxis: pantoprazole    Tablet 40 milliGRAM(s) Oral before breakfast  - Diet: Diet, DASH/TLC  - Activity: Activity - Ambulate as Tolerated  - Code Status: Full                77 year old woman with PMHx of HTN, HLD, nonischemic cardiomyopathy, HFrEF s/p AICD presents to the ED with 2 weeks of worsening shortness of breath. Patient and her son state patient has been experiencing shortness of breath that has progressed from present on activity to now shortness of breath during conversation. Patient also notes an increasing dry cough over this time period. Denies history of smoking, fevers/chills, chest pain, nausea/vomiting, bloody sputum, dark/tarry/bloody bowel movements, upper respiratory symptoms.     In ED, patient's HR is 110 and saturating 98% on RA  CT C/A/P revealing cardiomegaly with evidence of right heart strain, R hilar lymphadenopathy with R perihilar soft tissue density with indentation/possible invasion of right pulmonary artery, and narrowing of proximal R lobar and segmental branches with associated consolidation of right lower lobe. 2.2 x 2.8 cm filling defect noted in apex of left ventricle.   Last ECHO 8/2021 revealing EF 20-25%, mild Mr, mild TR, mild Pulm HTN  Dimer 2600, trops negative BNP 66765  Duplex venous LE negative for DVT; CTA chest negative for PE  Cr 1.4 (0.7 in 2021), Na+ 128, T.Benjamin 2.3, LFTs elevated (hemolyzed)  Lactate 5.8-->4.6  Admitted to SDU    CEU Course:   - Cardiology [facility] consulted, TTE confirmed EF <20% w/ LV thrombus - started on heparin IV  - Pulmonary team consulted for eval of EBUS - pending clinical improvement  - Overnight patient developed worsening DAVID and suspected ischemic hepatitis in the setting of reduced BP ~70s/50s  - Patient started on Dopamine ggt with improvement of BP   - 4/23 AM - patient asymptomatic - bedside echo revealed non-compressible IVC and reduced EF  - Spoke w/ Dr. Hensley and Dr. Yeager - accepted to CCU   - plan follows below         Suspected Cardiogenic Shock   #LV thrombus  #NICM  #HFrEF 25% s/p AICD  #HTN/HLD  - SOB x2 weeks  - CT CAP - cardiomegaly w/ RH strain  - TTE EF <20%, large fixed thrombus vs mass on apical LV wall, GIIDD, mod-sev TR, mod MR, mild MS, mild PH  - LE venous duplex -ve  - overnight BP 80s/60s -> given 1x dose lasix, started on dopamine ggt  - CCU consulted - now upgraded  - R IJ placed, started on milrinone ggt .125mcg/kg  - Current BP 110s/80s  - c/w heparin ggt  - holding lasix, spironolactone, entresto, coreg  - CTS and Cardio following ([place])  - RHC 04/24: Decreased cardiac output with increased SVR + PVR + PCWP consistent with cardiogenic shock.    Combined pre and post capillary PH..     #Post obstructive pneumonia  #Suspected lung mass   - CT CAP - R hilar LAD, R perihilar soft tissue density w/ indentation/possible invasion of R pulmonary artery and narrowing of proximal R lobar and segmental branches w/ consolidation of RLL  - MRSA -ve, procal 1.77  - c/w zosyn   - pulm following for EBUS - needs medical optimization     #HAGMA  #Lactic Acidosis  #DAVID on CKD   #Ischemic hepatitis   - AG 20, lactate 5.8-->4.6 -> 3.1   - Cr 1.4-> 1.7, BUN 42-> 61 - bsl Cr 0.7 2021  - AST/ALT 2051/917, LDH 1401 -> previous AST//114  - INR 2.6  - s/p IVF bolus - bedside echo non-collapsable IVC - hold on IVF for now   - repeat lactate and bmp   - pending RUQ US, CK, tylenol level   - pending HAV IgM/IgG, HBsAg, HBsAb, HBcAb IgM/IgG, HCV Ab, Anti HEV, Serum Ferritin, Transferrin Saturation, Ceruloplasmin level, ALE, SMA, immunoglobulins panel, AMA, Anti LK microsomal Ab, anti-soluble liver Ag, EBV, HSV, CMV, ACE levels   - GI following   - holding lipitor    #Hypotonic Hyponatremia   - Na 129, holding IVF  - trend Na     # To follow up  - will confirm central line placement  - repeat lactate, hepatitis workup, CMP   - monitor BP  - RUQ US     # Misc  - DVT Prophylaxis: heparin  Infusion.  - GI Prophylaxis: pantoprazole    Tablet 40 milliGRAM(s) Oral before breakfast  - Diet: Diet, DASH/TLC  - Activity: Activity - Ambulate as Tolerated  - Code Status: Full

## 2023-04-24 NOTE — PROGRESS NOTE ADULT - SUBJECTIVE AND OBJECTIVE BOX
Gastroenterology progress note:     Patient is a 77y old  Female who presents with a chief complaint of intra cardiac mass (24 Apr 2023 08:33)       Admitted on: 04-21-23    We are following the patient for: abnormal liver test     Interval History:  patient on levophed for cardiogenic shock, swan catheter is placed, no encephalopathy had 2 BM [id number]      PAST MEDICAL & SURGICAL HISTORY:  CHF, stage C      HTN (hypertension)          MEDICATIONS  (STANDING):  chlorhexidine 2% Cloths 1 Application(s) Topical daily  guaifenesin/dextromethorphan Oral Liquid 15 milliLiter(s) Oral once  heparin  Infusion. 800 Unit(s)/Hr (8 mL/Hr) IV Continuous <Continuous>  melatonin 5 milliGRAM(s) Oral at bedtime  milrinone Infusion 0.125 MICROgram(s)/kG/Min (2.38 mL/Hr) IV Continuous <Continuous>  norepinephrine Infusion 0.05 MICROgram(s)/kG/Min (5.95 mL/Hr) IV Continuous <Continuous>  pantoprazole    Tablet 40 milliGRAM(s) Oral before breakfast  piperacillin/tazobactam IVPB.. 3.375 Gram(s) IV Intermittent every 8 hours    MEDICATIONS  (PRN):  heparin   Injectable 2500 Unit(s) IV Push every 6 hours PRN For aPTT between 40 - 57  heparin   Injectable 5000 Unit(s) IV Push every 6 hours PRN For aPTT less than 40      Allergies  epinephrine (Unknown)      Review of Systems:   Constitutional: Ne Fever, No chills, No weakness  ENT: No visual changes, No throat pain  Cardiovascular:  No Chest Pain, No Palpitations  Respiratory:  No Cough, No Dyspnea  Gastrointestinal:  As described in HPI  Neurological: No numbness or weakness  Skin: No rash, no itching    Physical Examination:  T(C): 36.1 (04-24-23 @ 12:00), Max: 36.7 (04-24-23 @ 04:00)  HR: 91 (04-24-23 @ 12:00) (78 - 91)  BP: 91/56 (04-24-23 @ 12:00) (82/58 - 132/67)  RR: 30 (04-24-23 @ 12:00) (18 - 36)  SpO2: 96% (04-24-23 @ 12:00) (90% - 100%)      04-23-23 @ 07:01  -  04-24-23 @ 07:00  --------------------------------------------------------  IN: 599.1 mL / OUT: 2135 mL / NET: -1535.9 mL    04-24-23 @ 07:01  -  04-24-23 @ 13:13  --------------------------------------------------------  IN: 227.5 mL / OUT: 175 mL / NET: 52.5 mL      Constitutional: No acute distress.  Respiratory:  No signs of respiratory distress, decreased breath sounds bilaterally  Cardiovascular:  S1 S2, Regular rate and rhythm.  Abdominal: Abdomen is soft, symmetric, and non-tender without distention. There are no visible lesions.   Skin: No Jaundice.  Neurology: AAOX2-3, Non-focal  Vascular: No varicose vein, No cyanosis     Data: (reviewed by attending)                        12.7   10.63 )-----------( 164      ( 24 Apr 2023 11:10 )             38.6     Hgb trend:  12.7  04-24-23 @ 11:10  13.0  04-24-23 @ 02:29  13.1  04-23-23 @ 23:00  13.0  04-23-23 @ 18:32  12.7  04-23-23 @ 02:04  13.2  04-22-23 @ 11:24  13.8  04-22-23 @ 07:07        04-24    131<L>  |  95<L>  |  49<H>  ----------------------------<  162<H>  3.8   |  24  |  1.3    Ca    8.1<L>      24 Apr 2023 11:10  Mg     1.9     04-24    TPro  5.3<L>  /  Alb  2.6<L>  /  TBili  2.4<H>  /  DBili  x   /  AST  433<H>  /  ALT  560<H>  /  AlkPhos  114  04-24    Liver panel trend:  TBili 2.4   /      /      /   AlkP 114   /   Tptn 5.3   /   Alb 2.6    /   DBili --      04-24  TBili 2.4   /      /      /   AlkP 120   /   Tptn 5.4   /   Alb 2.8    /   DBili --      04-23  TBili 2.3   /      /      /   AlkP 122   /   Tptn 5.3   /   Alb 2.6    /   DBili --      04-23  TBili 3.0   /   AST 2051   /      /   AlkP 128   /   Tptn 5.3   /   Alb 2.9    /   DBili --      04-22  TBili 3.1   /   AST 2216   /      /   AlkP 137   /   Tptn 5.7   /   Alb 3.0    /   DBili --      04-22  TBili 3.0   /   AST 1660   /      /   AlkP 132   /   Tptn 6.0   /   Alb 3.3    /   DBili 1.3      04-22  TBili 2.3   /      /      /   AlkP 135   /   Tptn 6.8   /   Alb 3.4    /   DBili --      04-21      PT/INR - ( 23 Apr 2023 23:00 )   PT: 23.50 sec;   INR: 2.02 ratio         PTT - ( 24 Apr 2023 11:10 )  PTT:92.2 sec       Radiology: (reviewed by attending)    US Abdomen Doppler:   ACC: [id number] EXAM:  US DPLX ABDOMEN   ORDERED BY: BING GUEVARA     PROCEDURE DATE:  04/23/2023          INTERPRETATION:  CLINICAL HISTORY: Rule out thrombosis, elevated LFTs.    TECHNIQUE: Color and spectral Doppler ultrasound of the portal venous   system, hepatic arteries, hepatic veins and IVC.    COMPARISON: Correlation with CT abdomen and pelvis 4/21/2023.    FINDINGS:    Patent inferior vena cava.  Patent splenic vein.  Patent left, right, and main portal veins.  Patent left, middle, and right hepatic veins.  Patent hepatic artery.    IMPRESSION:    No evidence of  venous thrombosis.    --- End of Report ---          CONRAD RAHMAN MD; Resident Radiologist  This document has been electronically signed.  OLAYINKA PEREZ MD; Attending Radiologist  This document has been electronically signed. Apr 24 2023  8:50AM (04-23-23 @ 13:14)

## 2023-04-24 NOTE — PROGRESS NOTE ADULT - ASSESSMENT
ASSESSMENT:  77 year old woman with pmh of HTN, HLD, nonischemic cardiomyopathy, HFrEF, s/p ICD and PPM presents to the ED with 2 weeks of worsening shortness of breath. Patient and her son state patient has been experiencing shortness of breath that has progressed from present on activity to now shortness of breath during conversation. Patient also notes an increasing dry cough over this time period. Denies history of smoking, fevers/chills, chest pain, nausea/vomiting, bloody sputum, dark/tarry/bloody bowel movements, upper respiratory symptoms. Upon arrival, patient is HD stable and saturating 98% on RA, talking in complete sentences, no increased work of breathing.  CT C/A/P revealing cardiomegaly with evidence of right heart strain, R hilar lymphadenopathy with R perihilar soft tissue density with indentation/possible invasion of right pulmonary artery, and narrowing of proximal R lobar and segmental branches with associated consolidation of right lower lobe. 2.2 x 2.8 cm filling defect noted in apex of left ventricle. Patient follows with Dr. Frank, last ECHO 8/2021 revealing EF 20-25%, mild Mr, mild TR, mild Pulm HTN.    PLAN:   - Continue IV abx   - Monitor hemodynamics, wean pressors as tolerated   - Will likely need bronchoscopy with EBUS and FNA, f/u Pulmonology   - LV thrombus vs mass noted, continue hepatin gtt    [facility]  SPECTRA [id number]

## 2023-04-24 NOTE — CHART NOTE - NSCHARTNOTEFT_GEN_A_CORE
PREOPERATIVE DAY OF PROCEDURE EVALUATION:  I have personally seen and examined the patient.  I agree with the history and physical which I have reviewed and noted any changes below.  (Signed electronically by __________)  04-24-23 @ 12:37    77 year old woman with pmh of HTN, HLD, nonischemic cardiomyopathy, HFrEF, s/p ICD and PPM presents to the ED with 2 weeks of worsening shortness of breath. Patient and her son state patient has been experiencing shortness of breath that has progressed from present on activity to now shortness of breath during conversation. Patient also notes an increasing dry cough over this time period. Denies history of smoking, fevers/chills, chest pain, nausea/vomiting, bloody sputum, dark/tarry/bloody bowel movements, upper respiratory symptoms. Upon arrival, patient is HD stable and saturating 98% on RA, talking in complete sentences, no increased work of breathing.  CT C/A/P revealing cardiomegaly with evidence of right heart strain, R hilar lymphadenopathy with R perihilar soft tissue density with indentation/possible invasion of right pulmonary artery, and narrowing of proximal R lobar and segmental branches with associated consolidation of right lower lobe. 2.2 x 2.8 cm filling defect noted in apex of left ventricle. Patient follows with Dr. Frank, last ECHO 8/2021 revealing EF 20-25%, mild Mr, mild TR, mild Pulm HTN.  Patient presents to cardiac department for RHC     < from: TTE Echo Complete w/ Contrast w/ Doppler (04.22.23 @ 08:36) >    Summary:   1. Left ventricular ejection fraction, by visual estimation, is <20%.   2. Severely decreased global left ventricular systolic function.   3. Severely enlarged left atrium.   4. Elevated mean left atrial pressure.   5. Large, fixed thrombus vs. mass on the apical wall of the left   ventricle.   6. Spectral Doppler shows pseudonormal pattern of left ventricular   myocardial filling (Grade II diastolic dysfunction).   7. Moderately reduced RV systolic function.   8. Mildly enlarged right atrium.   9. Moderate mitral valve regurgitation.  10. Moderate-severe tricuspid regurgitation.  11. Mild pulmonic valve regurgitation.  12. Estimated pulmonary artery systolic pressure is 42.9 mmHg assuming a   right atrial pressure of 15 mmHg, which is consistent with mild pulmonary   hypertension.  13. Endocardial visualization was enhanced with intravenous echo contrast.      Cath Bleeding Risk: 17.7    Prehydration: patient admitted for cardiogenic shock, mild pulmonary HTN, ef <20%, here for RHC--no fluid bolus

## 2023-04-24 NOTE — CHART NOTE - NSCHARTNOTEFT_GEN_A_CORE
PRE-OP DIAGNOSIS:    Cardiogenic shock      PROCEDURE:     [x ] [facility]        PHYSICIAN:  Dr. Lord    ASSISTANT:   --       Consent:    [x] Patient   [ ] Family Member   [ ]  Used        Anesthesia:   [ ] General   [x] Sedation   [x] Local        Access & Closure:     [x] Fr Femoral Vein              FINDINGS:   Right Heart Cath  RA - 12  RV - 51/5/13  PA - 51/24/32  PCWP - 20    CO/CI Thermodilusion - 2.1/1.27  CO/CI Mehdi - 2.09/1.26    SVR (MAP 79 / RA 12 / CO 2.1) = 2552 dyn  PVR = 5.71 henriquez  Heart rate at time of right heart cath was 88 bpm. Measurements of pressures, arterial and venous oxygen saturation and cardiac output by thermodilution and measured mehdi were obtained. The catheter was sutured in place.         CONDITION:   [x] Good   [] Fair   [] Critical        SPECIMEN REMOVED: N/A       POST-OP DIAGNOSIS:      [x] Decreased cardiac output with increased SVR + PVR + PCWP consistent with cardiogenic shock.    Combined pre and post capillary PH.              PLAN OF CARE:   [] Return to In-patient bed   [] continued mgmt of shock per primary team.

## 2023-04-24 NOTE — PROGRESS NOTE ADULT - SUBJECTIVE AND OBJECTIVE BOX
24H events:    Patient is a 77y old Female who presents with a chief complaint of intra cardiac mass (24 Apr 2023 13:12)    Primary diagnosis of Lung mass       Today is hospital day 3d.      Patient seen and examined at bedside. Reports no active complaints.     PAST MEDICAL & SURGICAL HISTORY  CHF, stage C    HTN (hypertension)      SOCIAL HISTORY:  Negative for smoking/alcohol/drug use.     ALLERGIES:  epinephrine (Unknown)    MEDICATIONS:  STANDING MEDICATIONS  chlorhexidine 2% Cloths 1 Application(s) Topical daily  guaifenesin/dextromethorphan Oral Liquid 15 milliLiter(s) Oral once  heparin  Infusion. 800 Unit(s)/Hr IV Continuous <Continuous>  melatonin 5 milliGRAM(s) Oral at bedtime  milrinone Infusion 0.125 MICROgram(s)/kG/Min IV Continuous <Continuous>  norepinephrine Infusion 0.05 MICROgram(s)/kG/Min IV Continuous <Continuous>  pantoprazole    Tablet 40 milliGRAM(s) Oral before breakfast  piperacillin/tazobactam IVPB.. 3.375 Gram(s) IV Intermittent every 8 hours    PRN MEDICATIONS  heparin   Injectable 2500 Unit(s) IV Push every 6 hours PRN  heparin   Injectable 5000 Unit(s) IV Push every 6 hours PRN    VITALS:   T(F): 97  HR: 91  BP: 91/56  RR: 30  SpO2: 96%    LABS:                        12.7   10.63 )-----------( 164      ( 24 Apr 2023 11:10 )             38.6     04-24    131<L>  |  95<L>  |  49<H>  ----------------------------<  162<H>  3.8   |  24  |  1.3    Ca    8.1<L>      24 Apr 2023 11:10  Mg     1.9     04-24    TPro  5.3<L>  /  Alb  2.6<L>  /  TBili  2.4<H>  /  DBili  x   /  AST  433<H>  /  ALT  560<H>  /  AlkPhos  114  04-24    PT/INR - ( 23 Apr 2023 23:00 )   PT: 23.50 sec;   INR: 2.02 ratio         PTT - ( 24 Apr 2023 11:10 )  PTT:92.2 sec    ABG - ( 24 Apr 2023 06:14 )  pH, Arterial: 7.48  pH, Blood: x     /  pCO2: 28    /  pO2: 146   / HCO3: 21    / Base Excess: -1.4  /  SaO2: 99.6              Lactate, Blood: 2.1 mmol/L *H* (04-23-23 @ 18:32)          RADIOLOGY:    PHYSICAL EXAM:  GENERAL:  NAD  SKIN: No rashes or lesions  HEENT: Atraumatic. Normocephalic.   NECK: Supple, No JVD.   PULMONARY: decreased breath sounds B/L. No wheezing.   CVS: Normal S1, S2. Rate and Rhythm are regular.    ABDOMEN/GI: Soft, Nontender, Nondistended   MSK:  No clubbing or cyanosis   NEUROLOGIC:  moves all extremities   PSYCH: Awake and alert, confused

## 2023-04-25 LAB
A1C WITH ESTIMATED AVERAGE GLUCOSE RESULT: 6.2 % — HIGH (ref 4–5.6)
ALBUMIN SERPL ELPH-MCNC: 2.5 G/DL — LOW (ref 3.5–5.2)
ALP SERPL-CCNC: 108 U/L — SIGNIFICANT CHANGE UP (ref 30–115)
ALT FLD-CCNC: 450 U/L — HIGH (ref 0–41)
AMMONIA BLD-MCNC: 29 UMOL/L — SIGNIFICANT CHANGE UP (ref 11–55)
ANION GAP SERPL CALC-SCNC: 11 MMOL/L — SIGNIFICANT CHANGE UP (ref 7–14)
ANION GAP SERPL CALC-SCNC: 12 MMOL/L — SIGNIFICANT CHANGE UP (ref 7–14)
ANION GAP SERPL CALC-SCNC: 14 MMOL/L — SIGNIFICANT CHANGE UP (ref 7–14)
APAP SERPL-MCNC: <5 UG/ML — LOW (ref 10–30)
APTT BLD: 100 SEC — CRITICAL HIGH (ref 27–39.2)
AST SERPL-CCNC: 294 U/L — HIGH (ref 0–41)
BASE EXCESS BLDA CALC-SCNC: 1 MMOL/L — SIGNIFICANT CHANGE UP (ref -2–3)
BASE EXCESS BLDMV CALC-SCNC: 2.5 MMOL/L — SIGNIFICANT CHANGE UP
BASE EXCESS BLDMV CALC-SCNC: 4.2 MMOL/L — SIGNIFICANT CHANGE UP
BASE EXCESS BLDMV CALC-SCNC: 4.6 MMOL/L — SIGNIFICANT CHANGE UP
BASE EXCESS BLDMV CALC-SCNC: 6.1 MMOL/L — SIGNIFICANT CHANGE UP
BASOPHILS # BLD AUTO: 0.01 K/UL — SIGNIFICANT CHANGE UP (ref 0–0.2)
BASOPHILS NFR BLD AUTO: 0.1 % — SIGNIFICANT CHANGE UP (ref 0–1)
BILIRUB DIRECT SERPL-MCNC: 1.1 MG/DL — HIGH (ref 0–0.3)
BILIRUB INDIRECT FLD-MCNC: 1.1 MG/DL — SIGNIFICANT CHANGE UP (ref 0.2–1.2)
BILIRUB SERPL-MCNC: 2.2 MG/DL — HIGH (ref 0.2–1.2)
BUN SERPL-MCNC: 28 MG/DL — HIGH (ref 10–20)
BUN SERPL-MCNC: 28 MG/DL — HIGH (ref 10–20)
BUN SERPL-MCNC: 35 MG/DL — HIGH (ref 10–20)
CALCIUM SERPL-MCNC: 7.7 MG/DL — LOW (ref 8.4–10.5)
CALCIUM SERPL-MCNC: 8.1 MG/DL — LOW (ref 8.4–10.4)
CALCIUM SERPL-MCNC: 8.1 MG/DL — LOW (ref 8.4–10.5)
CERULOPLASMIN SERPL-MCNC: 32 MG/DL — SIGNIFICANT CHANGE UP (ref 16–45)
CHLORIDE SERPL-SCNC: 95 MMOL/L — LOW (ref 98–110)
CHLORIDE SERPL-SCNC: 96 MMOL/L — LOW (ref 98–110)
CHLORIDE SERPL-SCNC: 97 MMOL/L — LOW (ref 98–110)
CHOLEST SERPL-MCNC: 104 MG/DL — SIGNIFICANT CHANGE UP
CO2 SERPL-SCNC: 21 MMOL/L — SIGNIFICANT CHANGE UP (ref 17–32)
CO2 SERPL-SCNC: 25 MMOL/L — SIGNIFICANT CHANGE UP (ref 17–32)
CO2 SERPL-SCNC: 25 MMOL/L — SIGNIFICANT CHANGE UP (ref 17–32)
CREAT SERPL-MCNC: 0.7 MG/DL — SIGNIFICANT CHANGE UP (ref 0.7–1.5)
CREAT SERPL-MCNC: 0.8 MG/DL — SIGNIFICANT CHANGE UP (ref 0.7–1.5)
CREAT SERPL-MCNC: 0.9 MG/DL — SIGNIFICANT CHANGE UP (ref 0.7–1.5)
EGFR: 65 ML/MIN/1.73M2 — SIGNIFICANT CHANGE UP
EGFR: 75 ML/MIN/1.73M2 — SIGNIFICANT CHANGE UP
EGFR: 88 ML/MIN/1.73M2 — SIGNIFICANT CHANGE UP
EOSINOPHIL # BLD AUTO: 0.02 K/UL — SIGNIFICANT CHANGE UP (ref 0–0.7)
EOSINOPHIL NFR BLD AUTO: 0.2 % — SIGNIFICANT CHANGE UP (ref 0–8)
ESTIMATED AVERAGE GLUCOSE: 131 MG/DL — HIGH (ref 68–114)
ETHANOL SERPL-MCNC: <10 MG/DL — SIGNIFICANT CHANGE UP
FERRITIN SERPL-MCNC: 186 NG/ML — HIGH (ref 15–150)
GAS PNL BLDA: SIGNIFICANT CHANGE UP
GAS PNL BLDMV: SIGNIFICANT CHANGE UP
GAS PNL BLDV: SIGNIFICANT CHANGE UP
GGT SERPL-CCNC: 43 U/L — HIGH (ref 1–40)
GLUCOSE SERPL-MCNC: 104 MG/DL — HIGH (ref 70–99)
GLUCOSE SERPL-MCNC: 113 MG/DL — HIGH (ref 70–99)
GLUCOSE SERPL-MCNC: 139 MG/DL — HIGH (ref 70–99)
HCO3 BLDA-SCNC: 22 MMOL/L — SIGNIFICANT CHANGE UP (ref 21–28)
HCO3 BLDMV-SCNC: 26 MMOL/L — SIGNIFICANT CHANGE UP
HCO3 BLDMV-SCNC: 27 MMOL/L — SIGNIFICANT CHANGE UP
HCO3 BLDMV-SCNC: 28 MMOL/L — SIGNIFICANT CHANGE UP
HCO3 BLDMV-SCNC: 30 MMOL/L — SIGNIFICANT CHANGE UP
HCT VFR BLD CALC: 35.9 % — LOW (ref 37–47)
HDLC SERPL-MCNC: 17 MG/DL — LOW
HGB BLD-MCNC: 12 G/DL — SIGNIFICANT CHANGE UP (ref 12–16)
HOROWITZ INDEX BLDA+IHG-RTO: 28 — SIGNIFICANT CHANGE UP
HOROWITZ INDEX BLDMV+IHG-RTO: 28 — SIGNIFICANT CHANGE UP
HOROWITZ INDEX BLDMV+IHG-RTO: 28 — SIGNIFICANT CHANGE UP
IGA FLD-MCNC: 582 MG/DL — HIGH (ref 84–499)
IGG FLD-MCNC: 998 MG/DL — SIGNIFICANT CHANGE UP (ref 610–1660)
IGM SERPL-MCNC: 62 MG/DL — SIGNIFICANT CHANGE UP (ref 35–242)
IMM GRANULOCYTES NFR BLD AUTO: 0.3 % — SIGNIFICANT CHANGE UP (ref 0.1–0.3)
INR BLD: 1.67 RATIO — HIGH (ref 0.65–1.3)
IRON SATN MFR SERPL: 15 UG/DL — LOW (ref 35–150)
IRON SATN MFR SERPL: 8 % — LOW (ref 15–50)
LACTATE SERPL-SCNC: 1.7 MMOL/L — SIGNIFICANT CHANGE UP (ref 0.7–2)
LIPID PNL WITH DIRECT LDL SERPL: 68 MG/DL — SIGNIFICANT CHANGE UP
LYMPHOCYTES # BLD AUTO: 1.27 K/UL — SIGNIFICANT CHANGE UP (ref 1.2–3.4)
LYMPHOCYTES # BLD AUTO: 13.7 % — LOW (ref 20.5–51.1)
MAGNESIUM SERPL-MCNC: 1.8 MG/DL — SIGNIFICANT CHANGE UP (ref 1.8–2.4)
MCHC RBC-ENTMCNC: 30.4 PG — SIGNIFICANT CHANGE UP (ref 27–31)
MCHC RBC-ENTMCNC: 33.4 G/DL — SIGNIFICANT CHANGE UP (ref 32–37)
MCV RBC AUTO: 90.9 FL — SIGNIFICANT CHANGE UP (ref 81–99)
MONOCYTES # BLD AUTO: 0.78 K/UL — HIGH (ref 0.1–0.6)
MONOCYTES NFR BLD AUTO: 8.4 % — SIGNIFICANT CHANGE UP (ref 1.7–9.3)
NEUTROPHILS # BLD AUTO: 7.14 K/UL — HIGH (ref 1.4–6.5)
NEUTROPHILS NFR BLD AUTO: 77.3 % — HIGH (ref 42.2–75.2)
NON HDL CHOLESTEROL: 87 MG/DL — SIGNIFICANT CHANGE UP
NRBC # BLD: 0 /100 WBCS — SIGNIFICANT CHANGE UP (ref 0–0)
O2 CT VFR BLD CALC: 27 MMHG — SIGNIFICANT CHANGE UP
O2 CT VFR BLD CALC: 28 MMHG — SIGNIFICANT CHANGE UP
O2 CT VFR BLD CALC: 35 MMHG — SIGNIFICANT CHANGE UP
O2 CT VFR BLD CALC: 38 MMHG — SIGNIFICANT CHANGE UP
PCO2 BLDA: 26 MMHG — SIGNIFICANT CHANGE UP (ref 25–48)
PCO2 BLDMV: 35 MMHG — SIGNIFICANT CHANGE UP
PCO2 BLDMV: 37 MMHG — SIGNIFICANT CHANGE UP
PCO2 BLDMV: 38 MMHG — SIGNIFICANT CHANGE UP
PCO2 BLDMV: 38 MMHG — SIGNIFICANT CHANGE UP
PH BLDA: 7.54 — HIGH (ref 7.35–7.45)
PH BLDMV: 7.46 — SIGNIFICANT CHANGE UP
PH BLDMV: 7.48 — SIGNIFICANT CHANGE UP
PH BLDMV: 7.5 — SIGNIFICANT CHANGE UP
PH BLDMV: 7.5 — SIGNIFICANT CHANGE UP
PHOSPHATE SERPL-MCNC: 2.1 MG/DL — SIGNIFICANT CHANGE UP (ref 2.1–4.9)
PLATELET # BLD AUTO: 142 K/UL — SIGNIFICANT CHANGE UP (ref 130–400)
PMV BLD: 12.6 FL — HIGH (ref 7.4–10.4)
PO2 BLDA: 94 MMHG — SIGNIFICANT CHANGE UP (ref 83–108)
POTASSIUM SERPL-MCNC: 3.3 MMOL/L — LOW (ref 3.5–5)
POTASSIUM SERPL-MCNC: 3.6 MMOL/L — SIGNIFICANT CHANGE UP (ref 3.5–5)
POTASSIUM SERPL-MCNC: 3.8 MMOL/L — SIGNIFICANT CHANGE UP (ref 3.5–5)
POTASSIUM SERPL-SCNC: 3.3 MMOL/L — LOW (ref 3.5–5)
POTASSIUM SERPL-SCNC: 3.6 MMOL/L — SIGNIFICANT CHANGE UP (ref 3.5–5)
POTASSIUM SERPL-SCNC: 3.8 MMOL/L — SIGNIFICANT CHANGE UP (ref 3.5–5)
PROT SERPL-MCNC: 5.1 G/DL — LOW (ref 6–8)
PROTHROM AB SERPL-ACNC: 19.3 SEC — HIGH (ref 9.95–12.87)
RBC # BLD: 3.95 M/UL — LOW (ref 4.2–5.4)
RBC # FLD: 13.3 % — SIGNIFICANT CHANGE UP (ref 11.5–14.5)
SAO2 % BLDA: 99.5 % — HIGH (ref 94–98)
SAO2 % BLDMV: 48.8 % — SIGNIFICANT CHANGE UP
SAO2 % BLDMV: 49.6 % — SIGNIFICANT CHANGE UP
SAO2 % BLDMV: 61.9 % — SIGNIFICANT CHANGE UP
SAO2 % BLDMV: 66.9 % — SIGNIFICANT CHANGE UP
SODIUM SERPL-SCNC: 131 MMOL/L — LOW (ref 135–146)
SODIUM SERPL-SCNC: 132 MMOL/L — LOW (ref 135–146)
SODIUM SERPL-SCNC: 133 MMOL/L — LOW (ref 135–146)
T4 FREE SERPL-MCNC: 1.4 NG/DL — SIGNIFICANT CHANGE UP (ref 0.9–1.8)
TIBC SERPL-MCNC: 200 UG/DL — LOW (ref 220–430)
TRIGL SERPL-MCNC: 91 MG/DL — SIGNIFICANT CHANGE UP
TSH SERPL-MCNC: 1.38 UIU/ML — SIGNIFICANT CHANGE UP (ref 0.27–4.2)
UIBC SERPL-MCNC: 185 UG/DL — SIGNIFICANT CHANGE UP (ref 110–370)
URATE SERPL-MCNC: 5.8 MG/DL — SIGNIFICANT CHANGE UP (ref 2.5–7)
WBC # BLD: 9.25 K/UL — SIGNIFICANT CHANGE UP (ref 4.8–10.8)
WBC # FLD AUTO: 9.25 K/UL — SIGNIFICANT CHANGE UP (ref 4.8–10.8)

## 2023-04-25 PROCEDURE — 99232 SBSQ HOSP IP/OBS MODERATE 35: CPT

## 2023-04-25 PROCEDURE — 99231 SBSQ HOSP IP/OBS SF/LOW 25: CPT

## 2023-04-25 PROCEDURE — 99292 CRITICAL CARE ADDL 30 MIN: CPT

## 2023-04-25 PROCEDURE — 99291 CRITICAL CARE FIRST HOUR: CPT

## 2023-04-25 PROCEDURE — 71045 X-RAY EXAM CHEST 1 VIEW: CPT | Mod: 26

## 2023-04-25 RX ORDER — DOBUTAMINE HCL 250MG/20ML
10 VIAL (ML) INTRAVENOUS
Qty: 1000 | Refills: 0 | Status: DISCONTINUED | OUTPATIENT
Start: 2023-04-25 | End: 2023-04-26

## 2023-04-25 RX ORDER — SODIUM CHLORIDE 5 G/100ML
150 INJECTION, SOLUTION INTRAVENOUS
Refills: 0 | Status: DISCONTINUED | OUTPATIENT
Start: 2023-04-26 | End: 2023-04-27

## 2023-04-25 RX ORDER — FUROSEMIDE 40 MG
20 TABLET ORAL ONCE
Refills: 0 | Status: COMPLETED | OUTPATIENT
Start: 2023-04-25 | End: 2023-04-25

## 2023-04-25 RX ORDER — SODIUM CHLORIDE 5 G/100ML
150 INJECTION, SOLUTION INTRAVENOUS
Refills: 0 | Status: COMPLETED | OUTPATIENT
Start: 2023-04-25 | End: 2023-04-26

## 2023-04-25 RX ORDER — BUMETANIDE 0.25 MG/ML
2 INJECTION INTRAMUSCULAR; INTRAVENOUS ONCE
Refills: 0 | Status: COMPLETED | OUTPATIENT
Start: 2023-04-25 | End: 2023-04-25

## 2023-04-25 RX ORDER — BUMETANIDE 0.25 MG/ML
1 INJECTION INTRAMUSCULAR; INTRAVENOUS
Refills: 0 | Status: DISCONTINUED | OUTPATIENT
Start: 2023-04-25 | End: 2023-04-25

## 2023-04-25 RX ORDER — DOBUTAMINE HCL 250MG/20ML
7 VIAL (ML) INTRAVENOUS
Qty: 1000 | Refills: 0 | Status: DISCONTINUED | OUTPATIENT
Start: 2023-04-25 | End: 2023-04-25

## 2023-04-25 RX ORDER — ALBUMIN HUMAN 25 %
50 VIAL (ML) INTRAVENOUS ONCE
Refills: 0 | Status: COMPLETED | OUTPATIENT
Start: 2023-04-25 | End: 2023-04-25

## 2023-04-25 RX ORDER — BUMETANIDE 0.25 MG/ML
1 INJECTION INTRAMUSCULAR; INTRAVENOUS
Qty: 20 | Refills: 0 | Status: DISCONTINUED | OUTPATIENT
Start: 2023-04-25 | End: 2023-04-26

## 2023-04-25 RX ORDER — POTASSIUM CHLORIDE 20 MEQ
40 PACKET (EA) ORAL EVERY 4 HOURS
Refills: 0 | Status: COMPLETED | OUTPATIENT
Start: 2023-04-25 | End: 2023-04-26

## 2023-04-25 RX ORDER — FUROSEMIDE 40 MG
40 TABLET ORAL EVERY 12 HOURS
Refills: 0 | Status: DISCONTINUED | OUTPATIENT
Start: 2023-04-25 | End: 2023-04-25

## 2023-04-25 RX ADMIN — SODIUM CHLORIDE 50 MILLILITER(S): 5 INJECTION, SOLUTION INTRAVENOUS at 22:06

## 2023-04-25 RX ADMIN — Medication 7.14 MICROGRAM(S)/KG/MIN: at 03:33

## 2023-04-25 RX ADMIN — Medication 40 MILLIGRAM(S): at 11:11

## 2023-04-25 RX ADMIN — PIPERACILLIN AND TAZOBACTAM 25 GRAM(S): 4; .5 INJECTION, POWDER, LYOPHILIZED, FOR SOLUTION INTRAVENOUS at 05:23

## 2023-04-25 RX ADMIN — Medication 40 MILLIEQUIVALENT(S): at 18:24

## 2023-04-25 RX ADMIN — PIPERACILLIN AND TAZOBACTAM 25 GRAM(S): 4; .5 INJECTION, POWDER, LYOPHILIZED, FOR SOLUTION INTRAVENOUS at 13:33

## 2023-04-25 RX ADMIN — Medication 40 MILLIEQUIVALENT(S): at 22:11

## 2023-04-25 RX ADMIN — Medication 20 MILLIGRAM(S): at 05:24

## 2023-04-25 RX ADMIN — BUMETANIDE 2 MILLIGRAM(S): 0.25 INJECTION INTRAMUSCULAR; INTRAVENOUS at 22:49

## 2023-04-25 RX ADMIN — Medication 50 MILLILITER(S): at 12:04

## 2023-04-25 RX ADMIN — CHLORHEXIDINE GLUCONATE 1 APPLICATION(S): 213 SOLUTION TOPICAL at 11:10

## 2023-04-25 RX ADMIN — BUMETANIDE 2 MILLIGRAM(S): 0.25 INJECTION INTRAMUSCULAR; INTRAVENOUS at 14:45

## 2023-04-25 RX ADMIN — Medication 5 MILLIGRAM(S): at 22:06

## 2023-04-25 RX ADMIN — PANTOPRAZOLE SODIUM 40 MILLIGRAM(S): 20 TABLET, DELAYED RELEASE ORAL at 05:25

## 2023-04-25 RX ADMIN — HEPARIN SODIUM 700 UNIT(S)/HR: 5000 INJECTION INTRAVENOUS; SUBCUTANEOUS at 23:33

## 2023-04-25 RX ADMIN — Medication 9.53 MICROGRAM(S)/KG/MIN: at 23:34

## 2023-04-25 RX ADMIN — PIPERACILLIN AND TAZOBACTAM 25 GRAM(S): 4; .5 INJECTION, POWDER, LYOPHILIZED, FOR SOLUTION INTRAVENOUS at 22:06

## 2023-04-25 RX ADMIN — BUMETANIDE 5 MG/HR: 0.25 INJECTION INTRAMUSCULAR; INTRAVENOUS at 23:01

## 2023-04-25 NOTE — PROGRESS NOTE ADULT - ASSESSMENT
ASSESSMENT: 77 year old woman with pmh of HTN, HLD, nonischemic cardiomyopathy, HFrEF, s/p ICD and PPM presents to the ED with 2 weeks of worsening shortness of breath. Patient and her son state patient has been experiencing shortness of breath that has progressed from present on activity to now shortness of breath during conversation. Patient also notes an increasing dry cough over this time period. Denies history of smoking, fevers/chills, chest pain, nausea/vomiting, bloody sputum, dark/tarry/bloody bowel movements, upper respiratory symptoms. Upon arrival, patient is HD stable and saturating 98% on RA, talking in complete sentences, no increased work of breathing.  CT C/A/P revealing cardiomegaly with evidence of right heart strain, R hilar lymphadenopathy with R perihilar soft tissue density with indentation/possible invasion of right pulmonary artery, and narrowing of proximal R lobar and segmental branches with associated consolidation of right lower lobe. 2.2 x 2.8 cm filling defect noted in apex of left ventricle. Patient follows with Dr. Frank, last ECHO 8/2021 revealing EF 20-25%, mild Mr, mild TR, mild Pulm HTN.    PLAN:  - Care as per primary team  - F/u pulm for bronch  - Medical optimization  - Continue IV abx    [phone]

## 2023-04-25 NOTE — CONSULT NOTE ADULT - SUBJECTIVE AND OBJECTIVE BOX
Date of Admission: 23    CHIEF COMPLAINT: Patient is a 77y old  Female who presents with a chief complaint of intra cardiac mass (2023 16:11)    HISTORY OF PRESENT ILLNESS: 77 year old woman with PMHx of HTN, HLD, nonischemic cardiomyopathy, HFrEF s/p AICD presents to the ED with 2 weeks of worsening shortness of breath. Patient and her son state patient has been experiencing shortness of breath that has progressed from present on activity to now shortness of breath during conversation. Patient also notes an increasing dry cough over this time period. Denies history of smoking, fevers/chills, chest pain, nausea/vomiting, bloody sputum, dark/tarry/bloody bowel movements, upper respiratory symptoms.   In ED, patient's HR is 110 and saturating 98% on RA  CT C/A/P revealing cardiomegaly with evidence of right heart strain, R hilar lymphadenopathy with R perihilar soft tissue density with indentation/possible invasion of right pulmonary artery, and narrowing of proximal R lobar and segmental branches with associated consolidation of right lower lobe. 2.2 x 2.8 cm filling defect noted in apex of left ventricle.   Last ECHO 2021 revealing EF 20-25%, mild Mr, mild TR, mild Pulm HTN  Dimer 2600, trops negative BNP 28465  Duplex venous LE negative for DVT; CTA chest negative for PE  Cr 1.4 (0.7 in ), Na+ 128, T.Benjamin 2.3, LFTs elevated (hemolyzed)  Lactate 5.8-->4.6  Admitted to SDU (2023 01:10)      Patient seen and examined in CCU, family present at bedside. Reports patient was at baseline (active, ambulating long distances without issue) until about 2 weeks ago when patient began to have progressively worsening SOB with a dry cough. Patient reports Dr. Guerrero as per primary cardiologist, and Dr. Sherwood as her heart failure specialist (last seen in office in ). Endorses compliance with home medications. Family at bedside concerned about patient's recent weight loss as well.         PAST MEDICAL & SURGICAL HISTORY:  CHF, stage C  HTN (hypertension)      FAMILY HISTORY: No pertinent family history of premature cardiovascular disease in first degree relatives.    SOCIAL HISTORY:  Denies smoking, alcohol, or drug use    Allergies  epinephrine (Unknown)    Intolerances      REVIEW OF SYSTEMS:  CONSTITUTIONAL: No fever, + weight loss, or fatigue.  CARDIOLOGY: Patient denies chest pain, + shortness of breath, no syncopal episodes.   RESPIRATORY: + shortness of breath, no wheezing.   NEUROLOGICAL: + generalized weakness, no focal deficits to report.  GI: no BRBPR, no n/v, diarrhea.    PSYCHIATRY: normal mood and affect  HEENT: no nasal discharge, no ecchymosis  SKIN: no ecchymosis, no breakdown  MUSCULOSKELETAL: Full range of motion x4.       PHYSICAL EXAM:  T(C): 36.4 (23 @ 16:00), Max: 36.8 (23 @ 12:00)  HR: 101 (23 @ 18:00) (85 - 101)  BP: 95/60 (23 @ 18:00) (82/62 - 125/71)  RR: 26 (23 @ 18:00) (20 - 34)  SpO2: 100% (23 @ 18:00) (95% - 100%)  Wt(kg): --  I&O's Summary    2023 07:  -  2023 07:00  --------------------------------------------------------  IN: 645 mL / OUT: 1260 mL / NET: -615 mL    2023 07:  -  2023 18:42  --------------------------------------------------------  IN: 328.7 mL / OUT: 1450 mL / NET: -1121.3 mL        General Appearance: thin, frail, normal for age and gender. 	  Neck: +central line  Eyes: Extra Ocular muscles intact.   Cardiovascular: regular rate and rhythm S1 S2, No edema  Respiratory: decreased	  Psychiatry: Alert and oriented x 3, Mood & affect appropriate  Gastrointestinal:  Soft, Non-tender  Skin/Integumentary: No rashes, No ecchymoses, No cyanosis	  Neurologic: Non-focal  Musculoskeletal/ extremities: Normal range of motion, No clubbing, cyanosis or edema  Vascular: Peripheral pulses palpable 2+ bilaterally        LABS:	 	                          12.0   9.25  )-----------( 142      ( 2023 04:50 )             35.9     04-25    133<L>  |  96<L>  |  28<H>  ----------------------------<  139<H>  3.3<L>   |  25  |  0.8    Ca    8.1<L>      2023 17:12  Phos  2.1     04-25  Mg     1.8     04-25    TPro  5.1<L>  /  Alb  2.5<L>  /  TBili  2.2<H>  /  DBili  1.1<H>  /  AST  294<H>  /  ALT  450<H>  /  AlkPhos  108  04-25        PT/INR - ( 2023 04:50 )   PT: 19.30 sec;   INR: 1.67 ratio         PTT - ( 2023 04:50 )  PTT:100.0 sec    CARDIAC MARKERS:  Pro-Brain Natriuretic Peptide: 52582 pg/mL (23 @ 11:43)      TELEMETRY EVENTS: 	    EC23    Ventricular Rate 85 BPM  Atrial Rate 85 BPM  P-R Interval 122 ms  QRS Duration 112 ms  Q-T Interval 408 ms  QTC Calculation(Bazett) 485 ms  P Axis 40 degrees  R Axis -40 degrees  T Axis 94 degrees    Diagnosis Line Sinus rhythm withoccasional Premature ventricular complexes  Possible Left atrial enlargement  Left axis deviation  Incomplete right bundle branch block  Septal infarct , age undetermined  Abnormal ECG    Confirmed by Stacie Franco MD ([id number]) on 2023 7:50:12 AM  	  	    PREVIOUS DIAGNOSTIC TESTING:    TTE 23    Summary:   1. Left ventricular ejection fraction, by visual estimation, is <20%.   2. Severely decreased global left ventricular systolic function.   3. Severely enlarged left atrium.   4. Elevated mean left atrial pressure.   5. Large, fixed thrombus vs. mass on the apical wall of the left   ventricle.   6. Spectral Doppler shows pseudonormal pattern of left ventricular   myocardial filling (Grade II diastolic dysfunction).   7. Moderately reduced RV systolic function.   8. Mildly enlarged right atrium.   9. Moderate mitral valve regurgitation.  10. Moderate-severe tricuspid regurgitation.  11. Mild pulmonic valve regurgitation.  12. Estimated pulmonary artery systolic pressure is 42.9 mmHg assuming a   right atrial pressure of 15 mmHg, which is consistent withmild pulmonary   hypertension.  13. Endocardial visualization was enhanced with intravenous echo contrast.      Right Heart Catheterization 23    FINDINGS:   Right Heart Cath  RA - 12  RV - 51/5/13  PA - 51//32  PCWP - 20    CO/CI Thermodilusion - 2.1/1.27  CO/CI Radha - 2.09/1.26    SVR (MAP 79 / RA 12 / CO 2.1) = 2552 dyn  PVR = 5.71 henriquez      POST-OP DIAGNOSIS:    [x] Decreased cardiac output with increased SVR + PVR + PCWP consistent with cardiogenic shock.    Combined pre and post capillary PH.   	    Home Medications:  atorvastatin 20 mg oral tablet: 1 tab(s) orally once a day (04 Aug 2021 10:05)  carvedilol 6.25 mg oral tablet: 1 tab(s) orally 2 times a day (2023 02:02)  Entresto 49 mg-51 mg oral tablet: 1 tab(s) orally 2 times a day (04 Aug 2021 10:05)  Farxiga 10 mg oral tablet: 1 tab(s) orally once a day (2023 02:05)  Lasix 20 mg oral tablet: 1 tab(s) orally once a day (2023 02:04)  spironolactone 25 mg oral tablet: 1 tab(s) orally once a day (04 Aug 2021 10:05)    MEDICATIONS  (STANDING):  chlorhexidine 2% Cloths 1 Application(s) Topical daily  DOBUTamine Infusion 7 MICROgram(s)/kG/Min (6.67 mL/Hr) IV Continuous <Continuous>  guaifenesin/dextromethorphan Oral Liquid 15 milliLiter(s) Oral once  heparin  Infusion. 800 Unit(s)/Hr (8 mL/Hr) IV Continuous <Continuous>  melatonin 5 milliGRAM(s) Oral at bedtime  pantoprazole    Tablet 40 milliGRAM(s) Oral before breakfast  piperacillin/tazobactam IVPB.. 3.375 Gram(s) IV Intermittent every 8 hours  potassium chloride    Tablet ER 40 milliEquivalent(s) Oral every 4 hours    MEDICATIONS  (PRN):  heparin   Injectable 5000 Unit(s) IV Push every 6 hours PRN For aPTT less than 40  heparin   Injectable 2500 Unit(s) IV Push every 6 hours PRN For aPTT between 40 - 57

## 2023-04-25 NOTE — PROGRESS NOTE ADULT - SUBJECTIVE AND OBJECTIVE BOX
GENERAL SURGERY PROGRESS NOTE    Patient: MIKHAIL JOHNSON , 77y ([birth year])Female   MRN: [id number]  Location: [address]  Visit: 04-21-23 Inpatient  Date: 04-25-23 @ 16:11    Procedure/Dx/Injuries: perihilar mass w/ possible right pulmonary artery invasion / right main bronchus invasion and 2.8cm left ventricle apical mass    Events of past 24 hours: s/p right heart catheterization yesterday    PAST MEDICAL & SURGICAL HISTORY:  CHF, stage C      HTN (hypertension)          Vitals:   T(F): 98.2 (04-25-23 @ 12:00), Max: 98.2 (04-25-23 @ 12:00)  HR: 96 (04-25-23 @ 14:00)  BP: 118/73 (04-25-23 @ 14:00)  RR: 30 (04-25-23 @ 14:00)  SpO2: 98% (04-25-23 @ 14:00)      Diet, DASH/TLC:   Sodium & Cholesterol Restricted      Fluids:     I & O's:    04-24-23 @ 07:01  -  04-25-23 @ 07:00  --------------------------------------------------------  IN:    DOBUTamine: 5.7 mL    DOBUTamine: 45.5 mL    DOBUTamine: 42.6 mL    Heparin Infusion: 176 mL    Milrinone: 26.2 mL    Norepinephrine: 99 mL    Oral Fluid: 250 mL  Total IN: 645 mL    OUT:    Ureteral Catheter (mL): 1260 mL  Total OUT: 1260 mL    Total NET: -615 mL    PHYSICAL EXAM:  General: NAD  Cardiac: S1, S2 present  Respiratory: No labored breathing, normal respiratory effort  Abdomen: Soft, non-distended, non-tender  Skin: Warm/dry, normal color, no jaundice    MEDICATIONS  (STANDING):  buMETAnide Injectable 2 milliGRAM(s) IV Push once  buMETAnide Injectable 1 milliGRAM(s) IV Push two times a day  chlorhexidine 2% Cloths 1 Application(s) Topical daily  DOBUTamine Infusion 7 MICROgram(s)/kG/Min (6.67 mL/Hr) IV Continuous <Continuous>  furosemide   Injectable 40 milliGRAM(s) IV Push every 12 hours  guaifenesin/dextromethorphan Oral Liquid 15 milliLiter(s) Oral once  heparin  Infusion. 800 Unit(s)/Hr (8 mL/Hr) IV Continuous <Continuous>  melatonin 5 milliGRAM(s) Oral at bedtime  pantoprazole    Tablet 40 milliGRAM(s) Oral before breakfast  piperacillin/tazobactam IVPB.. 3.375 Gram(s) IV Intermittent every 8 hours    MEDICATIONS  (PRN):  heparin   Injectable 5000 Unit(s) IV Push every 6 hours PRN For aPTT less than 40  heparin   Injectable 2500 Unit(s) IV Push every 6 hours PRN For aPTT between 40 - 57      DVT PROPHYLAXIS: heparin   Injectable 5000 Unit(s) IV Push every 6 hours PRN  heparin   Injectable 2500 Unit(s) IV Push every 6 hours PRN  heparin  Infusion. 800 Unit(s)/Hr IV Continuous <Continuous>    GI PROPHYLAXIS: pantoprazole    Tablet 40 milliGRAM(s) Oral before breakfast    ANTICOAGULATION:   ANTIBIOTICS:  piperacillin/tazobactam IVPB.. 3.375 Gram(s)            LAB/STUDIES:  Labs:  CAPILLARY BLOOD GLUCOSE                              12.0   9.25  )-----------( 142      ( 25 Apr 2023 04:50 )             35.9       Auto Neutrophil %: 77.3 % (04-25-23 @ 04:50)  Auto Immature Granulocyte %: 0.3 % (04-25-23 @ 04:50)    04-25    132<L>  |  97<L>  |  35<H>  ----------------------------<  104<H>  3.8   |  21  |  0.9      Magnesium, Serum: 1.8 mg/dL (04-25-23 @ 04:50)      LFTs:             5.1  | 2.2  | 294      ------------------[108     ( 25 Apr 2023 04:50 )  2.5  | 1.1  | 450         Lipase:x      Amylase:x         Lactate, Blood: 1.7 mmol/L (04-25-23 @ 04:50)  Blood Gas Arterial, Lactate: 1.50 mmol/L (04-25-23 @ 04:07)  Blood Gas Arterial, Lactate: 1.60 mmol/L (04-25-23 @ 02:06)  Blood Gas Arterial, Lactate: 1.50 mmol/L (04-24-23 @ 22:00)  Blood Gas Venous - Lactate: 1.50 mmol/L (04-24-23 @ 17:54)  Blood Gas Arterial, Lactate: 1.60 mmol/L (04-24-23 @ 17:54)  Blood Gas Arterial, Lactate: 1.70 mmol/L (04-24-23 @ 06:14)  Blood Gas Arterial, Lactate: 1.70 mmol/L (04-24-23 @ 02:14)  Blood Gas Arterial, Lactate: 2.00 mmol/L (04-23-23 @ 22:50)  Lactate, Blood: 2.1 mmol/L (04-23-23 @ 18:32)  Blood Gas Arterial, Lactate: 1.90 mmol/L (04-23-23 @ 18:04)  Blood Gas Venous - Lactate: 2.00 mmol/L (04-23-23 @ 18:04)  Lactate, Blood: 3.1 mmol/L (04-23-23 @ 02:04)  Lactate, Blood: 3.2 mmol/L (04-23-23 @ 02:04)  Blood Gas Arterial, Lactate: 3.50 mmol/L (04-22-23 @ 16:29)    ABG - ( 25 Apr 2023 04:07 )  pH: 7.54  /  pCO2: 26    /  pO2: 94    / HCO3: 22    / Base Excess: 1.0   /  SaO2: 99.5            ABG - ( 25 Apr 2023 02:06 )  pH: 7.52  /  pCO2: 29    /  pO2: 83    / HCO3: 24    / Base Excess: 1.6   /  SaO2: 98.3            ABG - ( 24 Apr 2023 22:00 )  pH: 7.52  /  pCO2: 28    /  pO2: 99    / HCO3: 23    / Base Excess: 0.9   /  SaO2: 98.9              Coags:     19.30  ----< 1.67    ( 25 Apr 2023 04:50 )     100.0       Alcohol, Blood: <10 mg/dL (04-25-23 @ 04:50)      IMAGING:      < from: Xray Chest 1 View- PORTABLE-Routine (04.25.23 @ 06:08) >  Impression: Unchanged bibasilar perihilar and basilar opacities.  Support devices as above.

## 2023-04-25 NOTE — PROGRESS NOTE ADULT - ASSESSMENT
77 year old woman with PMHx of HTN, HLD, nonischemic cardiomyopathy, HFrEF s/p AICD presents to the ED with 2 weeks of worsening shortness of breath. Patient and her son state patient has been experiencing shortness of breath that has progressed from present on activity to now shortness of breath during conversation. Patient also notes an increasing dry cough over this time period. Denies history of smoking, fevers/chills, chest pain, nausea/vomiting, bloody sputum, dark/tarry/bloody bowel movements, upper respiratory symptoms.     In ED, patient's HR is 110 and saturating 98% on RA  CT C/A/P revealing cardiomegaly with evidence of right heart strain, R hilar lymphadenopathy with R perihilar soft tissue density with indentation/possible invasion of right pulmonary artery, and narrowing of proximal R lobar and segmental branches with associated consolidation of right lower lobe. 2.2 x 2.8 cm filling defect noted in apex of left ventricle.   Last ECHO 8/2021 revealing EF 20-25%, mild Mr, mild TR, mild Pulm HTN  Dimer 2600, trops negative BNP 46639  Duplex venous LE negative for DVT; CTA chest negative for PE  Cr 1.4 (0.7 in 2021), Na+ 128, T.Benjamin 2.3, LFTs elevated (hemolyzed)  Lactate 5.8-->4.6  Admitted to SDU    CEU Course:   - Cardiology [facility] consulted, TTE confirmed EF <20% w/ LV thrombus - started on heparin IV  - Pulmonary team consulted for eval of EBUS - pending clinical improvement  - Overnight patient developed worsening DAVID and suspected ischemic hepatitis in the setting of reduced BP ~70s/50s  - Patient started on Dopamine ggt with improvement of BP   - 4/23 AM - patient asymptomatic - bedside echo revealed non-compressible IVC and reduced EF  - Spoke w/ Dr. Hensley and Dr. Yeager - accepted to CCU   - plan follows below         #Cardiogenic Shock   #LV thrombus  #NICM  #HFrEF 25% s/p AICD  #HTN/HLD  - SOB x2 weeks  - CT CAP - cardiomegaly w/ RH strain  - TTE EF <20%, large fixed thrombus vs mass on apical LV wall, GIIDD, mod-sev TR, mod MR, mild MD, mild PH  - LE venous duplex -ve  - overnight BP 80s/60s -> given 1x dose lasix, started on dopamine ggt  - CCU consulted - now upgraded  - R IJ placed, started on milrinone ggt .125mcg/kg  - Current BP 110s/80s  - c/w heparin ggt  - holding lasix, spironolactone, entresto, coreg  - CTS and Cardio following ([facility])  - RHC 04/24: Decreased cardiac output with increased SVR + PVR + PCWP consistent with cardiogenic shock.    Combined pre and post capillary PH..     #Post obstructive pneumonia  #Suspected lung mass   - CT CAP - R hilar LAD, R perihilar soft tissue density w/ indentation/possible invasion of R pulmonary artery and narrowing of proximal R lobar and segmental branches w/ consolidation of RLL  - MRSA -ve, procal 1.77  - c/w zosyn   - pulm following for EBUS - needs medical optimization     #HAGMA  #Lactic Acidosis  #DAVID on CKD   #Ischemic hepatitis   - AG 20, lactate 5.8-->4.6 -> 3.1   - Cr 1.4-> 1.7, BUN 42-> 61 - bsl Cr 0.7 2021  - AST/ALT 2051/917, LDH 1401 -> previous AST//114  - INR 2.6  - s/p IVF bolus - bedside echo non-collapsable IVC - hold on IVF for now   - repeat lactate and bmp   - pending RUQ US, CK, tylenol level   - pending HAV IgM/IgG, HBsAg, HBsAb, HBcAb IgM/IgG, HCV Ab, Anti HEV, Serum Ferritin, Transferrin Saturation, Ceruloplasmin level, ALE, SMA, immunoglobulins panel, AMA, Anti LK microsomal Ab, anti-soluble liver Ag, EBV, HSV, CMV, ACE levels   - GI following   - holding lipitor    #Hypotonic Hyponatremia   - Na 129, holding IVF  - trend Na       # Misc  - DVT Prophylaxis: heparin  Infusion.  - GI Prophylaxis: pantoprazole    Tablet 40 milliGRAM(s) Oral before breakfast  - Diet: Diet, DASH/TLC  - Activity: Activity - Ambulate as Tolerated  - Code Status: Full    77 year old woman with PMHx of HTN, HLD, nonischemic cardiomyopathy, HFrEF s/p AICD presents to the ED with 2 weeks of worsening shortness of breath. Patient and her son state patient has been experiencing shortness of breath that has progressed from present on activity to now shortness of breath during conversation. Patient also notes an increasing dry cough over this time period. Denies history of smoking, fevers/chills, chest pain, nausea/vomiting, bloody sputum, dark/tarry/bloody bowel movements, upper respiratory symptoms.     In ED, patient's HR is 110 and saturating 98% on RA  CT C/A/P revealing cardiomegaly with evidence of right heart strain, R hilar lymphadenopathy with R perihilar soft tissue density with indentation/possible invasion of right pulmonary artery, and narrowing of proximal R lobar and segmental branches with associated consolidation of right lower lobe. 2.2 x 2.8 cm filling defect noted in apex of left ventricle.   Last ECHO 8/2021 revealing EF 20-25%, mild Mr, mild TR, mild Pulm HTN  Dimer 2600, trops negative BNP 12807  Duplex venous LE negative for DVT; CTA chest negative for PE  Cr 1.4 (0.7 in 2021), Na+ 128, T.Benjamin 2.3, LFTs elevated (hemolyzed)  Lactate 5.8-->4.6  Admitted to SDU    CEU Course:   - Cardiology [facility] consulted, TTE confirmed EF <20% w/ LV thrombus - started on heparin IV  - Pulmonary team consulted for eval of EBUS - pending clinical improvement  - Overnight patient developed worsening DAVID and suspected ischemic hepatitis in the setting of reduced BP ~70s/50s  - Patient started on Dopamine ggt with improvement of BP   - 4/23 AM - patient asymptomatic - bedside echo revealed non-compressible IVC and reduced EF  - Spoke w/ Dr. Hensley and Dr. Yeaegr - accepted to CCU   - plan follows below         #Cardiogenic Shock   #LV thrombus  #NICM  #HFrEF 25% s/p AICD  #HTN/HLD  - SOB x2 weeks  - CT CAP - cardiomegaly w/ RH strain  - TTE EF <20%, large fixed thrombus vs mass on apical LV wall, GIIDD, mod-sev TR, mod MR, mild [place], mild PH  - LE venous duplex -ve  - overnight BP 80s/60s -> given 1x dose lasix, started on dopamine ggt  - CCU consulted - now upgraded  - R IJ placed, started on milrinone ggt .125mcg/kg  - Current BP 110s/80s  - c/w heparin ggt  - lasix 40mg bid  - albumin stat dose 04/25  - CTS and Cardio following ([facility])  - RHC 04/24: Decreased cardiac output with increased SVR + PVR + PCWP consistent with cardiogenic shock.    Combined pre and post capillary PH..   - fu Heart failure team notes     #Post obstructive pneumonia  #Suspected lung mass   - CT CAP - R hilar LAD, R perihilar soft tissue density w/ indentation/possible invasion of R pulmonary artery and narrowing of proximal R lobar and segmental branches w/ consolidation of RLL  - MRSA -ve, procal 1.77  - c/w zosyn   - pulm following for EBUS - needs medical optimization     #HAGMA  #Lactic Acidosis  #DAVID on CKD   #Ischemic hepatitis   - AG 20, lactate 5.8-->4.6 -> 3.1   - Cr 1.4-> 1.7, BUN 42-> 61 - bsl Cr 0.7 2021  - AST/ALT 2051/917, LDH 1401 -> previous AST//114  - INR 2.6  - s/p IVF bolus - bedside echo non-collapsable IVC - hold on IVF for now   - repeat lactate and bmp   - pending RUQ US, CK, tylenol level   - pending HAV IgM/IgG, HBsAg, HBsAb, HBcAb IgM/IgG, HCV Ab, Anti HEV, Serum Ferritin, Transferrin Saturation, Ceruloplasmin level, ALE, SMA, immunoglobulins panel, AMA, Anti LK microsomal Ab, anti-soluble liver Ag, EBV, HSV, CMV, ACE levels   - GI following   - holding lipitor    #Hypotonic Hyponatremia   - Na 129, holding IVF  - trend Na       # Misc  - DVT Prophylaxis: heparin  Infusion.  - GI Prophylaxis: pantoprazole    Tablet 40 milliGRAM(s) Oral before breakfast  - Diet: Diet, DASH/TLC  - Activity: Activity - Ambulate as Tolerated  - Code Status: Full

## 2023-04-25 NOTE — PROGRESS NOTE ADULT - SUBJECTIVE AND OBJECTIVE BOX
hepatology progress note:     Patient is a 77y old  Female who presents with a chief complaint of intra cardiac mass (24 Apr 2023 15:24)       Admitted on: 04-21-23    We are following the patient for: abnormal liver alan     Interval History:  No acute event ON, patient is on Dobutamine drip, can tolerate PO intake, had BM , no encephalopathy    PAST MEDICAL & SURGICAL HISTORY:  CHF, stage C      HTN (hypertension)          MEDICATIONS  (STANDING):  chlorhexidine 2% Cloths 1 Application(s) Topical daily  DOBUTamine Infusion 7.5 MICROgram(s)/kG/Min (7.14 mL/Hr) IV Continuous <Continuous>  guaifenesin/dextromethorphan Oral Liquid 15 milliLiter(s) Oral once  heparin  Infusion. 800 Unit(s)/Hr (8 mL/Hr) IV Continuous <Continuous>  melatonin 5 milliGRAM(s) Oral at bedtime  pantoprazole    Tablet 40 milliGRAM(s) Oral before breakfast  piperacillin/tazobactam IVPB.. 3.375 Gram(s) IV Intermittent every 8 hours    MEDICATIONS  (PRN):  heparin   Injectable 2500 Unit(s) IV Push every 6 hours PRN For aPTT between 40 - 57  heparin   Injectable 5000 Unit(s) IV Push every 6 hours PRN For aPTT less than 40      Allergies  epinephrine (Unknown)      Review of Systems:   Constitutional: Ne Fever, No chills, No weakness  ENT: No visual changes, No throat pain  Cardiovascular:  No Chest Pain, No Palpitations  Respiratory:  No Cough, No Dyspnea  Gastrointestinal:  As described in HPI  Neurological: No numbness or weakness  Skin: No rash, no itching    Physical Examination:  T(C): 36.4 (04-25-23 @ 05:00), Max: 36.6 (04-24-23 @ 16:00)  HR: 88 (04-25-23 @ 08:00) (82 - 93)  BP: 109/70 (04-25-23 @ 08:00) (82/62 - 111/67)  RR: 20 (04-25-23 @ 08:00) (20 - 30)  SpO2: 99% (04-25-23 @ 08:00) (96% - 100%)      04-24-23 @ 07:01  -  04-25-23 @ 07:00  --------------------------------------------------------  IN: 645 mL / OUT: 1260 mL / NET: -615 mL    04-25-23 @ 07:01  -  04-25-23 @ 09:31  --------------------------------------------------------  IN: 30.2 mL / OUT: 350 mL / NET: -319.8 mL      Constitutional: No acute distress.  Respiratory:  No signs of respiratory distress.  Cardiovascular:  S1 S2, Regular rate and rhythm.  Abdominal: Abdomen is soft, symmetric, and non-tender without distention. There are no visible lesions. Bowel sounds are present and normoactive in all four quadrants.  Skin:  No Jaundice.  Neurology: AAOX3, Non-focal  Vascular: No varicose vein, No cyanosis        Data: (reviewed by attending)                        12.0   9.25  )-----------( 142      ( 25 Apr 2023 04:50 )             35.9     Hgb trend:  12.0  04-25-23 @ 04:50  12.7  04-24-23 @ 11:10  13.0  04-24-23 @ 02:29  13.1  04-23-23 @ 23:00  13.0  04-23-23 @ 18:32  12.7  04-23-23 @ 02:04  13.2  04-22-23 @ 11:24        04-25    132<L>  |  97<L>  |  35<H>  ----------------------------<  104<H>  3.8   |  21  |  0.9    Ca    8.1<L>      25 Apr 2023 04:50  Phos  2.1     04-25  Mg     1.8     04-25    TPro  5.1<L>  /  Alb  2.5<L>  /  TBili  2.2<H>  /  DBili  1.1<H>  /  AST  294<H>  /  ALT  450<H>  /  AlkPhos  108  04-25    Liver panel trend:  TBili 2.2   /      /      /   AlkP 108   /   Tptn 5.1   /   Alb 2.5    /   DBili 1.1      04-25  TBili 2.4   /      /      /   AlkP 114   /   Tptn 5.3   /   Alb 2.6    /   DBili --      04-24  TBili 2.4   /      /      /   AlkP 120   /   Tptn 5.4   /   Alb 2.8    /   DBili --      04-23  TBili 2.3   /      /      /   AlkP 122   /   Tptn 5.3   /   Alb 2.6    /   DBili --      04-23  TBili 3.0   /   AST 2051   /      /   AlkP 128   /   Tptn 5.3   /   Alb 2.9    /   DBili --      04-22  TBili 3.1   /   AST 2216   /      /   AlkP 137   /   Tptn 5.7   /   Alb 3.0    /   DBili --      04-22  TBili 3.0   /   AST 1660   /      /   AlkP 132   /   Tptn 6.0   /   Alb 3.3    /   DBili 1.3      04-22  TBili 2.3   /      /      /   AlkP 135   /   Tptn 6.8   /   Alb 3.4    /   DBili --      04-21      PT/INR - ( 25 Apr 2023 04:50 )   PT: 19.30 sec;   INR: 1.67 ratio         PTT - ( 25 Apr 2023 04:50 )  PTT:100.0 sec       Radiology: (reviewed by attending)    US Abdomen Doppler:   ACC: [id number] EXAM:  US DPLX ABDOMEN   ORDERED BY: BING GUEVARA     PROCEDURE DATE:  04/23/2023          INTERPRETATION:  CLINICAL HISTORY: Rule out thrombosis, elevated LFTs.    TECHNIQUE: Color and spectral Doppler ultrasound of the portal venous   system, hepatic arteries, hepatic veins and IVC.    COMPARISON: Correlation with CT abdomen and pelvis 4/21/2023.    FINDINGS:    Patent inferior vena cava.  Patent splenic vein.  Patent left, right, and main portal veins.  Patent left, middle, and right hepatic veins.  Patent hepatic artery.    IMPRESSION:    No evidence of  venous thrombosis.    --- End of Report ---          CONRAD RAHMAN MD; Resident Radiologist  This document has been electronically signed.  OLAYINKA PEREZ MD; Attending Radiologist  This document has been electronically signed. Apr 24 2023  8:50AM (04-23-23 @ 13:14)     hepatology progress note:     Patient is a 77y old  Female who presents with a chief complaint of intra cardiac mass (24 Apr 2023 15:24)       Admitted on: 04-21-23    We are following the patient for: abnormal liver tests     Interval History:  No acute event ON, patient is on Dobutamine drip, can tolerate PO intake, had BM , no encephalopathy    PAST MEDICAL & SURGICAL HISTORY:  CHF, stage C      HTN (hypertension)          MEDICATIONS  (STANDING):  chlorhexidine 2% Cloths 1 Application(s) Topical daily  DOBUTamine Infusion 7.5 MICROgram(s)/kG/Min (7.14 mL/Hr) IV Continuous <Continuous>  guaifenesin/dextromethorphan Oral Liquid 15 milliLiter(s) Oral once  heparin  Infusion. 800 Unit(s)/Hr (8 mL/Hr) IV Continuous <Continuous>  melatonin 5 milliGRAM(s) Oral at bedtime  pantoprazole    Tablet 40 milliGRAM(s) Oral before breakfast  piperacillin/tazobactam IVPB.. 3.375 Gram(s) IV Intermittent every 8 hours    MEDICATIONS  (PRN):  heparin   Injectable 2500 Unit(s) IV Push every 6 hours PRN For aPTT between 40 - 57  heparin   Injectable 5000 Unit(s) IV Push every 6 hours PRN For aPTT less than 40      Allergies  epinephrine (Unknown)      Review of Systems:   Constitutional: Ne Fever, No chills, No weakness  ENT: No visual changes, No throat pain  Cardiovascular:  No Chest Pain, No Palpitations  Respiratory:  No Cough, No Dyspnea  Gastrointestinal:  As described in HPI  Neurological: No numbness or weakness  Skin: No rash, no itching    Physical Examination:  T(C): 36.4 (04-25-23 @ 05:00), Max: 36.6 (04-24-23 @ 16:00)  HR: 88 (04-25-23 @ 08:00) (82 - 93)  BP: 109/70 (04-25-23 @ 08:00) (82/62 - 111/67)  RR: 20 (04-25-23 @ 08:00) (20 - 30)  SpO2: 99% (04-25-23 @ 08:00) (96% - 100%)      04-24-23 @ 07:01  -  04-25-23 @ 07:00  --------------------------------------------------------  IN: 645 mL / OUT: 1260 mL / NET: -615 mL    04-25-23 @ 07:01  -  04-25-23 @ 09:31  --------------------------------------------------------  IN: 30.2 mL / OUT: 350 mL / NET: -319.8 mL      Constitutional: No acute distress.  Respiratory:  No signs of respiratory distress.  Cardiovascular:  S1 S2, Regular rate and rhythm.  Abdominal: Abdomen is soft, symmetric, and non-tender without distention. There are no visible lesions. Bowel sounds are present and normoactive in all four quadrants.  Skin:  No Jaundice.  Neurology: AAOX3, Non-focal  Vascular: No varicose vein, No cyanosis        Data: (reviewed by attending)                        12.0   9.25  )-----------( 142      ( 25 Apr 2023 04:50 )             35.9     Hgb trend:  12.0  04-25-23 @ 04:50  12.7  04-24-23 @ 11:10  13.0  04-24-23 @ 02:29  13.1  04-23-23 @ 23:00  13.0  04-23-23 @ 18:32  12.7  04-23-23 @ 02:04  13.2  04-22-23 @ 11:24        04-25    132<L>  |  97<L>  |  35<H>  ----------------------------<  104<H>  3.8   |  21  |  0.9    Ca    8.1<L>      25 Apr 2023 04:50  Phos  2.1     04-25  Mg     1.8     04-25    TPro  5.1<L>  /  Alb  2.5<L>  /  TBili  2.2<H>  /  DBili  1.1<H>  /  AST  294<H>  /  ALT  450<H>  /  AlkPhos  108  04-25    Liver panel trend:  TBili 2.2   /      /      /   AlkP 108   /   Tptn 5.1   /   Alb 2.5    /   DBili 1.1      04-25  TBili 2.4   /      /      /   AlkP 114   /   Tptn 5.3   /   Alb 2.6    /   DBili --      04-24  TBili 2.4   /      /      /   AlkP 120   /   Tptn 5.4   /   Alb 2.8    /   DBili --      04-23  TBili 2.3   /      /      /   AlkP 122   /   Tptn 5.3   /   Alb 2.6    /   DBili --      04-23  TBili 3.0   /   AST 2051   /      /   AlkP 128   /   Tptn 5.3   /   Alb 2.9    /   DBili --      04-22  TBili 3.1   /   AST 2216   /      /   AlkP 137   /   Tptn 5.7   /   Alb 3.0    /   DBili --      04-22  TBili 3.0   /   AST 1660   /      /   AlkP 132   /   Tptn 6.0   /   Alb 3.3    /   DBili 1.3      04-22  TBili 2.3   /      /      /   AlkP 135   /   Tptn 6.8   /   Alb 3.4    /   DBili --      04-21      PT/INR - ( 25 Apr 2023 04:50 )   PT: 19.30 sec;   INR: 1.67 ratio         PTT - ( 25 Apr 2023 04:50 )  PTT:100.0 sec       Radiology: (reviewed by attending)    US Abdomen Doppler:   ACC: [id number] EXAM:  US DPLX ABDOMEN   ORDERED BY: BING GUEVARA     PROCEDURE DATE:  04/23/2023          INTERPRETATION:  CLINICAL HISTORY: Rule out thrombosis, elevated LFTs.    TECHNIQUE: Color and spectral Doppler ultrasound of the portal venous   system, hepatic arteries, hepatic veins and IVC.    COMPARISON: Correlation with CT abdomen and pelvis 4/21/2023.    FINDINGS:    Patent inferior vena cava.  Patent splenic vein.  Patent left, right, and main portal veins.  Patent left, middle, and right hepatic veins.  Patent hepatic artery.    IMPRESSION:    No evidence of  venous thrombosis.    --- End of Report ---          CONRAD RAHMAN MD; Resident Radiologist  This document has been electronically signed.  OLAYINKA PEREZ MD; Attending Radiologist  This document has been electronically signed. Apr 24 2023  8:50AM (04-23-23 @ 13:14)

## 2023-04-25 NOTE — CONSULT NOTE ADULT - NS ATTEND AMEND GEN_ALL_CORE FT
78 y/o F with h/o chronic systolic HF, NICM s/p ICD admitted with worsening SOB since ~2 weeks ago. Patient reports feeling SOB even with mild exertion. Prior to that she was able to walk several blocks without any significant limitation. TTE showed LVEF <20% and RHC showed elevated filling pressures with low CO/CI at 2.0/1.26. She was started on dobutamine gtt to which she has reponded well. Patient was also found to have right perihilar mass with possible invasion of PA. patient remains grossly overloaded.       Continue inotropic support with dobutamine gtt   Bumex gtt + hypertonic saline   Monitor lactic acid and LFTs  Strict I/Os  Daily weight   Keep Mg >2.2 and K 4-5   Continue AC for LV thrombus   Start iv iron sucrose 200 mg daily x5   Thoracic surgery f/u for EBUS/FNA once patient stable  Discussed at length with patient, family at bedside and CCU team

## 2023-04-25 NOTE — CONSULT NOTE ADULT - NS ATTEND OPT1 GEN_ALL_CORE
Progress Notes by Bozena Sanabria CPT at 12/22/17 03:55 PM     Author:  Bozena Sanabria CPT Service:  (none) Author Type:  Technician     Filed:  12/26/17 12:05 PM Encounter Date:  12/12/2017 Status:  Signed     :  Bozena Sanabria CPT (Technician)            Received lens, Dalia Researcheymartita drawer[AC1.1M]  Left message on answering machine to call back.[AC1.1T]        Revision History        User Key Date/Time User Provider Type Action    > AC1.1 12/26/17 12:05 PM Bozena Sanabria CPT Technician Sign    M - Manual, T - Template             I attest my time as attending is greater than 50% of the total combined time spent on qualifying patient care activities by the PA/NP and attending.

## 2023-04-25 NOTE — PROGRESS NOTE ADULT - NS ATTEND AMEND GEN_ALL_CORE FT
Ms. Tellez is a 77-year-old female admitted for worsening shortness of breath, CT chest revealed right hilar adenopathy with possible involvement of the pulmonary artery. Significant pmh of HTN, HLD, nonischemic cardiomyopathy, HFrEF, s/p ICD and PPM. Patient currently in ICU undergoing cardiology assessment and treatment. I reviewed the CT images, not well defined hilar mass and does not seem amenable for EBUS/FNA but once patient is more compensated will benefit from bronchoscopy for diagnosis.   04/25/23: Undergoing cardiology treatment for decompensated CHF, improving clinically. I discussed with the patient CT findings and recommended bronchoscopy when she is more stable. She is on heparin gtt and dobutamine. Discussed with pulmonary medicine, they are going to follow her up and perform bronchoscopy when more stable.  Plan: will follow.

## 2023-04-25 NOTE — PROGRESS NOTE ADULT - SUBJECTIVE AND OBJECTIVE BOX
24H events:    Patient is a 77y old Female who presents with a chief complaint of intra cardiac mass (25 Apr 2023 09:30)    Primary diagnosis of Lung mass       Today is hospital day 4d.      Patient seen and examined at bedside. Reports no active complaints.     PAST MEDICAL & SURGICAL HISTORY  CHF, stage C    HTN (hypertension)      SOCIAL HISTORY:  Negative for smoking/alcohol/drug use.     ALLERGIES:  epinephrine (Unknown)    MEDICATIONS:  STANDING MEDICATIONS  chlorhexidine 2% Cloths 1 Application(s) Topical daily  DOBUTamine Infusion 7.5 MICROgram(s)/kG/Min IV Continuous <Continuous>  furosemide   Injectable 40 milliGRAM(s) IV Push every 12 hours  guaifenesin/dextromethorphan Oral Liquid 15 milliLiter(s) Oral once  heparin  Infusion. 800 Unit(s)/Hr IV Continuous <Continuous>  melatonin 5 milliGRAM(s) Oral at bedtime  pantoprazole    Tablet 40 milliGRAM(s) Oral before breakfast  piperacillin/tazobactam IVPB.. 3.375 Gram(s) IV Intermittent every 8 hours    PRN MEDICATIONS  heparin   Injectable 2500 Unit(s) IV Push every 6 hours PRN  heparin   Injectable 5000 Unit(s) IV Push every 6 hours PRN    VITALS:   T(F): 98.2  HR: 93  BP: 85/64  RR: 31  SpO2: 98%    LABS:                        12.0   9.25  )-----------( 142      ( 25 Apr 2023 04:50 )             35.9     04-25    132<L>  |  97<L>  |  35<H>  ----------------------------<  104<H>  3.8   |  21  |  0.9    Ca    8.1<L>      25 Apr 2023 04:50  Phos  2.1     04-25  Mg     1.8     04-25    TPro  5.1<L>  /  Alb  2.5<L>  /  TBili  2.2<H>  /  DBili  1.1<H>  /  AST  294<H>  /  ALT  450<H>  /  AlkPhos  108  04-25    PT/INR - ( 25 Apr 2023 04:50 )   PT: 19.30 sec;   INR: 1.67 ratio         PTT - ( 25 Apr 2023 04:50 )  PTT:100.0 sec    ABG - ( 25 Apr 2023 04:07 )  pH, Arterial: 7.54  pH, Blood: x     /  pCO2: 26    /  pO2: 94    / HCO3: 22    / Base Excess: 1.0   /  SaO2: 99.5              Lactate, Blood: 1.7 mmol/L (04-25-23 @ 04:50)          RADIOLOGY:    PHYSICAL EXAM:  GENERAL:  NAD  SKIN: No rashes or lesions  HEENT: Atraumatic. Normocephalic.   NECK: Supple, No JVD.   PULMONARY: decreased breath sounds B/L. No wheezing.   CVS: Normal S1, S2. Rate and Rhythm are regular.    ABDOMEN/GI: Soft, Nontender, Nondistended   MSK:  No clubbing or cyanosis   NEUROLOGIC:  moves all extremities   PSYCH: Awake and alert, confused

## 2023-04-25 NOTE — CONSULT NOTE ADULT - ASSESSMENT
Assessment:   77 year old woman with pmh of HTN, HLD, nonischemic cardiomyopathy, HFrEF, s/p ICD and PPM presents to the ED with 2 weeks of worsening shortness of breath.       IMPRESSION:  Cardiogenic shock on inotropic suppport  HFrEF 20%, NICM  RLL Mass/opacity/ possible Pneumonia, r/o malignancy  LV Mass, ?Thrombus  Transaminitis in the setting of hypotension, shock liver      Plan:  Patient is grossly fluid overloaded on exam  POCUS exam: IVC dilated, non collapsing, hepatic vein engorged  Give Bumex 2 mg IV push, then start a Bumex gtt at 1 mg/hr   Start 3% Hypertonic Saline 150ml BID (If infused throughout a central line, run over one hour, if a peripheral line is to be used run over 3 hours)   Continue inotropic support- dobutamine gtt at 7.5 mcg/kg/min   Once more euvolemic, will try to wean inotropic support   Get BMP twice daily with magnesium   Maintain potassium >4.0, Mg >2.2  Strict intake and output  Daily weight   Iron sat 6%, follow up ferritin level  Thoracic Surgery and pulm recs appreciated  Rest of care as per CCU team  Plan discussed with patient, family at bedside, and CCU team  Will continue to follow Assessment:   77 year old woman with pmh of HTN, HLD, nonischemic cardiomyopathy, HFrEF, s/p ICD and PPM presents to the ED with 2 weeks of worsening shortness of breath.       IMPRESSION:  Cardiogenic shock on inotropic suppport  HFrEF 20%, NICM  RLL Mass/opacity/ possible Pneumonia, r/o malignancy  LV Mass, ?Thrombus  Transaminitis in the setting of hypotension, shock liver      Plan:    Patient is grossly fluid overloaded on exam  POCUS exam: IVC dilated, non collapsing, hepatic vein engorged  Give Bumex 2 mg IV push, then start a Bumex gtt at 1 mg/hr   Start 3% Hypertonic Saline 150ml BID (If infused throughout a central line, run over one hour, if a peripheral line is to be used run over 3 hours)   Continue inotropic support- dobutamine gtt at 7.5 mcg/kg/min   Once more euvolemic, will try to wean inotropic support   Get BMP twice daily with magnesium   Maintain potassium >4.0, Mg >2.2  Strict intake and output  Daily weight   Iron sat 6%, follow up ferritin level  Thoracic Surgery and pulm recs appreciated  Rest of care as per CCU team  Plan discussed with patient, family at bedside, and CCU team  Will continue to follow

## 2023-04-25 NOTE — PROGRESS NOTE ADULT - ASSESSMENT
77 year old woman with PMHx of HTN, HLD, nonischemic cardiomyopathy, HFrEF s/p AICD presents to the ED with 2 weeks of worsening shortness of breath currently admitted for perihilar sift tissue mass in mediastinum. hepatology was consulted for elevated LFT. She denies any previous infections of the liver oe chronic liver disease in family, denies any herbal medications or supplements or abx or travel out side.   Has been taking tylenol intermittently as needed but denies excess intake.     #)Elevated liver enzymes predominantly hepatocellular (AST>ALT) with hyperbilurubinemia likely due to ischemic liver injury   -Normal LFt in 9/2022  -Admitted with LFT 2.3/135/151/114  -No hypotensive episodes documented in the system   -Denies any recent abx or herbal meds or OTC medications  -h/o tylenol intake as needed  -CT abdomen showed Hepatic steatosis is noted. A 1 cm hypodensity is noted within the right lobe of the liver, too small for further characterization.  -echo 8/2021 low EF 20 to 25%, mild TR, mod AS   -inc INR on heparin drip  -No encephalopathy (no asterixis)  -LFTs are trending down  - On dobutamine drip for cardiogenic shock  - duplex liver vasculature unremarkable    Recs:   -check HAV IgM/IgG, HBsAg, HBsAb, HBcAb IgM/IgG, HCV Ab, Anti HEV ALE, SMA, immunoglobulins panel, AMA,  ACE levels   - Check CMV, EBV and HSV PCR  -Trend LFT, INR  -Avoid hepatotoxic medications   - Pulmonary and cardiothoracic surgery follow up for possible bronchoscopy with EBUS and FNA,  - rest of care by cardiology team 77 year old woman with PMHx of HTN, HLD, nonischemic cardiomyopathy, HFrEF s/p AICD presents to the ED with 2 weeks of worsening shortness of breath currently admitted for perihilar sift tissue mass in mediastinum. hepatology was consulted for elevated LFT. She denies any previous infections of the liver oe chronic liver disease in family, denies any herbal medications or supplements or abx or travel out side.   Has been taking tylenol intermittently as needed but denies excess intake.     #)Elevated liver enzymes predominantly hepatocellular (AST>ALT) with hyperbilirubinemia likely due to ischemic liver injury   -Normal LFt in 9/2022  -Admitted with LFT 2.3/135/151/114  -No hypotensive episodes documented in the system   -Denies any recent abx or herbal meds or OTC medications  -h/o tylenol intake as needed  -CT abdomen showed Hepatic steatosis is noted. A 1 cm hypodensity is noted within the right lobe of the liver, too small for further characterization.  -echo 8/2021 low EF 20 to 25%, mild TR, mod AS   -inc INR on heparin drip  -No encephalopathy (no asterixis)  -LFTs are trending down  - On dobutamine drip for cardiogenic shock  - duplex liver vasculature unremarkable    Recs:   -check HAV IgM/IgG, HBsAg, HBsAb, HBcAb IgM/IgG, HCV Ab, Anti HEV ALE, SMA, immunoglobulins panel, AMA,  ACE levels   - Check CMV, EBV and HSV PCR  -Trend LFT, INR  -Avoid hepatotoxic medications   - Pulmonary and cardiothoracic surgery follow up for possible bronchoscopy with EBUS and FNA,  - rest of care by cardiology team

## 2023-04-26 LAB
ALBUMIN SERPL ELPH-MCNC: 3 G/DL — LOW (ref 3.5–5.2)
ALP SERPL-CCNC: 107 U/L — SIGNIFICANT CHANGE UP (ref 30–115)
ALT FLD-CCNC: 410 U/L — HIGH (ref 0–41)
ANION GAP SERPL CALC-SCNC: 12 MMOL/L — SIGNIFICANT CHANGE UP (ref 7–14)
ANION GAP SERPL CALC-SCNC: 12 MMOL/L — SIGNIFICANT CHANGE UP (ref 7–14)
APTT BLD: 56.4 SEC — HIGH (ref 27–39.2)
APTT BLD: 75.2 SEC — CRITICAL HIGH (ref 27–39.2)
APTT BLD: >200 SEC — CRITICAL HIGH (ref 27–39.2)
AST SERPL-CCNC: 257 U/L — HIGH (ref 0–41)
BASE EXCESS BLDA CALC-SCNC: 5.8 MMOL/L — HIGH (ref -2–3)
BASE EXCESS BLDMV CALC-SCNC: 5.6 MMOL/L — SIGNIFICANT CHANGE UP
BASE EXCESS BLDMV CALC-SCNC: 6.2 MMOL/L — SIGNIFICANT CHANGE UP
BASE EXCESS BLDMV CALC-SCNC: 7.2 MMOL/L — SIGNIFICANT CHANGE UP
BASE EXCESS BLDMV CALC-SCNC: 7.8 MMOL/L — SIGNIFICANT CHANGE UP
BASE EXCESS BLDMV CALC-SCNC: 8.2 MMOL/L — SIGNIFICANT CHANGE UP
BASOPHILS # BLD AUTO: 0.01 K/UL — SIGNIFICANT CHANGE UP (ref 0–0.2)
BASOPHILS NFR BLD AUTO: 0.1 % — SIGNIFICANT CHANGE UP (ref 0–1)
BILIRUB SERPL-MCNC: 2.5 MG/DL — HIGH (ref 0.2–1.2)
BUN SERPL-MCNC: 20 MG/DL — SIGNIFICANT CHANGE UP (ref 10–20)
BUN SERPL-MCNC: 24 MG/DL — HIGH (ref 10–20)
CALCIUM SERPL-MCNC: 7.8 MG/DL — LOW (ref 8.4–10.4)
CALCIUM SERPL-MCNC: 7.9 MG/DL — LOW (ref 8.4–10.5)
CHLORIDE SERPL-SCNC: 96 MMOL/L — LOW (ref 98–110)
CHLORIDE SERPL-SCNC: 96 MMOL/L — LOW (ref 98–110)
CMV DNA CSF QL NAA+PROBE: 138 — SIGNIFICANT CHANGE UP
CMV DNA SPEC NAA+PROBE-LOG#: 2.14 LOG10IU/ML — HIGH
CMV IGG FLD QL: >10 U/ML — HIGH
CMV IGG SERPL-IMP: POSITIVE
CO2 SERPL-SCNC: 25 MMOL/L — SIGNIFICANT CHANGE UP (ref 17–32)
CO2 SERPL-SCNC: 27 MMOL/L — SIGNIFICANT CHANGE UP (ref 17–32)
CREAT SERPL-MCNC: 0.7 MG/DL — SIGNIFICANT CHANGE UP (ref 0.7–1.5)
CREAT SERPL-MCNC: 0.8 MG/DL — SIGNIFICANT CHANGE UP (ref 0.7–1.5)
EBV EA AB SER IA-ACNC: 70 U/ML — HIGH
EBV EA AB TITR SER IF: POSITIVE
EBV EA IGG SER-ACNC: POSITIVE
EBV NA IGG SER IA-ACNC: 82.6 U/ML — HIGH
EBV PATRN SPEC IB-IMP: SIGNIFICANT CHANGE UP
EBV VCA IGG AVIDITY SER QL IA: POSITIVE
EBV VCA IGM SER IA-ACNC: <10 U/ML — SIGNIFICANT CHANGE UP
EBV VCA IGM SER IA-ACNC: >750 U/ML — HIGH
EBV VCA IGM TITR FLD: NEGATIVE — SIGNIFICANT CHANGE UP
EGFR: 75 ML/MIN/1.73M2 — SIGNIFICANT CHANGE UP
EGFR: 88 ML/MIN/1.73M2 — SIGNIFICANT CHANGE UP
EOSINOPHIL # BLD AUTO: 0.02 K/UL — SIGNIFICANT CHANGE UP (ref 0–0.7)
EOSINOPHIL NFR BLD AUTO: 0.2 % — SIGNIFICANT CHANGE UP (ref 0–8)
GAS PNL BLDA: SIGNIFICANT CHANGE UP
GAS PNL BLDMV: SIGNIFICANT CHANGE UP
GLUCOSE SERPL-MCNC: 135 MG/DL — HIGH (ref 70–99)
GLUCOSE SERPL-MCNC: 143 MG/DL — HIGH (ref 70–99)
HAV IGG SER QL IA: REACTIVE
HBV CORE AB SER-ACNC: SIGNIFICANT CHANGE UP
HBV DNA # SERPL NAA+PROBE: SIGNIFICANT CHANGE UP IU/ML
HBV DNA SERPL NAA+PROBE-LOG#: SIGNIFICANT CHANGE UP LOGIU/ML
HBV SURFACE AB SER-ACNC: SIGNIFICANT CHANGE UP
HBV SURFACE AG SER-ACNC: SIGNIFICANT CHANGE UP
HCO3 BLDA-SCNC: 28 MMOL/L — SIGNIFICANT CHANGE UP (ref 21–28)
HCO3 BLDMV-SCNC: 29 MMOL/L — SIGNIFICANT CHANGE UP
HCO3 BLDMV-SCNC: 30 MMOL/L — SIGNIFICANT CHANGE UP
HCO3 BLDMV-SCNC: 31 MMOL/L — SIGNIFICANT CHANGE UP
HCO3 BLDMV-SCNC: 31 MMOL/L — SIGNIFICANT CHANGE UP
HCO3 BLDMV-SCNC: 32 MMOL/L — SIGNIFICANT CHANGE UP
HCT VFR BLD CALC: 35.8 % — LOW (ref 37–47)
HGB BLD-MCNC: 12.2 G/DL — SIGNIFICANT CHANGE UP (ref 12–16)
HOROWITZ INDEX BLDMV+IHG-RTO: 28 — SIGNIFICANT CHANGE UP
HSV1 IGG SER-ACNC: 46.6 INDEX — HIGH
HSV1 IGG SER-ACNC: 46.6 INDEX — HIGH
HSV1 IGG SERPL QL IA: POSITIVE
HSV1 IGG SERPL QL IA: POSITIVE
HSV2 IGG FLD-ACNC: 0.11 INDEX — SIGNIFICANT CHANGE UP
HSV2 IGG FLD-ACNC: 0.11 INDEX — SIGNIFICANT CHANGE UP
HSV2 IGG SERPL QL IA: NEGATIVE — SIGNIFICANT CHANGE UP
HSV2 IGG SERPL QL IA: NEGATIVE — SIGNIFICANT CHANGE UP
IMM GRANULOCYTES NFR BLD AUTO: 0.5 % — HIGH (ref 0.1–0.3)
LYMPHOCYTES # BLD AUTO: 1.39 K/UL — SIGNIFICANT CHANGE UP (ref 1.2–3.4)
LYMPHOCYTES # BLD AUTO: 14.3 % — LOW (ref 20.5–51.1)
MAGNESIUM SERPL-MCNC: 1.8 MG/DL — SIGNIFICANT CHANGE UP (ref 1.8–2.4)
MAGNESIUM SERPL-MCNC: 3.3 MG/DL — CRITICAL HIGH (ref 1.8–2.4)
MCHC RBC-ENTMCNC: 30.7 PG — SIGNIFICANT CHANGE UP (ref 27–31)
MCHC RBC-ENTMCNC: 34.1 G/DL — SIGNIFICANT CHANGE UP (ref 32–37)
MCV RBC AUTO: 90.2 FL — SIGNIFICANT CHANGE UP (ref 81–99)
MITOCHONDRIA AB SER-ACNC: SIGNIFICANT CHANGE UP
MONOCYTES # BLD AUTO: 0.74 K/UL — HIGH (ref 0.1–0.6)
MONOCYTES NFR BLD AUTO: 7.6 % — SIGNIFICANT CHANGE UP (ref 1.7–9.3)
NEUTROPHILS # BLD AUTO: 7.54 K/UL — HIGH (ref 1.4–6.5)
NEUTROPHILS NFR BLD AUTO: 77.3 % — HIGH (ref 42.2–75.2)
NRBC # BLD: 0 /100 WBCS — SIGNIFICANT CHANGE UP (ref 0–0)
O2 CT VFR BLD CALC: 26 MMHG — SIGNIFICANT CHANGE UP
O2 CT VFR BLD CALC: 29 MMHG — SIGNIFICANT CHANGE UP
O2 CT VFR BLD CALC: 29 MMHG — SIGNIFICANT CHANGE UP
O2 CT VFR BLD CALC: 30 MMHG — SIGNIFICANT CHANGE UP
O2 CT VFR BLD CALC: 36 MMHG — SIGNIFICANT CHANGE UP
PCO2 BLDA: 33 MMHG — SIGNIFICANT CHANGE UP (ref 25–48)
PCO2 BLDMV: 36 MMHG — SIGNIFICANT CHANGE UP
PCO2 BLDMV: 37 MMHG — SIGNIFICANT CHANGE UP
PCO2 BLDMV: 37 MMHG — SIGNIFICANT CHANGE UP
PCO2 BLDMV: 40 MMHG — SIGNIFICANT CHANGE UP
PCO2 BLDMV: 41 MMHG — SIGNIFICANT CHANGE UP
PH BLDA: 7.54 — HIGH (ref 7.35–7.45)
PH BLDMV: 7.49 — SIGNIFICANT CHANGE UP
PH BLDMV: 7.51 — SIGNIFICANT CHANGE UP
PH BLDMV: 7.53 — SIGNIFICANT CHANGE UP
PLATELET # BLD AUTO: 158 K/UL — SIGNIFICANT CHANGE UP (ref 130–400)
PMV BLD: 11.9 FL — HIGH (ref 7.4–10.4)
PO2 BLDA: 113 MMHG — HIGH (ref 83–108)
POTASSIUM SERPL-MCNC: 4.3 MMOL/L — SIGNIFICANT CHANGE UP (ref 3.5–5)
POTASSIUM SERPL-MCNC: 4.3 MMOL/L — SIGNIFICANT CHANGE UP (ref 3.5–5)
POTASSIUM SERPL-SCNC: 4.3 MMOL/L — SIGNIFICANT CHANGE UP (ref 3.5–5)
POTASSIUM SERPL-SCNC: 4.3 MMOL/L — SIGNIFICANT CHANGE UP (ref 3.5–5)
PROT SERPL-MCNC: 5.3 G/DL — LOW (ref 6–8.3)
PROT SERPL-MCNC: 5.3 G/DL — LOW (ref 6–8.3)
PROT SERPL-MCNC: 5.6 G/DL — LOW (ref 6–8)
RBC # BLD: 3.97 M/UL — LOW (ref 4.2–5.4)
RBC # FLD: 13.6 % — SIGNIFICANT CHANGE UP (ref 11.5–14.5)
SAO2 % BLDA: 99 % — HIGH (ref 94–98)
SAO2 % BLDMV: 45.1 % — SIGNIFICANT CHANGE UP
SAO2 % BLDMV: 47.4 % — SIGNIFICANT CHANGE UP
SAO2 % BLDMV: 48.1 % — SIGNIFICANT CHANGE UP
SAO2 % BLDMV: 51.3 % — SIGNIFICANT CHANGE UP
SAO2 % BLDMV: 59.5 % — SIGNIFICANT CHANGE UP
SMOOTH MUSCLE AB SER-ACNC: SIGNIFICANT CHANGE UP
SODIUM SERPL-SCNC: 133 MMOL/L — LOW (ref 135–146)
SODIUM SERPL-SCNC: 135 MMOL/L — SIGNIFICANT CHANGE UP (ref 135–146)
TROPONIN T SERPL-MCNC: <0.01 NG/ML — SIGNIFICANT CHANGE UP
VZV IGG FLD QL IA: 947.6 INDEX — SIGNIFICANT CHANGE UP
VZV IGG FLD QL IA: POSITIVE — SIGNIFICANT CHANGE UP
WBC # BLD: 9.75 K/UL — SIGNIFICANT CHANGE UP (ref 4.8–10.8)
WBC # FLD AUTO: 9.75 K/UL — SIGNIFICANT CHANGE UP (ref 4.8–10.8)

## 2023-04-26 PROCEDURE — 71045 X-RAY EXAM CHEST 1 VIEW: CPT | Mod: 26

## 2023-04-26 PROCEDURE — 93010 ELECTROCARDIOGRAM REPORT: CPT

## 2023-04-26 PROCEDURE — 71045 X-RAY EXAM CHEST 1 VIEW: CPT | Mod: 26,77

## 2023-04-26 PROCEDURE — 99291 CRITICAL CARE FIRST HOUR: CPT | Mod: 25

## 2023-04-26 PROCEDURE — 99233 SBSQ HOSP IP/OBS HIGH 50: CPT

## 2023-04-26 RX ORDER — DOBUTAMINE HCL 250MG/20ML
5 VIAL (ML) INTRAVENOUS
Qty: 1000 | Refills: 0 | Status: DISCONTINUED | OUTPATIENT
Start: 2023-04-26 | End: 2023-04-28

## 2023-04-26 RX ORDER — IRON SUCROSE 20 MG/ML
200 INJECTION, SOLUTION INTRAVENOUS EVERY 24 HOURS
Refills: 0 | Status: COMPLETED | OUTPATIENT
Start: 2023-04-26 | End: 2023-04-30

## 2023-04-26 RX ORDER — BUMETANIDE 0.25 MG/ML
1 INJECTION INTRAMUSCULAR; INTRAVENOUS
Qty: 20 | Refills: 0 | Status: DISCONTINUED | OUTPATIENT
Start: 2023-04-26 | End: 2023-04-28

## 2023-04-26 RX ORDER — SODIUM CHLORIDE 5 G/100ML
150 INJECTION, SOLUTION INTRAVENOUS
Refills: 0 | Status: DISCONTINUED | OUTPATIENT
Start: 2023-04-27 | End: 2023-04-27

## 2023-04-26 RX ORDER — MAGNESIUM SULFATE 500 MG/ML
2 VIAL (ML) INJECTION ONCE
Refills: 0 | Status: COMPLETED | OUTPATIENT
Start: 2023-04-26 | End: 2023-04-26

## 2023-04-26 RX ORDER — POTASSIUM CHLORIDE 20 MEQ
20 PACKET (EA) ORAL
Refills: 0 | Status: COMPLETED | OUTPATIENT
Start: 2023-04-26 | End: 2023-04-26

## 2023-04-26 RX ORDER — SODIUM CHLORIDE 5 G/100ML
150 INJECTION, SOLUTION INTRAVENOUS
Refills: 0 | Status: DISCONTINUED | OUTPATIENT
Start: 2023-04-26 | End: 2023-04-27

## 2023-04-26 RX ORDER — RIVAROXABAN 15 MG-20MG
20 KIT ORAL
Refills: 0 | Status: DISCONTINUED | OUTPATIENT
Start: 2023-04-26 | End: 2023-04-26

## 2023-04-26 RX ADMIN — PIPERACILLIN AND TAZOBACTAM 25 GRAM(S): 4; .5 INJECTION, POWDER, LYOPHILIZED, FOR SOLUTION INTRAVENOUS at 05:42

## 2023-04-26 RX ADMIN — HEPARIN SODIUM 700 UNIT(S)/HR: 5000 INJECTION INTRAVENOUS; SUBCUTANEOUS at 07:23

## 2023-04-26 RX ADMIN — PIPERACILLIN AND TAZOBACTAM 25 GRAM(S): 4; .5 INJECTION, POWDER, LYOPHILIZED, FOR SOLUTION INTRAVENOUS at 21:36

## 2023-04-26 RX ADMIN — CHLORHEXIDINE GLUCONATE 1 APPLICATION(S): 213 SOLUTION TOPICAL at 12:45

## 2023-04-26 RX ADMIN — Medication 40 MILLIEQUIVALENT(S): at 04:30

## 2023-04-26 RX ADMIN — IRON SUCROSE 110 MILLIGRAM(S): 20 INJECTION, SOLUTION INTRAVENOUS at 15:47

## 2023-04-26 RX ADMIN — HEPARIN SODIUM 700 UNIT(S)/HR: 5000 INJECTION INTRAVENOUS; SUBCUTANEOUS at 04:32

## 2023-04-26 RX ADMIN — HEPARIN SODIUM 600 UNIT(S)/HR: 5000 INJECTION INTRAVENOUS; SUBCUTANEOUS at 23:05

## 2023-04-26 RX ADMIN — PANTOPRAZOLE SODIUM 40 MILLIGRAM(S): 20 TABLET, DELAYED RELEASE ORAL at 08:26

## 2023-04-26 RX ADMIN — HEPARIN SODIUM 0 UNIT(S)/HR: 5000 INJECTION INTRAVENOUS; SUBCUTANEOUS at 14:53

## 2023-04-26 RX ADMIN — Medication 25 GRAM(S): at 02:32

## 2023-04-26 RX ADMIN — PIPERACILLIN AND TAZOBACTAM 25 GRAM(S): 4; .5 INJECTION, POWDER, LYOPHILIZED, FOR SOLUTION INTRAVENOUS at 15:48

## 2023-04-26 RX ADMIN — Medication 50 MILLIEQUIVALENT(S): at 02:32

## 2023-04-26 RX ADMIN — HEPARIN SODIUM 500 UNIT(S)/HR: 5000 INJECTION INTRAVENOUS; SUBCUTANEOUS at 15:57

## 2023-04-26 RX ADMIN — SODIUM CHLORIDE 50 MILLILITER(S): 5 INJECTION, SOLUTION INTRAVENOUS at 22:34

## 2023-04-26 RX ADMIN — Medication 50 MILLIEQUIVALENT(S): at 03:47

## 2023-04-26 RX ADMIN — SODIUM CHLORIDE 50 MILLILITER(S): 5 INJECTION, SOLUTION INTRAVENOUS at 10:26

## 2023-04-26 RX ADMIN — Medication 5 MILLIGRAM(S): at 21:36

## 2023-04-26 RX ADMIN — Medication 7.14 MICROGRAM(S)/KG/MIN: at 04:32

## 2023-04-26 NOTE — DIETITIAN INITIAL EVALUATION ADULT - PERTINENT LABORATORY DATA
04-26    133<L>  |  96<L>  |  24<H>  ----------------------------<  135<H>  4.3   |  25  |  0.8    Ca    7.9<L>      26 Apr 2023 04:50  Phos  2.1     04-25  Mg     3.3     04-26    TPro  5.6<L>  /  Alb  3.0<L>  /  TBili  2.5<H>  /  DBili  x   /  AST  257<H>  /  ALT  410<H>  /  AlkPhos  107  04-26  A1C with Estimated Average Glucose Result: 6.2 % (04-25-23 @ 04:50)

## 2023-04-26 NOTE — DIETITIAN INITIAL EVALUATION ADULT - NAME AND PHONE
Kitty [phone] or TEAMS    Nutrition Interventions: Meals, snacks, medical nutrition supplements; Nutrition Monitoring: Diet order, PO intake, weights, labs, NFPF, body composition, BM and tolerance to medical food supplements

## 2023-04-26 NOTE — PROGRESS NOTE ADULT - SUBJECTIVE AND OBJECTIVE BOX
Date of Admission: 23    Interval History: Patient     CHIEF COMPLAINT: Patient is a 77y old  Female who presents with a chief complaint of intra cardiac mass (2023 16:11)    HISTORY OF PRESENT ILLNESS: 77 year old woman with PMHx of HTN, HLD, nonischemic cardiomyopathy, HFrEF s/p AICD presents to the ED with 2 weeks of worsening shortness of breath. Patient and her son state patient has been experiencing shortness of breath that has progressed from present on activity to now shortness of breath during conversation. Patient also notes an increasing dry cough over this time period. Denies history of smoking, fevers/chills, chest pain, nausea/vomiting, bloody sputum, dark/tarry/bloody bowel movements, upper respiratory symptoms.   In ED, patient's HR is 110 and saturating 98% on RA  CT C/A/P revealing cardiomegaly with evidence of right heart strain, R hilar lymphadenopathy with R perihilar soft tissue density with indentation/possible invasion of right pulmonary artery, and narrowing of proximal R lobar and segmental branches with associated consolidation of right lower lobe. 2.2 x 2.8 cm filling defect noted in apex of left ventricle.   Last ECHO 2021 revealing EF 20-25%, mild Mr, mild TR, mild Pulm HTN  Dimer 2600, trops negative BNP 11070  Duplex venous LE negative for DVT; CTA chest negative for PE  Cr 1.4 (0.7 in ), Na+ 128, T.Benjamin 2.3, LFTs elevated (hemolyzed)  Lactate 5.8-->4.6  Admitted to SDU (2023 01:10)      Patient reports patient was at baseline (active, ambulating long distances without issue) until about 2 weeks ago when patient began to have progressively worsening SOB with a dry cough. Patient reports Dr. Guerrero as per primary cardiologist, and Dr. Sherwood as her heart failure specialist (last seen in office in ). Endorses compliance with home medications. Family at bedside concerned about patient's recent weight loss as well.         PAST MEDICAL & SURGICAL HISTORY:  CHF, stage C  HTN (hypertension)      FAMILY HISTORY: No pertinent family history of premature cardiovascular disease in first degree relatives.    SOCIAL HISTORY:  Denies smoking, alcohol, or drug use    Allergies  epinephrine (Unknown)    Intolerances      PHYSICAL EXAM:  T(C): 35.6 (2023 08:00), Max: 36.8 (2023 12:00)  T(F): 96 (2023 08:00), Max: 98.2 (2023 12:00)  HR: 95 (2023 09:00) (92 - 101)  BP: 92/68 (2023 09:00) (85/64 - 118/73)  BP(mean): 75 (2023 09:00) (63 - 91)  ABP: 111/69 (2023 09:00) (63/44 - 127/81)  ABP(mean): 82 (:00) (-7 - 95)  RR: 24 (:) (21 - 34)  SpO2: 97% (:00) (94% - 100%)    O2 Parameters below as of :00  Patient On (Oxygen Delivery Method): room air        General Appearance: thin, frail, normal for age and gender. 	  Neck: +central line  Eyes: Extra Ocular muscles intact.   Cardiovascular: regular rate and rhythm S1 S2, No edema  Respiratory: decreased	  Psychiatry: Alert and oriented x 3, Mood & affect appropriate  Gastrointestinal:  Soft, Non-tender  Skin/Integumentary: No rashes, No ecchymoses, No cyanosis	  Neurologic: Non-focal  Musculoskeletal/ extremities: Normal range of motion, No clubbing, cyanosis or edema  Vascular: Peripheral pulses palpable 2+ bilaterally        LABS:	 	    CBC Full  -  ( 2023 04:50 )  WBC Count : 9.75 K/uL  RBC Count : 3.97 M/uL  Hemoglobin : 12.2 g/dL  Hematocrit : 35.8 %  Platelet Count - Automated : 158 K/uL  Mean Cell Volume : 90.2 fL  Mean Cell Hemoglobin : 30.7 pg  Mean Cell Hemoglobin Concentration : 34.1 g/dL  Auto Neutrophil # : 7.54 K/uL  Auto Lymphocyte # : 1.39 K/uL  Auto Monocyte # : 0.74 K/uL  Auto Eosinophil # : 0.02 K/uL  Auto Basophil # : 0.01 K/uL  Auto Neutrophil % : 77.3 %  Auto Lymphocyte % : 14.3 %  Auto Monocyte % : 7.6 %  Auto Eosinophil % : 0.2 %  Auto Basophil % : 0.1 %    Comprehensive Metabolic Panel in AM (23 @ 04:50)    Sodium, Serum: 133 mmol/L   Potassium, Serum: 4.3 mmol/L   Chloride, Serum: 96 mmol/L   Carbon Dioxide, Serum: 25 mmol/L   Anion Gap, Serum: 12 mmol/L   Blood Urea Nitrogen, Serum: 24 mg/dL   Creatinine, Serum: 0.8 mg/dL   Glucose, Serum: 135 mg/dL   Calcium, Total Serum: 7.9 mg/dL   Protein Total, Serum: 5.6 g/dL   Albumin, Serum: 3.0 g/dL   Bilirubin Total, Serum: 2.5 mg/dL   Alkaline Phosphatase, Serum: 107 U/L   Aspartate Aminotransferase (AST/SGOT): 257 U/L   Alanine Aminotransferase (ALT/SGPT): 410 U/L   eGFR: 75: The estimated glomerular filtration rate (eGFR) is calculated using the   CKD-EPI creatinine equation, which does not have a coefficient for  race and is validated in individuals 18 years of age and older (N Engl J  Med ; [id number]:[id number]). Creatinine-based eGFR may be inaccurate in  various situations including but not limited to extremes of muscle mass,  altered dietary protein intake, or medications that affect renal tubular  creatinine secretion. mL/min/1.73m2      CARDIAC MARKERS:  Pro-Brain Natriuretic Peptide: 10908 pg/mL (23 @ 11:43)      TELEMETRY EVENTS: 	    EC23    Ventricular Rate 85 BPM  Atrial Rate 85 BPM  P-R Interval 122 ms  QRS Duration 112 ms  Q-T Interval 408 ms  QTC Calculation(Bazett) 485 ms  P Axis 40 degrees  R Axis -40 degrees  T Axis 94 degrees    Diagnosis Line Sinus rhythm withoccasional Premature ventricular complexes  Possible Left atrial enlargement  Left axis deviation  Incomplete right bundle branch block  Septal infarct , age undetermined  Abnormal ECG    Confirmed by Stacie Franco MD ([id number]) on 2023 7:50:12 AM  	  	    PREVIOUS DIAGNOSTIC TESTING:    TTE 23    Summary:   1. Left ventricular ejection fraction, by visual estimation, is <20%.   2. Severely decreased global left ventricular systolic function.   3. Severely enlarged left atrium.   4. Elevated mean left atrial pressure.   5. Large, fixed thrombus vs. mass on the apical wall of the left   ventricle.   6. Spectral Doppler shows pseudonormal pattern of left ventricular   myocardial filling (Grade II diastolic dysfunction).   7. Moderately reduced RV systolic function.   8. Mildly enlarged right atrium.   9. Moderate mitral valve regurgitation.  10. Moderate-severe tricuspid regurgitation.  11. Mild pulmonic valve regurgitation.  12. Estimated pulmonary artery systolic pressure is 42.9 mmHg assuming a   right atrial pressure of 15 mmHg, which is consistent withmild pulmonary   hypertension.  13. Endocardial visualization was enhanced with intravenous echo contrast.      Right Heart Catheterization 23    FINDINGS:   Right Heart Cath  RA - 12  RV - 51//13  PA -   PCWP - 20    CO/CI Thermodilusion - 2.1/1.27  CO/CI Radha - 2.09/1.26    SVR (MAP 79 / RA 12 / CO 2.1) = 2552 dyn  PVR = 5.71 henriquez      POST-OP DIAGNOSIS:    [x] Decreased cardiac output with increased SVR + PVR + PCWP consistent with cardiogenic shock.    Combined pre and post capillary PH.   	    Home Medications:  atorvastatin 20 mg oral tablet: 1 tab(s) orally once a day (04 Aug 2021 10:05)  carvedilol 6.25 mg oral tablet: 1 tab(s) orally 2 times a day (2023 02:02)  Entresto 49 mg-51 mg oral tablet: 1 tab(s) orally 2 times a day (04 Aug 2021 10:05)  Farxiga 10 mg oral tablet: 1 tab(s) orally once a day (2023 02:05)  Lasix 20 mg oral tablet: 1 tab(s) orally once a day (2023 02:04)  spironolactone 25 mg oral tablet: 1 tab(s) orally once a day (04 Aug 2021 10:05)    MEDICATIONS  (STANDING):  chlorhexidine 2% Cloths 1 Application(s) Topical daily  DOBUTamine Infusion 7 MICROgram(s)/kG/Min (6.67 mL/Hr) IV Continuous <Continuous>  guaifenesin/dextromethorphan Oral Liquid 15 milliLiter(s) Oral once  heparin  Infusion. 800 Unit(s)/Hr (8 mL/Hr) IV Continuous <Continuous>  melatonin 5 milliGRAM(s) Oral at bedtime  pantoprazole    Tablet 40 milliGRAM(s) Oral before breakfast  piperacillin/tazobactam IVPB.. 3.375 Gram(s) IV Intermittent every 8 hours  potassium chloride    Tablet ER 40 milliEquivalent(s) Oral every 4 hours    MEDICATIONS  (PRN):  heparin   Injectable 5000 Unit(s) IV Push every 6 hours PRN For aPTT less than 40  heparin   Injectable 2500 Unit(s) IV Push every 6 hours PRN For aPTT between 40 - 57         Date of Admission: 23    Interval History: Patient is sitting up in bed, in NAD. Family present at bedside. Patient reports no new complaints, denies SOB at this time. Currently on dobutamine gtt and Bumex gtt + hypertonic saline. 2.6L 24hr UOP, net negative 1.7L. Kidney function stable. Patient remains fluid overloaded.     CHIEF COMPLAINT: Patient is a 77y old  Female who presents with a chief complaint of intra cardiac mass (2023 16:11)    HISTORY OF PRESENT ILLNESS: 77 year old woman with PMHx of HTN, HLD, nonischemic cardiomyopathy, HFrEF s/p AICD presents to the ED with 2 weeks of worsening shortness of breath. Patient and her son state patient has been experiencing shortness of breath that has progressed from present on activity to now shortness of breath during conversation. Patient also notes an increasing dry cough over this time period. Denies history of smoking, fevers/chills, chest pain, nausea/vomiting, bloody sputum, dark/tarry/bloody bowel movements, upper respiratory symptoms.   In ED, patient's HR is 110 and saturating 98% on RA  CT C/A/P revealing cardiomegaly with evidence of right heart strain, R hilar lymphadenopathy with R perihilar soft tissue density with indentation/possible invasion of right pulmonary artery, and narrowing of proximal R lobar and segmental branches with associated consolidation of right lower lobe. 2.2 x 2.8 cm filling defect noted in apex of left ventricle.   Last ECHO 2021 revealing EF 20-25%, mild Mr, mild TR, mild Pulm HTN  Dimer 2600, trops negative BNP 65205  Duplex venous LE negative for DVT; CTA chest negative for PE  Cr 1.4 (0.7 in ), Na+ 128, T.Benjamin 2.3, LFTs elevated (hemolyzed)  Lactate 5.8-->4.6  Admitted to SDU (2023 01:10)      Patient reports patient was at baseline (active, ambulating long distances without issue) until about 2 weeks ago when patient began to have progressively worsening SOB with a dry cough. Patient reports Dr. Guerrero as per primary cardiologist, and Dr. Sherwood as her heart failure specialist (last seen in office in ). Endorses compliance with home medications. Family at bedside concerned about patient's recent weight loss as well.         PAST MEDICAL & SURGICAL HISTORY:  CHF, stage C  HTN (hypertension)      FAMILY HISTORY: No pertinent family history of premature cardiovascular disease in first degree relatives.    SOCIAL HISTORY:  Denies smoking, alcohol, or drug use      Allergies  epinephrine (Unknown)    Intolerances      PHYSICAL EXAM:  T(C): 35.6 (2023 08:00), Max: 36.8 (2023 12:00)  T(F): 96 (2023 08:00), Max: 98.2 (2023 12:00)  HR: 95 (2023 09:00) (92 - 101)  BP: 92/68 (2023 09:00) (85/64 - 118/73)  BP(mean): 75 (2023 09:00) (63 - 91)  ABP: 111/69 (2023 09:00) (63/44 - 127/81)  ABP(mean): 82 (2023 09:00) (-7 - 95)  RR: 24 (2023 09:00) (21 - 34)  SpO2: 97% (2023 09:00) (94% - 100%)    O2 Parameters below as of 2023 09:00  Patient On (Oxygen Delivery Method): room air        General Appearance: thin, frail, normal for age and gender. 	  Neck: +central line  Eyes: Extra Ocular muscles intact.   Cardiovascular: regular rate and rhythm S1 S2, No edema  Respiratory: decreased	  Psychiatry: Alert and oriented x 3, Mood & affect appropriate  Gastrointestinal:  Soft, Non-tender  Skin/Integumentary: No rashes, No ecchymoses, No cyanosis	  Neurologic: Non-focal  Musculoskeletal/ extremities: Normal range of motion, No clubbing, cyanosis or edema  Vascular: Peripheral pulses palpable 2+ bilaterally          LABS:	 	    CBC Full  -  ( 2023 04:50 )  WBC Count : 9.75 K/uL  RBC Count : 3.97 M/uL  Hemoglobin : 12.2 g/dL  Hematocrit : 35.8 %  Platelet Count - Automated : 158 K/uL  Mean Cell Volume : 90.2 fL  Mean Cell Hemoglobin : 30.7 pg  Mean Cell Hemoglobin Concentration : 34.1 g/dL  Auto Neutrophil # : 7.54 K/uL  Auto Lymphocyte # : 1.39 K/uL  Auto Monocyte # : 0.74 K/uL  Auto Eosinophil # : 0.02 K/uL  Auto Basophil # : 0.01 K/uL  Auto Neutrophil % : 77.3 %  Auto Lymphocyte % : 14.3 %  Auto Monocyte % : 7.6 %  Auto Eosinophil % : 0.2 %  Auto Basophil % : 0.1 %    Comprehensive Metabolic Panel in AM (23 @ 04:50)    Sodium, Serum: 133 mmol/L   Potassium, Serum: 4.3 mmol/L   Chloride, Serum: 96 mmol/L   Carbon Dioxide, Serum: 25 mmol/L   Anion Gap, Serum: 12 mmol/L   Blood Urea Nitrogen, Serum: 24 mg/dL   Creatinine, Serum: 0.8 mg/dL   Glucose, Serum: 135 mg/dL   Calcium, Total Serum: 7.9 mg/dL   Protein Total, Serum: 5.6 g/dL   Albumin, Serum: 3.0 g/dL   Bilirubin Total, Serum: 2.5 mg/dL   Alkaline Phosphatase, Serum: 107 U/L   Aspartate Aminotransferase (AST/SGOT): 257 U/L   Alanine Aminotransferase (ALT/SGPT): 410 U/L   eGFR: 75: The estimated glomerular filtration rate (eGFR) is calculated using the   CKD-EPI creatinine equation, which does not have a coefficient for  race and is validated in individuals 18 years of age and older (N Engl J  Med ; [id number]:[id number]). Creatinine-based eGFR may be inaccurate in  various situations including but not limited to extremes of muscle mass,  altered dietary protein intake, or medications that affect renal tubular  creatinine secretion. mL/min/1.73m2      CARDIAC MARKERS:  Pro-Brain Natriuretic Peptide: 41254 pg/mL (23 @ 11:43)      TELEMETRY EVENTS: 	    EC23    Ventricular Rate 85 BPM  Atrial Rate 85 BPM  P-R Interval 122 ms  QRS Duration 112 ms  Q-T Interval 408 ms  QTC Calculation(Bazett) 485 ms  P Axis 40 degrees  R Axis -40 degrees  T Axis 94 degrees    Diagnosis Line Sinus rhythm withoccasional Premature ventricular complexes  Possible Left atrial enlargement  Left axis deviation  Incomplete right bundle branch block  Septal infarct , age undetermined  Abnormal ECG    Confirmed by Stacie Franco MD ([id number]) on 2023 7:50:12 AM  	  	    PREVIOUS DIAGNOSTIC TESTING:    TTE 23    Summary:   1. Left ventricular ejection fraction, by visual estimation, is <20%.   2. Severely decreased global left ventricular systolic function.   3. Severely enlarged left atrium.   4. Elevated mean left atrial pressure.   5. Large, fixed thrombus vs. mass on the apical wall of the left   ventricle.   6. Spectral Doppler shows pseudonormal pattern of left ventricular   myocardial filling (Grade II diastolic dysfunction).   7. Moderately reduced RV systolic function.   8. Mildly enlarged right atrium.   9. Moderate mitral valve regurgitation.  10. Moderate-severe tricuspid regurgitation.  11. Mild pulmonic valve regurgitation.  12. Estimated pulmonary artery systolic pressure is 42.9 mmHg assuming a   right atrial pressure of 15 mmHg, which is consistent withmild pulmonary   hypertension.  13. Endocardial visualization was enhanced with intravenous echo contrast.      Right Heart Catheterization 23    FINDINGS:   Right Heart Cath  RA - 12  RV - 51/  PA -   PCWP - 20    CO/CI Thermodilusion - 2.1/1.27  CO/CI Radha - 2.09/1.26    SVR (MAP 79 / RA 12 / CO 2.1) = 2552 dyn  PVR = 5.71 henriquez      POST-OP DIAGNOSIS:    [x] Decreased cardiac output with increased SVR + PVR + PCWP consistent with cardiogenic shock.    Combined pre and post capillary PH.   	    Home Medications:  atorvastatin 20 mg oral tablet: 1 tab(s) orally once a day (04 Aug 2021 10:05)  carvedilol 6.25 mg oral tablet: 1 tab(s) orally 2 times a day (2023 02:02)  Entresto 49 mg-51 mg oral tablet: 1 tab(s) orally 2 times a day (04 Aug 2021 10:05)  Farxiga 10 mg oral tablet: 1 tab(s) orally once a day (2023 02:05)  Lasix 20 mg oral tablet: 1 tab(s) orally once a day (2023 02:04)  spironolactone 25 mg oral tablet: 1 tab(s) orally once a day (04 Aug 2021 10:05)    MEDICATIONS  (STANDING):  chlorhexidine 2% Cloths 1 Application(s) Topical daily  DOBUTamine Infusion 7 MICROgram(s)/kG/Min (6.67 mL/Hr) IV Continuous <Continuous>  guaifenesin/dextromethorphan Oral Liquid 15 milliLiter(s) Oral once  heparin  Infusion. 800 Unit(s)/Hr (8 mL/Hr) IV Continuous <Continuous>  melatonin 5 milliGRAM(s) Oral at bedtime  pantoprazole    Tablet 40 milliGRAM(s) Oral before breakfast  piperacillin/tazobactam IVPB.. 3.375 Gram(s) IV Intermittent every 8 hours  potassium chloride    Tablet ER 40 milliEquivalent(s) Oral every 4 hours    MEDICATIONS  (PRN):  heparin   Injectable 5000 Unit(s) IV Push every 6 hours PRN For aPTT less than 40  heparin   Injectable 2500 Unit(s) IV Push every 6 hours PRN For aPTT between 40 - 57

## 2023-04-26 NOTE — PROGRESS NOTE ADULT - SUBJECTIVE AND OBJECTIVE BOX
24H events:    Patient is a 77y old Female who presents with a chief complaint of intra cardiac mass (26 Apr 2023 10:40)    Primary diagnosis of Lung mass       Today is hospital day 5d.      Patient seen and examined at bedside. Reports no active complaints.     PAST MEDICAL & SURGICAL HISTORY  CHF, stage C    HTN (hypertension)      SOCIAL HISTORY:  Negative for smoking/alcohol/drug use.     ALLERGIES:  epinephrine (Unknown)    MEDICATIONS:  STANDING MEDICATIONS  chlorhexidine 2% Cloths 1 Application(s) Topical daily  DOBUTamine Infusion 7.5 MICROgram(s)/kG/Min IV Continuous <Continuous>  guaifenesin/dextromethorphan Oral Liquid 15 milliLiter(s) Oral once  heparin  Infusion. 800 Unit(s)/Hr IV Continuous <Continuous>  iron sucrose IVPB 200 milliGRAM(s) IV Intermittent every 24 hours  melatonin 5 milliGRAM(s) Oral at bedtime  pantoprazole    Tablet 40 milliGRAM(s) Oral before breakfast  piperacillin/tazobactam IVPB.. 3.375 Gram(s) IV Intermittent every 8 hours  sodium chloride 3%. 150 milliLiter(s) IV Continuous <Continuous>  sodium chloride 3%. 150 milliLiter(s) IV Continuous <Continuous>    PRN MEDICATIONS  heparin   Injectable 2500 Unit(s) IV Push every 6 hours PRN  heparin   Injectable 5000 Unit(s) IV Push every 6 hours PRN    VITALS:   T(F): 96  HR: 98  BP: 91/61  RR: 23  SpO2: 98%    LABS:                        12.2   9.75  )-----------( 158      ( 26 Apr 2023 04:50 )             35.8     04-26    133<L>  |  96<L>  |  24<H>  ----------------------------<  135<H>  4.3   |  25  |  0.8    Ca    7.9<L>      26 Apr 2023 04:50  Phos  2.1     04-25  Mg     3.3     04-26    TPro  5.6<L>  /  Alb  3.0<L>  /  TBili  2.5<H>  /  DBili  x   /  AST  257<H>  /  ALT  410<H>  /  AlkPhos  107  04-26    PT/INR - ( 25 Apr 2023 04:50 )   PT: 19.30 sec;   INR: 1.67 ratio         PTT - ( 26 Apr 2023 13:15 )  PTT:>200.0 sec    ABG - ( 26 Apr 2023 16:56 )  pH, Arterial: 7.54  pH, Blood: x     /  pCO2: 33    /  pO2: 113   / HCO3: 28    / Base Excess: 5.8   /  SaO2: 99.0              Troponin T, Serum: <0.01 ng/mL (04-26-23 @ 05:05)      CARDIAC MARKERS ( 26 Apr 2023 05:05 )  x     / <0.01 ng/mL / x     / x     / x          RADIOLOGY:    PHYSICAL EXAM:  GENERAL:  NAD  SKIN: No rashes or lesions  HEENT: Atraumatic. Normocephalic.   NECK: Supple, No JVD.   PULMONARY: decreased breath sounds B/L. No wheezing.   CVS: Normal S1, S2. Rate and Rhythm are regular.    ABDOMEN/GI: Soft, Nontender, Nondistended   MSK:  No clubbing or cyanosis   NEUROLOGIC:  moves all extremities   PSYCH: Awake and alert, confused

## 2023-04-26 NOTE — DIETITIAN INITIAL EVALUATION ADULT - OTHER INFO
Pertinent Medical Information: Per H&P, pt is a 76 y/o female with PMHx of HTN, HLD, nonischemic cardiomyopathy, HFrEF s/p AICD presents to the ED with 2 weeks of worsening shortness of breath.     Pertinent Subjective Information: Staff reports fair appetite. Pt also consumes food from brought in from home. Pt sleeps through meals at times. No chewing or swallowing difficulties noted. No nausea or vomiting reported.     Weight hx: UBW unknown. Current dosing weight is 63.5 KG. Previous admit weight was 62.6 KG on 9/22/22; 1.4% unintentional weight gain in over 7 months compared to 63.5 KG. Pt does not meet weight criteria for malnutrition at this time.

## 2023-04-26 NOTE — DIETITIAN INITIAL EVALUATION ADULT - OTHER CALCULATIONS
Using ABW: ENERGY: MSJ 1089.60 vs. 5116-9269 kcal/day (20-25 kcal/kg); PROTEIN: 76-89 g/day (1.2-1.4 g/kg); FLUID: 1588 mL/day (25 mL/kg) -- with consideration for age, BMI, critical care

## 2023-04-26 NOTE — PROGRESS NOTE ADULT - ASSESSMENT
Assessment:   77 year old woman with pmh of HTN, HLD, nonischemic cardiomyopathy, HFrEF, s/p ICD and PPM presents to the ED with 2 weeks of worsening shortness of breath.       IMPRESSION:  Cardiogenic shock on inotropic suppport  HFrEF 20%, NICM  RLL Mass/opacity/ possible Pneumonia, r/o malignancy  LV Mass, ?Thrombus  Transaminitis in the setting of hypotension, shock liver      Plan:  Patient is grossly fluid overloaded on exam  POCUS exam: IVC dilated, non collapsing, hepatic vein engorged  Give Bumex 2 mg IV push, then start a Bumex gtt at 1 mg/hr   Start 3% Hypertonic Saline 150ml BID (If infused throughout a central line, run over one hour, if a peripheral line is to be used run over 3 hours)   Continue inotropic support- dobutamine gtt at 7.5 mcg/kg/min   Once more euvolemic, will try to wean inotropic support   Get BMP twice daily with magnesium   Maintain potassium >4.0, Mg >2.2  Strict intake and output  Daily weight   Iron sat 6%, follow up ferritin level  Thoracic Surgery and pulm recs appreciated  Rest of care as per CCU team  Plan discussed with patient, family at bedside, and CCU team  Will continue to follow Assessment:   77 year old woman with pmh of HTN, HLD, nonischemic cardiomyopathy, HFrEF, s/p ICD and PPM presents to the ED with 2 weeks of worsening shortness of breath.       IMPRESSION:  Cardiogenic shock on inotropic suppport  HFrEF 20%, NICM  RLL Mass/opacity/ possible Pneumonia, r/o malignancy  LV Mass, ?Thrombus  Transaminitis in the setting of hypotension, shock liver      Plan:  Patient remains grossly fluid overloaded on exam  POCUS exam: IVC dilated, non collapsing, hepatic vein engorged  Continue Bumex gtt at 1 mg/hr   Continue 3% Hypertonic Saline 150ml BID (If infused throughout a central line, run over one hour, if a peripheral line is to be used run over 3 hours)   Continue inotropic support- dobutamine gtt at 7.5 mcg/kg/min   Once more euvolemic, will try to wean inotropic support   Obtain HIV and Hepatis C panel   Get BMP twice daily with magnesium   Maintain potassium >4.0, Mg >2.2  Strict intake and output  Daily weight   Iron sat 6%, ferritin 186- consider giving iron supplementation if no infection present  Thoracic Surgery and pulm recs appreciated  Rest of care as per CCU team  Plan discussed with patient, family at bedside, and CCU team  Will continue to follow Assessment:   77 year old woman with pmh of HTN, HLD, nonischemic cardiomyopathy, HFrEF, s/p ICD and PPM presents to the ED with 2 weeks of worsening shortness of breath.       IMPRESSION:  Cardiogenic shock on inotropic suppport  HFrEF 20%, NICM  RLL Mass/opacity/ possible Pneumonia, r/o malignancy  LV Mass, ?Thrombus  Transaminitis in the setting of hypotension, shock liver      Plan:  Patient remains grossly fluid overloaded on exam  POCUS exam: IVC dilated, non collapsing, hepatic vein engorged  Continue Bumex gtt at 1 mg/hr   Continue 3% Hypertonic Saline 150ml BID (If infused throughout a central line, run over one hour, if a peripheral line is to be used run over 3 hours)   Continue inotropic support- dobutamine gtt at 7.5 mcg/kg/min   Once more euvolemic, will try to wean inotropic support   Obtain HIV and Hepatis C panel   Get BMP twice daily with magnesium   Maintain potassium >4.0, Mg >2.2  Strict intake and output  Daily weight   Iron sat 6%, ferritin 186- consider giving iron supplementation if no infection present  Thoracic Surgery and pulm recs appreciated  Rest of care as per CCU team  Plan discussed with patient, family at bedside, and CCU team  Will continue to follow.

## 2023-04-26 NOTE — DIETITIAN INITIAL EVALUATION ADULT - ADD RECOMMEND
1. Add Ensure Plus HP 2X/DAILY to optimize kcal/pro intake -- provides 700 kcal/day total, 40g pro/day total  2. Continue with current diet order     Pt assessed to be at moderate nutrition risk; will f/u in 5-7 days or prn.

## 2023-04-26 NOTE — PROGRESS NOTE ADULT - ASSESSMENT
77 year old woman with PMHx of HTN, HLD, nonischemic cardiomyopathy, HFrEF s/p AICD presents to the ED with 2 weeks of worsening shortness of breath. Patient and her son state patient has been experiencing shortness of breath that has progressed from present on activity to now shortness of breath during conversation. Patient also notes an increasing dry cough over this time period. Denies history of smoking, fevers/chills, chest pain, nausea/vomiting, bloody sputum, dark/tarry/bloody bowel movements, upper respiratory symptoms.     In ED, patient's HR is 110 and saturating 98% on RA  CT C/A/P revealing cardiomegaly with evidence of right heart strain, R hilar lymphadenopathy with R perihilar soft tissue density with indentation/possible invasion of right pulmonary artery, and narrowing of proximal R lobar and segmental branches with associated consolidation of right lower lobe. 2.2 x 2.8 cm filling defect noted in apex of left ventricle.   Last ECHO 8/2021 revealing EF 20-25%, mild Mr, mild TR, mild Pulm HTN  Dimer 2600, trops negative BNP 01070  Duplex venous LE negative for DVT; CTA chest negative for PE  Cr 1.4 (0.7 in 2021), Na+ 128, T.Benjamin 2.3, LFTs elevated (hemolyzed)  Lactate 5.8-->4.6  Admitted to SDU    CEU Course:   - Cardiology [place] consulted, TTE confirmed EF <20% w/ LV thrombus - started on heparin IV  - Pulmonary team consulted for eval of EBUS - pending clinical improvement  - Overnight patient developed worsening DAVID and suspected ischemic hepatitis in the setting of reduced BP ~70s/50s  - Patient started on Dopamine ggt with improvement of BP   - 4/23 AM - patient asymptomatic - bedside echo revealed non-compressible IVC and reduced EF  - Spoke w/ Dr. Hensley and Dr. Yeager - accepted to CCU   - plan follows below         #Cardiogenic Shock   #LV thrombus  #NICM  #HFrEF 25% s/p AICD  #HTN/HLD  - SOB x2 weeks  - CT CAP - cardiomegaly w/ RH strain  - TTE EF <20%, large fixed thrombus vs mass on apical LV wall, GIIDD, mod-sev TR, mod MR, mild SD, mild PH  - LE venous duplex -ve  - overnight BP 80s/60s -> given 1x dose lasix, started on dopamine ggt  - CCU consulted - now upgraded  - R IJ placed, started on milrinone ggt .125mcg/kg  - Current BP 110s/80s  - c/w heparin ggt  - lasix 40mg bid  - albumin stat dose 04/25  - CTS and Cardio following ([place])  - RHC 04/24: Decreased cardiac output with increased SVR + PVR + PCWP consistent with cardiogenic shock.    Combined pre and post capillary PH..   - fu Heart failure team notes --- recommends bumex drip and 3% saline    #Post obstructive pneumonia  #Suspected lung mass   - CT CAP - R hilar LAD, R perihilar soft tissue density w/ indentation/possible invasion of R pulmonary artery and narrowing of proximal R lobar and segmental branches w/ consolidation of RLL  - MRSA -ve, procal 1.77  - c/w zosyn   - pulm following for EBUS - needs medical optimization     #HAGMA  #Lactic Acidosis  #DAVID on CKD   #Ischemic hepatitis   - AG 20, lactate 5.8-->4.6 -> 3.1   - Cr 1.4-> 1.7, BUN 42-> 61 - bsl Cr 0.7 2021  - AST/ALT 2051/917, LDH 1401 -> previous AST//114  - INR 2.6  - s/p IVF bolus - bedside echo non-collapsable IVC - hold on IVF for now   - repeat lactate and bmp   - pending RUQ US, CK, tylenol level   - pending HAV IgM/IgG, HBsAg, HBsAb, HBcAb IgM/IgG, HCV Ab, Anti HEV, Serum Ferritin, Transferrin Saturation, Ceruloplasmin level, ALE, SMA, immunoglobulins panel, AMA, Anti LK microsomal Ab, anti-soluble liver Ag, EBV, HSV, CMV, ACE levels   - GI following   - holding lipitor    #Hypotonic Hyponatremia   - Na 129, holding IVF  - trend Na       # Misc  - DVT Prophylaxis: heparin  Infusion.  - GI Prophylaxis: pantoprazole    Tablet 40 milliGRAM(s) Oral before breakfast  - Diet: Diet, DASH/TLC  - Activity: Activity - Ambulate as Tolerated  - Code Status: Full

## 2023-04-26 NOTE — DIETITIAN INITIAL EVALUATION ADULT - ORAL NUTRITION SUPPLEMENTS
Ensure Plus HP 2X/DAILY to optimize kcal/pro intake -- provides 700 kcal/day total, 40g pro/day total

## 2023-04-26 NOTE — DIETITIAN INITIAL EVALUATION ADULT - ORAL INTAKE PTA/DIET HISTORY
Pt unable to participate in nutrition assessment interview; confused per flowsheet. Unable to reach emergency contact numbers listed to obtain nutrition hx. Hx limited to medical chart at this time.

## 2023-04-26 NOTE — DIETITIAN INITIAL EVALUATION ADULT - PERTINENT MEDS FT
MEDICATIONS  (STANDING):  buMETAnide Infusion 1 mG/Hr (5 mL/Hr) IV Continuous <Continuous>  chlorhexidine 2% Cloths 1 Application(s) Topical daily  DOBUTamine Infusion 7.5 MICROgram(s)/kG/Min (7.14 mL/Hr) IV Continuous <Continuous>  guaifenesin/dextromethorphan Oral Liquid 15 milliLiter(s) Oral once  heparin  Infusion. 800 Unit(s)/Hr (8 mL/Hr) IV Continuous <Continuous>  melatonin 5 milliGRAM(s) Oral at bedtime  pantoprazole    Tablet 40 milliGRAM(s) Oral before breakfast  piperacillin/tazobactam IVPB.. 3.375 Gram(s) IV Intermittent every 8 hours  rivaroxaban 20 milliGRAM(s) Oral with dinner  sodium chloride 3%. 150 milliLiter(s) (50 mL/Hr) IV Continuous <Continuous>  sodium chloride 3%. 150 milliLiter(s) (50 mL/Hr) IV Continuous <Continuous>    MEDICATIONS  (PRN):  heparin   Injectable 5000 Unit(s) IV Push every 6 hours PRN For aPTT less than 40  heparin   Injectable 2500 Unit(s) IV Push every 6 hours PRN For aPTT between 40 - 57

## 2023-04-27 DIAGNOSIS — Z51.5 ENCOUNTER FOR PALLIATIVE CARE: ICD-10-CM

## 2023-04-27 DIAGNOSIS — J18.9 PNEUMONIA, UNSPECIFIED ORGANISM: ICD-10-CM

## 2023-04-27 DIAGNOSIS — Z71.89 OTHER SPECIFIED COUNSELING: ICD-10-CM

## 2023-04-27 DIAGNOSIS — I51.3 INTRACARDIAC THROMBOSIS, NOT ELSEWHERE CLASSIFIED: ICD-10-CM

## 2023-04-27 DIAGNOSIS — R57.0 CARDIOGENIC SHOCK: ICD-10-CM

## 2023-04-27 LAB
ALBUMIN SERPL ELPH-MCNC: 2.9 G/DL — LOW (ref 3.5–5.2)
ALP SERPL-CCNC: 111 U/L — SIGNIFICANT CHANGE UP (ref 30–115)
ALT FLD-CCNC: 359 U/L — HIGH (ref 0–41)
ANA TITR SER: NEGATIVE — SIGNIFICANT CHANGE UP
ANION GAP SERPL CALC-SCNC: 11 MMOL/L — SIGNIFICANT CHANGE UP (ref 7–14)
ANION GAP SERPL CALC-SCNC: 13 MMOL/L — SIGNIFICANT CHANGE UP (ref 7–14)
ANION GAP SERPL CALC-SCNC: 14 MMOL/L — SIGNIFICANT CHANGE UP (ref 7–14)
ANION GAP SERPL CALC-SCNC: 17 MMOL/L — HIGH (ref 7–14)
APTT BLD: 60 SEC — HIGH (ref 27–39.2)
APTT BLD: 65.7 SEC — HIGH (ref 27–39.2)
APTT BLD: 66.9 SEC — HIGH (ref 27–39.2)
AST SERPL-CCNC: 191 U/L — HIGH (ref 0–41)
BASE EXCESS BLDMV CALC-SCNC: 10.7 MMOL/L — SIGNIFICANT CHANGE UP
BASE EXCESS BLDMV CALC-SCNC: 9.5 MMOL/L — SIGNIFICANT CHANGE UP
BASOPHILS # BLD AUTO: 0.01 K/UL — SIGNIFICANT CHANGE UP (ref 0–0.2)
BASOPHILS NFR BLD AUTO: 0.1 % — SIGNIFICANT CHANGE UP (ref 0–1)
BILIRUB SERPL-MCNC: 2.5 MG/DL — HIGH (ref 0.2–1.2)
BUN SERPL-MCNC: 17 MG/DL — SIGNIFICANT CHANGE UP (ref 10–20)
BUN SERPL-MCNC: 19 MG/DL — SIGNIFICANT CHANGE UP (ref 10–20)
BUN SERPL-MCNC: 19 MG/DL — SIGNIFICANT CHANGE UP (ref 10–20)
BUN SERPL-MCNC: 23 MG/DL — HIGH (ref 10–20)
CALCIUM SERPL-MCNC: 7.7 MG/DL — LOW (ref 8.4–10.5)
CALCIUM SERPL-MCNC: 7.9 MG/DL — LOW (ref 8.4–10.5)
CALCIUM SERPL-MCNC: 8.1 MG/DL — LOW (ref 8.4–10.5)
CALCIUM SERPL-MCNC: 8.2 MG/DL — LOW (ref 8.4–10.5)
CHLORIDE SERPL-SCNC: 84 MMOL/L — LOW (ref 98–110)
CHLORIDE SERPL-SCNC: 90 MMOL/L — LOW (ref 98–110)
CHLORIDE SERPL-SCNC: 94 MMOL/L — LOW (ref 98–110)
CHLORIDE SERPL-SCNC: 94 MMOL/L — LOW (ref 98–110)
CO2 SERPL-SCNC: 24 MMOL/L — SIGNIFICANT CHANGE UP (ref 17–32)
CO2 SERPL-SCNC: 26 MMOL/L — SIGNIFICANT CHANGE UP (ref 17–32)
CO2 SERPL-SCNC: 27 MMOL/L — SIGNIFICANT CHANGE UP (ref 17–32)
CO2 SERPL-SCNC: 29 MMOL/L — SIGNIFICANT CHANGE UP (ref 17–32)
CREAT SERPL-MCNC: 0.7 MG/DL — SIGNIFICANT CHANGE UP (ref 0.7–1.5)
CREAT SERPL-MCNC: 0.8 MG/DL — SIGNIFICANT CHANGE UP (ref 0.7–1.5)
CREAT SERPL-MCNC: 0.9 MG/DL — SIGNIFICANT CHANGE UP (ref 0.7–1.5)
CREAT SERPL-MCNC: 0.9 MG/DL — SIGNIFICANT CHANGE UP (ref 0.7–1.5)
EGFR: 65 ML/MIN/1.73M2 — SIGNIFICANT CHANGE UP
EGFR: 65 ML/MIN/1.73M2 — SIGNIFICANT CHANGE UP
EGFR: 75 ML/MIN/1.73M2 — SIGNIFICANT CHANGE UP
EGFR: 88 ML/MIN/1.73M2 — SIGNIFICANT CHANGE UP
EOSINOPHIL # BLD AUTO: 0.07 K/UL — SIGNIFICANT CHANGE UP (ref 0–0.7)
EOSINOPHIL NFR BLD AUTO: 0.7 % — SIGNIFICANT CHANGE UP (ref 0–8)
GAS PNL BLDA: SIGNIFICANT CHANGE UP
GAS PNL BLDA: SIGNIFICANT CHANGE UP
GAS PNL BLDMV: SIGNIFICANT CHANGE UP
GLUCOSE SERPL-MCNC: 115 MG/DL — HIGH (ref 70–99)
GLUCOSE SERPL-MCNC: 137 MG/DL — HIGH (ref 70–99)
GLUCOSE SERPL-MCNC: 151 MG/DL — HIGH (ref 70–99)
GLUCOSE SERPL-MCNC: 426 MG/DL — HIGH (ref 70–99)
HCO3 BLDMV-SCNC: 34 MMOL/L — SIGNIFICANT CHANGE UP
HCO3 BLDMV-SCNC: 34 MMOL/L — SIGNIFICANT CHANGE UP
HCT VFR BLD CALC: 38.2 % — SIGNIFICANT CHANGE UP (ref 37–47)
HCT VFR BLD CALC: 38.2 % — SIGNIFICANT CHANGE UP (ref 37–47)
HCV AB S/CO SERPL IA: 0.04 COI — SIGNIFICANT CHANGE UP
HCV AB SERPL-IMP: SIGNIFICANT CHANGE UP
HGB BLD-MCNC: 13 G/DL — SIGNIFICANT CHANGE UP (ref 12–16)
HGB BLD-MCNC: 13 G/DL — SIGNIFICANT CHANGE UP (ref 12–16)
HIV 1+2 AB+HIV1 P24 AG SERPL QL IA: SIGNIFICANT CHANGE UP
HOROWITZ INDEX BLDMV+IHG-RTO: 21 — SIGNIFICANT CHANGE UP
HOROWITZ INDEX BLDMV+IHG-RTO: 68 — SIGNIFICANT CHANGE UP
IMM GRANULOCYTES NFR BLD AUTO: 0.9 % — HIGH (ref 0.1–0.3)
LKM AB SER-ACNC: <20.1 UNITS — SIGNIFICANT CHANGE UP (ref 0–20)
LYMPHOCYTES # BLD AUTO: 1.58 K/UL — SIGNIFICANT CHANGE UP (ref 1.2–3.4)
LYMPHOCYTES # BLD AUTO: 15.9 % — LOW (ref 20.5–51.1)
MAGNESIUM SERPL-MCNC: 1.6 MG/DL — LOW (ref 1.8–2.4)
MAGNESIUM SERPL-MCNC: 1.7 MG/DL — LOW (ref 1.8–2.4)
MAGNESIUM SERPL-MCNC: 2.8 MG/DL — HIGH (ref 1.8–2.4)
MCHC RBC-ENTMCNC: 30.4 PG — SIGNIFICANT CHANGE UP (ref 27–31)
MCHC RBC-ENTMCNC: 30.4 PG — SIGNIFICANT CHANGE UP (ref 27–31)
MCHC RBC-ENTMCNC: 34 G/DL — SIGNIFICANT CHANGE UP (ref 32–37)
MCHC RBC-ENTMCNC: 34 G/DL — SIGNIFICANT CHANGE UP (ref 32–37)
MCV RBC AUTO: 89.3 FL — SIGNIFICANT CHANGE UP (ref 81–99)
MCV RBC AUTO: 89.5 FL — SIGNIFICANT CHANGE UP (ref 81–99)
MONOCYTES # BLD AUTO: 0.89 K/UL — HIGH (ref 0.1–0.6)
MONOCYTES NFR BLD AUTO: 9 % — SIGNIFICANT CHANGE UP (ref 1.7–9.3)
NEUTROPHILS # BLD AUTO: 7.27 K/UL — HIGH (ref 1.4–6.5)
NEUTROPHILS NFR BLD AUTO: 73.4 % — SIGNIFICANT CHANGE UP (ref 42.2–75.2)
NRBC # BLD: 0 /100 WBCS — SIGNIFICANT CHANGE UP (ref 0–0)
NRBC # BLD: 0 /100 WBCS — SIGNIFICANT CHANGE UP (ref 0–0)
O2 CT VFR BLD CALC: 37 MMHG — SIGNIFICANT CHANGE UP
O2 CT VFR BLD CALC: 42 MMHG — SIGNIFICANT CHANGE UP
PCO2 BLDMV: 40 MMHG — SIGNIFICANT CHANGE UP
PCO2 BLDMV: 42 MMHG — SIGNIFICANT CHANGE UP
PH BLDMV: 7.51 — SIGNIFICANT CHANGE UP
PH BLDMV: 7.54 — SIGNIFICANT CHANGE UP
PLATELET # BLD AUTO: 197 K/UL — SIGNIFICANT CHANGE UP (ref 130–400)
PLATELET # BLD AUTO: 217 K/UL — SIGNIFICANT CHANGE UP (ref 130–400)
PMV BLD: 11.3 FL — HIGH (ref 7.4–10.4)
PMV BLD: 11.9 FL — HIGH (ref 7.4–10.4)
POTASSIUM SERPL-MCNC: 3.6 MMOL/L — SIGNIFICANT CHANGE UP (ref 3.5–5)
POTASSIUM SERPL-MCNC: 3.6 MMOL/L — SIGNIFICANT CHANGE UP (ref 3.5–5)
POTASSIUM SERPL-MCNC: 3.9 MMOL/L — SIGNIFICANT CHANGE UP (ref 3.5–5)
POTASSIUM SERPL-MCNC: 4 MMOL/L — SIGNIFICANT CHANGE UP (ref 3.5–5)
POTASSIUM SERPL-SCNC: 3.6 MMOL/L — SIGNIFICANT CHANGE UP (ref 3.5–5)
POTASSIUM SERPL-SCNC: 3.6 MMOL/L — SIGNIFICANT CHANGE UP (ref 3.5–5)
POTASSIUM SERPL-SCNC: 3.9 MMOL/L — SIGNIFICANT CHANGE UP (ref 3.5–5)
POTASSIUM SERPL-SCNC: 4 MMOL/L — SIGNIFICANT CHANGE UP (ref 3.5–5)
PROT SERPL-MCNC: 5.9 G/DL — LOW (ref 6–8)
RBC # BLD: 4.27 M/UL — SIGNIFICANT CHANGE UP (ref 4.2–5.4)
RBC # BLD: 4.28 M/UL — SIGNIFICANT CHANGE UP (ref 4.2–5.4)
RBC # FLD: 13.7 % — SIGNIFICANT CHANGE UP (ref 11.5–14.5)
RBC # FLD: 14 % — SIGNIFICANT CHANGE UP (ref 11.5–14.5)
SAO2 % BLDMV: 62.3 % — SIGNIFICANT CHANGE UP
SODIUM SERPL-SCNC: 125 MMOL/L — LOW (ref 135–146)
SODIUM SERPL-SCNC: 129 MMOL/L — LOW (ref 135–146)
SODIUM SERPL-SCNC: 134 MMOL/L — LOW (ref 135–146)
SODIUM SERPL-SCNC: 135 MMOL/L — SIGNIFICANT CHANGE UP (ref 135–146)
WBC # BLD: 9.72 K/UL — SIGNIFICANT CHANGE UP (ref 4.8–10.8)
WBC # BLD: 9.91 K/UL — SIGNIFICANT CHANGE UP (ref 4.8–10.8)
WBC # FLD AUTO: 9.72 K/UL — SIGNIFICANT CHANGE UP (ref 4.8–10.8)
WBC # FLD AUTO: 9.91 K/UL — SIGNIFICANT CHANGE UP (ref 4.8–10.8)

## 2023-04-27 PROCEDURE — 71045 X-RAY EXAM CHEST 1 VIEW: CPT | Mod: 26

## 2023-04-27 PROCEDURE — 99291 CRITICAL CARE FIRST HOUR: CPT | Mod: 25

## 2023-04-27 PROCEDURE — 99223 1ST HOSP IP/OBS HIGH 75: CPT

## 2023-04-27 PROCEDURE — 93010 ELECTROCARDIOGRAM REPORT: CPT

## 2023-04-27 PROCEDURE — 99291 CRITICAL CARE FIRST HOUR: CPT

## 2023-04-27 RX ORDER — HEPARIN SODIUM 5000 [USP'U]/ML
600 INJECTION INTRAVENOUS; SUBCUTANEOUS
Qty: 25000 | Refills: 0 | Status: DISCONTINUED | OUTPATIENT
Start: 2023-04-27 | End: 2023-05-01

## 2023-04-27 RX ORDER — MAGNESIUM SULFATE 500 MG/ML
2 VIAL (ML) INJECTION
Refills: 0 | Status: COMPLETED | OUTPATIENT
Start: 2023-04-27 | End: 2023-04-27

## 2023-04-27 RX ORDER — BENZOCAINE 10 %
1 GEL (GRAM) MUCOUS MEMBRANE ONCE
Refills: 0 | Status: COMPLETED | OUTPATIENT
Start: 2023-04-27 | End: 2023-04-27

## 2023-04-27 RX ORDER — SODIUM CHLORIDE 5 G/100ML
150 INJECTION, SOLUTION INTRAVENOUS
Refills: 0 | Status: DISCONTINUED | OUTPATIENT
Start: 2023-04-27 | End: 2023-04-27

## 2023-04-27 RX ORDER — POTASSIUM CHLORIDE 20 MEQ
20 PACKET (EA) ORAL
Refills: 0 | Status: COMPLETED | OUTPATIENT
Start: 2023-04-27 | End: 2023-04-27

## 2023-04-27 RX ORDER — SODIUM CHLORIDE 5 G/100ML
150 INJECTION, SOLUTION INTRAVENOUS
Refills: 0 | Status: DISCONTINUED | OUTPATIENT
Start: 2023-04-28 | End: 2023-04-28

## 2023-04-27 RX ORDER — CALCIUM GLUCONATE 100 MG/ML
2 VIAL (ML) INTRAVENOUS ONCE
Refills: 0 | Status: COMPLETED | OUTPATIENT
Start: 2023-04-27 | End: 2023-04-27

## 2023-04-27 RX ORDER — BENZOCAINE 10 %
1 GEL (GRAM) MUCOUS MEMBRANE
Refills: 0 | Status: DISCONTINUED | OUTPATIENT
Start: 2023-04-27 | End: 2023-05-05

## 2023-04-27 RX ADMIN — Medication 200 GRAM(S): at 23:54

## 2023-04-27 RX ADMIN — Medication 25 GRAM(S): at 10:30

## 2023-04-27 RX ADMIN — Medication 1 SPRAY(S): at 23:55

## 2023-04-27 RX ADMIN — Medication 25 GRAM(S): at 08:31

## 2023-04-27 RX ADMIN — PIPERACILLIN AND TAZOBACTAM 25 GRAM(S): 4; .5 INJECTION, POWDER, LYOPHILIZED, FOR SOLUTION INTRAVENOUS at 05:45

## 2023-04-27 RX ADMIN — PANTOPRAZOLE SODIUM 40 MILLIGRAM(S): 20 TABLET, DELAYED RELEASE ORAL at 05:45

## 2023-04-27 RX ADMIN — CHLORHEXIDINE GLUCONATE 1 APPLICATION(S): 213 SOLUTION TOPICAL at 11:37

## 2023-04-27 RX ADMIN — Medication 1 SPRAY(S): at 13:36

## 2023-04-27 RX ADMIN — HEPARIN SODIUM 600 UNIT(S)/HR: 5000 INJECTION INTRAVENOUS; SUBCUTANEOUS at 20:26

## 2023-04-27 RX ADMIN — PIPERACILLIN AND TAZOBACTAM 25 GRAM(S): 4; .5 INJECTION, POWDER, LYOPHILIZED, FOR SOLUTION INTRAVENOUS at 21:26

## 2023-04-27 RX ADMIN — Medication 100 MILLIEQUIVALENT(S): at 10:30

## 2023-04-27 RX ADMIN — Medication 15 MILLILITER(S): at 23:54

## 2023-04-27 RX ADMIN — IRON SUCROSE 110 MILLIGRAM(S): 20 INJECTION, SOLUTION INTRAVENOUS at 13:31

## 2023-04-27 RX ADMIN — Medication 4.76 MICROGRAM(S)/KG/MIN: at 10:31

## 2023-04-27 RX ADMIN — PIPERACILLIN AND TAZOBACTAM 25 GRAM(S): 4; .5 INJECTION, POWDER, LYOPHILIZED, FOR SOLUTION INTRAVENOUS at 13:32

## 2023-04-27 RX ADMIN — Medication 5 MILLIGRAM(S): at 21:26

## 2023-04-27 RX ADMIN — HEPARIN SODIUM 600 UNIT(S)/HR: 5000 INJECTION INTRAVENOUS; SUBCUTANEOUS at 05:37

## 2023-04-27 RX ADMIN — Medication 100 MILLIEQUIVALENT(S): at 08:31

## 2023-04-27 RX ADMIN — HEPARIN SODIUM 600 UNIT(S)/HR: 5000 INJECTION INTRAVENOUS; SUBCUTANEOUS at 13:30

## 2023-04-27 NOTE — CONSULT NOTE ADULT - PROBLEM SELECTOR RECOMMENDATION 4
- introduced role of palliative care to patient, son, and   - other sons were arriving today, and family was awaiting a comprehensive medical update before further GOC discussions

## 2023-04-27 NOTE — PROGRESS NOTE ADULT - SUBJECTIVE AND OBJECTIVE BOX
24H events:    Patient is a 77y old Female who presents with a chief complaint of intra cardiac mass (27 Apr 2023 11:02)    Primary diagnosis of Lung mass       Today is hospital day 6d.      Patient seen and examined at bedside. Reports no active complaints.     PAST MEDICAL & SURGICAL HISTORY  CHF, stage C    HTN (hypertension)      SOCIAL HISTORY:  Negative for smoking/alcohol/drug use.     ALLERGIES:  epinephrine (Unknown)    MEDICATIONS:  STANDING MEDICATIONS  benzocaine 20%/menthol 0.5% Spray 1 Spray(s) Topical once  buMETAnide Infusion 1 mG/Hr IV Continuous <Continuous>  chlorhexidine 2% Cloths 1 Application(s) Topical daily  DOBUTamine Infusion 5 MICROgram(s)/kG/Min IV Continuous <Continuous>  guaifenesin/dextromethorphan Oral Liquid 15 milliLiter(s) Oral once  heparin  Infusion. 800 Unit(s)/Hr IV Continuous <Continuous>  iron sucrose IVPB 200 milliGRAM(s) IV Intermittent every 24 hours  melatonin 5 milliGRAM(s) Oral at bedtime  pantoprazole    Tablet 40 milliGRAM(s) Oral before breakfast  piperacillin/tazobactam IVPB.. 3.375 Gram(s) IV Intermittent every 8 hours    PRN MEDICATIONS  heparin   Injectable 5000 Unit(s) IV Push every 6 hours PRN  heparin   Injectable 2500 Unit(s) IV Push every 6 hours PRN    VITALS:   T(F): 96.6  HR: 96  BP: 97/68  RR: 24  SpO2: 98%    LABS:                        13.0   9.91  )-----------( 197      ( 27 Apr 2023 05:10 )             38.2     04-27    135  |  94<L>  |  17  ----------------------------<  115<H>  3.6   |  27  |  0.7    Ca    8.1<L>      27 Apr 2023 05:10  Mg     1.6     04-27    TPro  5.9<L>  /  Alb  2.9<L>  /  TBili  2.5<H>  /  DBili  x   /  AST  191<H>  /  ALT  359<H>  /  AlkPhos  111  04-27    PTT - ( 27 Apr 2023 11:30 )  PTT:60.0 sec    ABG - ( 26 Apr 2023 20:43 )  pH, Arterial: 7.55  pH, Blood: x     /  pCO2: 33    /  pO2: 91    / HCO3: 29    / Base Excess: 6.5   /  SaO2: 98.4                  CARDIAC MARKERS ( 26 Apr 2023 05:05 )  x     / <0.01 ng/mL / x     / x     / x          RADIOLOGY:    PHYSICAL EXAM:      GENERAL:  NAD  SKIN: No rashes or lesions  HEENT: Atraumatic. Normocephalic.   NECK: Supple, No JVD.   PULMONARY: decreased breath sounds B/L. No wheezing.   CVS: Normal S1, S2. Rate and Rhythm are regular.    ABDOMEN/GI: Soft, Nontender, Nondistended   MSK:  No clubbing or cyanosis   NEUROLOGIC:  moves all extremities   PSYCH: Awake and alert, confused

## 2023-04-27 NOTE — CONSULT NOTE ADULT - ASSESSMENT
77F with PMH of HTN, HLD, NICCM, HFrEF s/p AICD, here from ED with worsening SOB, requiring CEU admission. Found to have cardiomegaly here, and course c/b worsening DAVID and suspected ischemic hepatitis, requiring dopatine gtt for BP support, and transferred to CCU. Also found to have LV thrombus, on heparin gtt. RHC (4/24) showed cardiogenic shock. Is also on IV zosyn for post-obstructive PNA. Found to have lung mass, awaiting PET outpatient to r/o malignancy. Patient is full code. Palliative care consulted for [facility].

## 2023-04-27 NOTE — CONSULT NOTE ADULT - SUBJECTIVE AND OBJECTIVE BOX
HPI: 77F with PMH of HTN, HLD, NICCM, HFrEF s/p AICD, here from ED with worsening SOB, requiring CEU admission. Found to have cardiomegaly here, and course c/b worsening DAVID and suspected ischemic hepatitis, requiring dopatine gtt for BP support, and transferred to CCU. Also found to have LV thrombus, on heparin gtt. RHC (4/24) showed cardiogenic shock. Is also on IV zosyn for post-obstructive PNA. Found to have lung mass, awaiting PET outpatient to r/o malignancy. Patient is full code. Palliative care consulted for GOC.        PERTINENT PM/SXH:   CHF, stage C    HTN (hypertension)        FAMILY HISTORY:  FH: heart failure (Mother)      ITEMS NOT CHECKED ARE NOT PRESENT    SOCIAL HISTORY:   Significant other/partner[ ]  Children[ ]  Hoahaoism/Spirituality:  Substance hx:  [ ]   Tobacco hx:  [ ]   Alcohol hx: [ ]   Living Situation: [ ]Home  [ ]Long term care  [ ]Rehab [ ]Other  Home Services: [ ] HHA [ ] Visting RN [ ] Hospice  Occupation:  Home Opioid hx:  [ ] Y [ ] N [ ] I-Stop Reference No:    ADVANCE DIRECTIVES:    [ ] Full Code [ ] DNR  MOLST  [ ]  Living Will  [ ]   DECISION MAKER(s):  [ ] Health Care Proxy(s)  [ ] Surrogate(s)  [ ] Guardian           Name(s): Phone Number(s):    BASELINE (I)ADL(s) (prior to admission):  [place]: [ ]Total  [ ] Moderate [ ]Dependent  Palliative Performance Status Version 2:         %    http://[facility].org/files/news/palliative_performance_scale_ppsv2.pdf    Allergies    epinephrine (Unknown)    Intolerances    MEDICATIONS  (STANDING):  buMETAnide Infusion 1 mG/Hr (5 mL/Hr) IV Continuous <Continuous>  chlorhexidine 2% Cloths 1 Application(s) Topical daily  DOBUTamine Infusion 5 MICROgram(s)/kG/Min (4.76 mL/Hr) IV Continuous <Continuous>  guaifenesin/dextromethorphan Oral Liquid 15 milliLiter(s) Oral once  heparin  Infusion. 600 Unit(s)/Hr (6 mL/Hr) IV Continuous <Continuous>  iron sucrose IVPB 200 milliGRAM(s) IV Intermittent every 24 hours  melatonin 5 milliGRAM(s) Oral at bedtime  pantoprazole    Tablet 40 milliGRAM(s) Oral before breakfast  piperacillin/tazobactam IVPB.. 3.375 Gram(s) IV Intermittent every 8 hours  sodium chloride 3%. 150 milliLiter(s) (50 mL/Hr) IV Continuous <Continuous>    MEDICATIONS  (PRN):    PRESENT SYMPTOMS: [ ]Unable to obtain due to poor mentation   Source if other than patient:  [ ]Family   [ ]Team     Pain: [ ]yes [ ]no  QOL impact -   Location -                    Aggravating factors -  Quality -  Radiation -  Timing-  Severity (0-10 scale):  Minimal acceptable level (0-10 scale):     CPOT:    https://www.[email or web address]/getattachment/[email or web address]/Critical-Care-Pain-Observation-Tool-(CPOT)    PAIN AD Score:   http://geriatrictoolkit.[email or web address]/cog/painad.pdf (press ctrl +  left click to view)    Dyspnea:                           [ ]None[ ]Mild [ ]Moderate [ ]Severe     Respiratory Distress Observation Scale (RDOS):   A score of 0 to 2 signifies little or no respiratory distress, 3 signifies mild distress, scores 4 to 6 indicate moderate distress, and scores greater than 7 signify severe distress  [email or web address]/sites/default/files/PDFS/171173-drpwrvhonfw-lzuyvqov-hfaomlskfse-fuklm.pdf    Anxiety:                             [ ]None[ ]Mild [ ]Moderate [ ]Severe   Fatigue:                             [ ]None[ ]Mild [ ]Moderate [ ]Severe   Nausea:                             [ ]None[ ]Mild [ ]Moderate [ ]Severe   Loss of appetite:              [ ]None[ ]Mild [ ]Moderate [ ]Severe   Constipation:                    [ ]None[ ]Mild [ ]Moderate [ ]Severe    Other Symptoms:  [ ]All other review of systems negative     Palliative Performance Status Version 2:         %    http://npcrc.org/files/news/palliative_performance_scale_ppsv2.pdf  PHYSICAL EXAM:  Vital Signs Last 24 Hrs  T(C): 35.9 (27 Apr 2023 08:00), Max: 36.2 (27 Apr 2023 04:00)  T(F): 96.6 (27 Apr 2023 08:00), Max: 97.1 (27 Apr 2023 04:00)  HR: 108 (27 Apr 2023 16:00) (92 - 108)  BP: 97/68 (27 Apr 2023 03:00) (87/61 - 107/66)  BP(mean): 78 (27 Apr 2023 03:00) (68 - 78)  RR: 25 (27 Apr 2023 16:00) (23 - 49)  SpO2: 99% (27 Apr 2023 16:00) (96% - 99%)    Parameters below as of 27 Apr 2023 13:00  Patient On (Oxygen Delivery Method): room air     I&O's Summary    26 Apr 2023 07:01  -  27 Apr 2023 07:00  --------------------------------------------------------  IN: 1327.1 mL / OUT: 3935 mL / NET: -2607.9 mL    27 Apr 2023 07:01  -  27 Apr 2023 17:18  --------------------------------------------------------  IN: 449.1 mL / OUT: 1755 mL / NET: -1305.9 mL        GENERAL:  [X ] No acute distress [ ]Lethargic  [ ]Unarousable  [ ]Verbal  [ ]Non-Verbal [ ]Cachexia    BEHAVIORAL/PSYCH:  [ ]Alert and Oriented x  [ ] Anxiety [ ] Delirium [ ] Agitation [X ] Calm   EYES: [ ] No scleral icterus [ ] Scleral icterus [ ] Closed  ENMT:  [ ]Dry mouth  [ ]No external oral lesions [ ] No external ear or nose lesions  CARDIOVASCULAR:  [ ]Regular [ ]Irregular [ ]Tachy [ ]Not Tachy  [ ]Tyson [ ] Edema [ ] No edema  PULMONARY:  [ ]Tachypnea  [ ]Audible excessive secretions [X ] No labored breathing [ ] labored breathing  GASTROINTESTINAL: [ ]Soft  [ ]Distended  [ X]Not distended [ ]Non tender [ ]Tender  MUSCULOSKELETAL: [ ]No clubbing [ ] clubbing  [ ] No cyanosis [ ] cyanosis  NEUROLOGIC: [ ]No focal deficits  [ ]Follows commands  [ ]Does not follow commands  [ ]Cognitive impairment  [ ]Dysphagia  [ ]Dysarthria  [ ]Paresis   SKIN: [ ] Jaundiced [X ] Non-jaundiced [ ]Rash [ ]No Rash [ ] Warm [ ] Dry  MISC/LINES: [ ] ET tube [ ] Trach [ ]NGT/OGT [ ]PEG [ ]Saenz    CRITICAL CARE:  [ ] Shock Present  [ ]Septic [ ]Cardiogenic [ ]Neurologic [ ]Hypovolemic  [ ]  Vasopressors [ ]  Inotropes   [ ]Respiratory failure present [ ]Mechanical ventilation [ ]Non-invasive ventilatory support [ ]High flow  [ ]Acute  [ ]Chronic [ ]Hypoxic  [ ]Hypercarbic [ ]Other  [ ]Other organ failure     LABS: reviewed by me                        13.0   9.91  )-----------( 197      ( 27 Apr 2023 05:10 )             38.2   04-27    125<L>  |  84<L>  |  19  ----------------------------<  426<H>  3.9   |  24  |  0.9    Ca    7.7<L>      27 Apr 2023 16:01  Mg     2.8     04-27    TPro  5.9<L>  /  Alb  2.9<L>  /  TBili  2.5<H>  /  DBili  x   /  AST  191<H>  /  ALT  359<H>  /  AlkPhos  111  04-27  PTT - ( 27 Apr 2023 11:30 )  PTT:60.0 sec      RADIOLOGY & ADDITIONAL STUDIES: reviewed by me    PROTEIN CALORIE MALNUTRITION PRESENT: [ ]mild [ ]moderate [ ]severe [ ]underweight [ ]morbid obesity  https://www.andeal.org/vault/2440/web/files/ONC/Table_Clinical%20Characteristics%20to%20Document%20Malnutrition-White%20JV%20et%20al%202012.pdf    Height (cm): 160 (04-26-23 @ 09:28)  Weight (kg): 63.5 (04-21-23 @ 10:56)  BMI (kg/m2): 24.8 (04-26-23 @ 09:28)    [ ]PPSV2 < or = to 30% [ ]significant weight loss  [ ]poor nutritional intake  [ ]anasarca      [ ]Artificial Nutrition      REFERRALS:   [ ]Chaplaincy  [ ]Hospice  [ ]Child Life  [ ]Social Work  [ ]Case management [ ]Holistic Therapy     Palliative care education provided to patient and/or family    Goals of Care Document:     ______________  Rachid Medrano MD  Palliative Medicine  [facility]   of Geriatric and Palliative Medicine  [phone]

## 2023-04-27 NOTE — PROGRESS NOTE ADULT - NS ATTEND AMEND GEN_ALL_CORE FT
Patient remains fluid overloaded, remains on dobutamine gtt for cardiogenic shock. Iron deficiency on blood work.     Continue aggressive diuresis    at 5 mcg/kg/min - continue to wean   Monitor UOP / daily lactate / LFTs   RHC/LHC once euvolemic   Thoracic surgery follow up for thoracic mass work up

## 2023-04-27 NOTE — PROGRESS NOTE ADULT - ASSESSMENT
- IVC remains dilated to ~1.9 on POCUS  - Continue slow inotropic wean,  @5 today  - Continue Bumex gtt @ 1  - Continue hypertonic saling  - Incentive Spirometer  - Recommend PT / OOB to chair  - Please obtain pre-albumin level to assess nutritional status  - Appreciate Thoracic Surgery recs: agree with PET scan as outpatient to r/o lung malignancy     - POCUS: IVC remains dilated to ~1.9cm  - Continue slow inotropic wean, agree with decreasing  to 5 mcg/kg/min today  - Continue diuresis with Bumex gtt 1mg/hr  - Continue hypertonic saline  - Incentive Spirometer  - Recommend PT / OOB to chair  - Please obtain pre-albumin level to assess nutritional status given recent weight loss  - CTA reviewed. Appreciate Thoracic Surgery recs: agree with PET scan as outpatient to r/o lung malignancy   Assessment:   77 year old woman with pmh of HTN, HLD, nonischemic cardiomyopathy, HFrEF, s/p ICD and PPM presents to the ED with 2 weeks of worsening shortness of breath.     IMPRESSION:  Cardiogenic shock on inotropic suppport  HFrEF 20%, NICM  RLL Mass/opacity/ possible Pneumonia, r/o malignancy  LV Mass, ?Thrombus  Transaminitis in the setting of hypotension, shock liver    - POCUS: IVC remains dilated to ~1.9cm, non collapsing  - Continue slow inotropic wean, agree with decreasing  to 5 mcg/kg/min today  - Continue diuresis with Bumex gtt 1mg/hr  - Continue 3% Hypertonic Saline 150ml BID (If infused throughout a central line, run over one hour, if a peripheral line is to be used run over 3 hours)   - Incentive Spirometer  - Recommend PT / OOB to chair  - Please obtain pre-albumin level to assess nutritional status given recent weight loss  - CTA reviewed. Appreciate Thoracic Surgery recs: agree with PET scan as outpatient to r/o lung malignancy  - Iron sat 6%, ferritin 186- consider giving iron supplementation if no infection present  - Rest of care as per CCU team  - Plan discussed with patient, family at bedside, and CCU team  - Will continue to follow

## 2023-04-27 NOTE — PROGRESS NOTE ADULT - ASSESSMENT
77 year old woman with PMHx of HTN, HLD, nonischemic cardiomyopathy, HFrEF s/p AICD presents to the ED with 2 weeks of worsening shortness of breath. Patient and her son state patient has been experiencing shortness of breath that has progressed from present on activity to now shortness of breath during conversation. Patient also notes an increasing dry cough over this time period. Denies history of smoking, fevers/chills, chest pain, nausea/vomiting, bloody sputum, dark/tarry/bloody bowel movements, upper respiratory symptoms.     In ED, patient's HR is 110 and saturating 98% on RA  CT C/A/P revealing cardiomegaly with evidence of right heart strain, R hilar lymphadenopathy with R perihilar soft tissue density with indentation/possible invasion of right pulmonary artery, and narrowing of proximal R lobar and segmental branches with associated consolidation of right lower lobe. 2.2 x 2.8 cm filling defect noted in apex of left ventricle.   Last ECHO 8/2021 revealing EF 20-25%, mild Mr, mild TR, mild Pulm HTN  Dimer 2600, trops negative BNP 53396  Duplex venous LE negative for DVT; CTA chest negative for PE  Cr 1.4 (0.7 in 2021), Na+ 128, T.Benjamin 2.3, LFTs elevated (hemolyzed)  Lactate 5.8-->4.6  Admitted to SDU    CEU Course:   - Cardiology [facility] consulted, TTE confirmed EF <20% w/ LV thrombus - started on heparin IV  - Pulmonary team consulted for eval of EBUS - pending clinical improvement  - Overnight patient developed worsening DAVID and suspected ischemic hepatitis in the setting of reduced BP ~70s/50s  - Patient started on Dopamine ggt with improvement of BP   - 4/23 AM - patient asymptomatic - bedside echo revealed non-compressible IVC and reduced EF  - Spoke w/ Dr. Hensley and Dr. Yeager - accepted to CCU   - plan follows below         #Cardiogenic Shock   #LV thrombus  #NICM  #HFrEF 25% s/p AICD  #HTN/HLD  - SOB x2 weeks  - CT CAP - cardiomegaly w/ RH strain  - TTE EF <20%, large fixed thrombus vs mass on apical LV wall, GIIDD, mod-sev TR, mod MR, mild NJ, mild PH  - LE venous duplex -ve  - overnight BP 80s/60s -> given 1x dose lasix, started on dopamine ggt  - CCU consulted - now upgraded  - R IJ placed, started on milrinone ggt .125mcg/kg  - Current BP 110s/80s  - c/w heparin ggt  - lasix 40mg bid  - albumin stat dose 04/25  - CTS and Cardio following ([facility])  - RHC 04/24: Decreased cardiac output with increased SVR + PVR + PCWP consistent with cardiogenic shock.    Combined pre and post capillary PH..   - fu Heart failure team notes --- recommends bumex drip and 3% saline    #Post obstructive pneumonia  #Suspected lung mass   - CT CAP - R hilar LAD, R perihilar soft tissue density w/ indentation/possible invasion of R pulmonary artery and narrowing of proximal R lobar and segmental branches w/ consolidation of RLL  - MRSA -ve, procal 1.77  - c/w zosyn   - pulm following for EBUS - needs medical optimization     #HAGMA  #Lactic Acidosis  #DAVID on CKD   #Ischemic hepatitis   - AG 20, lactate 5.8-->4.6 -> 3.1   - Cr 1.4-> 1.7, BUN 42-> 61 - bsl Cr 0.7 2021  - AST/ALT 2051/917, LDH 1401 -> previous AST//114  - INR 2.6  - s/p IVF bolus - bedside echo non-collapsable IVC - hold on IVF for now   - repeat lactate and bmp   - pending RUQ US, CK, tylenol level   - pending HAV IgM/IgG, HBsAg, HBsAb, HBcAb IgM/IgG, HCV Ab, Anti HEV, Serum Ferritin, Transferrin Saturation, Ceruloplasmin level, ALE, SMA, immunoglobulins panel, AMA, Anti LK microsomal Ab, anti-soluble liver Ag, EBV, HSV, CMV, ACE levels   - GI following   - holding lipitor    #Hypotonic Hyponatremia   - Na 129, holding IVF  - trend Na       # Misc  - DVT Prophylaxis: heparin  Infusion.  - GI Prophylaxis: pantoprazole    Tablet 40 milliGRAM(s) Oral before breakfast  - Diet: Diet, DASH/TLC  - Activity: Activity - Ambulate as Tolerated  - Code Status: Full    77 year old woman with PMHx of HTN, HLD, nonischemic cardiomyopathy, HFrEF s/p AICD presents to the ED with 2 weeks of worsening shortness of breath. Patient and her son state patient has been experiencing shortness of breath that has progressed from present on activity to now shortness of breath during conversation. Patient also notes an increasing dry cough over this time period. Denies history of smoking, fevers/chills, chest pain, nausea/vomiting, bloody sputum, dark/tarry/bloody bowel movements, upper respiratory symptoms.     In ED, patient's HR is 110 and saturating 98% on RA  CT C/A/P revealing cardiomegaly with evidence of right heart strain, R hilar lymphadenopathy with R perihilar soft tissue density with indentation/possible invasion of right pulmonary artery, and narrowing of proximal R lobar and segmental branches with associated consolidation of right lower lobe. 2.2 x 2.8 cm filling defect noted in apex of left ventricle.   Last ECHO 8/2021 revealing EF 20-25%, mild Mr, mild TR, mild Pulm HTN  Dimer 2600, trops negative BNP 44173  Duplex venous LE negative for DVT; CTA chest negative for PE  Cr 1.4 (0.7 in 2021), Na+ 128, T.Benjamin 2.3, LFTs elevated (hemolyzed)  Lactate 5.8-->4.6  Admitted to SDU    CEU Course:   - Cardiology [facility] consulted, TTE confirmed EF <20% w/ LV thrombus - started on heparin IV  - Pulmonary team consulted for eval of EBUS - pending clinical improvement  - Overnight patient developed worsening DAVID and suspected ischemic hepatitis in the setting of reduced BP ~70s/50s  - Patient started on Dopamine ggt with improvement of BP   - 4/23 AM - patient asymptomatic - bedside echo revealed non-compressible IVC and reduced EF  - Spoke w/ Dr. Hensley and Dr. Yeager - accepted to CCU   - plan follows below         #Cardiogenic Shock   #LV thrombus  #NICM  #HFrEF 25% s/p AICD  #HTN/HLD  - SOB x2 weeks  - CT CAP - cardiomegaly w/ RH strain  - TTE EF <20%, large fixed thrombus vs mass on apical LV wall, GIIDD, mod-sev TR, mod MR, mild NV, mild PH  - LE venous duplex -ve  - overnight BP 80s/60s -> given 1x dose lasix, started on dopamine ggt  - CCU consulted - now upgraded  - R IJ placed, started on milrinone ggt .125mcg/kg  - Current BP 110s/80s  - c/w heparin ggt  - albumin stat dose 04/25  - CTS and Cardio following ([place])  - RHC 04/24: Decreased cardiac output with increased SVR + PVR + PCWP consistent with cardiogenic shock.    Combined pre and post capillary PH..   - fu Heart failure team notes --- - IVC remains dilated to ~1.9 on POCUS. Continue slow inotropic wean,  @5 today. Continue Bumex gtt @ 1.  Continue hypertonic saling  - Incentive Spirometer  - Recommend PT / OOB to chair  - Please obtain pre-albumin level to assess nutritional status      #Post obstructive pneumonia  #Suspected lung mass   - CT CAP - R hilar LAD, R perihilar soft tissue density w/ indentation/possible invasion of R pulmonary artery and narrowing of proximal R lobar and segmental branches w/ consolidation of RLL  - MRSA -ve, procal 1.77  - c/w zosyn   - pulm following for EBUS - needs medical optimization   - Appreciate Thoracic Surgery recs: agree with PET scan as outpatient to r/o lung malignancy    #HAGMA  #Lactic Acidosis  #DAVID on CKD   #Ischemic hepatitis   - AG 20, lactate 5.8-->4.6 -> 3.1   - Cr 1.4-> 1.7, BUN 42-> 61 - bsl Cr 0.7 2021  - AST/ALT 2051/917, LDH 1401 -> previous AST//114  - INR 2.6  - s/p IVF bolus - bedside echo non-collapsable IVC - hold on IVF for now   - repeat lactate and bmp   - pending RUQ US, CK, tylenol level   - pending HAV IgM/IgG, HBsAg, HBsAb, HBcAb IgM/IgG, HCV Ab, Anti HEV, Serum Ferritin, Transferrin Saturation, Ceruloplasmin level, ALE, SMA, immunoglobulins panel, AMA, Anti LK microsomal Ab, anti-soluble liver Ag, EBV, HSV, CMV, ACE levels   - GI following   - holding lipitor    #Hypotonic Hyponatremia   - Na 129, holding IVF  - trend Na       # Misc  - DVT Prophylaxis: heparin  Infusion.  - GI Prophylaxis: pantoprazole    Tablet 40 milliGRAM(s) Oral before breakfast  - Diet: Diet, DASH/TLC  - Activity: Activity - Ambulate as Tolerated  - Code Status: Full

## 2023-04-27 NOTE — CONSULT NOTE ADULT - PROBLEM SELECTOR RECOMMENDATION 5
- will follow  ______________  Rachid Medrano MD  Palliative Medicine  [facility]   [facility]  [phone]

## 2023-04-27 NOTE — PROGRESS NOTE ADULT - SUBJECTIVE AND OBJECTIVE BOX
Date of Admission: 23    CHIEF COMPLAINT: Patient is a 77y old  Female who presents with a chief complaint of intra cardiac mass (2023 16:11)    Interval History: Patient seen and examined with family at bedside. Without complaints at this time. Unhappy with food options - states she cooks meals at home - encouraged Ensure BID. Denies CP, SOB,     HISTORY OF PRESENT ILLNESS: 77 year old woman with PMHx of HTN, HLD, nonischemic cardiomyopathy, HFrEF s/p AICD presents to the ED with 2 weeks of worsening shortness of breath. Patient and her son state patient has been experiencing shortness of breath that has progressed from present on activity to now shortness of breath during conversation. Patient also notes an increasing dry cough over this time period. Denies history of smoking, fevers/chills, chest pain, nausea/vomiting, bloody sputum, dark/tarry/bloody bowel movements, upper respiratory symptoms.   In ED, patient's HR is 110 and saturating 98% on RA  CT C/A/P revealing cardiomegaly with evidence of right heart strain, R hilar lymphadenopathy with R perihilar soft tissue density with indentation/possible invasion of right pulmonary artery, and narrowing of proximal R lobar and segmental branches with associated consolidation of right lower lobe. 2.2 x 2.8 cm filling defect noted in apex of left ventricle.   Last ECHO 2021 revealing EF 20-25%, mild Mr, mild TR, mild Pulm HTN  Dimer 2600, trops negative BNP 69699  Duplex venous LE negative for DVT; CTA chest negative for PE  Cr 1.4 (0.7 in ), Na+ 128, T.Benjamin 2.3, LFTs elevated (hemolyzed)  Lactate 5.8-->4.6  Admitted to SDU (2023 01:10)      Patient seen and examined in CCU, family present at bedside. Reports patient was at baseline (active, ambulating long distances without issue) until about 2 weeks ago when patient began to have progressively worsening SOB with a dry cough. Patient reports Dr. Guerrero as per primary cardiologist, and Dr. Sherwood as her heart failure specialist (last seen in office in ). Endorses compliance with home medications. Family at bedside concerned about patient's recent weight loss as well.         PAST MEDICAL & SURGICAL HISTORY:  CHF, stage C  HTN (hypertension)      FAMILY HISTORY: No pertinent family history of premature cardiovascular disease in first degree relatives.    SOCIAL HISTORY:  Denies smoking, alcohol, or drug use    Allergies  epinephrine (Unknown)    Intolerances      REVIEW OF SYSTEMS:  CONSTITUTIONAL: No fever, + weight loss, or fatigue.  CARDIOLOGY: Patient denies chest pain, + shortness of breath, no syncopal episodes.   RESPIRATORY: + shortness of breath, no wheezing.   NEUROLOGICAL: + generalized weakness, no focal deficits to report.  GI: no BRBPR, no n/v, diarrhea.    PSYCHIATRY: normal mood and affect  HEENT: no nasal discharge, no ecchymosis  SKIN: no ecchymosis, no breakdown  MUSCULOSKELETAL: Full range of motion x4.       PHYSICAL EXAM:  T(C): 36.4 (23 @ 16:00), Max: 36.8 (23 @ 12:00)  HR: 101 (23 @ 18:00) (85 - 101)  BP: 95/60 (23 @ 18:00) (82/62 - 125/71)  RR: 26 (23 @ 18:00) (20 - 34)  SpO2: 100% (23 @ 18:00) (95% - 100%)  Wt(kg): --  I&O's Summary    2023 07:  -  2023 07:00  --------------------------------------------------------  IN: 645 mL / OUT: 1260 mL / NET: -615 mL    2023 07:  -  2023 18:42  --------------------------------------------------------  IN: 328.7 mL / OUT: 1450 mL / NET: -1121.3 mL        General Appearance: thin, frail, normal for age and gender. 	  Neck: +central line  Eyes: Extra Ocular muscles intact.   Cardiovascular: regular rate and rhythm S1 S2, No edema  Respiratory: decreased	  Psychiatry: Alert and oriented x 3, Mood & affect appropriate  Gastrointestinal:  Soft, Non-tender  Skin/Integumentary: No rashes, No ecchymoses, No cyanosis	  Neurologic: Non-focal  Musculoskeletal/ extremities: Normal range of motion, No clubbing, cyanosis or edema  Vascular: Peripheral pulses palpable 2+ bilaterally        LABS:	 	                          12.0   9.25  )-----------( 142      ( 2023 04:50 )             35.9     04-25    133<L>  |  96<L>  |  28<H>  ----------------------------<  139<H>  3.3<L>   |  25  |  0.8    Ca    8.1<L>      2023 17:12  Phos  2.1     04-25  Mg     1.8     04-25    TPro  5.1<L>  /  Alb  2.5<L>  /  TBili  2.2<H>  /  DBili  1.1<H>  /  AST  294<H>  /  ALT  450<H>  /  AlkPhos  108  04-25        PT/INR - ( 2023 04:50 )   PT: 19.30 sec;   INR: 1.67 ratio         PTT - ( 2023 04:50 )  PTT:100.0 sec    CARDIAC MARKERS:  Pro-Brain Natriuretic Peptide: 24582 pg/mL (23 @ 11:43)      TELEMETRY EVENTS: 	    EC23    Ventricular Rate 85 BPM  Atrial Rate 85 BPM  P-R Interval 122 ms  QRS Duration 112 ms  Q-T Interval 408 ms  QTC Calculation(Bazett) 485 ms  P Axis 40 degrees  R Axis -40 degrees  T Axis 94 degrees    Diagnosis Line Sinus rhythm withoccasional Premature ventricular complexes  Possible Left atrial enlargement  Left axis deviation  Incomplete right bundle branch block  Septal infarct , age undetermined  Abnormal ECG    Confirmed by Stacie Franco MD ([id number]) on 2023 7:50:12 AM  	  	    PREVIOUS DIAGNOSTIC TESTING:    TTE 23    Summary:   1. Left ventricular ejection fraction, by visual estimation, is <20%.   2. Severely decreased global left ventricular systolic function.   3. Severely enlarged left atrium.   4. Elevated mean left atrial pressure.   5. Large, fixed thrombus vs. mass on the apical wall of the left   ventricle.   6. Spectral Doppler shows pseudonormal pattern of left ventricular   myocardial filling (Grade II diastolic dysfunction).   7. Moderately reduced RV systolic function.   8. Mildly enlarged right atrium.   9. Moderate mitral valve regurgitation.  10. Moderate-severe tricuspid regurgitation.  11. Mild pulmonic valve regurgitation.  12. Estimated pulmonary artery systolic pressure is 42.9 mmHg assuming a   right atrial pressure of 15 mmHg, which is consistent withmild pulmonary   hypertension.  13. Endocardial visualization was enhanced with intravenous echo contrast.      Right Heart Catheterization 23    FINDINGS:   Right Heart Cath  RA - 12  RV - 51/  PA - /  PCWP - 20    CO/CI Thermodilusion - 2.1/1.27  CO/CI Radha - 2.09/1.26    SVR (MAP 79 / RA 12 / CO 2.1) = 2552 dyn  PVR = 5.71 henriquez      POST-OP DIAGNOSIS:    [x] Decreased cardiac output with increased SVR + PVR + PCWP consistent with cardiogenic shock.    Combined pre and post capillary PH.   	    Home Medications:  atorvastatin 20 mg oral tablet: 1 tab(s) orally once a day (04 Aug 2021 10:05)  carvedilol 6.25 mg oral tablet: 1 tab(s) orally 2 times a day (2023 02:02)  Entresto 49 mg-51 mg oral tablet: 1 tab(s) orally 2 times a day (04 Aug 2021 10:05)  Farxiga 10 mg oral tablet: 1 tab(s) orally once a day (2023 02:05)  Lasix 20 mg oral tablet: 1 tab(s) orally once a day (2023 02:04)  spironolactone 25 mg oral tablet: 1 tab(s) orally once a day (04 Aug 2021 10:05)    MEDICATIONS  (STANDING):  chlorhexidine 2% Cloths 1 Application(s) Topical daily  DOBUTamine Infusion 7 MICROgram(s)/kG/Min (6.67 mL/Hr) IV Continuous <Continuous>  guaifenesin/dextromethorphan Oral Liquid 15 milliLiter(s) Oral once  heparin  Infusion. 800 Unit(s)/Hr (8 mL/Hr) IV Continuous <Continuous>  melatonin 5 milliGRAM(s) Oral at bedtime  pantoprazole    Tablet 40 milliGRAM(s) Oral before breakfast  piperacillin/tazobactam IVPB.. 3.375 Gram(s) IV Intermittent every 8 hours  potassium chloride    Tablet ER 40 milliEquivalent(s) Oral every 4 hours    MEDICATIONS  (PRN):  heparin   Injectable 5000 Unit(s) IV Push every 6 hours PRN For aPTT less than 40  heparin   Injectable 2500 Unit(s) IV Push every 6 hours PRN For aPTT between 40 - 57         Date of Admission: 23    CHIEF COMPLAINT: Patient is a 77y old  Female who presents with a chief complaint of intra cardiac mass.    Interval History: Patient seen and examined with family at bedside. Without complaints at this time. Unhappy with food options - states she cooks meals at home - encouraged Ensure BID. Otherwise without complaints. Has not been out of bed since hospitalization,     HISTORY OF PRESENT ILLNESS: 78 y/o F with PMHx of HTN, HLD, nonischemic cardiomyopathy, HFrEF s/p AICD presents to the ED with 2 weeks of worsening shortness of breath. Patient and her son state patient has been experiencing shortness of breath that has progressed from present on activity to now shortness of breath during conversation. Patient also notes an increasing dry cough over this time period.    In ED, patient's HR is 110 and saturating 98% on RA  CT C/A/P revealing cardiomegaly with evidence of right heart strain, R hilar lymphadenopathy with R perihilar soft tissue density with indentation/possible invasion of right pulmonary artery, and narrowing of proximal R lobar and segmental branches with associated consolidation of right lower lobe. 2.2 x 2.8 cm filling defect noted in apex of left ventricle.   Last ECHO 2021 revealing EF 20-25%, mild Mr, mild TR, mild Pulm HTN  Dimer 2600, trops negative BNP 07137  Duplex venous LE negative for DVT; CTA chest negative for PE  Cr 1.4 (0.7 in ), Na+ 128, T.Benjamin 2.3, LFTs elevated (hemolyzed)  Lactate 5.8-->4.6  Admitted to SDU (2023 01:10)      Patient seen and examined in CCU, family present at bedside. Reports patient was at baseline (active, ambulating long distances without issue) until about 2 weeks ago when patient began to have progressively worsening SOB with a dry cough. Patient reports Dr. Guerrero as per primary cardiologist, and Dr. Sherwood as her heart failure specialist (last seen in office in ). Endorses compliance with home medications. Family at bedside concerned about patient's recent weight loss as well.         PAST MEDICAL & SURGICAL HISTORY:  CHF, stage C  HTN (hypertension)      FAMILY HISTORY: No pertinent family history of premature cardiovascular disease in first degree relatives.    SOCIAL HISTORY:  Denies smoking, alcohol, or drug use    Allergies  epinephrine (Unknown)    Intolerances      Vitals:  ICU Vital Signs Last 24 Hrs  T(C): 35.9 (2023 08:00), Max: 36.2 (2023 04:00)  T(F): 96.6 (2023 08:00), Max: 97.1 (2023 04:00)  HR: 99 (:) (92 - 101)  BP: 97/68 (2023 03:00) (87/61 - 107/66)  BP(mean): 78 (2023 03:00) (68 - 78)  ABP: 100/64 (:) (94/56 - 112/69)  ABP(mean): 77 (:) (68 - 83)  RR: 27 (:) (23 - 49)  SpO2: 98% (:) (96% - 99%)    O2 Parameters below as of :  Patient On (Oxygen Delivery Method): room air      I&O's Summary    2023 07:  -  2023 07:00  --------------------------------------------------------  IN: 1327.1 mL / OUT: 3935 mL / NET: -2607.9 mL    2023 07:01  -  2023 15:31  --------------------------------------------------------  IN: 449.1 mL / OUT: 1725 mL / NET: -1275.9 mL        Physical Exam:  General Appearance: thin, frail, normal for age and gender. 	  Neck: +Central Line  Cardiovascular: RRR, +S1, S2, No edema  Respiratory: decreased @ bases  Psychiatry: Alert and oriented x 3, Mood & affect appropriate  Gastrointestinal:  Soft, Non-tender  Skin/Integumentary: No rashes, No ecchymoses, No cyanosis	  Neurologic: Non-focal  Musculoskeletal/ extremities: Normal range of motion, No clubbing, cyanosis or edema  Vascular: Peripheral pulses palpable 2+ bilaterally        LABS:	 	    CBC Full  -  ( 2023 05:10 )  WBC Count : 9.91 K/uL  RBC Count : 4.28 M/uL  Hemoglobin : 13.0 g/dL  Hematocrit : 38.2 %  Platelet Count - Automated : 197 K/uL  Mean Cell Volume : 89.3 fL  Mean Cell Hemoglobin : 30.4 pg  Mean Cell Hemoglobin Concentration : 34.0 g/dL  Auto Neutrophil # : 7.27 K/uL  Auto Lymphocyte # : 1.58 K/uL  Auto Monocyte # : 0.89 K/uL  Auto Eosinophil # : 0.07 K/uL  Auto Basophil # : 0.01 K/uL  Auto Neutrophil % : 73.4 %  Auto Lymphocyte % : 15.9 %  Auto Monocyte % : 9.0 %  Auto Eosinophil % : 0.7 %  Auto Basophil % : 0.1 %    Comprehensive Metabolic Panel in AM (23 @ 05:10)    Sodium, Serum: 135 mmol/L   Potassium, Serum: 3.6 mmol/L   Chloride, Serum: 94 mmol/L   Carbon Dioxide, Serum: 27 mmol/L   Anion Gap, Serum: 14 mmol/L   Blood Urea Nitrogen, Serum: 17 mg/dL   Creatinine, Serum: 0.7 mg/dL   Glucose, Serum: 115 mg/dL   Calcium, Total Serum: 8.1 mg/dL   Protein Total, Serum: 5.9 g/dL   Albumin, Serum: 2.9 g/dL   Bilirubin Total, Serum: 2.5 mg/dL   Alkaline Phosphatase, Serum: 111 U/L   Aspartate Aminotransferase (AST/SGOT): 191 U/L   Alanine Aminotransferase (ALT/SGPT): 359 U/L   eGFR: 88: The estimated glomerular filtration rate (eGFR) is calculated using the   CKD-EPI creatinine equation, which does not have a coefficient for  race and is validated in individuals 18 years of age and older (N Engl J  Med ; [id number]:[phone]). Creatinine-based eGFR may be inaccurate in  various situations including but not limited to extremes of muscle mass,  altered dietary protein intake, or medications that affect renal tubular  creatinine secretion. mL/min/1.73m2      Pro-Brain Natriuretic Peptide (23 @ 11:43)    Pro-Brain Natriuretic Peptide: 85134 pg/mL         Blood Gas Arterial, Lactate (23 @ 13:22)    Blood Gas Arterial, Lactate: 1.70 mmol/L    Blood Gas Profile - Mixed (23 @ 13:23)    pH, Mixed: 7.51   pCO2, Mixed: 42 mmHg   pO2, Mixed: 42 mmHg   HCO3, Mixed: 34 mmol/L   Base Excess Mixed: 9.5 mmol/L   FIO2, Mixed: 68   Blood Gas Source, Mixed: Mixed Blood Gas      TELEMETRY EVENTS: 	    EC2023  Ventricular Rate 96 BPM    Atrial Rate 96 BPM    P-R Interval 164 ms    QRS Duration 116 ms    Q-T Interval 396 ms    QTC Calculation(Bazett) 500 ms    P Axis 51 degrees    R Axis -41 degrees    T Axis 87 degrees    Diagnosis Line Normal sinus rhythm  Possible Left atrial enlargement  Left axis deviation  Prolonged QT  Abnormal ECG    Confirmed by Seven Hensley ([id number]) on 2023 7:07:08 AM      CXR     Impression:  CHF. Support devices as described. Without difference.      CT Angio Chest PE Protocol   IMPRESSION:    No evidence of pulmonary embolism. (See discussion below for more   findings.)    Marked cardiomegaly.    There is a 2.2 x 2.8 cm rounded filling defect in the region of the apex   of the left ventricle (3/11; 607/95). Differential diagnosis includes an   intracardiac mass or thrombus.    The main pulmonary artery is dilated, measuring 3.6 cm in diameter, which   may be seen with pulmonary hypertension. There is reflux of contrast   material into the IVC and hepatic veins compatible with right heart   dysfunction.    Right hilar lymphadenopathy and right perihilar soft tissue density is   noted. There is segmental narrowing and consolidation noted in the medial   aspect of the right lower lobe. There is a small right pleural effusion.   There is extrinsic indentation and possible invasion of the right main   pulmonary artery (604/142; 605/129; /). There is associated narrowing   of the proximal right lobar and segmental branches. A right hilar / right   lower lobe mass with postobstructive pneumonitis should be ruled out.          PREVIOUS DIAGNOSTIC TESTING:    TTE 23    Summary:   1. Left ventricular ejection fraction, by visual estimation, is <20%.   2. Severely decreased global left ventricular systolic function.   3. Severely enlarged left atrium.   4. Elevated mean left atrial pressure.   5. Large, fixed thrombus vs. mass on the apical wall of the left   ventricle.   6. Spectral Doppler shows pseudonormal pattern of left ventricular   myocardial filling (Grade II diastolic dysfunction).   7. Moderately reduced RV systolic function.   8. Mildly enlarged right atrium.   9. Moderate mitral valve regurgitation.  10. Moderate-severe tricuspid regurgitation.  11. Mild pulmonic valve regurgitation.  12. Estimated pulmonary artery systolic pressure is 42.9 mmHg assuming a   right atrial pressure of 15 mmHg, which is consistent withmild pulmonary   hypertension.  13. Endocardial visualization was enhanced with intravenous echo contrast.      Right Heart Catheterization 23    FINDINGS:   Right Heart Cath  RA - 12  RV - 51//  PA - //  PCWP - 20    CO/CI Thermodilusion - 2.1/1.27  CO/CI Radha - 2.09/1.26    SVR (MAP 79 / RA 12 / CO 2.1) = 2552 dyn  PVR = 5.71 henriquez      POST-OP DIAGNOSIS:    [x] Decreased cardiac output with increased SVR + PVR + PCWP consistent with cardiogenic shock.    Combined pre and post capillary PH.   	    Home Medications:  atorvastatin 20 mg oral tablet: 1 tab(s) orally once a day (04 Aug 2021 10:05)  carvedilol 6.25 mg oral tablet: 1 tab(s) orally 2 times a day (2023 02:02)  Entresto 49 mg-51 mg oral tablet: 1 tab(s) orally 2 times a day (04 Aug 2021 10:05)  Farxiga 10 mg oral tablet: 1 tab(s) orally once a day (2023 02:05)  Lasix 20 mg oral tablet: 1 tab(s) orally once a day (2023 02:04)  spironolactone 25 mg oral tablet: 1 tab(s) orally once a day (04 Aug 2021 10:05)    MEDICATIONS  (STANDING):  buMETAnide Infusion 1 mG/Hr (5 mL/Hr) IV Continuous <Continuous>  chlorhexidine 2% Cloths 1 Application(s) Topical daily  DOBUTamine Infusion 5 MICROgram(s)/kG/Min (4.76 mL/Hr) IV Continuous <Continuous>  guaifenesin/dextromethorphan Oral Liquid 15 milliLiter(s) Oral once  heparin  Infusion. 600 Unit(s)/Hr (6 mL/Hr) IV Continuous <Continuous>  iron sucrose IVPB 200 milliGRAM(s) IV Intermittent every 24 hours  melatonin 5 milliGRAM(s) Oral at bedtime  pantoprazole    Tablet 40 milliGRAM(s) Oral before breakfast  piperacillin/tazobactam IVPB.. 3.375 Gram(s) IV Intermittent every 8 hours    MEDICATIONS  (PRN):

## 2023-04-28 LAB
% ALBUMIN: 47 % — SIGNIFICANT CHANGE UP
% ALPHA 1: 9.1 % — SIGNIFICANT CHANGE UP
% ALPHA 2: 10.1 % — SIGNIFICANT CHANGE UP
% BETA: 17.5 % — SIGNIFICANT CHANGE UP
% GAMMA: 16.3 % — SIGNIFICANT CHANGE UP
ALBUMIN SERPL ELPH-MCNC: 2.5 G/DL — LOW (ref 3.6–5.5)
ALBUMIN SERPL ELPH-MCNC: 2.8 G/DL — LOW (ref 3.5–5.2)
ALBUMIN/GLOB SERPL ELPH: 0.9 RATIO — SIGNIFICANT CHANGE UP
ALP SERPL-CCNC: 119 U/L — HIGH (ref 30–115)
ALPHA1 GLOB SERPL ELPH-MCNC: 0.5 G/DL — HIGH (ref 0.1–0.4)
ALPHA2 GLOB SERPL ELPH-MCNC: 0.5 G/DL — SIGNIFICANT CHANGE UP (ref 0.5–1)
ALT FLD-CCNC: 275 U/L — HIGH (ref 0–41)
ANION GAP SERPL CALC-SCNC: 10 MMOL/L — SIGNIFICANT CHANGE UP (ref 7–14)
ANION GAP SERPL CALC-SCNC: 13 MMOL/L — SIGNIFICANT CHANGE UP (ref 7–14)
ANION GAP SERPL CALC-SCNC: 13 MMOL/L — SIGNIFICANT CHANGE UP (ref 7–14)
APTT BLD: 55.3 SEC — HIGH (ref 27–39.2)
APTT BLD: 56.8 SEC — HIGH (ref 27–39.2)
AST SERPL-CCNC: 111 U/L — HIGH (ref 0–41)
B-GLOBULIN SERPL ELPH-MCNC: 0.9 G/DL — SIGNIFICANT CHANGE UP (ref 0.5–1)
BASE EXCESS BLDMV CALC-SCNC: 12.9 MMOL/L — SIGNIFICANT CHANGE UP
BASE EXCESS BLDMV CALC-SCNC: 7.4 MMOL/L — SIGNIFICANT CHANGE UP
BASE EXCESS BLDMV CALC-SCNC: 9.8 MMOL/L — SIGNIFICANT CHANGE UP
BASOPHILS # BLD AUTO: 0.01 K/UL — SIGNIFICANT CHANGE UP (ref 0–0.2)
BASOPHILS NFR BLD AUTO: 0.1 % — SIGNIFICANT CHANGE UP (ref 0–1)
BILIRUB SERPL-MCNC: 2.5 MG/DL — HIGH (ref 0.2–1.2)
BUN SERPL-MCNC: 19 MG/DL — SIGNIFICANT CHANGE UP (ref 10–20)
BUN SERPL-MCNC: 21 MG/DL — HIGH (ref 10–20)
BUN SERPL-MCNC: 23 MG/DL — HIGH (ref 10–20)
CALCIUM SERPL-MCNC: 8.2 MG/DL — LOW (ref 8.4–10.5)
CALCIUM SERPL-MCNC: 8.8 MG/DL — SIGNIFICANT CHANGE UP (ref 8.4–10.5)
CALCIUM SERPL-MCNC: 8.9 MG/DL — SIGNIFICANT CHANGE UP (ref 8.4–10.5)
CHLORIDE SERPL-SCNC: 85 MMOL/L — LOW (ref 98–110)
CHLORIDE SERPL-SCNC: 89 MMOL/L — LOW (ref 98–110)
CHLORIDE SERPL-SCNC: 91 MMOL/L — LOW (ref 98–110)
CO2 SERPL-SCNC: 28 MMOL/L — SIGNIFICANT CHANGE UP (ref 17–32)
CO2 SERPL-SCNC: 29 MMOL/L — SIGNIFICANT CHANGE UP (ref 17–32)
CO2 SERPL-SCNC: 31 MMOL/L — SIGNIFICANT CHANGE UP (ref 17–32)
CREAT SERPL-MCNC: 0.8 MG/DL — SIGNIFICANT CHANGE UP (ref 0.7–1.5)
CREAT SERPL-MCNC: 0.8 MG/DL — SIGNIFICANT CHANGE UP (ref 0.7–1.5)
CREAT SERPL-MCNC: 0.9 MG/DL — SIGNIFICANT CHANGE UP (ref 0.7–1.5)
EBV DNA SERPL NAA+PROBE-ACNC: 249 IU/ML — HIGH
EBVPCR LOG: 2.4 LOG10IU/ML — HIGH
EGFR: 65 ML/MIN/1.73M2 — SIGNIFICANT CHANGE UP
EGFR: 75 ML/MIN/1.73M2 — SIGNIFICANT CHANGE UP
EGFR: 75 ML/MIN/1.73M2 — SIGNIFICANT CHANGE UP
EOSINOPHIL # BLD AUTO: 0.04 K/UL — SIGNIFICANT CHANGE UP (ref 0–0.7)
EOSINOPHIL NFR BLD AUTO: 0.4 % — SIGNIFICANT CHANGE UP (ref 0–8)
GAMMA GLOBULIN: 0.9 G/DL — SIGNIFICANT CHANGE UP (ref 0.6–1.6)
GAS PNL BLDA: SIGNIFICANT CHANGE UP
GAS PNL BLDMV: SIGNIFICANT CHANGE UP
GLUCOSE SERPL-MCNC: 114 MG/DL — HIGH (ref 70–99)
GLUCOSE SERPL-MCNC: 127 MG/DL — HIGH (ref 70–99)
GLUCOSE SERPL-MCNC: 155 MG/DL — HIGH (ref 70–99)
HCO3 BLDMV-SCNC: 31 MMOL/L — SIGNIFICANT CHANGE UP
HCO3 BLDMV-SCNC: 33 MMOL/L — SIGNIFICANT CHANGE UP
HCO3 BLDMV-SCNC: 37 MMOL/L — SIGNIFICANT CHANGE UP
HCT VFR BLD CALC: 38 % — SIGNIFICANT CHANGE UP (ref 37–47)
HEV IGM SER QL: NEGATIVE — SIGNIFICANT CHANGE UP
HGB BLD-MCNC: 12.7 G/DL — SIGNIFICANT CHANGE UP (ref 12–16)
IMM GRANULOCYTES NFR BLD AUTO: 0.4 % — HIGH (ref 0.1–0.3)
INTERPRETATION SERPL IFE-IMP: SIGNIFICANT CHANGE UP
LYMPHOCYTES # BLD AUTO: 1.37 K/UL — SIGNIFICANT CHANGE UP (ref 1.2–3.4)
LYMPHOCYTES # BLD AUTO: 14.5 % — LOW (ref 20.5–51.1)
MAGNESIUM SERPL-MCNC: 1.9 MG/DL — SIGNIFICANT CHANGE UP (ref 1.8–2.4)
MAGNESIUM SERPL-MCNC: 2 MG/DL — SIGNIFICANT CHANGE UP (ref 1.8–2.4)
MCHC RBC-ENTMCNC: 30.1 PG — SIGNIFICANT CHANGE UP (ref 27–31)
MCHC RBC-ENTMCNC: 33.4 G/DL — SIGNIFICANT CHANGE UP (ref 32–37)
MCV RBC AUTO: 90 FL — SIGNIFICANT CHANGE UP (ref 81–99)
MONOCYTES # BLD AUTO: 0.84 K/UL — HIGH (ref 0.1–0.6)
MONOCYTES NFR BLD AUTO: 8.9 % — SIGNIFICANT CHANGE UP (ref 1.7–9.3)
NEUTROPHILS # BLD AUTO: 7.16 K/UL — HIGH (ref 1.4–6.5)
NEUTROPHILS NFR BLD AUTO: 75.7 % — HIGH (ref 42.2–75.2)
NRBC # BLD: 0 /100 WBCS — SIGNIFICANT CHANGE UP (ref 0–0)
O2 CT VFR BLD CALC: 38 MMHG — SIGNIFICANT CHANGE UP
O2 CT VFR BLD CALC: 39 MMHG — SIGNIFICANT CHANGE UP
O2 CT VFR BLD CALC: 44 MMHG — SIGNIFICANT CHANGE UP
PCO2 BLDMV: 40 MMHG — SIGNIFICANT CHANGE UP
PCO2 BLDMV: 40 MMHG — SIGNIFICANT CHANGE UP
PCO2 BLDMV: 42 MMHG — SIGNIFICANT CHANGE UP
PH BLDMV: 7.5 — SIGNIFICANT CHANGE UP
PH BLDMV: 7.53 — SIGNIFICANT CHANGE UP
PH BLDMV: 7.55 — SIGNIFICANT CHANGE UP
PLATELET # BLD AUTO: 221 K/UL — SIGNIFICANT CHANGE UP (ref 130–400)
PMV BLD: 11.6 FL — HIGH (ref 7.4–10.4)
POTASSIUM SERPL-MCNC: 3.4 MMOL/L — LOW (ref 3.5–5)
POTASSIUM SERPL-MCNC: 3.4 MMOL/L — LOW (ref 3.5–5)
POTASSIUM SERPL-MCNC: 3.8 MMOL/L — SIGNIFICANT CHANGE UP (ref 3.5–5)
POTASSIUM SERPL-SCNC: 3.4 MMOL/L — LOW (ref 3.5–5)
POTASSIUM SERPL-SCNC: 3.4 MMOL/L — LOW (ref 3.5–5)
POTASSIUM SERPL-SCNC: 3.8 MMOL/L — SIGNIFICANT CHANGE UP (ref 3.5–5)
PREALB SERPL-MCNC: 5 MG/DL — LOW (ref 20–40)
PROT PATTERN SERPL ELPH-IMP: SIGNIFICANT CHANGE UP
PROT SERPL-MCNC: 5.9 G/DL — LOW (ref 6–8)
RBC # BLD: 4.22 M/UL — SIGNIFICANT CHANGE UP (ref 4.2–5.4)
RBC # FLD: 13.9 % — SIGNIFICANT CHANGE UP (ref 11.5–14.5)
SAO2 % BLDMV: 67.7 % — SIGNIFICANT CHANGE UP
SAO2 % BLDMV: 76.7 % — SIGNIFICANT CHANGE UP
SODIUM SERPL-SCNC: 126 MMOL/L — LOW (ref 135–146)
SODIUM SERPL-SCNC: 130 MMOL/L — LOW (ref 135–146)
SODIUM SERPL-SCNC: 133 MMOL/L — LOW (ref 135–146)
WBC # BLD: 9.46 K/UL — SIGNIFICANT CHANGE UP (ref 4.8–10.8)
WBC # FLD AUTO: 9.46 K/UL — SIGNIFICANT CHANGE UP (ref 4.8–10.8)

## 2023-04-28 PROCEDURE — 71045 X-RAY EXAM CHEST 1 VIEW: CPT | Mod: 26

## 2023-04-28 PROCEDURE — 93010 ELECTROCARDIOGRAM REPORT: CPT

## 2023-04-28 PROCEDURE — 99233 SBSQ HOSP IP/OBS HIGH 50: CPT

## 2023-04-28 PROCEDURE — 99291 CRITICAL CARE FIRST HOUR: CPT | Mod: 25

## 2023-04-28 RX ORDER — POTASSIUM CHLORIDE 20 MEQ
20 PACKET (EA) ORAL
Refills: 0 | Status: COMPLETED | OUTPATIENT
Start: 2023-04-28 | End: 2023-04-28

## 2023-04-28 RX ORDER — ALBUMIN HUMAN 25 %
100 VIAL (ML) INTRAVENOUS ONCE
Refills: 0 | Status: COMPLETED | OUTPATIENT
Start: 2023-04-28 | End: 2023-04-28

## 2023-04-28 RX ORDER — DOBUTAMINE HCL 250MG/20ML
7.5 VIAL (ML) INTRAVENOUS
Qty: 1000 | Refills: 0 | Status: DISCONTINUED | OUTPATIENT
Start: 2023-04-28 | End: 2023-04-28

## 2023-04-28 RX ORDER — NOREPINEPHRINE BITARTRATE/D5W 8 MG/250ML
0.05 PLASTIC BAG, INJECTION (ML) INTRAVENOUS
Qty: 16 | Refills: 0 | Status: DISCONTINUED | OUTPATIENT
Start: 2023-04-28 | End: 2023-05-04

## 2023-04-28 RX ORDER — DOBUTAMINE HCL 250MG/20ML
10 VIAL (ML) INTRAVENOUS
Qty: 1000 | Refills: 0 | Status: DISCONTINUED | OUTPATIENT
Start: 2023-04-28 | End: 2023-04-29

## 2023-04-28 RX ORDER — MAGNESIUM SULFATE 500 MG/ML
2 VIAL (ML) INJECTION ONCE
Refills: 0 | Status: COMPLETED | OUTPATIENT
Start: 2023-04-28 | End: 2023-04-28

## 2023-04-28 RX ADMIN — Medication 100 MILLILITER(S): at 17:40

## 2023-04-28 RX ADMIN — CHLORHEXIDINE GLUCONATE 1 APPLICATION(S): 213 SOLUTION TOPICAL at 12:17

## 2023-04-28 RX ADMIN — HEPARIN SODIUM 700 UNIT(S)/HR: 5000 INJECTION INTRAVENOUS; SUBCUTANEOUS at 03:32

## 2023-04-28 RX ADMIN — Medication 7.14 MICROGRAM(S)/KG/MIN: at 15:37

## 2023-04-28 RX ADMIN — IRON SUCROSE 110 MILLIGRAM(S): 20 INJECTION, SOLUTION INTRAVENOUS at 15:56

## 2023-04-28 RX ADMIN — Medication 50 MILLILITER(S): at 19:37

## 2023-04-28 RX ADMIN — Medication 25 GRAM(S): at 06:52

## 2023-04-28 RX ADMIN — PANTOPRAZOLE SODIUM 40 MILLIGRAM(S): 20 TABLET, DELAYED RELEASE ORAL at 05:28

## 2023-04-28 RX ADMIN — Medication 2.98 MICROGRAM(S)/KG/MIN: at 19:51

## 2023-04-28 RX ADMIN — Medication 50 MILLIEQUIVALENT(S): at 05:28

## 2023-04-28 RX ADMIN — HEPARIN SODIUM 700 UNIT(S)/HR: 5000 INJECTION INTRAVENOUS; SUBCUTANEOUS at 11:00

## 2023-04-28 RX ADMIN — Medication 50 MILLIEQUIVALENT(S): at 12:27

## 2023-04-28 RX ADMIN — Medication 5 MILLIGRAM(S): at 21:24

## 2023-04-28 NOTE — PHYSICAL THERAPY INITIAL EVALUATION ADULT - GENERAL OBSERVATIONS, REHAB EVAL
11:00-11:30 chart reviewed. Pt received semi-smith at B/S with  and son at b/s, alert, oriented, able to follow multi-step instructions and agreeable to PT evaluation. laying BP 85/57 HR 97 sitting at EOB /58  sitting in b/s chair BP 77/56  +ve orthostatic Pt returned back to bed in semi-reclining position /60 + Cont IV + A-line + yoon + IJ

## 2023-04-28 NOTE — PHYSICAL THERAPY INITIAL EVALUATION ADULT - GAIT TRAINING, PT EVAL
Pt will ambulate using RW or least restrictive AD for 25  ft with CGA  by discharge to facilitate return to PLOF. Pt will negotiate 4 steps using 1 HR under close supervision

## 2023-04-28 NOTE — PROGRESS NOTE ADULT - NS ATTEND AMEND GEN_ALL_CORE FT
Patient remains on dobutamine gtt, tolerating lower dose so far, good urine output and normal lactate. She remains fluid overloaded. Pre-albumin is 5 with along with recent weight loss of >10 lbs suggests significant malnutrition. Patient deconditioned but got up with physical therapy today.     Continue dobutamine gtt, wean gradually and monitor renal function, LFTs, lactate and blood pressure   Continue Bumex gtt until tomorrow + hypertonic saline   Start torsemide 20 mg daily   Strict I/Os  Get nutrition evaluation   Physical therapy follow up   Consider C to rule out CAD as cause for recent deterioration of LVEF  Thoracic surgery f/u for thoracic mass  Case discussed with CCU, general cardiology and family at bedside

## 2023-04-28 NOTE — PHYSICAL THERAPY INITIAL EVALUATION ADULT - BED MOBILITY TRAINING, PT EVAL
Pt will participate in supine to sit and reverse using side rails with close supervision   by discharge to facilitate return to PLOF.

## 2023-04-28 NOTE — PHYSICAL THERAPY INITIAL EVALUATION ADULT - IMPAIRMENTS CONTRIBUTING IMPAIRED BED MOBILITY, REHAB EVAL
Body Location Override (Optional - Billing Will Still Be Based On Selected Body Map Location If Applicable): inferior thoracic spine Detail Level: Detailed Number Of Curettages: 1 Size Of Lesion In Cm: 1.5 Size Of Lesion After Curettage: 2.5 impaired balance/decreased strength Add Intralesional Injection: No Concentration (Mg/Ml Or Millions Of Plaque Forming Units/Cc): 0.01 Anesthesia Type: 1% lidocaine with epinephrine Anesthesia Volume In Cc: 0.6 Cautery Type: aluminum chloride What Was Performed First?: Curettage Additional Information: (Optional): The wound was cleaned, and a pressure dressing was applied.  The patient received detailed post-op instructions. Consent was obtained from the patient. The risks, benefits and alternatives to therapy were discussed in detail. Specifically, the risks of infection, scarring, bleeding, prolonged wound healing, nerve injury, incomplete removal, allergy to anesthesia and recurrence were addressed. Alternatives to ED&C, such as: surgical removal and XRT were also discussed.  Prior to the procedure, the treatment site was clearly identified and confirmed by the patient. All components of Universal Protocol/PAUSE Rule completed. Render Post-Care Instructions In Note?: yes Post-Care Instructions: I reviewed with the patient in detail post-care instructions. Patient is to keep the area dry for 48 hours, and not to engage in any swimming until the area is healed. Should the patient develop any fevers, chills, bleeding, severe pain patient will contact the office immediately. Bill As A Line Item Or As Units: Line Item

## 2023-04-28 NOTE — PROGRESS NOTE ADULT - SUBJECTIVE AND OBJECTIVE BOX
MIKHAIL JOHNSON 77y Female  MRN#: [id number]   Hospital Day: 7d    HPI:  77 year old woman with PMHx of HTN, HLD, nonischemic cardiomyopathy, HFrEF s/p AICD presents to the ED with 2 weeks of worsening shortness of breath. Patient and her son state patient has been experiencing shortness of breath that has progressed from present on activity to now shortness of breath during conversation. Patient also notes an increasing dry cough over this time period. Denies history of smoking, fevers/chills, chest pain, nausea/vomiting, bloody sputum, dark/tarry/bloody bowel movements, upper respiratory symptoms.     In ED, patient's HR is 110 and saturating 98% on RA  CT C/A/P revealing cardiomegaly with evidence of right heart strain, R hilar lymphadenopathy with R perihilar soft tissue density with indentation/possible invasion of right pulmonary artery, and narrowing of proximal R lobar and segmental branches with associated consolidation of right lower lobe. 2.2 x 2.8 cm filling defect noted in apex of left ventricle.   Last ECHO 8/2021 revealing EF 20-25%, mild Mr, mild TR, mild Pulm HTN  Dimer 2600, trops negative BNP 64565  Duplex venous LE negative for DVT; CTA chest negative for PE  Cr 1.4 (0.7 in 2021), Na+ 128, T.Benjamin 2.3, LFTs elevated (hemolyzed)  Lactate 5.8-->4.6  Admitted to SDU (22 Apr 2023 01:10)        OBJECTIVE  PAST MEDICAL & SURGICAL HISTORY  CHF, stage C    HTN (hypertension)      ALLERGIES:  epinephrine (Unknown)    MEDICATIONS:  STANDING MEDICATIONS  chlorhexidine 2% Cloths 1 Application(s) Topical daily  DOBUTamine Infusion 7.5 MICROgram(s)/kG/Min IV Continuous <Continuous>  heparin  Infusion. 600 Unit(s)/Hr IV Continuous <Continuous>  iron sucrose IVPB 200 milliGRAM(s) IV Intermittent every 24 hours  melatonin 5 milliGRAM(s) Oral at bedtime  pantoprazole    Tablet 40 milliGRAM(s) Oral before breakfast    PRN MEDICATIONS  benzocaine 20% Spray 1 Spray(s) Topical four times a day PRN      VITAL SIGNS: Last 24 Hours  T(C): 35.8 (28 Apr 2023 08:00), Max: 35.8 (28 Apr 2023 04:00)  T(F): 96.4 (28 Apr 2023 08:00), Max: 96.4 (28 Apr 2023 04:00)  HR: 91 (28 Apr 2023 08:00) (91 - 109)  BP: --  BP(mean): --  RR: 19 (28 Apr 2023 08:00) (19 - 34)  SpO2: 99% (28 Apr 2023 08:00) (91% - 100%)    LABS:                        12.7   9.46  )-----------( 221      ( 28 Apr 2023 04:05 )             38.0     04-28    133<L>  |  91<L>  |  19  ----------------------------<  114<H>  3.4<L>   |  29  |  0.8    Ca    8.9      28 Apr 2023 04:05  Mg     1.9     04-28    TPro  5.9<L>  /  Alb  2.8<L>  /  TBili  2.5<H>  /  DBili  x   /  AST  111<H>  /  ALT  275<H>  /  AlkPhos  119<H>  04-28    PTT - ( 28 Apr 2023 10:02 )  PTT:55.3 sec    ABG - ( 28 Apr 2023 04:30 )  pH, Arterial: 7.52  pH, Blood: x     /  pCO2: 41    /  pO2: 82    / HCO3: 34    / Base Excess: 9.7   /  SaO2: 97.8                          PHYSICAL EXAM:  General Appearance: thin, frail, normal for age and gender. 	  Neck: +Central Line  Cardiovascular: RRR, +S1, S2, No edema  Respiratory: decreased @ bases  Psychiatry: Fatigued, oriented x 3, Mood & affect appropriate  Gastrointestinal:  Soft, Non-tender  Skin/Integumentary: No rashes, No ecchymoses, No cyanosis	  Neurologic: Non-focal  Musculoskeletal/ extremities: Normal range of motion, No clubbing, cyanosis or edema  Vascular: Peripheral pulses palpable 2+ bilaterally

## 2023-04-28 NOTE — PROGRESS NOTE ADULT - ASSESSMENT
Assessment:   77 year old woman with pmh of HTN, HLD, nonischemic cardiomyopathy, HFrEF, s/p ICD and PPM presents to the ED with 2 weeks of worsening shortness of breath.     IMPRESSION:  Cardiogenic shock on inotropic suppport  HFrEF 20%, NICM  RLL Mass/opacity/ possible Pneumonia, r/o malignancy  LV Mass, ?Thrombus  Transaminitis in the setting of hypotension, shock liver    - POCUS: IVC remains dilated to ~1.9cm, non collapsing  - Continue slow inotropic wean, agree with decreasing  to 5 mcg/kg/min today  - Continue diuresis with Bumex gtt 1mg/hr  - Continue 3% Hypertonic Saline 150ml BID (If infused throughout a central line, run over one hour, if a peripheral line is to be used run over 3 hours)   - Incentive Spirometer  - Recommend PT / OOB to chair  - Please obtain pre-albumin level to assess nutritional status given recent weight loss  - CTA reviewed. Appreciate Thoracic Surgery recs: agree with PET scan as outpatient to r/o lung malignancy  - Iron sat 6%, ferritin 186- consider giving iron supplementation if no infection present  - Rest of care as per CCU team  - Plan discussed with patient, family at bedside, and CCU team  - Will continue to follow Assessment:   77 year old woman with pmh of HTN, HLD, nonischemic cardiomyopathy, HFrEF, s/p ICD and PPM presents to the ED with 2 weeks of worsening shortness of breath.     IMPRESSION:  Cardiogenic shock on inotropic support  HFrEF 20%, NICM  RLL Mass/opacity/ possible Pneumonia, r/o malignancy  LV Mass, ?Thrombus  Transaminitis in the setting of hypotension, shock liver    - POCUS: IVC remains dilated to ~1.9cm, non collapsing  - Continue slow inotropic wean, agree with decreasing  to 5 mcg/kg/min today  - Continue Bumex gtt 1mg/hr and 3%   - Continue 3% Hypertonic Saline 150ml BID (If infused throughout a central line, run over one hour, if a peripheral line is to be used run over 3 hours)   - Incentive Spirometer x10 an hour while awake- please encourage use  - Continue PT / OOB to chair daily as tolerated  - Pre-albumin 5  - CTA reviewed. Appreciate Thoracic Surgery recs: agree with PET scan as outpatient to r/o lung malignancy  - Iron sat 6%, ferritin 186- continue IV iron supplementation to complete 5 day course  - Rest of care as per CCU team  - Plan discussed with patient, family at bedside, and CCU team  - Will continue to follow Assessment:   77 year old woman with pmh of HTN, HLD, nonischemic cardiomyopathy, HFrEF, s/p ICD and PPM presents to the ED with 2 weeks of worsening shortness of breath.     IMPRESSION:  Cardiogenic shock on inotropic support  HFrEF 20%, NICM  RLL Mass/opacity/ possible Pneumonia, r/o malignancy  LV Mass, ?Thrombus  Transaminitis in the setting of hypotension, shock liver    - POCUS: hepatic vein remains engorged, IVC ~2 cm, collapsing with respirations  - Continue Bumex gtt 1mg/hr and 3% hypertonic saline 150mL BID for today  - Tomorrow 4/29- switch to Torsemide 20mg daily   - Dobutamine gtt at 5 mcg/kg/min  - Get repeat Echo Monday 5/01  - Incentive Spirometer x10 an hour while awake- please encourage use  - Continue PT / OOB to chair daily as tolerated  - Pre-albumin 5  - CTA reviewed. Appreciate Thoracic Surgery recs: agree with PET scan as outpatient to r/o lung malignancy  - Iron sat 6%, ferritin 186- continue IV iron supplementation to complete 5 day course  - Rest of care as per CCU team  - Plan discussed with patient, family at bedside, and CCU team  - Will continue to follow

## 2023-04-28 NOTE — CONSULT NOTE ADULT - SUBJECTIVE AND OBJECTIVE BOX
NUTRITION SUPPORT TEAM  -  CONSULT NOTE   77 year old woman with PMHx of HTN, HLD, nonischemic cardiomyopathy, HFrEF s/p AICD presents to the ED with 2 weeks of worsening shortness of breath. Patient and her son state patient has been experiencing shortness of breath that has progressed from present on activity to now shortness of breath during conversation. Patient also notes an increasing dry cough over this time period. Denies history of smoking, fevers/chills, chest pain, nausea/vomiting, bloody sputum, dark/tarry/bloody bowel movements, upper respiratory symptoms.     In ED, patient's HR is 110 and saturating 98% on RA  CT C/A/P revealing cardiomegaly with evidence of right heart strain, R hilar lymphadenopathy with R perihilar soft tissue density with indentation/possible invasion of right pulmonary artery, and narrowing of proximal R lobar and segmental branches with associated consolidation of right lower lobe. 2.2 x 2.8 cm filling defect noted in apex of left ventricle.   Last ECHO 8/2021 revealing EF 20-25%, mild Mr, mild TR, mild Pulm HTN  Dimer 2600, trops negative BNP 39291  Duplex venous LE negative for DVT; CTA chest negative for PE  Cr 1.4 (0.7 in 2021), Na+ 128, T.Benjamin 2.3, LFTs elevated (hemolyzed)  Lactate 5.8-->4.6  Admitted to SDU (22 Apr 2023 01:10)    NUTRITION SUPPORT NOTE:    on po diet    REVIEW OF SYSTEMS:  Negative except as noted above.     PAST MEDICAL/SURGICAL HISTORY:   CHF, stage C  HTN (hypertension)  ALLERGIES:  epinephrine (Unknown)  VITALS:  T(F): 96.4 (04-28 @ 08:00), Max: 96.4 (04-28 @ 04:00)  HR: 91 (04-28 @ 08:00) (91 - 96)  RR: 19 (04-28 @ 08:00) (19 - 28)  SpO2: 99% (04-28 @ 08:00) (91% - 100%)  ABG - ( 28 Apr 2023 04:30 )  pH, Arterial: 7.52  pH, Blood: x     /  pCO2: 41    /  pO2: 82    / HCO3: 34    / Base Excess: 9.7   /  SaO2: 97.8    HEIGHT/WEIGHT/BMI:   Height (cm): 160 (04-26)  Weight (kg): 63.5 (04-21)  BMI (kg/m2): 24.8 (04-26)    PHYSICAL EXAM:   GENERAL: NAD  HEENT: Moist mucous membranes  ABDOMEN: Soft, N/T N/D  EXTREMITIES:  No edema  SKIN: no lesions  IV ACCESS: right IJ site c/d/i  ENTERAL ACCESS: po diet    04-27-23 @ 07:01  -  04-28-23 @ 07:00  --------------------------------------------------------  IN:    Bumetanide: 80 mL    DOBUTamine: 21.3 mL    DOBUTamine: 100.8 mL    Heparin Infusion: 30 mL    Heparin Infusion: 119 mL    IV PiggyBack: 300 mL  Total IN: 651.1 mL    OUT:    Ureteral Catheter (mL): 3145 mL  Total OUT: 3145 mL  Total NET: -2493.9 mL  STANDING MEDICATIONS:   chlorhexidine 2% Cloths 1 Application(s) Topical daily  DOBUTamine Infusion 7.5 MICROgram(s)/kG/Min IV Continuous <Continuous>  heparin  Infusion. 600 Unit(s)/Hr IV Continuous <Continuous>  iron sucrose IVPB 200 milliGRAM(s) IV Intermittent every 24 hours  melatonin 5 milliGRAM(s) Oral at bedtime  pantoprazole    Tablet 40 milliGRAM(s) Oral before breakfast      LABS:                         12.7   9.46  )-----------( 221      ( 28 Apr 2023 04:05 )             38.0     133<L>  |  91<L>  |  19  ----------------------------<  114<H>          (04-28-23 @ 04:05)  3.4<L>   |  29  |  0.8    Ca    8.9          (04-28-23 @ 04:05)  Mg     1.9         (04-28-23 @ 04:05)    TPro  5.9<L>  /  Alb  2.8<L>  /  TBili  2.5<H>  /  DBili  x   /  AST  111<H>  /  ALT  275<H>  /  AlkPhos  119<H>       04-28-23 @ 04:05  Triglycerides, Serum: 91 mg/dL (04-25 @ 04:50)  Prealbumin, Serum: 5 mg/dL (04-27-23 @ 16:01)  A1c: 6.2 % (04-25-23 @ 04:50)    DIET:   Diet, DASH/TLC:   Sodium & Cholesterol Restricted  1200mL Fluid Restriction ([id number])  Supplement Feeding Modality:  Oral  Ensure Plus High Protein Cans or Servings Per Day:  2       Frequency:  Daily (04-28-23 @ 11:51) [Active]  RADIOLOGY:   < from: Xray Chest 1 View- PORTABLE-Routine (Xray Chest 1 View- PORTABLE-Routine in AM.) (04.28.23 @ 05:30) >  IMPRESSION:  Mild decrease in bilateral opacities.   NUTRITION SUPPORT TEAM  -  CONSULT NOTE   77 year old woman with PMHx of HTN, HLD, nonischemic cardiomyopathy, HFrEF s/p AICD presents to the ED with 2 weeks of worsening shortness of breath. Patient and her son state patient has been experiencing shortness of breath that has progressed from present on activity to now shortness of breath during conversation. Patient also notes an increasing dry cough over this time period. Denies history of smoking, fevers/chills, chest pain, nausea/vomiting, bloody sputum, dark/tarry/bloody bowel movements, upper respiratory symptoms.     In ED, patient's HR is 110 and saturating 98% on RA  CT C/A/P revealing cardiomegaly with evidence of right heart strain, R hilar lymphadenopathy with R perihilar soft tissue density with indentation/possible invasion of right pulmonary artery, and narrowing of proximal R lobar and segmental branches with associated consolidation of right lower lobe. 2.2 x 2.8 cm filling defect noted in apex of left ventricle.   Last ECHO 8/2021 revealing EF 20-25%, mild Mr, mild TR, mild Pulm HTN  Dimer 2600, trops negative BNP 49051  Duplex venous LE negative for DVT; CTA chest negative for PE  Cr 1.4 (0.7 in 2021), Na+ 128, T.Benjamin 2.3, LFTs elevated (hemolyzed)  Lactate 5.8-->4.6  Admitted to SDU (22 Apr 2023 01:10)    NUTRITION SUPPORT NOTE:    on po diet  family member at bedside   pt stated she is not a big eater   wants food from home like soup or puree    REVIEW OF SYSTEMS:  Negative except as noted above.     PAST MEDICAL/SURGICAL HISTORY:   CHF, stage C  HTN (hypertension)  ALLERGIES:  epinephrine (Unknown)  VITALS:  T(F): 96.4 (04-28 @ 08:00), Max: 96.4 (04-28 @ 04:00)  HR: 91 (04-28 @ 08:00) (91 - 96)  RR: 19 (04-28 @ 08:00) (19 - 28)  SpO2: 99% (04-28 @ 08:00) (91% - 100%)  ABG - ( 28 Apr 2023 04:30 )  pH, Arterial: 7.52  pH, Blood: x     /  pCO2: 41    /  pO2: 82    / HCO3: 34    / Base Excess: 9.7   /  SaO2: 97.8    HEIGHT/WEIGHT/BMI:   Height (cm): 160 (04-26)  Weight (kg): 63.5 (04-21)  BMI (kg/m2): 24.8 (04-26)    PHYSICAL EXAM:   GENERAL: NAD alert, awake, verbal  temporal/clavicular wasted  HEENT: Moist mucous membranes  ABDOMEN: Soft, N/T N/D  EXTREMITIES:  No edema decrease in LBM  SKIN: no lesions  IV ACCESS: right IJ site c/d/i  ENTERAL ACCESS: po diet    04-27-23 @ 07:01  -  04-28-23 @ 07:00  --------------------------------------------------------  IN:    Bumetanide: 80 mL    DOBUTamine: 21.3 mL    DOBUTamine: 100.8 mL    Heparin Infusion: 30 mL    Heparin Infusion: 119 mL    IV PiggyBack: 300 mL  Total IN: 651.1 mL    OUT:    Ureteral Catheter (mL): 3145 mL  Total OUT: 3145 mL  Total NET: -2493.9 mL  STANDING MEDICATIONS:   chlorhexidine 2% Cloths 1 Application(s) Topical daily  DOBUTamine Infusion 7.5 MICROgram(s)/kG/Min IV Continuous <Continuous>  heparin  Infusion. 600 Unit(s)/Hr IV Continuous <Continuous>  iron sucrose IVPB 200 milliGRAM(s) IV Intermittent every 24 hours  melatonin 5 milliGRAM(s) Oral at bedtime  pantoprazole    Tablet 40 milliGRAM(s) Oral before breakfast      LABS:                         12.7   9.46  )-----------( 221      ( 28 Apr 2023 04:05 )             38.0     133<L>  |  91<L>  |  19  ----------------------------<  114<H>          (04-28-23 @ 04:05)  3.4<L>   |  29  |  0.8    Ca    8.9          (04-28-23 @ 04:05)  Mg     1.9         (04-28-23 @ 04:05)    TPro  5.9<L>  /  Alb  2.8<L>  /  TBili  2.5<H>  /  DBili  x   /  AST  111<H>  /  ALT  275<H>  /  AlkPhos  119<H>       04-28-23 @ 04:05  Triglycerides, Serum: 91 mg/dL (04-25 @ 04:50)  Prealbumin, Serum: 5 mg/dL (04-27-23 @ 16:01)  A1c: 6.2 % (04-25-23 @ 04:50)    DIET:   Diet, DASH/TLC:   Sodium & Cholesterol Restricted  1200mL Fluid Restriction ([id number])  Supplement Feeding Modality:  Oral  Ensure Plus High Protein Cans or Servings Per Day:  2       Frequency:  Daily (04-28-23 @ 11:51) [Active]  RADIOLOGY:   < from: Xray Chest 1 View- PORTABLE-Routine (Xray Chest 1 View- PORTABLE-Routine in AM.) (04.28.23 @ 05:30) >  IMPRESSION:  Mild decrease in bilateral opacities.   NUTRITION SUPPORT TEAM  -  CONSULT NOTE   77 year old woman with PMHx of HTN, HLD, nonischemic cardiomyopathy, HFrEF s/p AICD presents to the ED with 2 weeks of worsening shortness of breath. Patient and her son state patient has been experiencing shortness of breath that has progressed from present on activity to now shortness of breath during conversation. Patient also notes an increasing dry cough over this time period. Denies history of smoking, fevers/chills, chest pain, nausea/vomiting, bloody sputum, dark/tarry/bloody bowel movements, upper respiratory symptoms.     In ED, patient's HR is 110 and saturating 98% on RA  CT C/A/P revealing cardiomegaly with evidence of right heart strain, R hilar lymphadenopathy with R perihilar soft tissue density with indentation/possible invasion of right pulmonary artery, and narrowing of proximal R lobar and segmental branches with associated consolidation of right lower lobe. 2.2 x 2.8 cm filling defect noted in apex of left ventricle.   Last ECHO 8/2021 revealing EF 20-25%, mild Mr, mild TR, mild Pulm HTN  Dimer 2600, trops negative BNP 87535  Duplex venous LE negative for DVT; CTA chest negative for PE  Cr 1.4 (0.7 in 2021), Na+ 128, T.Benjamin 2.3, LFTs elevated (hemolyzed)  Lactate 5.8-->4.6  Admitted to SDU (22 Apr 2023 01:10)    NUTRITION SUPPORT NOTE:    pt developed worsening DAVID, hypotension, concern for ischemic liver - transferred to CCU  + pressors, diuresis  on po diet but intake poor (SOB contributes to this as well)  family member at bedside   pt stated she is not a big eater , wants food from home like soup or puree    REVIEW OF SYSTEMS:  Negative except as noted above.     PAST MEDICAL/SURGICAL HISTORY:   CHF, stage C  HTN (hypertension)  ALLERGIES:  epinephrine (Unknown)    VITALS:  T(F): 96.4 (04-28 @ 08:00), Max: 96.4 (04-28 @ 04:00)  HR: 91 (04-28 @ 08:00) (91 - 96)  RR: 19 (04-28 @ 08:00) (19 - 28)  SpO2: 99% (04-28 @ 08:00) (91% - 100%)    ABG - ( 28 Apr 2023 04:30 )  pH, Arterial: 7.52  pH, Blood: x     /  pCO2: 41    /  pO2: 82    / HCO3: 34    / Base Excess: 9.7   /  SaO2: 97.8      HEIGHT/WEIGHT/BMI:   Height (cm): 160 (04-26)  Weight (kg): 63.5 (04-21)  BMI (kg/m2): 24.8 (04-26)    PHYSICAL EXAM:   GENERAL: NAD alert, awake, verbal, frail, fatigued  temporal/clavicular wasted - partly r/t age but more LBM loss than would be expected  HEENT: Moist mucous membranes  ABDOMEN: Soft, N/T N/D  EXTREMITIES:  No edema, decrease in LBM  SKIN: no lesions  IV ACCESS: right IJ site c/d/i  ENTERAL ACCESS: po diet    04-27-23 @ 07:01  -  04-28-23 @ 07:00  --------------------------------------------------------  IN:    Bumetanide: 80 mL    DOBUTamine: 21.3 mL    DOBUTamine: 100.8 mL    Heparin Infusion: 30 mL    Heparin Infusion: 119 mL    IV PiggyBack: 300 mL  Total IN: 651.1 mL  OUT:    Ureteral Catheter (mL): 3145 mL  Total OUT: 3145 mL  Total NET: -2493.9 mL    STANDING MEDICATIONS:   chlorhexidine 2% Cloths 1 Application(s) Topical daily  DOBUTamine Infusion 7.5 MICROgram(s)/kG/Min IV Continuous <Continuous>  heparin  Infusion. 600 Unit(s)/Hr IV Continuous <Continuous>  iron sucrose IVPB 200 milliGRAM(s) IV Intermittent every 24 hours  melatonin 5 milliGRAM(s) Oral at bedtime  pantoprazole    Tablet 40 milliGRAM(s) Oral before breakfast    LABS:                         12.7   9.46  )-----------( 221      ( 28 Apr 2023 04:05 )             38.0     133<L>  |  91<L>  |  19  ----------------------------<  114<H>          (04-28-23 @ 04:05)  3.4<L>   |  29  |  0.8    Ca    8.9          (04-28-23 @ 04:05)  Mg     1.9         (04-28-23 @ 04:05)    TPro  5.9<L>  /  Alb  2.8<L>  /  TBili  2.5<H>  /  DBili  x   /  AST  111<H>  /  ALT  275<H>  /  AlkPhos  119<H>       04-28-23 @ 04:05  Triglycerides, Serum: 91 mg/dL (04-25 @ 04:50)  Prealbumin, Serum: 5 mg/dL (04-27-23 @ 16:01)    A1c: 6.2 % (04-25-23 @ 04:50)    DIET:   Diet, DASH/TLC:   Sodium & Cholesterol Restricted  1200mL Fluid Restriction ([id number])  Supplement Feeding Modality:  Oral  Ensure Plus High Protein Cans or Servings Per Day:  2       Frequency:  Daily (04-28-23 @ 11:51) [Active]  RADIOLOGY:   < from: Xray Chest 1 View- PORTABLE-Routine (Xray Chest 1 View- PORTABLE-Routine in AM.) (04.28.23 @ 05:30) >  IMPRESSION:  Mild decrease in bilateral opacities.    - CT CAP - cardiomegaly w/ RH strain  - TTE EF <20%, large fixed thrombus vs mass on apical LV wall, GIIDD, mod-sev TR, mod MR, mild SC, mild PH

## 2023-04-28 NOTE — PROGRESS NOTE ADULT - ASSESSMENT
77 year old woman with PMHx of HTN, HLD, nonischemic cardiomyopathy, HFrEF s/p AICD presents to the ED with 2 weeks of worsening shortness of breath with increasing dry cough over this time period.  In ED, patient's HR is 110 and saturating 98% on RA  CT C/A/P revealing cardiomegaly with evidence of right heart strain, R hilar lymphadenopathy with R perihilar soft tissue density with indentation/possible invasion of right pulmonary artery, and narrowing of proximal R lobar and segmental branches with associated consolidation of right lower lobe. 2.2 x 2.8 cm filling defect noted in apex of left ventricle.   Last ECHO 8/2021 revealing EF 20-25%, mild Mr, mild TR, mild Pulm HTN  Dimer 2600, trops negative BNP 15804  Duplex venous LE negative for DVT; CTA chest negative for PE  Cr 1.4 (0.7 in 2021), Na+ 128, T.Benjamin 2.3, LFTs elevated (hemolyzed)  Lactate 5.8-->4.6  Admitted to SDU    CEU Course:   - Cardiology [facility] consulted, TTE confirmed EF <20% w/ LV thrombus - started on heparin IV  - Pulmonary team consulted for eval of EBUS - pending clinical improvement  - Overnight patient developed worsening DAVID and suspected ischemic hepatitis in the setting of reduced BP ~70s/50s  - Patient started on Dopamine ggt with improvement of BP   - 4/23 AM - patient asymptomatic - bedside echo revealed non-compressible IVC and reduced EF  - Spoke w/ Dr. Hensley and Dr. Yeager - accepted to CCU   - plan follows below         #Cardiogenic Shock   #LV thrombus  #NICM  #HFrEF 25% s/p AICD  #HTN/HLD  - SOB x2 weeks  - CT CAP - cardiomegaly w/ RH strain  - TTE EF <20%, large fixed thrombus vs mass on apical LV wall, GIIDD, mod-sev TR, mod MR, mild DE, mild PH  - LE venous duplex -ve  - overnight BP 80s/60s -> given 1x dose lasix, started on dopamine ggt  - CCU consulted - now upgraded  - R IJ placed, started on milrinone ggt .125mcg/kg  - Current BP 110s/80s  - c/w heparin ggt  - albumin stat dose 04/25  - CTS and Cardio following ([place])  - RHC 04/24: Decreased cardiac output with increased SVR + PVR + PCWP consistent with cardiogenic shock.    Combined pre and post capillary PH..   - fu Heart failure team notes --- - IVC remains dilated to ~1.9 on POCUS. Continue slow inotropic wean,  @5 today. Continue Bumex gtt @ 1.  Continue hypertonic saling  - Incentive Spirometer  - Recommend PT / OOB to chair  - Please obtain pre-albumin level to assess nutritional status      #Post obstructive pneumonia  #Suspected lung mass   - CT CAP - R hilar LAD, R perihilar soft tissue density w/ indentation/possible invasion of R pulmonary artery and narrowing of proximal R lobar and segmental branches w/ consolidation of RLL  - MRSA -ve, procal 1.77  - c/w zosyn   - pulm following for EBUS - needs medical optimization   - Appreciate Thoracic Surgery recs: agree with PET scan as outpatient to r/o lung malignancy    #HAGMA  #Lactic Acidosis  #DAVID on CKD   #Ischemic hepatitis   - AG 20, lactate 5.8-->4.6 -> 3.1   - Cr 1.4-> 1.7, BUN 42-> 61 - bsl Cr 0.7 2021  - AST/ALT 2051/917, LDH 1401 -> previous AST//114  - INR 2.6  - s/p IVF bolus - bedside echo non-collapsable IVC - hold on IVF for now   - repeat lactate and bmp   - pending RUQ US, CK, tylenol level   - pending HAV IgM/IgG, HBsAg, HBsAb, HBcAb IgM/IgG, HCV Ab, Anti HEV, Serum Ferritin, Transferrin Saturation, Ceruloplasmin level, ALE, SMA, immunoglobulins panel, AMA, Anti LK microsomal Ab, anti-soluble liver Ag, EBV, HSV, CMV, ACE levels   - GI following   - holding lipitor    #Hypotonic Hyponatremia   - Na 129, holding IVF  - trend Na       # Misc  - DVT Prophylaxis: heparin drip  - GI Prophylaxis: pantoprazole    Tablet 40 milliGRAM(s) Oral before breakfast  - Diet: Diet, DASH/TLC with 1.2 L fluid restriction  - Activity: Activity - Ambulate as Tolerated  - Code Status: Full      77 year old woman with PMHx of HTN, HLD, nonischemic cardiomyopathy, HFrEF s/p AICD presents to the ED with 2 weeks of worsening shortness of breath with increasing dry cough over this time period.  In ED, patient's HR is 110 and saturating 98% on RA  CT C/A/P revealing cardiomegaly with evidence of right heart strain, R hilar lymphadenopathy with R perihilar soft tissue density with indentation/possible invasion of right pulmonary artery, and narrowing of proximal R lobar and segmental branches with associated consolidation of right lower lobe. 2.2 x 2.8 cm filling defect noted in apex of left ventricle.   Last ECHO 8/2021 revealing EF 20-25%, mild Mr, mild TR, mild Pulm HTN  Dimer 2600, trops negative BNP 59503  Duplex venous LE negative for DVT; CTA chest negative for PE  Cr 1.4 (0.7 in 2021), Na+ 128, T.Benjamin 2.3, LFTs elevated (hemolyzed)  Lactate 5.8-->4.6  Admitted to SDU    CEU Course:   - Cardiology [facility] consulted, TTE confirmed EF <20% w/ LV thrombus - started on heparin IV  - Pulmonary team consulted for eval of EBUS - pending clinical improvement  - Patient developped worsening DAVID and suspected ischemic hepatitis in the setting of reduced BP ~70s/50s  - Patient started on Dopamine ggt with improvement of BP   - 4/23 AM - patient asymptomatic - bedside echo revealed non-compressible IVC and reduced EF  - Spoke w/ Dr. Hensley and Dr. Yeager - accepted to CCU for cardiogenic shock    CCU assessment and plan:    #Cardiogenic Shock   #LV thrombus  #NICM  #HFrEF 25% s/p AICD  #HTN/HLD  - CT CAP - cardiomegaly w/ RH strain  - TTE 4/22/2023 EF <20%, large fixed thrombus vs mass on apical LV wall, GIIDD, mod-sev TR, mod MR, mild DE, mild PH  - LE venous duplex 4/21 -ve  - R IJ placed on 4/23rd --> needs to be changed on 4/30th  - RHC 04/24: Decreased cardiac output with increased SVR + PVR + PCWP consistent with cardiogenic shock.    Combined pre and post capillary PH..   - s/p albumin stat dose 4/25  - s/p milrinone on 4/23 and 4/24 --> switched to dobutamine on 4/24  - 4/28: dobutamine increased to 10, bumex 1mg drip held, given albumin 25% 100mL for hypotension.   - c/w heparin ggt  - CTS, HF ( Dr Sherwood), Cardio following ([place]) --> plan for RHC on monday when patient optimized   - Incentive Spirometer  - 4/28 PT OOB to chair  - pre albumin 5 decreased --> nutrition cs       #Post obstructive pneumonia  #Suspected lung mass   - CT CAP - R hilar LAD, R perihilar soft tissue density w/ indentation/possible invasion of R pulmonary artery and narrowing of proximal R lobar and segmental branches w/ consolidation of RLL  - MRSA -ve, procal 1.77  - completed zosyn 7 day course   - pulm following for EBUS - needs medical optimization   - Appreciate Thoracic Surgery recs: agree with PET scan as outpatient to r/o lung malignancy    #HAGMA - resolved   #Lactic Acidosis - resolved   #DAVID on CKD - resolved   #Elevated liver enzymes predominantly hepatocellular (AST>ALT) with hyperbilirubinemia likely due to ischemic liver injury - resolved   - AG 20, lactate 5.8-->4.6 -> 3.1 --> 1.2  - Cr 1.4-> 1.7--> 0.8, - bsl Cr 0.7 2021  - AST/ALT 2051/917, LDH 1401 -> previous AST//114 --> back to baseline  - INR 2.6 --> 1.6 --> f/u repeat  - pending RUQ US, CK, tylenol level   - Abdomen US doppler 4/23 --> no venous thrombosis   - 4/25 results: ferritin 186 (H), ceruloplasmin 32 (nL), ALE -ve, SMA -ve, AMA -ve, anti LK microsomal Ab -ve, HIV -ve, immunofixation -ve,   +ve CMV IgG, +ve EBV IgG, +ve hep A IgG, +ve HSV 1 IgG  hep pannel -ve otherwise   - pending anti-soluble liver Ag, ACE levels   - GI following   - holding lipitor    #Hypotonic Hyponatremia  - hypervolemic   bumex on hold for now  - trend Na       # Misc  - DVT Prophylaxis: heparin drip  - GI Prophylaxis: pantoprazole    Tablet 40 milliGRAM(s) Oral before breakfast  - Diet: Diet, DASH/TLC with 1.2 L fluid restriction  - Activity: Activity - Ambulate as Tolerated  - Code Status: Full

## 2023-04-28 NOTE — CONSULT NOTE ADULT - ASSESSMENT
ASSESSMENT  Cardiogenic Shock   LV thrombus  NICM  HFrEF 25% s/p AICD  HTN/HLD  - SOB x2 weeks  - CT CAP - cardiomegaly w/ RH strain  - TTE EF <20%, large fixed thrombus vs mass on apical LV wall, GIIDD, mod-sev TR, mod MR, mild MT, mild PH   Post obstructive pneumonia  Suspected lung mass   - HAGMA  Lactic Acidosis  DAVID on CKD   Ischemic hepatitis   - hypokalemia   PLAN   ASSESSMENT  Cardiogenic Shock   LV thrombus  NICM  HFrEF 25% s/p AICD  HTN/HLD  - SOB x2 weeks  - CT CAP - cardiomegaly w/ RH strain  - TTE EF <20%, large fixed thrombus vs mass on apical LV wall, GIIDD, mod-sev TR, mod MR, mild CO, mild PH   Post obstructive pneumonia  Suspected lung mass   - HAGMA  Lactic Acidosis  DAVID on CKD   Ischemic hepatitis   - hypokalemia   PLAN  spoke  with family   encourage po intake mostly protein  waldemar count   if po intake is less than 50% suggest to place NGT tube feed (not [place] sump)  when ok to use the NGT start with jevity 1.2 at 240ml after each attempted po diet x3   check bmp/phos/mg and correct lytes     ASSESSMENT  Cardiogenic Shock   LV thrombus  NICM  HFrEF 25% s/p AICD  HTN/HLD  - SOB x2 weeks  - CT CAP - cardiomegaly w/ RH strain  - TTE EF <20%, large fixed thrombus vs mass on apical LV wall, GIIDD, mod-sev TR, mod MR, mild SD, mild PH   Post obstructive pneumonia  Suspected lung mass   - HAGMA  Lactic Acidosis  DAVID on CKD   Ischemic hepatitis   - hypokalemia   PLAN  spoke  with family   encourage po intake mostly protein  waldemar count   if po intake is less than 50% suggest to place NGT tube feed (not [place] sump)  when ok to use the NGT start with jevity 1.2 at 240ml after each attempted po diet x3   check bmp/phos/mg/vit D/ and ZINC      ASSESSMENT  Cardiogenic Shock   LV thrombus  NICM  HFrEF 25% s/p AICD  HTN/HLD  - SOB x2 weeks  Post obstructive pneumonia  Suspected lung mass   - HAGMA, Lactic Acidosis  DAVID on CKD   Ischemic hepatitis     PLAN  encourage po intake - particularly concerned with adequacy of protein intake  waldemar count   if po intake is less than 50% suggest to place NG feeding tube (not [place] sump)      would then supplement post-prandially with TwoCal  HN at 240ml after each attempted po meal at which intake < 50%      flush NG with only 20 ml water brisk syringe push before & after each feeding  f/u phos and Mg with am labs (if + intake, concern for refeeding) , add zinc level and 25oh vit D levels too  note that prealbumin is not a marker of nutritional status or adequacy, and by itself is not of value - although unrelated to albumin, it is also a negative acute    phase protein -       so that in acute infectious, inflammatory conditions, prealb synthesis is minimal, regardless of how much the pt is fed.  will follow

## 2023-04-28 NOTE — PROGRESS NOTE ADULT - SUBJECTIVE AND OBJECTIVE BOX
Date of Admission: 23    CHIEF COMPLAINT: Patient is a 77y old  Female who presents with a chief complaint of intra cardiac mass.    Interval History: Patient seen and examined with family present at bedside. OOB to chair with physical therapy this AM. Patient reports feeling fatigued and is anxious to d/c home. NPO for planned cath today-                                                                                                                                                              HISTORY OF PRESENT ILLNESS: 76 y/o F with PMHx of HTN, HLD, nonischemic cardiomyopathy, HFrEF s/p AICD presents to the ED with 2 weeks of worsening shortness of breath. Patient and her son state patient has been experiencing shortness of breath that has progressed from present on activity to now shortness of breath during conversation. Patient also notes an increasing dry cough over this time period.    In ED, patient's HR is 110 and saturating 98% on RA  CT C/A/P revealing cardiomegaly with evidence of right heart strain, R hilar lymphadenopathy with R perihilar soft tissue density with indentation/possible invasion of right pulmonary artery, and narrowing of proximal R lobar and segmental branches with associated consolidation of right lower lobe. 2.2 x 2.8 cm filling defect noted in apex of left ventricle.   Last ECHO 2021 revealing EF 20-25%, mild Mr, mild TR, mild Pulm HTN  Dimer 2600, trops negative BNP 90408  Duplex venous LE negative for DVT; CTA chest negative for PE  Cr 1.4 (0.7 in ), Na+ 128, T.Benjamin 2.3, LFTs elevated (hemolyzed)  Lactate 5.8-->4.6  Admitted to SDU (2023 01:10)      Patient seen and examined in CCU, family present at bedside. Reports patient was at baseline (active, ambulating long distances without issue) until about 2 weeks ago when patient began to have progressively worsening SOB with a dry cough. Patient reports Dr. Guerrero as per primary cardiologist, and Dr. Sherwood as her heart failure specialist (last seen in office in ). Endorses compliance with home medications. Family at bedside concerned about patient's recent weight loss as well.         PAST MEDICAL & SURGICAL HISTORY:  CHF, stage C  HTN (hypertension)      FAMILY HISTORY: No pertinent family history of premature cardiovascular disease in first degree relatives.    SOCIAL HISTORY:  Denies smoking, alcohol, or drug use    Allergies  epinephrine (Unknown)    Intolerances      Vitals:  ICU Vital Signs Last 24 Hrs  T(C): 35.9 (2023 08:00), Max: 36.2 (2023 04:00)  T(F): 96.6 (2023 08:00), Max: 97.1 (2023 04:00)  HR: 99 (:00) (92 - 101)  BP: 97/68 (2023 03:00) (87/61 - 107/66)  BP(mean): 78 (2023 03:00) (68 - 78)  ABP: 100/64 (:00) (94/56 - 112/69)  ABP(mean): 77 (:) (68 - 83)  RR: 27 (:00) (23 - 49)  SpO2: 98% (:) (96% - 99%)    O2 Parameters below as of 2023 13:00  Patient On (Oxygen Delivery Method): room air      I&O's Summary    2023 07:01  -  2023 07:00  --------------------------------------------------------  IN: 1327.1 mL / OUT: 3935 mL / NET: -2607.9 mL    2023 07:01  -  2023 15:31  --------------------------------------------------------  IN: 449.1 mL / OUT: 1725 mL / NET: -1275.9 mL        Physical Exam:  General Appearance: thin, frail, normal for age and gender. 	  Neck: +Central Line  Cardiovascular: RRR, +S1, S2, No edema  Respiratory: decreased @ bases  Psychiatry: Alert and oriented x 3, Mood & affect appropriate  Gastrointestinal:  Soft, Non-tender  Skin/Integumentary: No rashes, No ecchymoses, No cyanosis	  Neurologic: Non-focal  Musculoskeletal/ extremities: Normal range of motion, No clubbing, cyanosis or edema  Vascular: Peripheral pulses palpable 2+ bilaterally        LABS:	 	    CBC Full  -  ( 2023 05:10 )  WBC Count : 9.91 K/uL  RBC Count : 4.28 M/uL  Hemoglobin : 13.0 g/dL  Hematocrit : 38.2 %  Platelet Count - Automated : 197 K/uL  Mean Cell Volume : 89.3 fL  Mean Cell Hemoglobin : 30.4 pg  Mean Cell Hemoglobin Concentration : 34.0 g/dL  Auto Neutrophil # : 7.27 K/uL  Auto Lymphocyte # : 1.58 K/uL  Auto Monocyte # : 0.89 K/uL  Auto Eosinophil # : 0.07 K/uL  Auto Basophil # : 0.01 K/uL  Auto Neutrophil % : 73.4 %  Auto Lymphocyte % : 15.9 %  Auto Monocyte % : 9.0 %  Auto Eosinophil % : 0.7 %  Auto Basophil % : 0.1 %    Comprehensive Metabolic Panel in AM (23 @ 05:10)    Sodium, Serum: 135 mmol/L   Potassium, Serum: 3.6 mmol/L   Chloride, Serum: 94 mmol/L   Carbon Dioxide, Serum: 27 mmol/L   Anion Gap, Serum: 14 mmol/L   Blood Urea Nitrogen, Serum: 17 mg/dL   Creatinine, Serum: 0.7 mg/dL   Glucose, Serum: 115 mg/dL   Calcium, Total Serum: 8.1 mg/dL   Protein Total, Serum: 5.9 g/dL   Albumin, Serum: 2.9 g/dL   Bilirubin Total, Serum: 2.5 mg/dL   Alkaline Phosphatase, Serum: 111 U/L   Aspartate Aminotransferase (AST/SGOT): 191 U/L   Alanine Aminotransferase (ALT/SGPT): 359 U/L   eGFR: 88: The estimated glomerular filtration rate (eGFR) is calculated using the   CKD-EPI creatinine equation, which does not have a coefficient for  race and is validated in individuals 18 years of age and older (N Engl J  Med ; 385:9146-6246). Creatinine-based eGFR may be inaccurate in  various situations including but not limited to extremes of muscle mass,  altered dietary protein intake, or medications that affect renal tubular  creatinine secretion. mL/min/1.73m2      Pro-Brain Natriuretic Peptide (23 @ 11:43)    Pro-Brain Natriuretic Peptide: 88873 pg/mL         Blood Gas Arterial, Lactate (23 @ 13:22)    Blood Gas Arterial, Lactate: 1.70 mmol/L    Blood Gas Profile - Mixed (23 @ 13:23)    pH, Mixed: 7.51   pCO2, Mixed: 42 mmHg   pO2, Mixed: 42 mmHg   HCO3, Mixed: 34 mmol/L   Base Excess Mixed: 9.5 mmol/L   FIO2, Mixed: 68   Blood Gas Source, Mixed: Mixed Blood Gas      TELEMETRY EVENTS: 	    EC2023  Ventricular Rate 96 BPM    Atrial Rate 96 BPM    P-R Interval 164 ms    QRS Duration 116 ms    Q-T Interval 396 ms    QTC Calculation(Bazett) 500 ms    P Axis 51 degrees    R Axis -41 degrees    T Axis 87 degrees    Diagnosis Line Normal sinus rhythm  Possible Left atrial enlargement  Left axis deviation  Prolonged QT  Abnormal ECG    Confirmed by Seven Hensley ([id number]) on 2023 7:07:08 AM      CXR     Impression:  CHF. Support devices as described. Without difference.      CT Angio Chest PE Protocol   IMPRESSION:    No evidence of pulmonary embolism. (See discussion below for more   findings.)    Marked cardiomegaly.    There is a 2.2 x 2.8 cm rounded filling defect in the region of the apex   of the left ventricle (3/11; 607/95). Differential diagnosis includes an   intracardiac mass or thrombus.    The main pulmonary artery is dilated, measuring 3.6 cm in diameter, which   may be seen with pulmonary hypertension. There is reflux of contrast   material into the IVC and hepatic veins compatible with right heart   dysfunction.    Right hilar lymphadenopathy and right perihilar soft tissue density is   noted. There is segmental narrowing and consolidation noted in the medial   aspect of the right lower lobe. There is a small right pleural effusion.   There is extrinsic indentation and possible invasion of the right main   pulmonary artery (604/142; 605/129; 2/). There is associated narrowing   of the proximal right lobar and segmental branches. A right hilar / right   lower lobe mass with postobstructive pneumonitis should be ruled out.          PREVIOUS DIAGNOSTIC TESTING:    TTE 23    Summary:   1. Left ventricular ejection fraction, by visual estimation, is <20%.   2. Severely decreased global left ventricular systolic function.   3. Severely enlarged left atrium.   4. Elevated mean left atrial pressure.   5. Large, fixed thrombus vs. mass on the apical wall of the left   ventricle.   6. Spectral Doppler shows pseudonormal pattern of left ventricular   myocardial filling (Grade II diastolic dysfunction).   7. Moderately reduced RV systolic function.   8. Mildly enlarged right atrium.   9. Moderate mitral valve regurgitation.  10. Moderate-severe tricuspid regurgitation.  11. Mild pulmonic valve regurgitation.  12. Estimated pulmonary artery systolic pressure is 42.9 mmHg assuming a   right atrial pressure of 15 mmHg, which is consistent withmild pulmonary   hypertension.  13. Endocardial visualization was enhanced with intravenous echo contrast.      Right Heart Catheterization 23    FINDINGS:   Right Heart Cath  RA - 12  RV - 51/  PA -   PCWP - 20    CO/CI Thermodilusion - 2.1/1.27  CO/CI Radha - 2.09/1.26    SVR (MAP 79 / RA 12 / CO 2.1) = 2552 dyn  PVR = 5.71 henriquez      POST-OP DIAGNOSIS:    [x] Decreased cardiac output with increased SVR + PVR + PCWP consistent with cardiogenic shock.    Combined pre and post capillary PH.   	    Home Medications:  atorvastatin 20 mg oral tablet: 1 tab(s) orally once a day (04 Aug 2021 10:05)  carvedilol 6.25 mg oral tablet: 1 tab(s) orally 2 times a day (2023 02:02)  Entresto 49 mg-51 mg oral tablet: 1 tab(s) orally 2 times a day (04 Aug 2021 10:05)  Farxiga 10 mg oral tablet: 1 tab(s) orally once a day (2023 02:05)  Lasix 20 mg oral tablet: 1 tab(s) orally once a day (2023 02:04)  spironolactone 25 mg oral tablet: 1 tab(s) orally once a day (04 Aug 2021 10:05)    MEDICATIONS  (STANDING):  buMETAnide Infusion 1 mG/Hr (5 mL/Hr) IV Continuous <Continuous>  chlorhexidine 2% Cloths 1 Application(s) Topical daily  DOBUTamine Infusion 5 MICROgram(s)/kG/Min (4.76 mL/Hr) IV Continuous <Continuous>  guaifenesin/dextromethorphan Oral Liquid 15 milliLiter(s) Oral once  heparin  Infusion. 600 Unit(s)/Hr (6 mL/Hr) IV Continuous <Continuous>  iron sucrose IVPB 200 milliGRAM(s) IV Intermittent every 24 hours  melatonin 5 milliGRAM(s) Oral at bedtime  pantoprazole    Tablet 40 milliGRAM(s) Oral before breakfast  piperacillin/tazobactam IVPB.. 3.375 Gram(s) IV Intermittent every 8 hours    MEDICATIONS  (PRN):         Date of Admission: 23    CHIEF COMPLAINT: Patient is a 77y old  Female who presents with a chief complaint of intra cardiac mass.    Interval History: Patient seen and examined with family present at bedside. OOB to chair with physical therapy this AM. Patient reports feeling fatigued and is anxious to d/c home. Remains on Bumex gtt and inotropic support- Dobutamine 5 mcg/kg/min. Kidney function remains stable.                                                                                                                                                               HISTORY OF PRESENT ILLNESS: 76 y/o F with PMHx of HTN, HLD, nonischemic cardiomyopathy, HFrEF s/p AICD presents to the ED with 2 weeks of worsening shortness of breath. Patient and her son state patient has been experiencing shortness of breath that has progressed from present on activity to now shortness of breath during conversation. Patient also notes an increasing dry cough over this time period.    In ED, patient's HR is 110 and saturating 98% on RA  CT C/A/P revealing cardiomegaly with evidence of right heart strain, R hilar lymphadenopathy with R perihilar soft tissue density with indentation/possible invasion of right pulmonary artery, and narrowing of proximal R lobar and segmental branches with associated consolidation of right lower lobe. 2.2 x 2.8 cm filling defect noted in apex of left ventricle.   Last ECHO 2021 revealing EF 20-25%, mild Mr, mild TR, mild Pulm HTN  Dimer 2600, trops negative BNP 51105  Duplex venous LE negative for DVT; CTA chest negative for PE  Cr 1.4 (0.7 in ), Na+ 128, T.Benjamin 2.3, LFTs elevated (hemolyzed)  Lactate 5.8-->4.6  Admitted to SDU (2023 01:10)      Patient seen and examined in CCU, family present at bedside. Reports patient was at baseline (active, ambulating long distances without issue) until about 2 weeks ago when patient began to have progressively worsening SOB with a dry cough. Patient reports Dr. Guerrero as per primary cardiologist, and Dr. Sherwood as her heart failure specialist (last seen in office in ). Endorses compliance with home medications. Family at bedside concerned about patient's recent weight loss as well.         PAST MEDICAL & SURGICAL HISTORY:  CHF, stage C  HTN (hypertension)      FAMILY HISTORY: No pertinent family history of premature cardiovascular disease in first degree relatives.    SOCIAL HISTORY:  Denies smoking, alcohol, or drug use    Allergies  epinephrine (Unknown)    Intolerances      Vitals:  ICU Vital Signs Last 24 Hrs  T(C): 35.9 (2023 08:00), Max: 36.2 (2023 04:00)  T(F): 96.6 (2023 08:00), Max: 97.1 (2023 04:00)  HR: 99 (:) (92 - 101)  BP: 97/68 (2023 03:00) (87/61 - 107/66)  BP(mean): 78 (2023 03:00) (68 - 78)  ABP: 100/64 (:00) (94/56 - 112/69)  ABP(mean): 77 (:) (68 - 83)  RR: 27 (:) (23 - 49)  SpO2: 98% (:) (96% - 99%)    O2 Parameters below as of 2023 13:00  Patient On (Oxygen Delivery Method): room air      I&O's Summary    2023 07:  -  2023 07:00  --------------------------------------------------------  IN: 1327.1 mL / OUT: 3935 mL / NET: -2607.9 mL    2023 07:01  -  2023 15:31  --------------------------------------------------------  IN: 449.1 mL / OUT: 1725 mL / NET: -1275.9 mL        Physical Exam:  General Appearance: thin, frail, normal for age and gender. 	  Neck: +Central Line  Cardiovascular: RRR, +S1, S2, No edema  Respiratory: decreased @ bases  Psychiatry: Alert and oriented x 3, Mood & affect appropriate  Gastrointestinal:  Soft, Non-tender  Skin/Integumentary: No rashes, No ecchymoses, No cyanosis	  Neurologic: Non-focal  Musculoskeletal/ extremities: Normal range of motion, No clubbing, cyanosis or edema  Vascular: Peripheral pulses palpable 2+ bilaterally        LABS:	 	    CBC Full  -  ( 2023 05:10 )  WBC Count : 9.91 K/uL  RBC Count : 4.28 M/uL  Hemoglobin : 13.0 g/dL  Hematocrit : 38.2 %  Platelet Count - Automated : 197 K/uL  Mean Cell Volume : 89.3 fL  Mean Cell Hemoglobin : 30.4 pg  Mean Cell Hemoglobin Concentration : 34.0 g/dL  Auto Neutrophil # : 7.27 K/uL  Auto Lymphocyte # : 1.58 K/uL  Auto Monocyte # : 0.89 K/uL  Auto Eosinophil # : 0.07 K/uL  Auto Basophil # : 0.01 K/uL  Auto Neutrophil % : 73.4 %  Auto Lymphocyte % : 15.9 %  Auto Monocyte % : 9.0 %  Auto Eosinophil % : 0.7 %  Auto Basophil % : 0.1 %    Comprehensive Metabolic Panel in AM (23 @ 05:10)    Sodium, Serum: 135 mmol/L   Potassium, Serum: 3.6 mmol/L   Chloride, Serum: 94 mmol/L   Carbon Dioxide, Serum: 27 mmol/L   Anion Gap, Serum: 14 mmol/L   Blood Urea Nitrogen, Serum: 17 mg/dL   Creatinine, Serum: 0.7 mg/dL   Glucose, Serum: 115 mg/dL   Calcium, Total Serum: 8.1 mg/dL   Protein Total, Serum: 5.9 g/dL   Albumin, Serum: 2.9 g/dL   Bilirubin Total, Serum: 2.5 mg/dL   Alkaline Phosphatase, Serum: 111 U/L   Aspartate Aminotransferase (AST/SGOT): 191 U/L   Alanine Aminotransferase (ALT/SGPT): 359 U/L   eGFR: 88: The estimated glomerular filtration rate (eGFR) is calculated using the   CKD-EPI creatinine equation, which does not have a coefficient for  race and is validated in individuals 18 years of age and older (N Engl J  Med ; 385:5945-7274). Creatinine-based eGFR may be inaccurate in  various situations including but not limited to extremes of muscle mass,  altered dietary protein intake, or medications that affect renal tubular  creatinine secretion. mL/min/1.73m2      Pro-Brain Natriuretic Peptide (23 @ 11:43)    Pro-Brain Natriuretic Peptide: 99815 pg/mL         Blood Gas Arterial, Lactate (23 @ 13:22)    Blood Gas Arterial, Lactate: 1.70 mmol/L    Blood Gas Profile - Mixed (23 @ 13:23)    pH, Mixed: 7.51   pCO2, Mixed: 42 mmHg   pO2, Mixed: 42 mmHg   HCO3, Mixed: 34 mmol/L   Base Excess Mixed: 9.5 mmol/L   FIO2, Mixed: 68   Blood Gas Source, Mixed: Mixed Blood Gas      TELEMETRY EVENTS: 	    EC2023  Ventricular Rate 96 BPM    Atrial Rate 96 BPM    P-R Interval 164 ms    QRS Duration 116 ms    Q-T Interval 396 ms    QTC Calculation(Bazett) 500 ms    P Axis 51 degrees    R Axis -41 degrees    T Axis 87 degrees    Diagnosis Line Normal sinus rhythm  Possible Left atrial enlargement  Left axis deviation  Prolonged QT  Abnormal ECG    Confirmed by Seven Hensley ([id number]) on 2023 7:07:08 AM      CXR     Impression:  CHF. Support devices as described. Without difference.      CT Angio Chest PE Protocol   IMPRESSION:    No evidence of pulmonary embolism. (See discussion below for more   findings.)    Marked cardiomegaly.    There is a 2.2 x 2.8 cm rounded filling defect in the region of the apex   of the left ventricle (3/11; 607/95). Differential diagnosis includes an   intracardiac mass or thrombus.    The main pulmonary artery is dilated, measuring 3.6 cm in diameter, which   may be seen with pulmonary hypertension. There is reflux of contrast   material into the IVC and hepatic veins compatible with right heart   dysfunction.    Right hilar lymphadenopathy and right perihilar soft tissue density is   noted. There is segmental narrowing and consolidation noted in the medial   aspect of the right lower lobe. There is a small right pleural effusion.   There is extrinsic indentation and possible invasion of the right main   pulmonary artery (604/142; 605/129; ). There is associated narrowing   of the proximal right lobar and segmental branches. A right hilar / right   lower lobe mass with postobstructive pneumonitis should be ruled out.          PREVIOUS DIAGNOSTIC TESTING:    TTE 23    Summary:   1. Left ventricular ejection fraction, by visual estimation, is <20%.   2. Severely decreased global left ventricular systolic function.   3. Severely enlarged left atrium.   4. Elevated mean left atrial pressure.   5. Large, fixed thrombus vs. mass on the apical wall of the left   ventricle.   6. Spectral Doppler shows pseudonormal pattern of left ventricular   myocardial filling (Grade II diastolic dysfunction).   7. Moderately reduced RV systolic function.   8. Mildly enlarged right atrium.   9. Moderate mitral valve regurgitation.  10. Moderate-severe tricuspid regurgitation.  11. Mild pulmonic valve regurgitation.  12. Estimated pulmonary artery systolic pressure is 42.9 mmHg assuming a   right atrial pressure of 15 mmHg, which is consistent withmild pulmonary   hypertension.  13. Endocardial visualization was enhanced with intravenous echo contrast.      Right Heart Catheterization 23    FINDINGS:   Right Heart Cath  RA - 12  RV - 51/5/13  PA - /  PCWP - 20    CO/CI Thermodilusion - 2.1/1.27  CO/CI Radha - 2.09/1.26    SVR (MAP 79 / RA 12 / CO 2.1) = 2552 dyn  PVR = 5.71 henriquez      POST-OP DIAGNOSIS:    [x] Decreased cardiac output with increased SVR + PVR + PCWP consistent with cardiogenic shock.    Combined pre and post capillary PH.   	    Home Medications:  atorvastatin 20 mg oral tablet: 1 tab(s) orally once a day (04 Aug 2021 10:05)  carvedilol 6.25 mg oral tablet: 1 tab(s) orally 2 times a day (2023 02:02)  Entresto 49 mg-51 mg oral tablet: 1 tab(s) orally 2 times a day (04 Aug 2021 10:05)  Farxiga 10 mg oral tablet: 1 tab(s) orally once a day (2023 02:05)  Lasix 20 mg oral tablet: 1 tab(s) orally once a day (2023 02:04)  spironolactone 25 mg oral tablet: 1 tab(s) orally once a day (04 Aug 2021 10:05)    MEDICATIONS  (STANDING):  buMETAnide Infusion 1 mG/Hr (5 mL/Hr) IV Continuous <Continuous>  chlorhexidine 2% Cloths 1 Application(s) Topical daily  DOBUTamine Infusion 5 MICROgram(s)/kG/Min (4.76 mL/Hr) IV Continuous <Continuous>  guaifenesin/dextromethorphan Oral Liquid 15 milliLiter(s) Oral once  heparin  Infusion. 600 Unit(s)/Hr (6 mL/Hr) IV Continuous <Continuous>  iron sucrose IVPB 200 milliGRAM(s) IV Intermittent every 24 hours  melatonin 5 milliGRAM(s) Oral at bedtime  pantoprazole    Tablet 40 milliGRAM(s) Oral before breakfast  piperacillin/tazobactam IVPB.. 3.375 Gram(s) IV Intermittent every 8 hours    MEDICATIONS  (PRN):         Date of Admission: 23    CHIEF COMPLAINT: Patient is a 77y old  Female who presents with a chief complaint of intra cardiac mass.    Interval History: Patient seen and examined with family present at bedside. OOB to chair with physical therapy this AM. Patient reports feeling fatigued and is anxious to d/c home. Remains on Bumex gtt and inotropic support- Dobutamine 5 mcg/kg/min. Kidney function remains stable.                                                                                                                                                               HISTORY OF PRESENT ILLNESS: 78 y/o F with PMHx of HTN, HLD, nonischemic cardiomyopathy, HFrEF s/p AICD presents to the ED with 2 weeks of worsening shortness of breath. Patient and her son state patient has been experiencing shortness of breath that has progressed from present on activity to now shortness of breath during conversation. Patient also notes an increasing dry cough over this time period.    In ED, patient's HR is 110 and saturating 98% on RA  CT C/A/P revealing cardiomegaly with evidence of right heart strain, R hilar lymphadenopathy with R perihilar soft tissue density with indentation/possible invasion of right pulmonary artery, and narrowing of proximal R lobar and segmental branches with associated consolidation of right lower lobe. 2.2 x 2.8 cm filling defect noted in apex of left ventricle.   Last ECHO 2021 revealing EF 20-25%, mild Mr, mild TR, mild Pulm HTN  Dimer 2600, trops negative BNP 83191  Duplex venous LE negative for DVT; CTA chest negative for PE  Cr 1.4 (0.7 in ), Na+ 128, T.Benjamin 2.3, LFTs elevated (hemolyzed)  Lactate 5.8-->4.6  Admitted to SDU (2023 01:10)      Patient seen and examined in CCU, family present at bedside. Reports patient was at baseline (active, ambulating long distances without issue) until about 2 weeks ago when patient began to have progressively worsening SOB with a dry cough. Patient reports Dr. Guerrero as per primary cardiologist, and Dr. Sherwood as her heart failure specialist (last seen in office in ). Endorses compliance with home medications. Family at bedside concerned about patient's recent weight loss as well.         PAST MEDICAL & SURGICAL HISTORY:  CHF, stage C  HTN (hypertension)      FAMILY HISTORY: No pertinent family history of premature cardiovascular disease in first degree relatives.    SOCIAL HISTORY:  Denies smoking, alcohol, or drug use    Allergies  epinephrine (Unknown)    Intolerances      Vitals:  ICU Vital Signs Last 24 Hrs  T(C): 35.9 (2023 08:00), Max: 36.2 (2023 04:00)  T(F): 96.6 (2023 08:00), Max: 97.1 (2023 04:00)  HR: 99 (:) (92 - 101)  BP: 97/68 (2023 03:00) (87/61 - 107/66)  BP(mean): 78 (2023 03:00) (68 - 78)  ABP: 100/64 (:00) (94/56 - 112/69)  ABP(mean): 77 (:) (68 - 83)  RR: 27 (:) (23 - 49)  SpO2: 98% (:) (96% - 99%)    O2 Parameters below as of 2023 13:00  Patient On (Oxygen Delivery Method): room air      I&O's Summary    2023 07:  -  2023 07:00  --------------------------------------------------------  IN: 1327.1 mL / OUT: 3935 mL / NET: -2607.9 mL    2023 07:01  -  2023 15:31  --------------------------------------------------------  IN: 449.1 mL / OUT: 1725 mL / NET: -1275.9 mL        Physical Exam:  General Appearance: thin, frail, normal for age and gender. 	  Neck: +Central Line  Cardiovascular: RRR, +S1, S2, No edema  Respiratory: decreased @ bases  Psychiatry: Fatigued, oriented x 3, Mood & affect appropriate  Gastrointestinal:  Soft, Non-tender  Skin/Integumentary: No rashes, No ecchymoses, No cyanosis	  Neurologic: Non-focal  Musculoskeletal/ extremities: Normal range of motion, No clubbing, cyanosis or edema  Vascular: Peripheral pulses palpable 2+ bilaterally        LABS:	 	    CBC Full  -  ( 2023 04:05 )  WBC Count : 9.46 K/uL  RBC Count : 4.22 M/uL  Hemoglobin : 12.7 g/dL  Hematocrit : 38.0 %  Platelet Count - Automated : 221 K/uL  Mean Cell Volume : 90.0 fL  Mean Cell Hemoglobin : 30.1 pg  Mean Cell Hemoglobin Concentration : 33.4 g/dL  Auto Neutrophil # : 7.16 K/uL  Auto Lymphocyte # : 1.37 K/uL  Auto Monocyte # : 0.84 K/uL  Auto Eosinophil # : 0.04 K/uL  Auto Basophil # : 0.01 K/uL  Auto Neutrophil % : 75.7 %  Auto Lymphocyte % : 14.5 %  Auto Monocyte % : 8.9 %  Auto Eosinophil % : 0.4 %  Auto Basophil % : 0.1 %      Comprehensive Metabolic Panel in AM (23 @ 04:05)    Sodium, Serum: 133 mmol/L   Potassium, Serum: 3.4 mmol/L   Chloride, Serum: 91 mmol/L   Carbon Dioxide, Serum: 29 mmol/L   Anion Gap, Serum: 13 mmol/L   Blood Urea Nitrogen, Serum: 19 mg/dL   Creatinine, Serum: 0.8 mg/dL   Glucose, Serum: 114 mg/dL   Calcium, Total Serum: 8.9 mg/dL   Protein Total, Serum: 5.9 g/dL   Albumin, Serum: 2.8 g/dL   Bilirubin Total, Serum: 2.5 mg/dL   Alkaline Phosphatase, Serum: 119 U/L   Aspartate Aminotransferase (AST/SGOT): 111 U/L   Alanine Aminotransferase (ALT/SGPT): 275 U/L   eGFR: 75: The estimated glomerular filtration rate (eGFR) is calculated using the   CKD-EPI creatinine equation, which does not have a coefficient for  race and is validated in individuals 18 years of age and older (N Engl J  Med ; 385:6523-5675). Creatinine-based eGFR may be inaccurate in  various situations including but not limited to extremes of muscle mass,  altered dietary protein intake, or medications that affect renal tubular  creatinine secretion. mL/min/1.73m2        ABG - ( 2023 04:30 )  pH, Arterial: 7.52  pH, Blood: x     /  pCO2: 41    /  pO2: 82    / HCO3: 34    / Base Excess: 9.7   /  SaO2: 97.8          Pro-Brain Natriuretic Peptide (23 @ 11:43)    Pro-Brain Natriuretic Peptide: 13947 pg/mL        TELEMETRY EVENTS: 	    EC2023    Ventricular Rate 96 BPM  Atrial Rate 96 BPM  P-R Interval 164 ms  QRS Duration 116 ms  Q-T Interval 396 ms  QTC Calculation(Bazett) 500 ms  P Axis 51 degrees  R Axis -41 degrees  T Axis 87 degrees    Diagnosis Line Normal sinus rhythm  Possible Left atrial enlargement  Left axis deviation  Prolonged QT  Abnormal ECG    Confirmed by Seven Hensley ([id number]) on 2023 7:07:08 AM      CXR     Impression:  CHF. Support devices as described. Without difference.      CT Angio Chest PE Protocol   IMPRESSION:    No evidence of pulmonary embolism. (See discussion below for more   findings.)    Marked cardiomegaly.    There is a 2.2 x 2.8 cm rounded filling defect in the region of the apex   of the left ventricle (3/11; 607/95). Differential diagnosis includes an   intracardiac mass or thrombus.    The main pulmonary artery is dilated, measuring 3.6 cm in diameter, which   may be seen with pulmonary hypertension. There is reflux of contrast   material into the IVC and hepatic veins compatible with right heart   dysfunction.    Right hilar lymphadenopathy and right perihilar soft tissue density is   noted. There is segmental narrowing and consolidation noted in the medial   aspect of the right lower lobe. There is a small right pleural effusion.   There is extrinsic indentation and possible invasion of the right main   pulmonary artery (604/142; 605/129; ). There is associated narrowing   of the proximal right lobar and segmental branches. A right hilar / right   lower lobe mass with postobstructive pneumonitis should be ruled out.          PREVIOUS DIAGNOSTIC TESTING:    TTE 23    Summary:   1. Left ventricular ejection fraction, by visual estimation, is <20%.   2. Severely decreased global left ventricular systolic function.   3. Severely enlarged left atrium.   4. Elevated mean left atrial pressure.   5. Large, fixed thrombus vs. mass on the apical wall of the left   ventricle.   6. Spectral Doppler shows pseudonormal pattern of left ventricular   myocardial filling (Grade II diastolic dysfunction).   7. Moderately reduced RV systolic function.   8. Mildly enlarged right atrium.   9. Moderate mitral valve regurgitation.  10. Moderate-severe tricuspid regurgitation.  11. Mild pulmonic valve regurgitation.  12. Estimated pulmonary artery systolic pressure is 42.9 mmHg assuming a   right atrial pressure of 15 mmHg, which is consistent withmild pulmonary   hypertension.  13. Endocardial visualization was enhanced with intravenous echo contrast.      Right Heart Catheterization 23    FINDINGS:   Right Heart Cath  RA - 12  RV - 51/5/13  PA - 51//  PCWP - 20    CO/CI Thermodilusion - 2.1/1.27  CO/CI Radha - 2.09/1.26    SVR (MAP 79 / RA 12 / CO 2.1) = 2552 dyn  PVR = 5.71 henriquez      POST-OP DIAGNOSIS:    [x] Decreased cardiac output with increased SVR + PVR + PCWP consistent with cardiogenic shock.    Combined pre and post capillary PH.   	    Home Medications:  atorvastatin 20 mg oral tablet: 1 tab(s) orally once a day (04 Aug 2021 10:05)  carvedilol 6.25 mg oral tablet: 1 tab(s) orally 2 times a day (2023 02:02)  Entresto 49 mg-51 mg oral tablet: 1 tab(s) orally 2 times a day (04 Aug 2021 10:05)  Farxiga 10 mg oral tablet: 1 tab(s) orally once a day (2023 02:05)  Lasix 20 mg oral tablet: 1 tab(s) orally once a day (2023 02:04)  spironolactone 25 mg oral tablet: 1 tab(s) orally once a day (04 Aug 2021 10:05)    MEDICATIONS  (STANDING):  buMETAnide Infusion 1 mG/Hr (5 mL/Hr) IV Continuous <Continuous>  chlorhexidine 2% Cloths 1 Application(s) Topical daily  DOBUTamine Infusion 5 MICROgram(s)/kG/Min (4.76 mL/Hr) IV Continuous <Continuous>  guaifenesin/dextromethorphan Oral Liquid 15 milliLiter(s) Oral once  heparin  Infusion. 600 Unit(s)/Hr (6 mL/Hr) IV Continuous <Continuous>  iron sucrose IVPB 200 milliGRAM(s) IV Intermittent every 24 hours  melatonin 5 milliGRAM(s) Oral at bedtime  pantoprazole    Tablet 40 milliGRAM(s) Oral before breakfast  piperacillin/tazobactam IVPB.. 3.375 Gram(s) IV Intermittent every 8 hours    MEDICATIONS  (PRN):         Date of Admission: 23    CHIEF COMPLAINT: Patient is a 77y old  Female who presents with a chief complaint of intra cardiac mass.    Interval History: Patient seen and examined with family present at bedside. OOB to chair with physical therapy this AM. Patient reports feeling fatigued and is anxious to d/c home. Remains on Bumex gtt and inotropic support- Dobutamine 5 mcg/kg/min. Kidney function remains stable.                                                                                                                                                               HISTORY OF PRESENT ILLNESS: 76 y/o F with PMHx of HTN, HLD, nonischemic cardiomyopathy, HFrEF s/p AICD presents to the ED with 2 weeks of worsening shortness of breath. Patient and her son state patient has been experiencing shortness of breath that has progressed from present on activity to now shortness of breath during conversation. Patient also notes an increasing dry cough over this time period.    In ED, patient's HR is 110 and saturating 98% on RA  CT C/A/P revealing cardiomegaly with evidence of right heart strain, R hilar lymphadenopathy with R perihilar soft tissue density with indentation/possible invasion of right pulmonary artery, and narrowing of proximal R lobar and segmental branches with associated consolidation of right lower lobe. 2.2 x 2.8 cm filling defect noted in apex of left ventricle.   Last ECHO 2021 revealing EF 20-25%, mild Mr, mild TR, mild Pulm HTN  Dimer 2600, trops negative BNP 52146  Duplex venous LE negative for DVT; CTA chest negative for PE  Cr 1.4 (0.7 in ), Na+ 128, T.Benjamin 2.3, LFTs elevated (hemolyzed)  Lactate 5.8-->4.6  Admitted to SDU (2023 01:10)      Patient reportedly was at baseline (active, ambulating long distances without issue) until about 2 weeks ago when patient began to have progressively worsening SOB with a dry cough. Patient reports Dr. Mead as per primary cardiologist, and Dr. Sherwood as her heart failure specialist (last seen in office in ). Endorses compliance with home medications. Family at bedside concerned about patient's recent weight loss as well.         PAST MEDICAL & SURGICAL HISTORY:  CHF, stage C  HTN (hypertension)      FAMILY HISTORY: No pertinent family history of premature cardiovascular disease in first degree relatives.    SOCIAL HISTORY:  Denies smoking, alcohol, or drug use    Allergies  epinephrine (Unknown)    Intolerances      Vitals:  ICU Vital Signs Last 24 Hrs  T(C): 35.8 (2023 08:00), Max: 35.8 (2023 04:00)  T(F): 96.4 (2023 08:00), Max: 96.4 (2023 04:00)  HR: 91 (:00) (91 - 109)  BP: --  BP(mean): --  ABP: 98/58 (2023 08:00) (89/60 - 110/63)  ABP(mean): 71 (:00) (67 - 83)  RR: 19 (:00) (19 - 34)  SpO2: 99% (:00) (91% - 100%)      I&O's Summary    2023 07:  -  2023 07:00  --------------------------------------------------------  IN: 1327.1 mL / OUT: 3935 mL / NET: -2607.9 mL    2023 07:01  -  2023 15:31  --------------------------------------------------------  IN: 449.1 mL / OUT: 1725 mL / NET: -1275.9 mL        Physical Exam:  General Appearance: thin, frail, normal for age and gender. 	  Neck: +Central Line  Cardiovascular: RRR, +S1, S2, No edema  Respiratory: decreased @ bases  Psychiatry: Fatigued, oriented x 3, Mood & affect appropriate  Gastrointestinal:  Soft, Non-tender  Skin/Integumentary: No rashes, No ecchymoses, No cyanosis	  Neurologic: Non-focal  Musculoskeletal/ extremities: Normal range of motion, No clubbing, cyanosis or edema  Vascular: Peripheral pulses palpable 2+ bilaterally        LABS:	 	    CBC Full  -  ( 2023 04:05 )  WBC Count : 9.46 K/uL  RBC Count : 4.22 M/uL  Hemoglobin : 12.7 g/dL  Hematocrit : 38.0 %  Platelet Count - Automated : 221 K/uL  Mean Cell Volume : 90.0 fL  Mean Cell Hemoglobin : 30.1 pg  Mean Cell Hemoglobin Concentration : 33.4 g/dL  Auto Neutrophil # : 7.16 K/uL  Auto Lymphocyte # : 1.37 K/uL  Auto Monocyte # : 0.84 K/uL  Auto Eosinophil # : 0.04 K/uL  Auto Basophil # : 0.01 K/uL  Auto Neutrophil % : 75.7 %  Auto Lymphocyte % : 14.5 %  Auto Monocyte % : 8.9 %  Auto Eosinophil % : 0.4 %  Auto Basophil % : 0.1 %      Comprehensive Metabolic Panel in AM (23 @ 04:05)    Sodium, Serum: 133 mmol/L   Potassium, Serum: 3.4 mmol/L   Chloride, Serum: 91 mmol/L   Carbon Dioxide, Serum: 29 mmol/L   Anion Gap, Serum: 13 mmol/L   Blood Urea Nitrogen, Serum: 19 mg/dL   Creatinine, Serum: 0.8 mg/dL   Glucose, Serum: 114 mg/dL   Calcium, Total Serum: 8.9 mg/dL   Protein Total, Serum: 5.9 g/dL   Albumin, Serum: 2.8 g/dL   Bilirubin Total, Serum: 2.5 mg/dL   Alkaline Phosphatase, Serum: 119 U/L   Aspartate Aminotransferase (AST/SGOT): 111 U/L   Alanine Aminotransferase (ALT/SGPT): 275 U/L   eGFR: 75: The estimated glomerular filtration rate (eGFR) is calculated using the   CKD-EPI creatinine equation, which does not have a coefficient for  race and is validated in individuals 18 years of age and older (N Engl J  Med ; 385:[id number]). Creatinine-based eGFR may be inaccurate in  various situations including but not limited to extremes of muscle mass,  altered dietary protein intake, or medications that affect renal tubular  creatinine secretion. mL/min/1.73m2        ABG - ( 2023 04:30 )  pH, Arterial: 7.52  pH, Blood: x     /  pCO2: 41    /  pO2: 82    / HCO3: 34    / Base Excess: 9.7   /  SaO2: 97.8          Pro-Brain Natriuretic Peptide (23 @ 11:43)    Pro-Brain Natriuretic Peptide: 09604 pg/mL        TELEMETRY EVENTS: 	    EC2023    Ventricular Rate 96 BPM  Atrial Rate 96 BPM  P-R Interval 164 ms  QRS Duration 116 ms  Q-T Interval 396 ms  QTC Calculation(Bazett) 500 ms  P Axis 51 degrees  R Axis -41 degrees  T Axis 87 degrees    Diagnosis Line Normal sinus rhythm  Possible Left atrial enlargement  Left axis deviation  Prolonged QT  Abnormal ECG    Confirmed by Seven Hensley ([id number]) on 2023 7:07:08 AM      CXR     Impression:  CHF. Support devices as described. Without difference.      CT Angio Chest PE Protocol   IMPRESSION:    No evidence of pulmonary embolism. (See discussion below for more   findings.)    Marked cardiomegaly.    There is a 2.2 x 2.8 cm rounded filling defect in the region of the apex   of the left ventricle (3/11; 607/95). Differential diagnosis includes an   intracardiac mass or thrombus.    The main pulmonary artery is dilated, measuring 3.6 cm in diameter, which   may be seen with pulmonary hypertension. There is reflux of contrast   material into the IVC and hepatic veins compatible with right heart   dysfunction.    Right hilar lymphadenopathy and right perihilar soft tissue density is   noted. There is segmental narrowing and consolidation noted in the medial   aspect of the right lower lobe. There is a small right pleural effusion.   There is extrinsic indentation and possible invasion of the right main   pulmonary artery (604/142; 605/129; ). There is associated narrowing   of the proximal right lobar and segmental branches. A right hilar / right   lower lobe mass with postobstructive pneumonitis should be ruled out.          PREVIOUS DIAGNOSTIC TESTING:    TTE 23    Summary:   1. Left ventricular ejection fraction, by visual estimation, is <20%.   2. Severely decreased global left ventricular systolic function.   3. Severely enlarged left atrium.   4. Elevated mean left atrial pressure.   5. Large, fixed thrombus vs. mass on the apical wall of the left   ventricle.   6. Spectral Doppler shows pseudonormal pattern of left ventricular   myocardial filling (Grade II diastolic dysfunction).   7. Moderately reduced RV systolic function.   8. Mildly enlarged right atrium.   9. Moderate mitral valve regurgitation.  10. Moderate-severe tricuspid regurgitation.  11. Mild pulmonic valve regurgitation.  12. Estimated pulmonary artery systolic pressure is 42.9 mmHg assuming a   right atrial pressure of 15 mmHg, which is consistent withmild pulmonary   hypertension.  13. Endocardial visualization was enhanced with intravenous echo contrast.      Right Heart Catheterization 23    FINDINGS:   Right Heart Cath  RA - 12  RV - 51/5/13  PA - /  PCWP - 20    CO/CI Thermodilusion - 2.1/1.27  CO/CI Radha - 2.09/1.26    SVR (MAP 79 / RA 12 / CO 2.1) = 2552 dyn  PVR = 5.71 henriquez      POST-OP DIAGNOSIS:    [x] Decreased cardiac output with increased SVR + PVR + PCWP consistent with cardiogenic shock.    Combined pre and post capillary PH.   	    Home Medications:  atorvastatin 20 mg oral tablet: 1 tab(s) orally once a day (04 Aug 2021 10:05)  carvedilol 6.25 mg oral tablet: 1 tab(s) orally 2 times a day (2023 02:02)  Entresto 49 mg-51 mg oral tablet: 1 tab(s) orally 2 times a day (04 Aug 2021 10:05)  Farxiga 10 mg oral tablet: 1 tab(s) orally once a day (2023 02:05)  Lasix 20 mg oral tablet: 1 tab(s) orally once a day (2023 02:04)  spironolactone 25 mg oral tablet: 1 tab(s) orally once a day (04 Aug 2021 10:05)    MEDICATIONS  (STANDING):  buMETAnide Infusion 1 mG/Hr (5 mL/Hr) IV Continuous <Continuous>  chlorhexidine 2% Cloths 1 Application(s) Topical daily  DOBUTamine Infusion 5 MICROgram(s)/kG/Min (4.76 mL/Hr) IV Continuous <Continuous>  guaifenesin/dextromethorphan Oral Liquid 15 milliLiter(s) Oral once  heparin  Infusion. 600 Unit(s)/Hr (6 mL/Hr) IV Continuous <Continuous>  iron sucrose IVPB 200 milliGRAM(s) IV Intermittent every 24 hours  melatonin 5 milliGRAM(s) Oral at bedtime  pantoprazole    Tablet 40 milliGRAM(s) Oral before breakfast  piperacillin/tazobactam IVPB.. 3.375 Gram(s) IV Intermittent every 8 hours    MEDICATIONS  (PRN):

## 2023-04-28 NOTE — PHYSICAL THERAPY INITIAL EVALUATION ADULT - PERTINENT HX OF CURRENT PROBLEM, REHAB EVAL
77 year old woman with PMHx of HTN, HLD, nonischemic cardiomyopathy, HFrEF s/p AICD presents to the ED with 2 weeks of worsening shortness of breath. Patient and her son state patient has been experiencing shortness of breath that has progressed from present on activity to now shortness of breath during conversation. Patient also notes an increasing dry cough over this time period. Denies history of smoking, fevers/chills, chest pain, nausea/vomiting, bloody sputum, dark/tarry/bloody bowel movements, upper respiratory symptoms. Heart Cath scheduled 4/28

## 2023-04-28 NOTE — PHYSICAL THERAPY INITIAL EVALUATION ADULT - TRANSFER TRAINING, PT EVAL
Pt will transfer from bed to chair and reverse using RW with  close supervision  to facilitate return to PLOF.

## 2023-04-28 NOTE — PHYSICAL THERAPY INITIAL EVALUATION ADULT - ADDITIONAL COMMENTS
Pt lives with her  in a private house with 4 steps to enter and 7-8 steps to negotiate inside. Pt is independent in all functional mobility and ADLs and uses no AD.

## 2023-04-29 LAB
ALBUMIN SERPL ELPH-MCNC: 3.5 G/DL — SIGNIFICANT CHANGE UP (ref 3.5–5.2)
ALP SERPL-CCNC: 106 U/L — SIGNIFICANT CHANGE UP (ref 30–115)
ALT FLD-CCNC: 171 U/L — HIGH (ref 0–41)
ANION GAP SERPL CALC-SCNC: 12 MMOL/L — SIGNIFICANT CHANGE UP (ref 7–14)
APTT BLD: 52.3 SEC — HIGH (ref 27–39.2)
APTT BLD: 71.8 SEC — CRITICAL HIGH (ref 27–39.2)
APTT BLD: 86.6 SEC — CRITICAL HIGH (ref 27–39.2)
AST SERPL-CCNC: 53 U/L — HIGH (ref 0–41)
BASE EXCESS BLDMV CALC-SCNC: 9.7 MMOL/L — SIGNIFICANT CHANGE UP
BASE EXCESS BLDV CALC-SCNC: 5.3 MMOL/L — HIGH (ref -2–3)
BASOPHILS # BLD AUTO: 0.01 K/UL — SIGNIFICANT CHANGE UP (ref 0–0.2)
BASOPHILS NFR BLD AUTO: 0.1 % — SIGNIFICANT CHANGE UP (ref 0–1)
BILIRUB SERPL-MCNC: 2.4 MG/DL — HIGH (ref 0.2–1.2)
BUN SERPL-MCNC: 28 MG/DL — HIGH (ref 10–20)
CALCIUM SERPL-MCNC: 8.9 MG/DL — SIGNIFICANT CHANGE UP (ref 8.4–10.5)
CHLORIDE SERPL-SCNC: 91 MMOL/L — LOW (ref 98–110)
CO2 SERPL-SCNC: 29 MMOL/L — SIGNIFICANT CHANGE UP (ref 17–32)
CREAT SERPL-MCNC: 0.9 MG/DL — SIGNIFICANT CHANGE UP (ref 0.7–1.5)
EGFR: 65 ML/MIN/1.73M2 — SIGNIFICANT CHANGE UP
EOSINOPHIL # BLD AUTO: 0.04 K/UL — SIGNIFICANT CHANGE UP (ref 0–0.7)
EOSINOPHIL NFR BLD AUTO: 0.4 % — SIGNIFICANT CHANGE UP (ref 0–8)
GAS PNL BLDA: SIGNIFICANT CHANGE UP
GAS PNL BLDA: SIGNIFICANT CHANGE UP
GAS PNL BLDMV: SIGNIFICANT CHANGE UP
GAS PNL BLDV: SIGNIFICANT CHANGE UP
GLUCOSE SERPL-MCNC: 115 MG/DL — HIGH (ref 70–99)
HCO3 BLDMV-SCNC: 34 MMOL/L — SIGNIFICANT CHANGE UP
HCO3 BLDV-SCNC: 29 MMOL/L — SIGNIFICANT CHANGE UP (ref 22–29)
HCT VFR BLD CALC: 33.8 % — LOW (ref 37–47)
HGB BLD-MCNC: 11.1 G/DL — LOW (ref 12–16)
IMM GRANULOCYTES NFR BLD AUTO: 0.6 % — HIGH (ref 0.1–0.3)
INR BLD: 1.4 RATIO — HIGH (ref 0.65–1.3)
LACTATE BLDV-MCNC: 2.2 MMOL/L — HIGH (ref 0.5–2)
LYMPHOCYTES # BLD AUTO: 1.45 K/UL — SIGNIFICANT CHANGE UP (ref 1.2–3.4)
LYMPHOCYTES # BLD AUTO: 15 % — LOW (ref 20.5–51.1)
MAGNESIUM SERPL-MCNC: 2.7 MG/DL — HIGH (ref 1.8–2.4)
MCHC RBC-ENTMCNC: 29.6 PG — SIGNIFICANT CHANGE UP (ref 27–31)
MCHC RBC-ENTMCNC: 32.8 G/DL — SIGNIFICANT CHANGE UP (ref 32–37)
MCV RBC AUTO: 90.1 FL — SIGNIFICANT CHANGE UP (ref 81–99)
MONOCYTES # BLD AUTO: 0.76 K/UL — HIGH (ref 0.1–0.6)
MONOCYTES NFR BLD AUTO: 7.9 % — SIGNIFICANT CHANGE UP (ref 1.7–9.3)
NEUTROPHILS # BLD AUTO: 7.34 K/UL — HIGH (ref 1.4–6.5)
NEUTROPHILS NFR BLD AUTO: 76 % — HIGH (ref 42.2–75.2)
NRBC # BLD: 0 /100 WBCS — SIGNIFICANT CHANGE UP (ref 0–0)
O2 CT VFR BLD CALC: 45 MMHG — SIGNIFICANT CHANGE UP
OSMOLALITY SERPL: 280 MOS/KG — SIGNIFICANT CHANGE UP (ref 280–301)
PCO2 BLDMV: 41 MMHG — SIGNIFICANT CHANGE UP
PCO2 BLDV: 38 MMHG — LOW (ref 39–42)
PH BLDMV: 7.52 — SIGNIFICANT CHANGE UP
PH BLDV: 7.49 — HIGH (ref 7.32–7.43)
PLATELET # BLD AUTO: 204 K/UL — SIGNIFICANT CHANGE UP (ref 130–400)
PMV BLD: 11.5 FL — HIGH (ref 7.4–10.4)
PO2 BLDV: 30 MMHG — SIGNIFICANT CHANGE UP
POTASSIUM SERPL-MCNC: 4.2 MMOL/L — SIGNIFICANT CHANGE UP (ref 3.5–5)
POTASSIUM SERPL-SCNC: 4.2 MMOL/L — SIGNIFICANT CHANGE UP (ref 3.5–5)
PROT SERPL-MCNC: 6.2 G/DL — SIGNIFICANT CHANGE UP (ref 6–8)
PROTHROM AB SERPL-ACNC: 16.1 SEC — HIGH (ref 9.95–12.87)
RBC # BLD: 3.75 M/UL — LOW (ref 4.2–5.4)
RBC # FLD: 14 % — SIGNIFICANT CHANGE UP (ref 11.5–14.5)
SAO2 % BLDMV: 79.1 % — SIGNIFICANT CHANGE UP
SAO2 % BLDV: 49.9 % — SIGNIFICANT CHANGE UP
SODIUM SERPL-SCNC: 132 MMOL/L — LOW (ref 135–146)
WBC # BLD: 9.66 K/UL — SIGNIFICANT CHANGE UP (ref 4.8–10.8)
WBC # FLD AUTO: 9.66 K/UL — SIGNIFICANT CHANGE UP (ref 4.8–10.8)

## 2023-04-29 PROCEDURE — 93010 ELECTROCARDIOGRAM REPORT: CPT

## 2023-04-29 PROCEDURE — 71045 X-RAY EXAM CHEST 1 VIEW: CPT | Mod: 26

## 2023-04-29 PROCEDURE — 99233 SBSQ HOSP IP/OBS HIGH 50: CPT

## 2023-04-29 RX ORDER — DOBUTAMINE HCL 250MG/20ML
7.5 VIAL (ML) INTRAVENOUS
Qty: 1000 | Refills: 0 | Status: DISCONTINUED | OUTPATIENT
Start: 2023-04-29 | End: 2023-04-29

## 2023-04-29 RX ORDER — MAGNESIUM SULFATE 500 MG/ML
2 VIAL (ML) INJECTION ONCE
Refills: 0 | Status: COMPLETED | OUTPATIENT
Start: 2023-04-29 | End: 2023-04-29

## 2023-04-29 RX ORDER — DOBUTAMINE HCL 250MG/20ML
5 VIAL (ML) INTRAVENOUS
Qty: 1000 | Refills: 0 | Status: DISCONTINUED | OUTPATIENT
Start: 2023-04-29 | End: 2023-05-05

## 2023-04-29 RX ORDER — POTASSIUM CHLORIDE 20 MEQ
20 PACKET (EA) ORAL
Refills: 0 | Status: COMPLETED | OUTPATIENT
Start: 2023-04-29 | End: 2023-04-29

## 2023-04-29 RX ADMIN — Medication 20 MILLIGRAM(S): at 12:18

## 2023-04-29 RX ADMIN — Medication 50 MILLIEQUIVALENT(S): at 05:37

## 2023-04-29 RX ADMIN — HEPARIN SODIUM 800 UNIT(S)/HR: 5000 INJECTION INTRAVENOUS; SUBCUTANEOUS at 21:15

## 2023-04-29 RX ADMIN — IRON SUCROSE 110 MILLIGRAM(S): 20 INJECTION, SOLUTION INTRAVENOUS at 14:30

## 2023-04-29 RX ADMIN — HEPARIN SODIUM 800 UNIT(S)/HR: 5000 INJECTION INTRAVENOUS; SUBCUTANEOUS at 12:19

## 2023-04-29 RX ADMIN — Medication 7.14 MICROGRAM(S)/KG/MIN: at 12:19

## 2023-04-29 RX ADMIN — HEPARIN SODIUM 800 UNIT(S)/HR: 5000 INJECTION INTRAVENOUS; SUBCUTANEOUS at 06:11

## 2023-04-29 RX ADMIN — Medication 50 MILLIEQUIVALENT(S): at 03:35

## 2023-04-29 RX ADMIN — Medication 5 MILLIGRAM(S): at 21:09

## 2023-04-29 RX ADMIN — Medication 25 GRAM(S): at 03:35

## 2023-04-29 RX ADMIN — PANTOPRAZOLE SODIUM 40 MILLIGRAM(S): 20 TABLET, DELAYED RELEASE ORAL at 05:37

## 2023-04-29 NOTE — PROGRESS NOTE ADULT - ASSESSMENT
77 year old woman with PMHx of HTN, HLD, nonischemic cardiomyopathy, HFrEF s/p AICD presents to the ED with 2 weeks of worsening shortness of breath with increasing dry cough over this time period.  In ED, patient's HR is 110 and saturating 98% on RA  CT C/A/P revealing cardiomegaly with evidence of right heart strain, R hilar lymphadenopathy with R perihilar soft tissue density with indentation/possible invasion of right pulmonary artery, and narrowing of proximal R lobar and segmental branches with associated consolidation of right lower lobe. 2.2 x 2.8 cm filling defect noted in apex of left ventricle.   Last ECHO 8/2021 revealing EF 20-25%, mild Mr, mild TR, mild Pulm HTN  Dimer 2600, trops negative BNP 83275  Duplex venous LE negative for DVT; CTA chest negative for PE  Cr 1.4 (0.7 in 2021), Na+ 128, T.Benjamin 2.3, LFTs elevated (hemolyzed)  Lactate 5.8-->4.6  Admitted to SDU    CEU Course:   - Cardiology [facility] consulted, TTE confirmed EF <20% w/ LV thrombus - started on heparin IV  - Pulmonary team consulted for eval of EBUS - pending clinical improvement  - Patient developped worsening DAVID and suspected ischemic hepatitis in the setting of reduced BP ~70s/50s  - Patient started on Dopamine ggt with improvement of BP   - 4/23 AM - patient asymptomatic - bedside echo revealed non-compressible IVC and reduced EF  - Transferred to CCU for cardiogenic shock    IMPRESSION  #Cardiogenic Shock on ionotrops  #LV Mass likely thrombus  #NICM  #HFrEF 25% s/p AICD  #HTN/HLD  #Post obstructive pneumonia  #Suspected lung mass   #DAVID on CKD   #Elevated liver enzymes predominantly hepatocellular (AST>ALT) with hyperbilirubinemia   #Hypotonic Hyponatremia  - hypervolemic   ---------------------------------------------------------------------------------   TTE 4/22/2023 EF <20%, large fixed thrombus vs mass on apical LV wall, GIIDD, mod-sev TR, mod MR, mild CO, mild PH    RHC 04/24: Decreased cardiac output with increased SVR + PVR + PCWP consistent with cardiogenic shock.    Combined pre and post capillary PH.    PLAN:    CNS:   -no depressants.     HEENT:   -Oral care    PULMONARY:    -HOB @ 45 degrees. Keep SPO2> 92%. Aspiration precautions.     CARDIOVASCULAR:   - Decrease dobutamine to 7.5 mcg/kg/min. Wean off Levo  - c/w heparin ggt  - CTS, HF ( Dr Sherwood), Cardio following ([facility]) --> plan for RHC on monday and possible LHC for newly reduced EF?  - will consider afterload reduction to help wean ionotrops after RHC   - Incentive Spirometer  - start torsemide 20mg qd    GI:   -GI prophylaxis.    -oral feeds    RENAL:    -Follow up lytes.  Correct as needed.     INFECTIOUS DISEASE:   -finished ABX  -CHG 4% daily bath    HEMATOLOGICAL:    -DVT prophylaxis. heparin ggt    ENDOCRINE:    -Follow up FS.  -insulin sliding scale if needed    MUSCULOSKELETAL:   -increase ambulation as tolerated    R IJ placed on 4/23rd --> needs to be changed on 4/30th    CCU monitoring

## 2023-04-29 NOTE — PROGRESS NOTE ADULT - SUBJECTIVE AND OBJECTIVE BOX
CHIEF COMPLAINT:  Patient is a 77y old  Female who presents with a chief complaint of intra cardiac mass (28 Apr 2023 14:49)      INTERVAL HISTORY/OVERNIGHT EVENTS:  No major overnight events.     ======================  MEDICATIONS:  chlorhexidine 2% Cloths 1 Application(s) Topical daily  iron sucrose IVPB 200 milliGRAM(s) IV Intermittent every 24 hours  melatonin 5 milliGRAM(s) Oral at bedtime  pantoprazole    Tablet 40 milliGRAM(s) Oral before breakfast    DRIPS:  DOBUTamine Infusion 10 MICROgram(s)/kG/Min (9.53 mL/Hr) IV Continuous <Continuous>  heparin  Infusion. 600 Unit(s)/Hr (6 mL/Hr) IV Continuous <Continuous>  norepinephrine Infusion 0.05 MICROgram(s)/kG/Min (2.98 mL/Hr) IV Continuous <Continuous>      ======================  PHYSICAL EXAMINATION:  GEN:  nad.   HEENT:  eomi. ncat  PULM:  b/l lung sounds   CARD: s1, s2  ABD: +bs. ntnd  EXT:  no new rashes.    NEURO:  no new focal deficits.   ======================  OBJECTIVE:        VS:  T(F): 98 (04-29 @ 08:00), Max: 98 (04-29 @ 08:00)  HR: 104 (04-29 @ 08:00) (93 - 117)  BP: 91/59 (04-29 @ 08:00) (86/56 - 124/57)  RR: 29 (04-29 @ 08:00) (22 - 39)  SpO2: 96% (04-29 @ 08:00) (89% - 99%)  CVP(mm Hg): 152 (04-29 @ 07:00) (8 - 174)      I/O:      04-26 @ 07:01  -  04-27 @ 07:00  --------------------------------------------------------  IN: 1327.1 mL / OUT: 3935 mL / NET: -2607.9 mL    04-27 @ 07:01 - 04-28 @ 07:00  --------------------------------------------------------  IN: 651.1 mL / OUT: 3145 mL / NET: -2493.9 mL    04-28 @ 07:01 - 04-29 @ 07:00  --------------------------------------------------------  IN: 525.1 mL / OUT: 1035 mL / NET: -509.9 mL      ======================    LABS:  ABG - ( 29 Apr 2023 01:48 )  pH, Arterial: 7.57  pH, Blood: x     /  pCO2: 34    /  pO2: 117   / HCO3: 31    / Base Excess: 8.8   /  SaO2: 100.0                                   11.1   9.66  )-----------( 204      ( 29 Apr 2023 05:00 )             33.8     04-29    132<L>  |  91<L>  |  28<H>  ----------------------------<  115<H>  4.2   |  29  |  0.9    Ca    8.9      29 Apr 2023 05:00  Mg     2.7     04-29    TPro  6.2  /  Alb  3.5  /  TBili  2.4<H>  /  DBili  x   /  AST  53<H>  /  ALT  171<H>  /  AlkPhos  106  04-29    LIVER FUNCTIONS - ( 29 Apr 2023 05:00 )  Alb: 3.5 g/dL / Pro: 6.2 g/dL / ALK PHOS: 106 U/L / ALT: 171 U/L / AST: 53 U/L / GGT: x           PT/INR - ( 29 Apr 2023 05:00 )   PT: 16.10 sec;   INR: 1.40 ratio         PTT - ( 29 Apr 2023 05:00 )  PTT:52.3 sec

## 2023-04-30 LAB
ALBUMIN SERPL ELPH-MCNC: 3.2 G/DL — LOW (ref 3.5–5.2)
ALP SERPL-CCNC: 113 U/L — SIGNIFICANT CHANGE UP (ref 30–115)
ALT FLD-CCNC: 129 U/L — HIGH (ref 0–41)
ANION GAP SERPL CALC-SCNC: 12 MMOL/L — SIGNIFICANT CHANGE UP (ref 7–14)
APTT BLD: 86.9 SEC — CRITICAL HIGH (ref 27–39.2)
AST SERPL-CCNC: 37 U/L — SIGNIFICANT CHANGE UP (ref 0–41)
BASE EXCESS BLDMV CALC-SCNC: 7.6 MMOL/L — SIGNIFICANT CHANGE UP
BASOPHILS # BLD AUTO: 0.02 K/UL — SIGNIFICANT CHANGE UP (ref 0–0.2)
BASOPHILS NFR BLD AUTO: 0.2 % — SIGNIFICANT CHANGE UP (ref 0–1)
BILIRUB SERPL-MCNC: 2.4 MG/DL — HIGH (ref 0.2–1.2)
BUN SERPL-MCNC: 33 MG/DL — HIGH (ref 10–20)
CALCIUM SERPL-MCNC: 8.9 MG/DL — SIGNIFICANT CHANGE UP (ref 8.4–10.5)
CHLORIDE SERPL-SCNC: 90 MMOL/L — LOW (ref 98–110)
CO2 SERPL-SCNC: 28 MMOL/L — SIGNIFICANT CHANGE UP (ref 17–32)
CREAT SERPL-MCNC: 0.8 MG/DL — SIGNIFICANT CHANGE UP (ref 0.7–1.5)
EGFR: 75 ML/MIN/1.73M2 — SIGNIFICANT CHANGE UP
EOSINOPHIL # BLD AUTO: 0.03 K/UL — SIGNIFICANT CHANGE UP (ref 0–0.7)
EOSINOPHIL NFR BLD AUTO: 0.3 % — SIGNIFICANT CHANGE UP (ref 0–8)
GAS PNL BLDA: SIGNIFICANT CHANGE UP
GAS PNL BLDMV: SIGNIFICANT CHANGE UP
GLUCOSE SERPL-MCNC: 121 MG/DL — HIGH (ref 70–99)
HCO3 BLDMV-SCNC: 32 MMOL/L — SIGNIFICANT CHANGE UP
HCT VFR BLD CALC: 33.7 % — LOW (ref 37–47)
HGB BLD-MCNC: 11 G/DL — LOW (ref 12–16)
IMM GRANULOCYTES NFR BLD AUTO: 0.8 % — HIGH (ref 0.1–0.3)
LYMPHOCYTES # BLD AUTO: 1.98 K/UL — SIGNIFICANT CHANGE UP (ref 1.2–3.4)
LYMPHOCYTES # BLD AUTO: 17.4 % — LOW (ref 20.5–51.1)
MAGNESIUM SERPL-MCNC: 2.1 MG/DL — SIGNIFICANT CHANGE UP (ref 1.8–2.4)
MCHC RBC-ENTMCNC: 29.8 PG — SIGNIFICANT CHANGE UP (ref 27–31)
MCHC RBC-ENTMCNC: 32.6 G/DL — SIGNIFICANT CHANGE UP (ref 32–37)
MCV RBC AUTO: 91.3 FL — SIGNIFICANT CHANGE UP (ref 81–99)
MONOCYTES # BLD AUTO: 0.78 K/UL — HIGH (ref 0.1–0.6)
MONOCYTES NFR BLD AUTO: 6.9 % — SIGNIFICANT CHANGE UP (ref 1.7–9.3)
NEUTROPHILS # BLD AUTO: 8.46 K/UL — HIGH (ref 1.4–6.5)
NEUTROPHILS NFR BLD AUTO: 74.4 % — SIGNIFICANT CHANGE UP (ref 42.2–75.2)
NRBC # BLD: 0 /100 WBCS — SIGNIFICANT CHANGE UP (ref 0–0)
O2 CT VFR BLD CALC: 34 MMHG — SIGNIFICANT CHANGE UP
PCO2 BLDMV: 43 MMHG — SIGNIFICANT CHANGE UP
PH BLDMV: 7.48 — SIGNIFICANT CHANGE UP
PLATELET # BLD AUTO: 225 K/UL — SIGNIFICANT CHANGE UP (ref 130–400)
PMV BLD: 11.8 FL — HIGH (ref 7.4–10.4)
POTASSIUM SERPL-MCNC: 4.3 MMOL/L — SIGNIFICANT CHANGE UP (ref 3.5–5)
POTASSIUM SERPL-SCNC: 4.3 MMOL/L — SIGNIFICANT CHANGE UP (ref 3.5–5)
PROT SERPL-MCNC: 5.9 G/DL — LOW (ref 6–8)
RBC # BLD: 3.69 M/UL — LOW (ref 4.2–5.4)
RBC # FLD: 14.3 % — SIGNIFICANT CHANGE UP (ref 11.5–14.5)
SAO2 % BLDMV: 57.4 % — SIGNIFICANT CHANGE UP
SODIUM SERPL-SCNC: 130 MMOL/L — LOW (ref 135–146)
WBC # BLD: 11.36 K/UL — HIGH (ref 4.8–10.8)
WBC # FLD AUTO: 11.36 K/UL — HIGH (ref 4.8–10.8)

## 2023-04-30 PROCEDURE — 93010 ELECTROCARDIOGRAM REPORT: CPT

## 2023-04-30 PROCEDURE — 71045 X-RAY EXAM CHEST 1 VIEW: CPT | Mod: 26

## 2023-04-30 PROCEDURE — 99233 SBSQ HOSP IP/OBS HIGH 50: CPT

## 2023-04-30 RX ORDER — SODIUM CHLORIDE 5 G/100ML
150 INJECTION, SOLUTION INTRAVENOUS
Refills: 0 | Status: COMPLETED | OUTPATIENT
Start: 2023-04-30 | End: 2023-04-30

## 2023-04-30 RX ORDER — BUMETANIDE 0.25 MG/ML
2 INJECTION INTRAMUSCULAR; INTRAVENOUS ONCE
Refills: 0 | Status: DISCONTINUED | OUTPATIENT
Start: 2023-04-30 | End: 2023-04-30

## 2023-04-30 RX ORDER — BUMETANIDE 0.25 MG/ML
1 INJECTION INTRAMUSCULAR; INTRAVENOUS ONCE
Refills: 0 | Status: COMPLETED | OUTPATIENT
Start: 2023-04-30 | End: 2023-04-30

## 2023-04-30 RX ADMIN — CHLORHEXIDINE GLUCONATE 1 APPLICATION(S): 213 SOLUTION TOPICAL at 12:48

## 2023-04-30 RX ADMIN — Medication 5 MILLIGRAM(S): at 21:01

## 2023-04-30 RX ADMIN — BUMETANIDE 1 MILLIGRAM(S): 0.25 INJECTION INTRAMUSCULAR; INTRAVENOUS at 18:44

## 2023-04-30 RX ADMIN — SODIUM CHLORIDE 50 MILLILITER(S): 5 INJECTION, SOLUTION INTRAVENOUS at 15:23

## 2023-04-30 RX ADMIN — Medication 20 MILLIGRAM(S): at 06:32

## 2023-04-30 RX ADMIN — Medication 20 MILLIGRAM(S): at 18:29

## 2023-04-30 RX ADMIN — PANTOPRAZOLE SODIUM 40 MILLIGRAM(S): 20 TABLET, DELAYED RELEASE ORAL at 06:32

## 2023-04-30 RX ADMIN — HEPARIN SODIUM 800 UNIT(S)/HR: 5000 INJECTION INTRAVENOUS; SUBCUTANEOUS at 06:50

## 2023-04-30 RX ADMIN — IRON SUCROSE 110 MILLIGRAM(S): 20 INJECTION, SOLUTION INTRAVENOUS at 13:49

## 2023-04-30 NOTE — PROGRESS NOTE ADULT - ASSESSMENT
77 year old woman with PMHx of HTN, HLD, nonischemic cardiomyopathy, HFrEF s/p AICD presents to the ED with 2 weeks of worsening shortness of breath with increasing dry cough over this time period accepted to CCU for cardiogenic shock    #Cardiogenic Shock   #LV thrombus  #NICM  #HFrEF 25% s/p AICD  #HTN/HLD  - CT CAP - cardiomegaly w/ RH strain  - TTE 4/22/2023 EF <20%, large fixed thrombus vs mass on apical LV wall, GIIDD, mod-sev TR, mod MR, mild NE, mild PH  - LE venous duplex 4/21 -ve  - dobutamine, torsemide   - c/w heparin ggt  - f/u HF ( Dr Sherwood), Cardio ([place])       #Post obstructive pneumonia  #Suspected lung mass   - CT CAP - R hilar LAD, R perihilar soft tissue density w/ indentation/possible invasion of R pulmonary artery and narrowing of proximal R lobar and segmental branches w/ consolidation of RLL  - MRSA -ve, procal 1.77  - completed zosyn 7 day course   - pulm following for EBUS - needs medical optimization   - Appreciate Thoracic Surgery recs: agree with PET scan as outpatient to r/o lung malignancy  - Incentive Spirometer    #HAGMA - resolved   #Lactic Acidosis - resolved   #DAVID on CKD - resolved   #Elevated liver enzymes predominantly hepatocellular (AST>ALT) with hyperbilirubinemia likely due to ischemic liver injury - resolved   - trend labs   - GI following   - holding lipitor    #Hypotonic Hyponatremia  - hypervolemic   - trend Na       # Misc  - DVT Prophylaxis: heparin drip  - GI Prophylaxis: pantoprazole    Tablet 40 milliGRAM(s) Oral before breakfast  - Diet: Diet, DASH/TLC with 1.2 L fluid restriction  - Activity: Activity - Ambulate as Tolerated  - Code Status: Full

## 2023-04-30 NOTE — PROGRESS NOTE ADULT - SUBJECTIVE AND OBJECTIVE BOX
Patient is a 77y old  Female who presents with a chief complaint of intra cardiac mass (29 Apr 2023 09:18)      INTERVAL HPI/OVERNIGHT EVENTS:   No overnight events   Afebrile, hemodynamically stable     Subjective: Patient seen and examined at bedside.    ICU Vital Signs Last 24 Hrs  T(C): 36.6 (30 Apr 2023 16:00), Max: 36.8 (29 Apr 2023 20:00)  T(F): 97.8 (30 Apr 2023 16:00), Max: 98.3 (29 Apr 2023 20:00)  HR: 104 (30 Apr 2023 18:00) (98 - 118)  BP: 117/84 (30 Apr 2023 18:00) (82/66 - 117/84)  BP(mean): 94 (30 Apr 2023 18:00) (58 - 94)  ABP: --  ABP(mean): --  RR: 25 (30 Apr 2023 18:00) (17 - 34)  SpO2: 95% (30 Apr 2023 18:00) (92% - 100%)      I&O's Summary    29 Apr 2023 07:01  -  30 Apr 2023 07:00  --------------------------------------------------------  IN: 1275 mL / OUT: 765 mL / NET: 510 mL    30 Apr 2023 07:01  -  30 Apr 2023 19:36  --------------------------------------------------------  IN: 1122 mL / OUT: 300 mL / NET: 822 mL          PHYSICAL EXAM:  GENERAL: No acute distress   HEAD:  Atraumatic, Normocephalic  EYES: EOMI, PERRLA, conjunctiva and sclera clear  ENMT: No tonsillar erythema, exudates, or enlargement; Moist mucous membranes  NECK: Supple, No JVD, Normal thyroid  HEART: Regular rate and rhythm; No murmurs, rubs, or gallops  RESPIRATORY: CTA B/L, No W/R/R  ABDOMEN: Soft, Nontender, Nondistended; Bowel sounds present  NEUROLOGY: A&Ox3, nonfocal, moving all extremities  EXTREMITIES:  2+ Peripheral Pulses, No clubbing, cyanosis, or edema  SKIN: warm, dry, normal color, no rash or abnormal lesions    LABS:                        11.0   11.36 )-----------( 225      ( 30 Apr 2023 05:41 )             33.7     04-30    130<L>  |  90<L>  |  33<H>  ----------------------------<  121<H>  4.3   |  28  |  0.8    Ca    8.9      30 Apr 2023 05:41  Mg     2.1     04-30    TPro  5.9<L>  /  Alb  3.2<L>  /  TBili  2.4<H>  /  DBili  x   /  AST  37  /  ALT  129<H>  /  AlkPhos  113  04-30    PT/INR - ( 29 Apr 2023 05:00 )   PT: 16.10 sec;   INR: 1.40 ratio         PTT - ( 30 Apr 2023 05:41 )  PTT:86.9 sec    CAPILLARY BLOOD GLUCOSE        ABG - ( 30 Apr 2023 02:08 )  pH, Arterial: 7.54  pH, Blood: x     /  pCO2: 34    /  pO2: 122   / HCO3: 29    / Base Excess: 6.5   /  SaO2: 98.5                Consultant(s) Notes Reviewed:  [x ] YES  [ ] NO    MEDICATIONS  (STANDING):  chlorhexidine 2% Cloths 1 Application(s) Topical daily  DOBUTamine Infusion 10 MICROgram(s)/kG/Min (9.53 mL/Hr) IV Continuous <Continuous>  heparin  Infusion. 600 Unit(s)/Hr (6 mL/Hr) IV Continuous <Continuous>  melatonin 5 milliGRAM(s) Oral at bedtime  norepinephrine Infusion 0.05 MICROgram(s)/kG/Min (2.98 mL/Hr) IV Continuous <Continuous>  pantoprazole    Tablet 40 milliGRAM(s) Oral before breakfast  torsemide 20 milliGRAM(s) Oral every 12 hours    MEDICATIONS  (PRN):  benzocaine 20% Spray 1 Spray(s) Topical four times a day PRN throat pain      Care Discussed with Consultants/Other Providers [ x] YES  [ ] NO    RADIOLOGY & ADDITIONAL TESTS:

## 2023-05-01 LAB
ACE SERPL-CCNC: 21 U/L — SIGNIFICANT CHANGE UP (ref 14–82)
ALBUMIN SERPL ELPH-MCNC: 3 G/DL — LOW (ref 3.5–5.2)
ALP SERPL-CCNC: 109 U/L — SIGNIFICANT CHANGE UP (ref 30–115)
ALT FLD-CCNC: 101 U/L — HIGH (ref 0–41)
ANION GAP SERPL CALC-SCNC: 10 MMOL/L — SIGNIFICANT CHANGE UP (ref 7–14)
ANION GAP SERPL CALC-SCNC: 13 MMOL/L — SIGNIFICANT CHANGE UP (ref 7–14)
APTT BLD: 59 SEC — HIGH (ref 27–39.2)
AST SERPL-CCNC: 32 U/L — SIGNIFICANT CHANGE UP (ref 0–41)
BASE EXCESS BLDMV CALC-SCNC: 5.6 MMOL/L — SIGNIFICANT CHANGE UP
BASOPHILS # BLD AUTO: 0.02 K/UL — SIGNIFICANT CHANGE UP (ref 0–0.2)
BASOPHILS NFR BLD AUTO: 0.2 % — SIGNIFICANT CHANGE UP (ref 0–1)
BILIRUB SERPL-MCNC: 2.7 MG/DL — HIGH (ref 0.2–1.2)
BUN SERPL-MCNC: 23 MG/DL — HIGH (ref 10–20)
BUN SERPL-MCNC: 29 MG/DL — HIGH (ref 10–20)
CALCIUM SERPL-MCNC: 8.3 MG/DL — LOW (ref 8.4–10.5)
CALCIUM SERPL-MCNC: 9 MG/DL — SIGNIFICANT CHANGE UP (ref 8.4–10.5)
CHLORIDE SERPL-SCNC: 92 MMOL/L — LOW (ref 98–110)
CHLORIDE SERPL-SCNC: 94 MMOL/L — LOW (ref 98–110)
CO2 SERPL-SCNC: 25 MMOL/L — SIGNIFICANT CHANGE UP (ref 17–32)
CO2 SERPL-SCNC: 28 MMOL/L — SIGNIFICANT CHANGE UP (ref 17–32)
CREAT SERPL-MCNC: 0.7 MG/DL — SIGNIFICANT CHANGE UP (ref 0.7–1.5)
CREAT SERPL-MCNC: 0.8 MG/DL — SIGNIFICANT CHANGE UP (ref 0.7–1.5)
EGFR: 75 ML/MIN/1.73M2 — SIGNIFICANT CHANGE UP
EGFR: 88 ML/MIN/1.73M2 — SIGNIFICANT CHANGE UP
EOSINOPHIL # BLD AUTO: 0.04 K/UL — SIGNIFICANT CHANGE UP (ref 0–0.7)
EOSINOPHIL NFR BLD AUTO: 0.4 % — SIGNIFICANT CHANGE UP (ref 0–8)
GAS PNL BLDA: SIGNIFICANT CHANGE UP
GLUCOSE SERPL-MCNC: 107 MG/DL — HIGH (ref 70–99)
GLUCOSE SERPL-MCNC: 89 MG/DL — SIGNIFICANT CHANGE UP (ref 70–99)
HCO3 BLDMV-SCNC: 29 MMOL/L — SIGNIFICANT CHANGE UP
HCT VFR BLD CALC: 33 % — LOW (ref 37–47)
HCT VFR BLD CALC: 33.2 % — LOW (ref 37–47)
HGB BLD-MCNC: 10.9 G/DL — LOW (ref 12–16)
HGB BLD-MCNC: 11 G/DL — LOW (ref 12–16)
IMM GRANULOCYTES NFR BLD AUTO: 0.7 % — HIGH (ref 0.1–0.3)
LYMPHOCYTES # BLD AUTO: 1.51 K/UL — SIGNIFICANT CHANGE UP (ref 1.2–3.4)
LYMPHOCYTES # BLD AUTO: 13.9 % — LOW (ref 20.5–51.1)
MAGNESIUM SERPL-MCNC: 1.7 MG/DL — LOW (ref 1.8–2.4)
MAGNESIUM SERPL-MCNC: 2 MG/DL — SIGNIFICANT CHANGE UP (ref 1.8–2.4)
MCHC RBC-ENTMCNC: 30.3 PG — SIGNIFICANT CHANGE UP (ref 27–31)
MCHC RBC-ENTMCNC: 30.4 PG — SIGNIFICANT CHANGE UP (ref 27–31)
MCHC RBC-ENTMCNC: 33 G/DL — SIGNIFICANT CHANGE UP (ref 32–37)
MCHC RBC-ENTMCNC: 33.1 G/DL — SIGNIFICANT CHANGE UP (ref 32–37)
MCV RBC AUTO: 91.5 FL — SIGNIFICANT CHANGE UP (ref 81–99)
MCV RBC AUTO: 91.9 FL — SIGNIFICANT CHANGE UP (ref 81–99)
MONOCYTES # BLD AUTO: 0.71 K/UL — HIGH (ref 0.1–0.6)
MONOCYTES NFR BLD AUTO: 6.5 % — SIGNIFICANT CHANGE UP (ref 1.7–9.3)
NEUTROPHILS # BLD AUTO: 8.52 K/UL — HIGH (ref 1.4–6.5)
NEUTROPHILS NFR BLD AUTO: 78.3 % — HIGH (ref 42.2–75.2)
NRBC # BLD: 0 /100 WBCS — SIGNIFICANT CHANGE UP (ref 0–0)
NRBC # BLD: 0 /100 WBCS — SIGNIFICANT CHANGE UP (ref 0–0)
O2 CT VFR BLD CALC: 35 MMHG — SIGNIFICANT CHANGE UP
PCO2 BLDMV: 36 MMHG — SIGNIFICANT CHANGE UP
PH BLDMV: 7.51 — SIGNIFICANT CHANGE UP
PLATELET # BLD AUTO: 246 K/UL — SIGNIFICANT CHANGE UP (ref 130–400)
PLATELET # BLD AUTO: 248 K/UL — SIGNIFICANT CHANGE UP (ref 130–400)
PMV BLD: 11.9 FL — HIGH (ref 7.4–10.4)
PMV BLD: 12.5 FL — HIGH (ref 7.4–10.4)
POTASSIUM SERPL-MCNC: 3.7 MMOL/L — SIGNIFICANT CHANGE UP (ref 3.5–5)
POTASSIUM SERPL-MCNC: 3.9 MMOL/L — SIGNIFICANT CHANGE UP (ref 3.5–5)
POTASSIUM SERPL-SCNC: 3.7 MMOL/L — SIGNIFICANT CHANGE UP (ref 3.5–5)
POTASSIUM SERPL-SCNC: 3.9 MMOL/L — SIGNIFICANT CHANGE UP (ref 3.5–5)
PROT SERPL-MCNC: 6 G/DL — SIGNIFICANT CHANGE UP (ref 6–8)
RBC # BLD: 3.59 M/UL — LOW (ref 4.2–5.4)
RBC # BLD: 3.63 M/UL — LOW (ref 4.2–5.4)
RBC # FLD: 14.6 % — HIGH (ref 11.5–14.5)
RBC # FLD: 14.9 % — HIGH (ref 11.5–14.5)
SAO2 % BLDMV: 58.4 % — SIGNIFICANT CHANGE UP
SARS-COV-2 RNA SPEC QL NAA+PROBE: DETECTED
SARS-COV-2 RNA SPEC QL NAA+PROBE: SIGNIFICANT CHANGE UP
SODIUM SERPL-SCNC: 130 MMOL/L — LOW (ref 135–146)
SODIUM SERPL-SCNC: 132 MMOL/L — LOW (ref 135–146)
TROPONIN T SERPL-MCNC: 0.02 NG/ML — HIGH
WBC # BLD: 10.88 K/UL — HIGH (ref 4.8–10.8)
WBC # BLD: 12.45 K/UL — HIGH (ref 4.8–10.8)
WBC # FLD AUTO: 10.88 K/UL — HIGH (ref 4.8–10.8)
WBC # FLD AUTO: 12.45 K/UL — HIGH (ref 4.8–10.8)

## 2023-05-01 PROCEDURE — 71045 X-RAY EXAM CHEST 1 VIEW: CPT | Mod: 26

## 2023-05-01 PROCEDURE — 71045 X-RAY EXAM CHEST 1 VIEW: CPT | Mod: 26,77

## 2023-05-01 PROCEDURE — 93010 ELECTROCARDIOGRAM REPORT: CPT

## 2023-05-01 PROCEDURE — 99291 CRITICAL CARE FIRST HOUR: CPT

## 2023-05-01 PROCEDURE — 99291 CRITICAL CARE FIRST HOUR: CPT | Mod: 25

## 2023-05-01 RX ORDER — HEPARIN SODIUM 5000 [USP'U]/ML
5000 INJECTION INTRAVENOUS; SUBCUTANEOUS EVERY 6 HOURS
Refills: 0 | Status: DISCONTINUED | OUTPATIENT
Start: 2023-05-01 | End: 2023-05-03

## 2023-05-01 RX ORDER — POTASSIUM CHLORIDE 20 MEQ
40 PACKET (EA) ORAL ONCE
Refills: 0 | Status: COMPLETED | OUTPATIENT
Start: 2023-05-01 | End: 2023-05-01

## 2023-05-01 RX ORDER — BUMETANIDE 0.25 MG/ML
1 INJECTION INTRAMUSCULAR; INTRAVENOUS
Qty: 20 | Refills: 0 | Status: DISCONTINUED | OUTPATIENT
Start: 2023-05-01 | End: 2023-05-02

## 2023-05-01 RX ORDER — HEPARIN SODIUM 5000 [USP'U]/ML
2500 INJECTION INTRAVENOUS; SUBCUTANEOUS EVERY 6 HOURS
Refills: 0 | Status: DISCONTINUED | OUTPATIENT
Start: 2023-05-01 | End: 2023-05-03

## 2023-05-01 RX ORDER — MAGNESIUM SULFATE 500 MG/ML
2 VIAL (ML) INJECTION
Refills: 0 | Status: COMPLETED | OUTPATIENT
Start: 2023-05-01 | End: 2023-05-01

## 2023-05-01 RX ORDER — HEPARIN SODIUM 5000 [USP'U]/ML
INJECTION INTRAVENOUS; SUBCUTANEOUS
Qty: 25000 | Refills: 0 | Status: DISCONTINUED | OUTPATIENT
Start: 2023-05-01 | End: 2023-05-03

## 2023-05-01 RX ORDER — HEPARIN SODIUM 5000 [USP'U]/ML
5000 INJECTION INTRAVENOUS; SUBCUTANEOUS ONCE
Refills: 0 | Status: COMPLETED | OUTPATIENT
Start: 2023-05-01 | End: 2023-05-01

## 2023-05-01 RX ORDER — SODIUM CHLORIDE 5 G/100ML
150 INJECTION, SOLUTION INTRAVENOUS
Refills: 0 | Status: DISCONTINUED | OUTPATIENT
Start: 2023-05-01 | End: 2023-05-02

## 2023-05-01 RX ORDER — MILRINONE LACTATE 1 MG/ML
0.38 INJECTION, SOLUTION INTRAVENOUS
Qty: 20 | Refills: 0 | Status: DISCONTINUED | OUTPATIENT
Start: 2023-05-01 | End: 2023-05-05

## 2023-05-01 RX ADMIN — Medication 25 GRAM(S): at 13:27

## 2023-05-01 RX ADMIN — HEPARIN SODIUM 800 UNIT(S)/HR: 5000 INJECTION INTRAVENOUS; SUBCUTANEOUS at 07:17

## 2023-05-01 RX ADMIN — Medication 40 MILLIEQUIVALENT(S): at 13:23

## 2023-05-01 RX ADMIN — HEPARIN SODIUM 5000 UNIT(S): 5000 INJECTION INTRAVENOUS; SUBCUTANEOUS at 17:43

## 2023-05-01 RX ADMIN — Medication 20 MILLIGRAM(S): at 05:36

## 2023-05-01 RX ADMIN — Medication 5 MILLIGRAM(S): at 23:02

## 2023-05-01 RX ADMIN — Medication 25 GRAM(S): at 13:23

## 2023-05-01 RX ADMIN — BUMETANIDE 5 MG/HR: 0.25 INJECTION INTRAMUSCULAR; INTRAVENOUS at 15:27

## 2023-05-01 RX ADMIN — MILRINONE LACTATE 2.38 MICROGRAM(S)/KG/MIN: 1 INJECTION, SOLUTION INTRAVENOUS at 15:36

## 2023-05-01 RX ADMIN — HEPARIN SODIUM 1100 UNIT(S)/HR: 5000 INJECTION INTRAVENOUS; SUBCUTANEOUS at 17:24

## 2023-05-01 RX ADMIN — CHLORHEXIDINE GLUCONATE 1 APPLICATION(S): 213 SOLUTION TOPICAL at 13:25

## 2023-05-01 RX ADMIN — SODIUM CHLORIDE 50 MILLILITER(S): 5 INJECTION, SOLUTION INTRAVENOUS at 17:26

## 2023-05-01 RX ADMIN — PANTOPRAZOLE SODIUM 40 MILLIGRAM(S): 20 TABLET, DELAYED RELEASE ORAL at 05:36

## 2023-05-01 NOTE — PROGRESS NOTE ADULT - SUBJECTIVE AND OBJECTIVE BOX
Date of Admission: 23    CHIEF COMPLAINT: Patient is a 77y old  Female who presents with a chief complaint of intra cardiac mass.    Interval History: Patient seen and examined with family present at bedside. s/p RHC this AM (results below). Now also found to be COVID positive. Currently on Dobutamine at 7.5 mcg/kg/min. Patient appears solemn, no new complaints at this time.                                                                                                                                                             HISTORY OF PRESENT ILLNESS: 78 y/o F with PMHx of HTN, HLD, nonischemic cardiomyopathy, HFrEF s/p AICD presents to the ED with 2 weeks of worsening shortness of breath. Patient and her son state patient has been experiencing shortness of breath that has progressed from present on activity to now shortness of breath during conversation. Patient also notes an increasing dry cough over this time period.    In ED, patient's HR is 110 and saturating 98% on RA  CT C/A/P revealing cardiomegaly with evidence of right heart strain, R hilar lymphadenopathy with R perihilar soft tissue density with indentation/possible invasion of right pulmonary artery, and narrowing of proximal R lobar and segmental branches with associated consolidation of right lower lobe. 2.2 x 2.8 cm filling defect noted in apex of left ventricle.   Last ECHO 2021 revealing EF 20-25%, mild Mr, mild TR, mild Pulm HTN  Dimer 2600, trops negative BNP 36391  Duplex venous LE negative for DVT; CTA chest negative for PE  Cr 1.4 (0.7 in ), Na+ 128, T.Benjamin 2.3, LFTs elevated (hemolyzed)  Lactate 5.8-->4.6  Admitted to SDU (2023 01:10)      Patient reportedly was at baseline (active, ambulating long distances without issue) until about 2 weeks ago when patient began to have progressively worsening SOB with a dry cough. Patient reports Dr. Mead as per primary cardiologist, and Dr. Sherwood as her heart failure specialist (last seen in office in ). Endorses compliance with home medications. Family at bedside concerned about patient's recent weight loss as well.         PAST MEDICAL & SURGICAL HISTORY:  CHF, stage C  HTN (hypertension)      FAMILY HISTORY: No pertinent family history of premature cardiovascular disease in first degree relatives.    SOCIAL HISTORY:  Denies smoking, alcohol, or drug use    Allergies  epinephrine (Unknown)    Intolerances      Vitals:  ICU Vital Signs Last 24 Hrs  T(C): 36.7 (01 May 2023 08:00), Max: 36.7 (2023 20:00)  T(F): 98 (01 May 2023 08:00), Max: 98 (2023 20:00)  HR: 101 (01 May 2023 13:15) (101 - 107)  BP: 95/66 (01 May 2023 13:15) (86/66 - 117/84)  BP(mean): 75 (01 May 2023 13:15) (58 - 94)  ABP: --  ABP(mean): --  RR: 28 (01 May 2023 13:15) (20 - 34)  SpO2: 96% (01 May 2023 10:00) (92% - 98%)    O2 Parameters below as of 01 May 2023 10:00  Patient On (Oxygen Delivery Method): room air        Physical Exam:  General Appearance: thin, frail, normal for age and gender. 	  Cardiovascular: RRR, +S1, S2, No edema  Respiratory: decreased @ bases  Psychiatry: Fatigued, oriented x 3, Mood & affect appropriate  Gastrointestinal:  Soft, Non-tender  Skin/Integumentary: No rashes, No ecchymoses, No cyanosis, +[place] cath L groin	  Neurologic: Non-focal  Musculoskeletal/ extremities: Normal range of motion, No clubbing, cyanosis or edema  Vascular: Peripheral pulses palpable 2+ bilaterally        LABS:	 	    CBC Full  -  ( 01 May 2023 05:25 )  WBC Count : 10.88 K/uL  RBC Count : 3.59 M/uL  Hemoglobin : 10.9 g/dL  Hematocrit : 33.0 %  Platelet Count - Automated : 246 K/uL  Mean Cell Volume : 91.9 fL  Mean Cell Hemoglobin : 30.4 pg  Mean Cell Hemoglobin Concentration : 33.0 g/dL  Auto Neutrophil # : 8.52 K/uL  Auto Lymphocyte # : 1.51 K/uL  Auto Monocyte # : 0.71 K/uL  Auto Eosinophil # : 0.04 K/uL  Auto Basophil # : 0.02 K/uL  Auto Neutrophil % : 78.3 %  Auto Lymphocyte % : 13.9 %  Auto Monocyte % : 6.5 %  Auto Eosinophil % : 0.4 %  Auto Basophil % : 0.2 %      Comprehensive Metabolic Panel in AM (23 @ 05:25)    Sodium, Serum: 132 mmol/L   Potassium, Serum: 3.9 mmol/L   Chloride, Serum: 94 mmol/L   Carbon Dioxide, Serum: 28 mmol/L   Anion Gap, Serum: 10 mmol/L   Blood Urea Nitrogen, Serum: 29 mg/dL   Creatinine, Serum: 0.8 mg/dL   Glucose, Serum: 89 mg/dL   Calcium, Total Serum: 9.0 mg/dL   Protein Total, Serum: 6.0 g/dL   Albumin, Serum: 3.0 g/dL   Bilirubin Total, Serum: 2.7 mg/dL   Alkaline Phosphatase, Serum: 109 U/L   Aspartate Aminotransferase (AST/SGOT): 32 U/L   Alanine Aminotransferase (ALT/SGPT): 101 U/L   eGFR: 75: The estimated glomerular filtration rate (eGFR) is calculated using the   CKD-EPI creatinine equation, which does not have a coefficient for  race and is validated in individuals 18 years of age and older (N Engl J  Med ; [id number]). Creatinine-based eGFR may be inaccurate in  various situations including but not limited to extremes of muscle mass,  altered dietary protein intake, or medications that affect renal tubular  creatinine secretion. mL/min/1.73m2          Pro-Brain Natriuretic Peptide (23 @ 11:43)    Pro-Brain Natriuretic Peptide: 50114 pg/mL        TELEMETRY EVENTS: 	    EC2023    Ventricular Rate 96 BPM  Atrial Rate 96 BPM  P-R Interval 164 ms  QRS Duration 116 ms  Q-T Interval 396 ms  QTC Calculation(Bazett) 500 ms  P Axis 51 degrees  R Axis -41 degrees  T Axis 87 degrees    Diagnosis Line Normal sinus rhythm  Possible Left atrial enlargement  Left axis deviation  Prolonged QT  Abnormal ECG    Confirmed by Seven Hensley ([id number]) on 2023 7:07:08 AM      CXR     Impression:  CHF. Support devices as described. Without difference.      CT Angio Chest PE Protocol   IMPRESSION:    No evidence of pulmonary embolism. (See discussion below for more   findings.)    Marked cardiomegaly.    There is a 2.2 x 2.8 cm rounded filling defect in the region of the apex   of the left ventricle (3/11; 607/95). Differential diagnosis includes an   intracardiac mass or thrombus.    The main pulmonary artery is dilated, measuring 3.6 cm in diameter, which   may be seen with pulmonary hypertension. There is reflux of contrast   material into the IVC and hepatic veins compatible with right heart   dysfunction.    Right hilar lymphadenopathy and right perihilar soft tissue density is   noted. There is segmental narrowing and consolidation noted in the medial   aspect of the right lower lobe. There is a small right pleural effusion.   There is extrinsic indentation and possible invasion of the right main   pulmonary artery (604/142; 605/129; 2/42). There is associated narrowing   of the proximal right lobar and segmental branches. A right hilar / right   lower lobe mass with postobstructive pneumonitis should be ruled out.          PREVIOUS DIAGNOSTIC TESTING:    TTE 23    Summary:   1. Left ventricular ejection fraction, by visual estimation, is <20%.   2. Severely decreased global left ventricular systolic function.   3. Severely enlarged left atrium.   4. Elevated mean left atrial pressure.   5. Large, fixed thrombus vs. mass on the apical wall of the left   ventricle.   6. Spectral Doppler shows pseudonormal pattern of left ventricular   myocardial filling (Grade II diastolic dysfunction).   7. Moderately reduced RV systolic function.   8. Mildly enlarged right atrium.   9. Moderate mitral valve regurgitation.  10. Moderate-severe tricuspid regurgitation.  11. Mild pulmonic valve regurgitation.  12. Estimated pulmonary artery systolic pressure is 42.9 mmHg assuming a   right atrial pressure of 15 mmHg, which is consistent withmild pulmonary   hypertension.  13. Endocardial visualization was enhanced with intravenous echo contrast.      Right Heart Catheterization 23    FINDINGS:   Right Heart Cath  RA - 12  RV - 51/5/13  PA - 51/24/32  PCWP - 20    CO/CI Thermodilusion - 2.1/1.27  CO/CI Radha - 2.09/1.26    SVR (MAP 79 / RA 12 / CO 2.1) = 2552 dyn  PVR = 5.71 henriquez      POST-OP DIAGNOSIS:    [x] Decreased cardiac output with increased SVR + PVR + PCWP consistent with cardiogenic shock.    Combined pre and post capillary PH.   	    Home Medications:  atorvastatin 20 mg oral tablet: 1 tab(s) orally once a day (04 Aug 2021 10:05)  carvedilol 6.25 mg oral tablet: 1 tab(s) orally 2 times a day (2023 02:02)  Entresto 49 mg-51 mg oral tablet: 1 tab(s) orally 2 times a day (04 Aug 2021 10:05)  Farxiga 10 mg oral tablet: 1 tab(s) orally once a day (2023 02:05)  Lasix 20 mg oral tablet: 1 tab(s) orally once a day (2023 02:04)  spironolactone 25 mg oral tablet: 1 tab(s) orally once a day (04 Aug 2021 10:05)    MEDICATIONS  (STANDING):  buMETAnide Infusion 1 mG/Hr (5 mL/Hr) IV Continuous <Continuous>  chlorhexidine 2% Cloths 1 Application(s) Topical daily  DOBUTamine Infusion 5 MICROgram(s)/kG/Min (4.76 mL/Hr) IV Continuous <Continuous>  guaifenesin/dextromethorphan Oral Liquid 15 milliLiter(s) Oral once  heparin  Infusion. 600 Unit(s)/Hr (6 mL/Hr) IV Continuous <Continuous>  iron sucrose IVPB 200 milliGRAM(s) IV Intermittent every 24 hours  melatonin 5 milliGRAM(s) Oral at bedtime  pantoprazole    Tablet 40 milliGRAM(s) Oral before breakfast  piperacillin/tazobactam IVPB.. 3.375 Gram(s) IV Intermittent every 8 hours    MEDICATIONS  (PRN):

## 2023-05-01 NOTE — PROGRESS NOTE ADULT - ASSESSMENT
77 year old woman with PMHx of HTN, HLD, nonischemic cardiomyopathy, HFrEF s/p AICD presents to the ED with 2 weeks of worsening shortness of breath with increasing dry cough over this time period accepted to CCU for cardiogenic shock    #Cardiogenic Shock   #LV thrombus  #NICM  #HFrEF 25% s/p AICD  #HTN/HLD  - CT CAP - cardiomegaly w/ RH strain  - TTE 4/22/2023 EF <20%, large fixed thrombus vs mass on apical LV wall, GIIDD, mod-sev TR, mod MR, mild IN, mild PH  - LE venous duplex 4/21 -ve  - dobutamine 10 micra, trying to wean off, and torsemide   - c/w heparin ggt  - f/u HF ( Dr Sherwood), Cardio ([place])  - s/p RHC, swan kevon thru R femoral vein - 86      #Post obstructive pneumonia  #Suspected lung mass   - CT CAP - R hilar LAD, R perihilar soft tissue density w/ indentation/possible invasion of R pulmonary artery and narrowing of proximal R lobar and segmental branches w/ consolidation of RLL  - MRSA -ve, procal 1.77  - completed zosyn 7 day course   - pulm following for EBUS - needs medical optimization   - Appreciate Thoracic Surgery recs: agree with PET scan as outpatient to r/o lung malignancy  - Incentive Spirometer    #HAGMA - resolved   #Lactic Acidosis - resolved   #DAVID on CKD - resolved   #Elevated liver enzymes predominantly hepatocellular (AST>ALT) with hyperbilirubinemia likely due to ischemic liver injury - resolved   - trend labs   - GI following   - holding lipitor    #Hypotonic Hyponatremia  - hypervolemic   - trend Na     # Misc  - DVT Prophylaxis: heparin drip  - GI Prophylaxis: pantoprazole    Tablet 40 milliGRAM(s) Oral before breakfast  - Diet: Diet, DASH/TLC with 1.2 L fluid restriction  - Activity: Activity - Ambulate as Tolerated  - Code Status: Full   - Dispo: CCU might require PICC line    77 year old woman with PMHx of HTN, HLD, nonischemic cardiomyopathy, HFrEF s/p AICD presents to the ED with 2 weeks of worsening shortness of breath with increasing dry cough over this time period accepted to CCU for cardiogenic shock    #Cardiogenic Shock   #LV thrombus  #NICM  #HFrEF 25% s/p AICD  #HTN/HLD  - CT CAP - cardiomegaly w/ RH strain  - TTE 4/22/2023 EF <20%, large fixed thrombus vs mass on apical LV wall, GIIDD, mod-sev TR, mod MR, mild [place], mild PH  - LE venous duplex 4/21 -ve  - dobutamine 7.5 micra, trying to wean off  --> IR consulted for PICC line on DC  - c/w heparin ggt  - f/u HF ( Dr Sherwood), Cardio ([place]): 3% saline +bumex drip till 5/2 and stop torsemide--> F/U Electrolytes BID  - s/p RHC on 5/1, swan kevon thru R femoral vein - 86 cm marking      #Post obstructive pneumonia  #Suspected lung mass   - CT CAP - R hilar LAD, R perihilar soft tissue density w/ indentation/possible invasion of R pulmonary artery and narrowing of proximal R lobar and segmental branches w/ consolidation of RLL  - MRSA -ve, procal 1.77  - completed zosyn 7 days course 4/27  - pulm following for EBUS - needs medical optimization   - Thoracic Surgery: possible biopsy on 5/3;  agree with PET scan as outpatient to r/o lung malignancy  - Incentive Spirometer    #HAGMA - resolved   #Lactic Acidosis - resolved   #DAVID on CKD - resolved   #Elevated liver enzymes predominantly hepatocellular (AST>ALT) with hyperbilirubinemia likely due to ischemic liver injury - resolved   - trend labs   - GI following   - holding lipitor    #Hypotonic Hyponatremia  - hypervolemic   - trend Na BID    # Misc  - DVT Prophylaxis: heparin drip  - GI Prophylaxis: pantoprazole    Tablet 40 milliGRAM(s) Oral before breakfast  - Diet: Diet, DASH/TLC with 1.2 L fluid restriction  - Activity: Activity - Ambulate as Tolerated  - Code Status: Full   - Dispo: CCU might require PICC line    77 year old woman with PMHx of HTN, HLD, nonischemic cardiomyopathy, HFrEF s/p AICD presents to the ED with 2 weeks of worsening shortness of breath with increasing dry cough over this time period accepted to CCU for cardiogenic shock    #Cardiogenic Shock   #LV thrombus  #NICM  #HFrEF 25% s/p AICD  #HTN/HLD  - CT CAP - cardiomegaly w/ RH strain  - TTE 4/22/2023 EF <20%, large fixed thrombus vs mass on apical LV wall, GIIDD, mod-sev TR, mod MR, mild MD, mild PH  - LE venous duplex 4/21 -ve  - dobutamine 7.5 micra, trying to wean off  --> IR consulted for PICC line on DC  - c/w heparin ggt  - f/u HF ( Dr Sherwood), Cardio ([place]): 3% saline +bumex drip till 5/2 and stop torsemide--> F/U Electrolytes BID  - s/p RHC on 5/1, swan kevon thru R femoral vein - 86 cm marking      #Post obstructive pneumonia  #Suspected lung mass   - CT CAP - R hilar LAD, R perihilar soft tissue density w/ indentation/possible invasion of R pulmonary artery and narrowing of proximal R lobar and segmental branches w/ consolidation of RLL  - MRSA -ve, procal 1.77  - completed zosyn 7 days course 4/27  - pulm following for EBUS - needs medical optimization   - Thoracic Surgery: possible biopsy on 5/3;  agree with PET scan as outpatient to r/o lung malignancy  - Incentive Spirometer    #HAGMA - resolved   #Lactic Acidosis - resolved   #DAVID on CKD - resolved   #Elevated liver enzymes predominantly hepatocellular (AST>ALT) with hyperbilirubinemia likely due to ischemic liver injury - resolved   - trend labs   - GI following   - holding lipitor    #Hypotonic Hyponatremia  - hypervolemic   - trend Na BID    # Misc  - DVT Prophylaxis: heparin drip  - GI Prophylaxis: pantoprazole    Tablet 40 milliGRAM(s) Oral before breakfast  - Diet: Diet, DASH/TLC with 1.2 L fluid restriction  - Activity: Activity - Ambulate as Tolerated  - Code Status: Full   - Dispo: CCU, pending volume status optimization, CT sx intervention and PICC line insertion    77 year old woman with PMHx of HTN, HLD, nonischemic cardiomyopathy, HFrEF s/p AICD presents to the ED with 2 weeks of worsening shortness of breath with increasing dry cough over this time period accepted to CCU for cardiogenic shock    #Cardiogenic Shock   #LV thrombus  #NICM  #HFrEF 25% s/p AICD  #HTN/HLD  - CT CAP - cardiomegaly w/ RH strain  - TTE 4/22/2023 EF <20%, large fixed thrombus vs mass on apical LV wall, GIIDD, mod-sev TR, mod MR, mild ID, mild PH  - LE venous duplex 4/21 negative  - dobutamine 7.5 micra, trying to wean off  --> IR consulted for PICC line on DC  - c/w heparin ggt - on hold post- swanganz, will resume in PM  - f/u HF ( Dr Sherwood), Cardio ([place]): 3% saline +bumex drip till 5/2 and stop torsemide--> F/U Electrolytes BID  - s/p RHC on 5/1, swan kevon thru R femoral vein - 86 cm marking      #Post obstructive pneumonia  #Suspected lung mass   - CT CAP - R hilar LAD, R perihilar soft tissue density w/ indentation/possible invasion of R pulmonary artery and narrowing of proximal R lobar and segmental branches w/ consolidation of RLL  - MRSA -ve, procal 1.77  - completed zosyn 7 days course 4/27  - pulm following for EBUS - needs medical optimization   - Thoracic Surgery: possible biopsy on 5/3;  agree with PET scan as outpatient to r/o lung malignancy  - Incentive Spirometer    #HAGMA - resolved   #Lactic Acidosis - resolved   #DAVID on CKD - resolved   #Elevated liver enzymes predominantly hepatocellular (AST>ALT) with hyperbilirubinemia likely due to ischemic liver injury - resolved   - trend labs   - GI following   - holding lipitor    #Hypotonic Hyponatremia  - hypervolemic   - trend Na BID    # Misc  - DVT Prophylaxis: heparin drip  - GI Prophylaxis: pantoprazole    Tablet 40 milliGRAM(s) Oral before breakfast  - Diet: Diet, DASH/TLC with 1.2 L fluid restriction  - Activity: Activity - Ambulate as Tolerated  - Code Status: Full   - Dispo: CCU, pending volume status optimization, CT sx intervention and PICC line insertion

## 2023-05-01 NOTE — PROGRESS NOTE ADULT - NS ATTEND AMEND GEN_ALL_CORE FT
Patient remains on dobutamine gtt, tolerating lower dose so far, good urine output and normal lactate. She remains fluid overloaded. Pre-albumin is 5 with along with recent weight loss of >10 lbs suggests significant malnutrition. Patient deconditioned but got up with physical therapy today.     Continue dobutamine gtt, wean gradually and monitor renal function, LFTs, lactate and blood pressure   Continue Bumex gtt until tomorrow + hypertonic saline   Start torsemide 20 mg daily   Strict I/Os  Get nutrition evaluation   Physical therapy follow up   Consider C to rule out CAD as cause for recent deterioration of LVEF  Thoracic surgery f/u for thoracic mass  Case discussed with CCU, general cardiology and family at bedside Patient was hypotensive over the weekend, dobutamine increased to 10 mcg/kg/min and pressor support started. No increase in lactate or LFTs, renal function remained stable. She was weaned off pressor and dobutamine decreased to 7.5 mcg/kg/min. RHC today showed CO/CI 2.3/1.4 / 2.8/1.6. RA pressure is 11 mmHg. At this point it's clear the patient is in low output and inotropic dependant and an evaluation for LVAD would be a reasonable next step. However, concern for possible malignancy remains. Additionally, patient's advanced age and malnutrition could be potential barriers for consideration of advanced therapies.     Add milrinone 0.125 mcg/kg/min   Continue dobutamine at 7.5 mcg/kg/min   Start Bumex gtt at 1 mg/hr + hypertonic saline 3% 150 ml BID  Spoke with thoracic surgery to consider biopsy while inpatient  Will re-assess candidacy for advanced therapies after malignancy evaluation   Will discuss with patient and family  Palliative care evaluation    Discussed with outpatient cardiologist, Dr. Mead and CCU team

## 2023-05-01 NOTE — PROGRESS NOTE ADULT - SUBJECTIVE AND OBJECTIVE BOX
Location: [place] [address] A ([address])  Patient Name: MIKHAIL JOHNSON  Age: 77y  Gender: Female    Past Medical and Surgical History:  CHF, stage C    HTN (hypertension)    Social History:  Social History:      Allergies:  epinephrine (Unknown)      Patient is a 77y old Female who presents with a chief complaint of intra cardiac mass (30 Apr 2023 19:36)    Primary diagnosis of Nonspecific abnormal findings on radiological and other examination of lung field      Progress Note  This morning patient was seen and examined at bedside.    Today is hospital day 10d.  Mrs. Johnson is doing fine.   Reports having good night sleep. Dyspnea and Chest pain (chief complaint) have improved. Appetite is adequate, and denies nausea or vomiting. Denies any abdominal pain, diarrhea, or constipation.   Bedbound and denies any shortness of breath, chest pain, palpitations, or light headedness.      Vital Signs in the last 24 hours   Vitals Summary T(C): 36.7 (05-01-23 @ 08:00), Max: 36.7 (04-30-23 @ 20:00)  HR: 101 (05-01-23 @ 13:15) (101 - 111)  BP: 95/66 (05-01-23 @ 13:15) (86/66 - 117/84)  RR: 28 (05-01-23 @ 13:15) (20 - 34)  SpO2: 96% (05-01-23 @ 10:00) (92% - 98%)  Vent Data   Intake/ Output   04-30-23 @ 07:01  -  05-01-23 @ 07:00  --------------------------------------------------------  IN: 1412 mL / OUT: 1130 mL / NET: 282 mL    05-01-23 @ 07:01  -  05-01-23 @ 13:55  --------------------------------------------------------  IN: 80.9 mL / OUT: 320 mL / NET: -239.1 mL          Physical Exam  * General Appearance: Alert, cooperative, interactive, oriented to time, place, and person, in no acute distress  * Lungs: Respirations unlabored, Good bilateral air entry, normal breath sounds  * Chest Wall: No tenderness or deformity  * Heart: Regular Rate and Rhythm, S1 and S2  * Abdomen: Symmetric, soft, non-tender, bowel sounds active all four quadrants  * Extremities: Extremities normal, atraumatic, no cyanosis, no lower extremity pitting edema bilaterally, warm , well perfused      Investigations   Laboratory Workup  - CBC:                        10.9   10.88 )-----------( 246      ( 01 May 2023 05:25 )             33.0     - Chemistry:  05-01    132<L>  |  94<L>  |  29<H>  ----------------------------<  89  3.9   |  28  |  0.8    Ca    9.0      01 May 2023 05:25  Mg     1.7     05-01    TPro  6.0  /  Alb  3.0<L>  /  TBili  2.7<H>  /  DBili  x   /  AST  32  /  ALT  101<H>  /  AlkPhos  109  05-01    - Coagulation Studies:  PTT - ( 01 May 2023 05:25 )  PTT:59.0 sec  - ABG:  ABG - ( 30 Apr 2023 02:08 )  pH, Arterial: 7.54  pH, Blood: x     /  pCO2: 34    /  pO2: 122   / HCO3: 29    / Base Excess: 6.5   /  SaO2: 98.5              - Cardiac Markers:  CARDIAC MARKERS ( 01 May 2023 05:25 )  x     / 0.02 ng/mL / x     / x     / x            Microbiological Workup        Radiological Workup  *  Current Medications  Standing Medications  chlorhexidine 2% Cloths 1 Application(s) Topical daily  DOBUTamine Infusion 10 MICROgram(s)/kG/Min (9.53 mL/Hr) IV Continuous <Continuous>  heparin  Infusion. 600 Unit(s)/Hr (6 mL/Hr) IV Continuous <Continuous>  melatonin 5 milliGRAM(s) Oral at bedtime  norepinephrine Infusion 0.05 MICROgram(s)/kG/Min (2.98 mL/Hr) IV Continuous <Continuous>  pantoprazole    Tablet 40 milliGRAM(s) Oral before breakfast  torsemide 20 milliGRAM(s) Oral every 12 hours    PRN Medications  benzocaine 20% Spray 1 Spray(s) Topical four times a day PRN throat pain    Singles Doses Administered  (ADM OVERRIDE) 1 each &lt;see task&gt; GiveOnce  (ADM OVERRIDE) 1 each &lt;see task&gt; GiveOnce  (ADM OVERRIDE) 1 each &lt;see task&gt; GiveOnce  (ADM OVERRIDE) 1 each &lt;see task&gt; GiveOnce  (ADM OVERRIDE) 1 each &lt;see task&gt; GiveOnce  (ADM OVERRIDE) 1 each &lt;see task&gt; GiveOnce  (ADM OVERRIDE) 3 each &lt;see task&gt; GiveOnce  (ADM OVERRIDE) 1 each &lt;see task&gt; GiveOnce  (ADM OVERRIDE) 1 each &lt;see task&gt; GiveOnce  (ADM OVERRIDE) 1 each &lt;see task&gt; GiveOnce  albumin human 25% IVPB 100 milliLiter(s) IV Intermittent once  albumin human 25% IVPB 100 milliLiter(s) IV Intermittent once  albumin human 25% IVPB 50 milliLiter(s) IV Intermittent once  aluminum hydroxide/magnesium hydroxide/simethicone Suspension 30 milliLiter(s) Oral Once  benzocaine 20%/menthol 0.5% Spray 1 Spray(s) Topical once  buMETAnide Injectable 2 milliGRAM(s) IV Push once  buMETAnide Injectable 2 milliGRAM(s) IV Push once  buMETAnide Injectable 1 milliGRAM(s) IV Push once  calcium gluconate IVPB 2 Gram(s) IV Intermittent once  famotidine Injectable 20 milliGRAM(s) IV Push once  furosemide   Injectable 40 milliGRAM(s) IV Push once  furosemide   Injectable 60 milliGRAM(s) IV Push once  furosemide   Injectable 20 milliGRAM(s) IV Push once  guaifenesin/dextromethorphan Oral Liquid 15 milliLiter(s) Oral once  iron sucrose IVPB 200 milliGRAM(s) IV Intermittent every 24 hours  lidocaine 1% Injectable 10 milliLiter(s) Local Injection once  magnesium sulfate  IVPB 2 Gram(s) IV Intermittent every 2 hours  magnesium sulfate  IVPB 2 Gram(s) IV Intermittent once  magnesium sulfate  IVPB 2 Gram(s) IV Intermittent once  magnesium sulfate  IVPB 2 Gram(s) IV Intermittent once  magnesium sulfate  IVPB 2 Gram(s) IV Intermittent every 2 hours  piperacillin/tazobactam IVPB. 3.375 Gram(s) IV Intermittent once  piperacillin/tazobactam IVPB.- 3.375 Gram(s) IV Intermittent once  piperacillin/tazobactam IVPB.. 3.375 Gram(s) IV Intermittent every 8 hours  potassium chloride    Tablet ER 40 milliEquivalent(s) Oral every 4 hours  potassium chloride   Powder 40 milliEquivalent(s) Oral once  potassium chloride  20 mEq/100 mL IVPB 20 milliEquivalent(s) IV Intermittent every 2 hours  potassium chloride  20 mEq/100 mL IVPB 20 milliEquivalent(s) IV Intermittent every 2 hours  potassium chloride  20 mEq/100 mL IVPB 20 milliEquivalent(s) IV Intermittent every 2 hours  potassium chloride  20 mEq/100 mL IVPB 20 milliEquivalent(s) IV Intermittent every 2 hours  potassium chloride  20 mEq/100 mL IVPB 20 milliEquivalent(s) IV Intermittent every 2 hours  sodium chloride 0.9% Bolus 250 milliLiter(s) IV Bolus once  sodium chloride 3%. 150 milliLiter(s) IV Continuous <Continuous>  sodium chloride 3%. 150 milliLiter(s) IV Continuous <Continuous>  spironolactone 12.5 milliGRAM(s) Oral once

## 2023-05-01 NOTE — PROGRESS NOTE ADULT - ASSESSMENT
ASSESSMENT:   77 year old woman with pmh of HTN, HLD, nonischemic cardiomyopathy, HFrEF, s/p ICD and PPM presents to the ED with 2 weeks of worsening shortness of breath.     IMPRESSION:  Cardiogenic shock on inotropic support  HFrEF 20%, NICM  RLL Mass/opacity/ possible Pneumonia, r/o malignancy  LV Mass, ?Thrombus  Transaminitis in the setting of hypotension, shock liver    RHC 5/01: RA 14, RV 46/14, PA 56/26/37, PCWP: 25, CO/C.I mehdi 2.37/1.43, C.O. /C.I thermodilution 2.8/1.69    PLAN:  - s/p RHC today- high filling pressures, low CO/CI  - Obtain hemodynamics from SWAN catheter  - Give Bumex 2 mg IV push, then start a Bumex gtt at 1 mg/hr  - Start 3% Hypertonic Saline 150ml BID (If infused throughout a central line, run over one hour, if a peripheral line is to be used run over 3 hours)  - Continue Dobutamine @ 7.5 mcg/kg/min  - Add milrinone 0.125 mcg/kg/min  - Incentive Spirometer x10 an hour while awake- please encourage use  - Continue PT / OOB to chair daily as tolerated once SWAN catheter removed (in groin)  - Pre-albumin 5  - Will need to r/o lung malignancy this admission to determine future heart failure management (advanced therapies vs hospice), discussed with Thoracic Surgery- tentative plan for procedure Wednesday   - s/p 5 day course of IV iron  - COVID management and rest of care as per CCU team  - Plan discussed with CCU team and CTSx  - Will continue to follow

## 2023-05-01 NOTE — CHART NOTE - NSCHARTNOTEFT_GEN_A_CORE
PRE-OP DIAGNOSIS:  Cardiogenic shock      PROCEDURE:     [] Coronary Angiogram     [] LHC     [] LVG     [X] RHC     [] Intervention (see below)         PHYSICIAN:  Dr. Lord    ASSISTANT:  Dr. Avendaño       PROCEDURE DESCRIPTION:     Consent:      [X] Patient     [] Family Member     []  Used        Anesthesia:     [X] General     [] Sedation     [X] Local        Access & Closure:     [] Fr Radial Artery     [] Fr Femoral Artery     [X] 7 Fr R Femoral Vein     [] Fr Brachial Vein       IV Contrast: 0mL        Intervention:     Finding:  RA 14  RV 46/14  PA 56/26/37  PCWP: 25  CO/C.I mehdi 2.37/1.43  C.O. /C.I thermodilution 2.8/1.69     ESTIMATED BLOOD LOSS: < 10 mL        CONDITION:     [X] Good     [] Fair     [] Critical        SPECIMEN REMOVED: N/A         PLAN OF CARE:     - Return to CCU  - Heart failure management per CCU/HF team  - [place] left in R femoral vein

## 2023-05-01 NOTE — CHART NOTE - NSCHARTNOTEFT_GEN_A_CORE
PREOPERATIVE DAY OF PROCEDURE EVALUATION:  I have personally seen and examined the patient.  I agree with the history and physical which I have reviewed and noted any changes below.  (Signed electronically by __________)  05-01-23 @ 11:30    77 year old woman with pmh of HTN, HLD, nonischemic cardiomyopathy, HFrEF, s/p ICD and PPM presents with worsening shortness of breath.   CT C/A/P revealing cardiomegaly with evidence of right heart strain, R hilar lymphadenopathy with R perihilar soft tissue density with indentation/possible invasion of right pulmonary artery, and narrowing of proximal R lobar and segmental branches with associated consolidation of right lower lobe. 2.2 x 2.8 cm filling defect noted in apex of left ventricle. Patient follows with Dr. Frank, last ECHO 8/2021 revealing EF 20-25%, mild Mr, mild TR, mild Pulm HTN.  Patient presents to cardiac department for RHC       Cath Bleeding Risk: 19%    Prehydration: patient admitted for cardiogenic shock, mild pulmonary HTN, ef <20%, here for RHC--no fluid bolus.

## 2023-05-02 LAB
ALBUMIN SERPL ELPH-MCNC: 3.1 G/DL — LOW (ref 3.5–5.2)
ALP SERPL-CCNC: 122 U/L — HIGH (ref 30–115)
ALT FLD-CCNC: 79 U/L — HIGH (ref 0–41)
ANION GAP SERPL CALC-SCNC: 15 MMOL/L — HIGH (ref 7–14)
APTT BLD: 59.9 SEC — HIGH (ref 27–39.2)
APTT BLD: 88 SEC — CRITICAL HIGH (ref 27–39.2)
APTT BLD: 94.9 SEC — CRITICAL HIGH (ref 27–39.2)
AST SERPL-CCNC: 30 U/L — SIGNIFICANT CHANGE UP (ref 0–41)
BASE EXCESS BLDMV CALC-SCNC: 7.3 MMOL/L — SIGNIFICANT CHANGE UP
BASOPHILS # BLD AUTO: 0.01 K/UL — SIGNIFICANT CHANGE UP (ref 0–0.2)
BASOPHILS NFR BLD AUTO: 0.1 % — SIGNIFICANT CHANGE UP (ref 0–1)
BILIRUB SERPL-MCNC: 3.5 MG/DL — HIGH (ref 0.2–1.2)
BUN SERPL-MCNC: 21 MG/DL — HIGH (ref 10–20)
CALCIUM SERPL-MCNC: 8.6 MG/DL — SIGNIFICANT CHANGE UP (ref 8.4–10.4)
CHLORIDE SERPL-SCNC: 93 MMOL/L — LOW (ref 98–110)
CO2 SERPL-SCNC: 26 MMOL/L — SIGNIFICANT CHANGE UP (ref 17–32)
CREAT SERPL-MCNC: 0.7 MG/DL — SIGNIFICANT CHANGE UP (ref 0.7–1.5)
EGFR: 88 ML/MIN/1.73M2 — SIGNIFICANT CHANGE UP
EOSINOPHIL # BLD AUTO: 0.04 K/UL — SIGNIFICANT CHANGE UP (ref 0–0.7)
EOSINOPHIL NFR BLD AUTO: 0.3 % — SIGNIFICANT CHANGE UP (ref 0–8)
GAS PNL BLDA: SIGNIFICANT CHANGE UP
GLUCOSE SERPL-MCNC: 108 MG/DL — HIGH (ref 70–99)
HCO3 BLDMV-SCNC: 30 MMOL/L — SIGNIFICANT CHANGE UP
HCT VFR BLD CALC: 33.1 % — LOW (ref 37–47)
HGB BLD-MCNC: 11.1 G/DL — LOW (ref 12–16)
IMM GRANULOCYTES NFR BLD AUTO: 0.7 % — HIGH (ref 0.1–0.3)
LYMPHOCYTES # BLD AUTO: 1.53 K/UL — SIGNIFICANT CHANGE UP (ref 1.2–3.4)
LYMPHOCYTES # BLD AUTO: 12.9 % — LOW (ref 20.5–51.1)
MAGNESIUM SERPL-MCNC: 1.7 MG/DL — LOW (ref 1.8–2.4)
MCHC RBC-ENTMCNC: 30.5 PG — SIGNIFICANT CHANGE UP (ref 27–31)
MCHC RBC-ENTMCNC: 33.5 G/DL — SIGNIFICANT CHANGE UP (ref 32–37)
MCV RBC AUTO: 90.9 FL — SIGNIFICANT CHANGE UP (ref 81–99)
MONOCYTES # BLD AUTO: 0.81 K/UL — HIGH (ref 0.1–0.6)
MONOCYTES NFR BLD AUTO: 6.8 % — SIGNIFICANT CHANGE UP (ref 1.7–9.3)
NEUTROPHILS # BLD AUTO: 9.42 K/UL — HIGH (ref 1.4–6.5)
NEUTROPHILS NFR BLD AUTO: 79.2 % — HIGH (ref 42.2–75.2)
NRBC # BLD: 0 /100 WBCS — SIGNIFICANT CHANGE UP (ref 0–0)
O2 CT VFR BLD CALC: 41 MMHG — SIGNIFICANT CHANGE UP
PCO2 BLDMV: 34 MMHG — SIGNIFICANT CHANGE UP
PH BLDMV: 7.55 — SIGNIFICANT CHANGE UP
PHOSPHATE SERPL-MCNC: 2.2 MG/DL — SIGNIFICANT CHANGE UP (ref 2.1–4.9)
PLATELET # BLD AUTO: 264 K/UL — SIGNIFICANT CHANGE UP (ref 130–400)
PMV BLD: 12.1 FL — HIGH (ref 7.4–10.4)
POTASSIUM SERPL-MCNC: 3.5 MMOL/L — SIGNIFICANT CHANGE UP (ref 3.5–5)
POTASSIUM SERPL-SCNC: 3.5 MMOL/L — SIGNIFICANT CHANGE UP (ref 3.5–5)
PROT SERPL-MCNC: 6.2 G/DL — SIGNIFICANT CHANGE UP (ref 6–8)
RBC # BLD: 3.64 M/UL — LOW (ref 4.2–5.4)
RBC # FLD: 14.8 % — HIGH (ref 11.5–14.5)
SAO2 % BLDMV: 75.1 % — SIGNIFICANT CHANGE UP
SARS-COV-2 RNA SPEC QL NAA+PROBE: SIGNIFICANT CHANGE UP
SARS-COV-2 RNA SPEC QL NAA+PROBE: SIGNIFICANT CHANGE UP
SODIUM SERPL-SCNC: 134 MMOL/L — LOW (ref 135–146)
SOLUBLE LIVER IGG SER IA-ACNC: 0.6 — SIGNIFICANT CHANGE UP (ref 0–20)
WBC # BLD: 11.89 K/UL — HIGH (ref 4.8–10.8)
WBC # FLD AUTO: 11.89 K/UL — HIGH (ref 4.8–10.8)

## 2023-05-02 PROCEDURE — 99291 CRITICAL CARE FIRST HOUR: CPT | Mod: 25

## 2023-05-02 PROCEDURE — 99231 SBSQ HOSP IP/OBS SF/LOW 25: CPT

## 2023-05-02 PROCEDURE — 71045 X-RAY EXAM CHEST 1 VIEW: CPT | Mod: 26

## 2023-05-02 PROCEDURE — 99233 SBSQ HOSP IP/OBS HIGH 50: CPT

## 2023-05-02 PROCEDURE — 99291 CRITICAL CARE FIRST HOUR: CPT

## 2023-05-02 RX ORDER — POTASSIUM CHLORIDE 20 MEQ
40 PACKET (EA) ORAL EVERY 4 HOURS
Refills: 0 | Status: COMPLETED | OUTPATIENT
Start: 2023-05-02 | End: 2023-05-02

## 2023-05-02 RX ORDER — BUMETANIDE 0.25 MG/ML
2 INJECTION INTRAMUSCULAR; INTRAVENOUS EVERY 8 HOURS
Refills: 0 | Status: DISCONTINUED | OUTPATIENT
Start: 2023-05-02 | End: 2023-05-04

## 2023-05-02 RX ORDER — METHYLPREDNISOLONE 4 MG
500 TABLET ORAL DAILY
Refills: 0 | Status: DISCONTINUED | OUTPATIENT
Start: 2023-05-02 | End: 2023-05-05

## 2023-05-02 RX ORDER — MAGNESIUM SULFATE 500 MG/ML
2 VIAL (ML) INJECTION ONCE
Refills: 0 | Status: COMPLETED | OUTPATIENT
Start: 2023-05-02 | End: 2023-05-02

## 2023-05-02 RX ADMIN — BUMETANIDE 2 MILLIGRAM(S): 0.25 INJECTION INTRAMUSCULAR; INTRAVENOUS at 22:29

## 2023-05-02 RX ADMIN — SODIUM CHLORIDE 50 MILLILITER(S): 5 INJECTION, SOLUTION INTRAVENOUS at 05:53

## 2023-05-02 RX ADMIN — Medication 40 MILLIEQUIVALENT(S): at 11:10

## 2023-05-02 RX ADMIN — CHLORHEXIDINE GLUCONATE 1 APPLICATION(S): 213 SOLUTION TOPICAL at 11:10

## 2023-05-02 RX ADMIN — Medication 5 MILLIGRAM(S): at 22:28

## 2023-05-02 RX ADMIN — HEPARIN SODIUM 1100 UNIT(S)/HR: 5000 INJECTION INTRAVENOUS; SUBCUTANEOUS at 09:39

## 2023-05-02 RX ADMIN — MILRINONE LACTATE 2.38 MICROGRAM(S)/KG/MIN: 1 INJECTION, SOLUTION INTRAVENOUS at 05:55

## 2023-05-02 RX ADMIN — BUMETANIDE 5 MG/HR: 0.25 INJECTION INTRAMUSCULAR; INTRAVENOUS at 05:55

## 2023-05-02 RX ADMIN — HEPARIN SODIUM 1100 UNIT(S)/HR: 5000 INJECTION INTRAVENOUS; SUBCUTANEOUS at 02:20

## 2023-05-02 RX ADMIN — Medication 500 MILLIGRAM(S): at 11:10

## 2023-05-02 RX ADMIN — Medication 7.14 MICROGRAM(S)/KG/MIN: at 05:53

## 2023-05-02 RX ADMIN — Medication 40 MILLIEQUIVALENT(S): at 09:39

## 2023-05-02 RX ADMIN — PANTOPRAZOLE SODIUM 40 MILLIGRAM(S): 20 TABLET, DELAYED RELEASE ORAL at 05:52

## 2023-05-02 RX ADMIN — BUMETANIDE 2 MILLIGRAM(S): 0.25 INJECTION INTRAMUSCULAR; INTRAVENOUS at 13:40

## 2023-05-02 RX ADMIN — Medication 25 GRAM(S): at 07:46

## 2023-05-02 RX ADMIN — MILRINONE LACTATE 3.81 MICROGRAM(S)/KG/MIN: 1 INJECTION, SOLUTION INTRAVENOUS at 15:21

## 2023-05-02 NOTE — CONSULT NOTE ADULT - SUBJECTIVE AND OBJECTIVE BOX
MIKHAIL JOHNSON  77y, Female  Allergy: epinephrine (Unknown)      All historical available data reviewed.    HPI:  77 year old woman with PMHx of HTN, HLD, nonischemic cardiomyopathy, HFrEF s/p AICD presents to the ED with 2 weeks of worsening shortness of breath. Patient and her son state patient has been experiencing shortness of breath that has progressed from present on activity to now shortness of breath during conversation. Patient also notes an increasing dry cough over this time period. Denies history of smoking, fevers/chills, chest pain, nausea/vomiting, bloody sputum, dark/tarry/bloody bowel movements, upper respiratory symptoms.     In ED, patient's HR is 110 and saturating 98% on RA  CT C/A/P revealing cardiomegaly with evidence of right heart strain, R hilar lymphadenopathy with R perihilar soft tissue density with indentation/possible invasion of right pulmonary artery, and narrowing of proximal R lobar and segmental branches with associated consolidation of right lower lobe. 2.2 x 2.8 cm filling defect noted in apex of left ventricle.   Last ECHO 8/2021 revealing EF 20-25%, mild Mr, mild TR, mild Pulm HTN  Dimer 2600, trops negative BNP 82588  Duplex venous LE negative for DVT; CTA chest negative for PE  Cr 1.4 (0.7 in 2021), Na+ 128, T.Benjamin 2.3, LFTs elevated (hemolyzed)  Lactate 5.8-->4.6  Admitted to SDU (22 Apr 2023 01:10)  Hospital course noted  ID called for COVID-19     FAMILY HISTORY:  FH: heart failure (Mother)      PAST MEDICAL & SURGICAL HISTORY:  CHF, stage C      HTN (hypertension)            VITALS:  T(F): 98.4, Max: 99.5 (05-01-23 @ 20:00)  HR: 101  BP: 90/54  RR: 24Vital Signs Last 24 Hrs  T(C): 36.9 (02 May 2023 04:00), Max: 37.5 (01 May 2023 20:00)  T(F): 98.4 (02 May 2023 04:00), Max: 99.5 (01 May 2023 20:00)  HR: 101 (02 May 2023 08:00) (96 - 108)  BP: 90/54 (02 May 2023 08:00) (81/60 - 112/68)  BP(mean): 67 (02 May 2023 08:00) (67 - 82)  RR: 24 (02 May 2023 08:00) (12 - 34)  SpO2: 98% (02 May 2023 07:00) (95% - 98%)    Parameters below as of 02 May 2023 07:00  Patient On (Oxygen Delivery Method): room air        TESTS & MEASUREMENTS:                        11.1   11.89 )-----------( 264      ( 02 May 2023 05:45 )             33.1     05-02    134<L>  |  93<L>  |  21<H>  ----------------------------<  108<H>  3.5   |  26  |  0.7    Ca    8.6      02 May 2023 05:45  Phos  2.2     05-02  Mg     1.7     05-02    TPro  6.2  /  Alb  3.1<L>  /  TBili  3.5<H>  /  DBili  x   /  AST  30  /  ALT  79<H>  /  AlkPhos  122<H>  05-02    LIVER FUNCTIONS - ( 02 May 2023 05:45 )  Alb: 3.1 g/dL / Pro: 6.2 g/dL / ALK PHOS: 122 U/L / ALT: 79 U/L / AST: 30 U/L / GGT: x                   RADIOLOGY & ADDITIONAL TESTS:  Personal review of radiological diagnostics performed  Echo and EKG results noted when applicable.     MEDICATIONS:  benzocaine 20% Spray 1 Spray(s) Topical four times a day PRN  buMETAnide Infusion 1 mG/Hr IV Continuous <Continuous>  chlorhexidine 2% Cloths 1 Application(s) Topical daily  DOBUTamine Infusion 7.5 MICROgram(s)/kG/Min IV Continuous <Continuous>  heparin   Injectable 5000 Unit(s) IV Push every 6 hours PRN  heparin   Injectable 2500 Unit(s) IV Push every 6 hours PRN  heparin  Infusion.  Unit(s)/Hr IV Continuous <Continuous>  magnesium gluconate 500 milliGRAM(s) Oral daily  melatonin 5 milliGRAM(s) Oral at bedtime  milrinone Infusion 0.125 MICROgram(s)/kG/Min IV Continuous <Continuous>  norepinephrine Infusion 0.05 MICROgram(s)/kG/Min IV Continuous <Continuous>  pantoprazole    Tablet 40 milliGRAM(s) Oral before breakfast  potassium chloride   Powder 40 milliEquivalent(s) Oral every 4 hours  sodium chloride 3% Bolus 150 milliLiter(s) IV Bolus <User Schedule>      ANTIBIOTICS:

## 2023-05-02 NOTE — PROGRESS NOTE ADULT - SUBJECTIVE AND OBJECTIVE BOX
Date of Admission: 23    Interval History: Patient seen and examined with family present at bedside. Currently on Dobutamine at 7.5 mcg/kg/min and Milrinone 0.125 mcg/kg/min. Without new complaints. Denies SOB.                                                                                                                                                            HISTORY OF PRESENT ILLNESS: 76 y/o F with PMHx of HTN, HLD, nonischemic cardiomyopathy, HFrEF s/p AICD presents to the ED with 2 weeks of worsening shortness of breath. Patient and her son state patient has been experiencing shortness of breath that has progressed from present on activity to now shortness of breath during conversation. Patient also notes an increasing dry cough over this time period.    In ED, patient's HR is 110 and saturating 98% on RA  CT C/A/P revealing cardiomegaly with evidence of right heart strain, R hilar lymphadenopathy with R perihilar soft tissue density with indentation/possible invasion of right pulmonary artery, and narrowing of proximal R lobar and segmental branches with associated consolidation of right lower lobe. 2.2 x 2.8 cm filling defect noted in apex of left ventricle.   Last ECHO 2021 revealing EF 20-25%, mild Mr, mild TR, mild Pulm HTN  Dimer 2600, trops negative BNP 43002  Duplex venous LE negative for DVT; CTA chest negative for PE  Cr 1.4 (0.7 in ), Na+ 128, T.Benjamin 2.3, LFTs elevated (hemolyzed)  Lactate 5.8-->4.6  Admitted to SDU (2023 01:10)      Patient reportedly was at baseline (active, ambulating long distances without issue) until about 2 weeks ago when patient began to have progressively worsening SOB with a dry cough. Patient reports Dr. Mead as per primary cardiologist, and Dr. Sherwood as her heart failure specialist (last seen in office in ). Endorses compliance with home medications. Family at bedside concerned about patient's recent weight loss as well.         PAST MEDICAL & SURGICAL HISTORY:  CHF, stage C  HTN (hypertension)    Allergies  epinephrine (Unknown)    Intolerances      Vitals:    ICU Vital Signs Last 24 Hrs  T(C): 37.2 (02 May 2023 12:00), Max: 37.5 (01 May 2023 20:00)  T(F): 99 (02 May 2023 12:00), Max: 99.5 (01 May 2023 20:00)  HR: 110 (02 May 2023 13:00) (96 - 110)  BP: 90/62 (02 May 2023 13:00) (81/60 - 112/68)  BP(mean): 72 (02 May 2023 13:00) (67 - 82)-  RR: 25 (02 May 2023 13:00) (11 - 25)  SpO2: 98% (02 May 2023 13:00) (95% - 98%)    O2 Parameters below as of 02 May 2023 13:00  Patient On (Oxygen Delivery Method): room air        Physical Exam:  General Appearance: thin, frail, normal for age and gender. 	  Cardiovascular: RRR, +S1, S2, No edema  Respiratory: decreased @ bases  Psychiatry: Fatigued, oriented x 3, Mood & affect appropriate  Skin/Integumentary: No rashes, No ecchymoses, No cyanosis, +[place] cath L groin	  Neurologic: Non-focal  Musculoskeletal/ extremities: Normal range of motion, No clubbing, cyanosis or edema  Vascular: Peripheral pulses palpable 2+ bilaterally        LABS:	 	    CBC Full  -  ( 02 May 2023 05:45 )  WBC Count : 11.89 K/uL  RBC Count : 3.64 M/uL  Hemoglobin : 11.1 g/dL  Hematocrit : 33.1 %  Platelet Count - Automated : 264 K/uL  Mean Cell Volume : 90.9 fL  Mean Cell Hemoglobin : 30.5 pg  Mean Cell Hemoglobin Concentration : 33.5 g/dL  Auto Neutrophil # : 9.42 K/uL  Auto Lymphocyte # : 1.53 K/uL  Auto Monocyte # : 0.81 K/uL  Auto Eosinophil # : 0.04 K/uL  Auto Basophil # : 0.01 K/uL  Auto Neutrophil % : 79.2 %  Auto Lymphocyte % : 12.9 %  Auto Monocyte % : 6.8 %  Auto Eosinophil % : 0.3 %  Auto Basophil % : 0.1 %    Comprehensive Metabolic Panel in AM (23 @ 05:45)    Sodium, Serum: 134 mmol/L   Potassium, Serum: 3.5 mmol/L   Chloride, Serum: 93 mmol/L   Carbon Dioxide, Serum: 26 mmol/L   Anion Gap, Serum: 15 mmol/L   Blood Urea Nitrogen, Serum: 21 mg/dL   Creatinine, Serum: 0.7 mg/dL   Glucose, Serum: 108 mg/dL   Calcium, Total Serum: 8.6 mg/dL   Protein Total, Serum: 6.2 g/dL   Albumin, Serum: 3.1 g/dL   Bilirubin Total, Serum: 3.5 mg/dL   Alkaline Phosphatase, Serum: 122 U/L   Aspartate Aminotransferase (AST/SGOT): 30 U/L   Alanine Aminotransferase (ALT/SGPT): 79 U/L   eGFR: 88: The estimated glomerular filtration rate (eGFR) is calculated using the   CKD-EPI creatinine equation, which does not have a coefficient for  race and is validated in individuals 18 years of age and older (N Engl J  Med ; 385:6542-9802). Creatinine-based eGFR may be inaccurate in  various situations including but not limited to extremes of muscle mass,  altered dietary protein intake, or medications that affect renal tubular  creatinine secretion. mL/min/1.73m2          TELEMETRY EVENTS: 	    EC2023    Ventricular Rate 96 BPM  Atrial Rate 96 BPM  P-R Interval 164 ms  QRS Duration 116 ms  Q-T Interval 396 ms  QTC Calculation(Bazett) 500 ms  P Axis 51 degrees  R Axis -41 degrees  T Axis 87 degrees    Diagnosis Line Normal sinus rhythm  Possible Left atrial enlargement  Left axis deviation  Prolonged QT  Abnormal ECG    Confirmed by Seven Hensley ([id number]) on 2023 7:07:08 AM      CXR     Impression:  CHF. Support devices as described. Without difference.      CT Angio Chest PE Protocol   IMPRESSION:    No evidence of pulmonary embolism. (See discussion below for more   findings.)    Marked cardiomegaly.    There is a 2.2 x 2.8 cm rounded filling defect in the region of the apex   of the left ventricle (3/11; 607/95). Differential diagnosis includes an   intracardiac mass or thrombus.    The main pulmonary artery is dilated, measuring 3.6 cm in diameter, which   may be seen with pulmonary hypertension. There is reflux of contrast   material into the IVC and hepatic veins compatible with right heart   dysfunction.    Right hilar lymphadenopathy and right perihilar soft tissue density is   noted. There is segmental narrowing and consolidation noted in the medial   aspect of the right lower lobe. There is a small right pleural effusion.   There is extrinsic indentation and possible invasion of the right main   pulmonary artery (604/142; 605/129; ). There is associated narrowing   of the proximal right lobar and segmental branches. A right hilar / right   lower lobe mass with postobstructive pneumonitis should be ruled out.          PREVIOUS DIAGNOSTIC TESTING:    TTE 23    Summary:   1. Left ventricular ejection fraction, by visual estimation, is <20%.   2. Severely decreased global left ventricular systolic function.   3. Severely enlarged left atrium.   4. Elevated mean left atrial pressure.   5. Large, fixed thrombus vs. mass on the apical wall of the left   ventricle.   6. Spectral Doppler shows pseudonormal pattern of left ventricular   myocardial filling (Grade II diastolic dysfunction).   7. Moderately reduced RV systolic function.   8. Mildly enlarged right atrium.   9. Moderate mitral valve regurgitation.  10. Moderate-severe tricuspid regurgitation.  11. Mild pulmonic valve regurgitation.  12. Estimated pulmonary artery systolic pressure is 42.9 mmHg assuming a   right atrial pressure of 15 mmHg, which is consistent withmild pulmonary   hypertension.  13. Endocardial visualization was enhanced with intravenous echo contrast.      Right Heart Catheterization 23    FINDINGS:   Right Heart Cath  RA - 12  RV - 51/5/13  PA - 51/24/32  PCWP - 20    CO/CI Thermodilusion - 2.1/1.27  CO/CI Mehdi - 2.09/1.26    SVR (MAP 79 / RA 12 / CO 2.1) = 2552 dyn  PVR = 5.71 henriquez      RHC : RA 14, RV 46/14, PA 56/26/37, PCWP: 25, CO/C.I mehdi 2.37/1.43, C.O. /C.I thermodilution 2.8/1.69      Home Medications:  atorvastatin 20 mg oral tablet: 1 tab(s) orally once a day (04 Aug 2021 10:05)  carvedilol 6.25 mg oral tablet: 1 tab(s) orally 2 times a day (2023 02:02)  Entresto 49 mg-51 mg oral tablet: 1 tab(s) orally 2 times a day (04 Aug 2021 10:05)  Farxiga 10 mg oral tablet: 1 tab(s) orally once a day (2023 02:05)  Lasix 20 mg oral tablet: 1 tab(s) orally once a day (2023 02:04)  spironolactone 25 mg oral tablet: 1 tab(s) orally once a day (04 Aug 2021 10:05)    MEDICATIONS  (STANDING):  MEDICATIONS  (STANDING):  buMETAnide Injectable 2 milliGRAM(s) IV Push every 8 hours  chlorhexidine 2% Cloths 1 Application(s) Topical daily  DOBUTamine Infusion 7.5 MICROgram(s)/kG/Min (7.14 mL/Hr) IV Continuous <Continuous>  heparin  Infusion.  Unit(s)/Hr (11 mL/Hr) IV Continuous <Continuous>  magnesium gluconate 500 milliGRAM(s) Oral daily  melatonin 5 milliGRAM(s) Oral at bedtime  milrinone Infusion 0.125 MICROgram(s)/kG/Min (2.38 mL/Hr) IV Continuous <Continuous>  norepinephrine Infusion 0.05 MICROgram(s)/kG/Min (2.98 mL/Hr) IV Continuous <Continuous>  pantoprazole    Tablet 40 milliGRAM(s) Oral before breakfast    MEDICATIONS  (PRN):  benzocaine 20% Spray 1 Spray(s) Topical four times a day PRN throat pain  heparin   Injectable 5000 Unit(s) IV Push every 6 hours PRN For aPTT less than 40  heparin   Injectable 2500 Unit(s) IV Push every 6 hours PRN For aPTT between 40 - 57   Date of Admission: 23    Interval History: Patient seen and examined with family present at bedside. Currently on Dobutamine at 7.5 mcg/kg/min and Milrinone 0.125 mcg/kg/min. Without new complaints. Denies SOB.                                                                                                                                                            HISTORY OF PRESENT ILLNESS: 76 y/o F with PMHx of HTN, HLD, nonischemic cardiomyopathy, HFrEF s/p AICD presents to the ED with 2 weeks of worsening shortness of breath. Patient and her son state patient has been experiencing shortness of breath that has progressed from present on activity to now shortness of breath during conversation. Patient also notes an increasing dry cough over this time period.    In ED, patient's HR is 110 and saturating 98% on RA  CT C/A/P revealing cardiomegaly with evidence of right heart strain, R hilar lymphadenopathy with R perihilar soft tissue density with indentation/possible invasion of right pulmonary artery, and narrowing of proximal R lobar and segmental branches with associated consolidation of right lower lobe. 2.2 x 2.8 cm filling defect noted in apex of left ventricle.   Last ECHO 2021 revealing EF 20-25%, mild Mr, mild TR, mild Pulm HTN  Dimer 2600, trops negative BNP 97314  Duplex venous LE negative for DVT; CTA chest negative for PE  Cr 1.4 (0.7 in ), Na+ 128, T.Benjamin 2.3, LFTs elevated (hemolyzed)  Lactate 5.8-->4.6  Admitted to SDU (2023 01:10)      Patient reportedly was at baseline (active, ambulating long distances without issue) until about 2 weeks ago when patient began to have progressively worsening SOB with a dry cough. Patient reports Dr. Mead as per primary cardiologist, and Dr. Sherwood as her heart failure specialist (last seen in office in ). Endorses compliance with home medications. Family at bedside concerned about patient's recent weight loss as well.         PAST MEDICAL & SURGICAL HISTORY:  CHF, stage C  HTN (hypertension)    Allergies  epinephrine (Unknown)    Intolerances      Vitals:    ICU Vital Signs Last 24 Hrs  T(C): 37.2 (02 May 2023 12:00), Max: 37.5 (01 May 2023 20:00)  T(F): 99 (02 May 2023 12:00), Max: 99.5 (01 May 2023 20:00)  HR: 110 (02 May 2023 13:00) (96 - 110)  BP: 90/62 (02 May 2023 13:00) (81/60 - 112/68)  BP(mean): 72 (02 May 2023 13:00) (67 - 82)-  RR: 25 (02 May 2023 13:00) (11 - 25)  SpO2: 98% (02 May 2023 13:00) (95% - 98%)    O2 Parameters below as of 02 May 2023 13:00  Patient On (Oxygen Delivery Method): room air        Physical Exam:  General Appearance: thin, frail, normal for age and gender. 	  Cardiovascular: RRR, +S1, S2, No edema  Respiratory: decreased @ bases  Psychiatry: Fatigued, oriented x 3, Mood & affect appropriate  Skin/Integumentary: No rashes, No ecchymoses, No cyanosis, +[place] cath L groin	  Neurologic: Non-focal  Musculoskeletal/ extremities: Normal range of motion, No clubbing, cyanosis or edema  Vascular: Peripheral pulses palpable 2+ bilaterally        LABS:	 	    CBC Full  -  ( 02 May 2023 05:45 )  WBC Count : 11.89 K/uL  RBC Count : 3.64 M/uL  Hemoglobin : 11.1 g/dL  Hematocrit : 33.1 %  Platelet Count - Automated : 264 K/uL  Mean Cell Volume : 90.9 fL  Mean Cell Hemoglobin : 30.5 pg  Mean Cell Hemoglobin Concentration : 33.5 g/dL  Auto Neutrophil # : 9.42 K/uL  Auto Lymphocyte # : 1.53 K/uL  Auto Monocyte # : 0.81 K/uL  Auto Eosinophil # : 0.04 K/uL  Auto Basophil # : 0.01 K/uL  Auto Neutrophil % : 79.2 %  Auto Lymphocyte % : 12.9 %  Auto Monocyte % : 6.8 %  Auto Eosinophil % : 0.3 %  Auto Basophil % : 0.1 %    Comprehensive Metabolic Panel in AM (23 @ 05:45)    Sodium, Serum: 134 mmol/L   Potassium, Serum: 3.5 mmol/L   Chloride, Serum: 93 mmol/L   Carbon Dioxide, Serum: 26 mmol/L   Anion Gap, Serum: 15 mmol/L   Blood Urea Nitrogen, Serum: 21 mg/dL   Creatinine, Serum: 0.7 mg/dL   Glucose, Serum: 108 mg/dL   Calcium, Total Serum: 8.6 mg/dL   Protein Total, Serum: 6.2 g/dL   Albumin, Serum: 3.1 g/dL   Bilirubin Total, Serum: 3.5 mg/dL   Alkaline Phosphatase, Serum: 122 U/L   Aspartate Aminotransferase (AST/SGOT): 30 U/L   Alanine Aminotransferase (ALT/SGPT): 79 U/L   eGFR: 88: The estimated glomerular filtration rate (eGFR) is calculated using the   CKD-EPI creatinine equation, which does not have a coefficient for  race and is validated in individuals 18 years of age and older (N Engl J  Med ; 385:0402-4344). Creatinine-based eGFR may be inaccurate in  various situations including but not limited to extremes of muscle mass,  altered dietary protein intake, or medications that affect renal tubular  creatinine secretion. mL/min/1.73m2          TELEMETRY EVENTS: 	    EC2023    Ventricular Rate 96 BPM  Atrial Rate 96 BPM  P-R Interval 164 ms  QRS Duration 116 ms  Q-T Interval 396 ms  QTC Calculation(Bazett) 500 ms  P Axis 51 degrees  R Axis -41 degrees  T Axis 87 degrees    Diagnosis Line Normal sinus rhythm  Possible Left atrial enlargement  Left axis deviation  Prolonged QT  Abnormal ECG    Confirmed by Seven Hensley ([id number]) on 2023 7:07:08 AM      CXR     Impression:  CHF. Support devices as described. Without difference.      CT Angio Chest PE Protocol   IMPRESSION:    No evidence of pulmonary embolism. (See discussion below for more   findings.)    Marked cardiomegaly.    There is a 2.2 x 2.8 cm rounded filling defect in the region of the apex   of the left ventricle (3/11; 607/95). Differential diagnosis includes an   intracardiac mass or thrombus.    The main pulmonary artery is dilated, measuring 3.6 cm in diameter, which   may be seen with pulmonary hypertension. There is reflux of contrast   material into the IVC and hepatic veins compatible with right heart   dysfunction.    Right hilar lymphadenopathy and right perihilar soft tissue density is   noted. There is segmental narrowing and consolidation noted in the medial   aspect of the right lower lobe. There is a small right pleural effusion.   There is extrinsic indentation and possible invasion of the right main   pulmonary artery (604/142; 605/129; ). There is associated narrowing   of the proximal right lobar and segmental branches. A right hilar / right   lower lobe mass with postobstructive pneumonitis should be ruled out.          PREVIOUS DIAGNOSTIC TESTING:    TTE 23    Summary:   1. Left ventricular ejection fraction, by visual estimation, is <20%.   2. Severely decreased global left ventricular systolic function.   3. Severely enlarged left atrium.   4. Elevated mean left atrial pressure.   5. Large, fixed thrombus vs. mass on the apical wall of the left   ventricle.   6. Spectral Doppler shows pseudonormal pattern of left ventricular   myocardial filling (Grade II diastolic dysfunction).   7. Moderately reduced RV systolic function.   8. Mildly enlarged right atrium.   9. Moderate mitral valve regurgitation.  10. Moderate-severe tricuspid regurgitation.  11. Mild pulmonic valve regurgitation.  12. Estimated pulmonary artery systolic pressure is 42.9 mmHg assuming a   right atrial pressure of 15 mmHg, which is consistent withmild pulmonary   hypertension.  13. Endocardial visualization was enhanced with intravenous echo contrast.      Right Heart Catheterization 23    FINDINGS:   Right Heart Cath  RA - 12  RV - 51/5/13  PA - 51/24/32  PCWP - 20    CO/CI Thermodilusion - 2.1/1.27  CO/CI Mehdi - 2.09/1.26    SVR (MAP 79 / RA 12 / CO 2.1) = 2552 dyn  PVR = 5.71 henriquez      RHC : RA 14, RV 46/14, PA 56/26/37, PCWP: 25, CO/C.I mehdi 2.37/1.43, C.O. /C.I thermodilution 2.8/1.69      Home Medications:  atorvastatin 20 mg oral tablet: 1 tab(s) orally once a day (04 Aug 2021 10:05)  carvedilol 6.25 mg oral tablet: 1 tab(s) orally 2 times a day (2023 02:02)  Entresto 49 mg-51 mg oral tablet: 1 tab(s) orally 2 times a day (04 Aug 2021 10:05)  Farxiga 10 mg oral tablet: 1 tab(s) orally once a day (2023 02:05)  Lasix 20 mg oral tablet: 1 tab(s) orally once a day (2023 02:04)  spironolactone 25 mg oral tablet: 1 tab(s) orally once a day (04 Aug 2021 10:05)    MEDICATIONS  (STANDING):  MEDICATIONS  (STANDING):  buMETAnide Injectable 2 milliGRAM(s) IV Push every 8 hours  chlorhexidine 2% Cloths 1 Application(s) Topical daily  DOBUTamine Infusion 7.5 MICROgram(s)/kG/Min (7.14 mL/Hr) IV Continuous <Continuous>  heparin  Infusion.  Unit(s)/Hr (11 mL/Hr) IV Continuous <Continuous>  magnesium gluconate 500 milliGRAM(s) Oral daily  melatonin 5 milliGRAM(s) Oral at bedtime  milrinone Infusion 0.125 MICROgram(s)/kG/Min (2.38 mL/Hr) IV Continuous <Continuous>  norepinephrine Infusion 0.05 MICROgram(s)/kG/Min (2.98 mL/Hr) IV Continuous <Continuous>  pantoprazole    Tablet 40 milliGRAM(s) Oral before breakfast    MEDICATIONS  (PRN):  benzocaine 20% Spray 1 Spray(s) Topical four times a day PRN throat pain  heparin   Injectable 5000 Unit(s) IV Push every 6 hours PRN For aPTT less than 40  heparin   Injectable 2500 Unit(s) IV Push every 6 hours PRN For aPTT between 40 - 57

## 2023-05-02 NOTE — PROGRESS NOTE ADULT - ASSESSMENT
77 year old woman with pmh of HTN, HLD, nonischemic cardiomyopathy, HFrEF, s/p ICD and PPM presents to the ED with 2 weeks of worsening shortness of breath. Patient and her son state patient has been experiencing shortness of breath that has progressed from present on activity to now shortness of breath during conversation. Patient also notes an increasing dry cough over this time period. Denies history of smoking, fevers/chills, chest pain, nausea/vomiting, bloody sputum, dark/tarry/bloody bowel movements, upper respiratory symptoms. Upon arrival, patient is HD stable and saturating 98% on RA, talking in complete sentences, no increased work of breathing.  CT C/A/P revealing cardiomegaly with evidence of right heart strain, R hilar lymphadenopathy with R perihilar soft tissue density with indentation/possible invasion of right pulmonary artery, and narrowing of proximal R lobar and segmental branches with associated consolidation of right lower lobe. 2.2 x 2.8 cm filling defect noted in apex of left ventricle. Patient follows with Dr. Frank, last ECHO 8/2021 revealing EF 20-25%, mild Mr, mild TR, mild Pulm HTN.    PLAN:  - Care as per primary team  - F/u pulm for bronch  - Medical optimization  - Continue IV abx  -Cardiac anesthesia evaluation, plan for egd 5/3    [phone]

## 2023-05-02 NOTE — PROGRESS NOTE ADULT - SUBJECTIVE AND OBJECTIVE BOX
GENERAL SURGERY PROGRESS NOTE    Patient: MIKHAIL JOHNSON , 77y ([birth year])Female   MRN: [id number]  Location: [address]  Visit: 04-21-23 Inpatient  Date: 05-02-23 @ 09:52    Hospital Day #: 12  Post-Op Day #:    Procedure/Dx/Injuries: Plan for bronchoscopy    Events of past 24 hours: Will plan for bronchoscopy, EGD 5/3, will need pre-op eval by cardiac anesthesia.    PAST MEDICAL & SURGICAL HISTORY:  CHF, stage C  HTN (hypertension)    Vitals:   T(F): 98.4 (05-02-23 @ 08:00), Max: 99.5 (05-01-23 @ 20:00)  HR: 101 (05-02-23 @ 08:00)  BP: 90/54 (05-02-23 @ 08:00)  RR: 24 (05-02-23 @ 08:00)  SpO2: 98% (05-02-23 @ 08:00)      Diet, NPO after Midnight:      NPO Start Date: 30-Apr-2023,   NPO Start Time: 23:59  Diet, DASH/TLC:   Sodium & Cholesterol Restricted  1200mL Fluid Restriction ([id number])  Supplement Feeding Modality:  Oral  Ensure Plus High Protein Cans or Servings Per Day:  2       Frequency:  Daily      Fluids:     I & O's:    05-01-23 @ 07:01  -  05-02-23 @ 07:00  --------------------------------------------------------  IN:    Bumetanide: 90 mL    DOBUTamine: 56.9 mL    DOBUTamine: 121.2 mL    Heparin Infusion: 24 mL    Heparin Infusion: 165 mL    IV PiggyBack: 150 mL    Milrinone: 43 mL  Total IN: 650.1 mL    OUT:    Norepinephrine: 0 mL    Ureteral Catheter (mL): 2045 mL  Total OUT: 2045 mL    Total NET: -1394.9 mL      PHYSICAL EXAM:  General: NAD  Cardiac: S1, S2 present  Respiratory: No labored breathing, normal respiratory effort  Abdomen: Soft, non-distended, non-tender  Skin: Warm/dry, normal color, no jaundice    MEDICATIONS  (STANDING):  buMETAnide Infusion 1 mG/Hr (5 mL/Hr) IV Continuous <Continuous>  chlorhexidine 2% Cloths 1 Application(s) Topical daily  DOBUTamine Infusion 7.5 MICROgram(s)/kG/Min (7.14 mL/Hr) IV Continuous <Continuous>  heparin  Infusion.  Unit(s)/Hr (11 mL/Hr) IV Continuous <Continuous>  magnesium gluconate 500 milliGRAM(s) Oral daily  melatonin 5 milliGRAM(s) Oral at bedtime  milrinone Infusion 0.125 MICROgram(s)/kG/Min (2.38 mL/Hr) IV Continuous <Continuous>  norepinephrine Infusion 0.05 MICROgram(s)/kG/Min (2.98 mL/Hr) IV Continuous <Continuous>  pantoprazole    Tablet 40 milliGRAM(s) Oral before breakfast  potassium chloride   Powder 40 milliEquivalent(s) Oral every 4 hours  sodium chloride 3% Bolus 150 milliLiter(s) IV Bolus <User Schedule>    MEDICATIONS  (PRN):  benzocaine 20% Spray 1 Spray(s) Topical four times a day PRN throat pain  heparin   Injectable 5000 Unit(s) IV Push every 6 hours PRN For aPTT less than 40  heparin   Injectable 2500 Unit(s) IV Push every 6 hours PRN For aPTT between 40 - 57      DVT PROPHYLAXIS: heparin   Injectable 5000 Unit(s) IV Push every 6 hours PRN  heparin   Injectable 2500 Unit(s) IV Push every 6 hours PRN  heparin  Infusion.  Unit(s)/Hr IV Continuous <Continuous>    GI PROPHYLAXIS: pantoprazole    Tablet 40 milliGRAM(s) Oral before breakfast      LAB/STUDIES:  Labs:  CAPILLARY BLOOD GLUCOSE                       11.1   11.89 )-----------( 264      ( 02 May 2023 05:45 )             33.1       Auto Neutrophil %: 79.2 % (05-02-23 @ 05:45)  Auto Immature Granulocyte %: 0.7 % (05-02-23 @ 05:45)    05-02    134<L>  |  93<L>  |  21<H>  ----------------------------<  108<H>  3.5   |  26  |  0.7      Calcium, Total Serum: 8.6 mg/dL (05-02-23 @ 05:45)      LFTs:             6.2  | 3.5  | 30       ------------------[122     ( 02 May 2023 05:45 )  3.1  | x    | 79          Lipase:x      Amylase:x         Blood Gas Arterial, Lactate: 1.60 mmol/L (05-02-23 @ 05:11)  Blood Gas Arterial, Lactate: 1.40 mmol/L (05-01-23 @ 21:09)  Blood Gas Arterial, Lactate: 1.30 mmol/L (04-30-23 @ 02:08)  Blood Gas Arterial, Lactate: 2.00 mmol/L (04-29-23 @ 15:08)  Blood Gas Venous - Lactate: 2.20 mmol/L (04-29-23 @ 15:07)    ABG - ( 02 May 2023 05:11 )  pH: 7.51  /  pCO2: 38    /  pO2: 83    / HCO3: 30    / Base Excess: 6.9   /  SaO2: 97.5      ABG - ( 01 May 2023 21:09 )  pH: 7.55  /  pCO2: 30    /  pO2: 97    / HCO3: 26    / Base Excess: 4.2   /  SaO2: 98.5      ABG - ( 30 Apr 2023 02:08 )  pH: 7.54  /  pCO2: 34    /  pO2: 122   / HCO3: 29    / Base Excess: 6.5   /  SaO2: 98.5          Coags:     x      ----< x       ( 02 May 2023 08:10 )     59.9        CARDIAC MARKERS ( 01 May 2023 05:25 )  x     / 0.02 ng/mL / x     / x     / x            IMAGING:  n/a    ACCESS/ DEVICES:  [x ] Peripheral IV

## 2023-05-02 NOTE — PROGRESS NOTE ADULT - NS ATTEND AMEND GEN_ALL_CORE FT
Patient was hypotensive over the weekend, dobutamine increased to 10 mcg/kg/min and pressor support started. No increase in lactate or LFTs, renal function remained stable. She was weaned off pressor and dobutamine decreased to 7.5 mcg/kg/min. RHC today showed CO/CI 2.3/1.4 / 2.8/1.6. RA pressure is 11 mmHg. At this point it's clear the patient is in low output and inotropic dependant and an evaluation for LVAD would be a reasonable next step. However, concern for possible malignancy remains. Additionally, patient's advanced age and malnutrition could be potential barriers for consideration of advanced therapies.     Add milrinone 0.125 mcg/kg/min   Continue dobutamine at 7.5 mcg/kg/min   Start Bumex gtt at 1 mg/hr + hypertonic saline 3% 150 ml BID  Spoke with thoracic surgery to consider biopsy while inpatient  Will re-assess candidacy for advanced therapies after malignancy evaluation   Will discuss with patient and family  Palliative care evaluation    Discussed with outpatient cardiologist, Dr. Mead and CCU team Pending Patient remains remains on dual inotropic support, CI 1.9. Stable markers of perfusion. She remains fluid overloaded with CVP ~11. Discussed with thoracic surgery regarding need for EBUS/biopsy of mediastinal mass.       Continue dobutamine gtt at 7.5 mcg/kg/min  Increase milrinone to 0.2 mcg/kg/min   Continue diuresis with Bumex pushes + hypertonic saline   Monitor lactate/ LFTs   Strict I/Os   Discussed at length with patient/ and then with son. Patient remains critically ill on dual inotropic support and thoracic mass concerning for malignancy. She is also malnourished and physically deconditioned.   Discussed with primary team

## 2023-05-02 NOTE — PROGRESS NOTE ADULT - ASSESSMENT
HPI: 77F with PMH of HTN, HLD, NICCM, HFrEF s/p AICD, here from ED with worsening SOB, requiring CEU admission. Found to have cardiomegaly here, and course c/b worsening DAVID and suspected ischemic hepatitis, requiring dopatine gtt for BP support, and transferred to CCU. Also found to have LV thrombus, on heparin gtt. RHC (4/24) showed cardiogenic shock. Is also on IV zosyn for post-obstructive PNA. Found to have lung mass, awaiting PET outpatient to r/o malignancy. Patient is full code. Palliative care consulted for [facility].    Spoke with patient and  at bedside Re-introduced palliative care. Patient asserts she makes her own  medical decisions but with the help of her children and . They report they are waiting for more medical updates from the specialists to understand the options moving forward. Explained that once the options for treatment are more clear, we can support decision making going forward.   Patient comfortable on exam.     Education about palliative care provided to patient/family.  See Recs below.    Please call [phone] with questions or concerns 24/7.   We will continue to follow.

## 2023-05-02 NOTE — PROGRESS NOTE ADULT - SUBJECTIVE AND OBJECTIVE BOX
Location: [address] ([address])  Patient Name: MIKHAIL JOHNSON  Age: 77y  Gender: Female    Past Medical and Surgical History:  CHF, stage C    HTN (hypertension)        Social History:  Social History:      Allergies:  epinephrine (Unknown)      Patient is a 77y old Female who presents with a chief complaint of intra cardiac mass (02 May 2023 13:56)    Primary diagnosis of Nonspecific abnormal findings on radiological and other examination of lung field        Progress Note  This morning patient was seen and examined at bedside.    Today is hospital day 11d.  Reports having good night sleep. Dyspnea and Chest pain (chief complaint) have improved. Appetite is adequate, and denies nausea or vomiting. Denies any abdominal pain, diarrhea, or constipation.   Bedbound and denies any shortness of breath, chest pain, palpitations, or light headedness.        Vital Signs in the last 24 hours   Vitals Summary T(C): 37.2 (05-02-23 @ 12:00), Max: 37.5 (05-01-23 @ 20:00)  HR: 110 (05-02-23 @ 13:00) (96 - 110)  BP: 90/62 (05-02-23 @ 13:00) (81/60 - 112/68)  RR: 25 (05-02-23 @ 13:00) (11 - 25)  SpO2: 98% (05-02-23 @ 13:00) (95% - 98%)  Vent Data   Intake/ Output   05-01-23 @ 07:01  -  05-02-23 @ 07:00  --------------------------------------------------------  IN: 650.1 mL / OUT: 2045 mL / NET: -1394.9 mL    05-02-23 @ 07:01  -  05-02-23 @ 14:46  --------------------------------------------------------  IN: 358.5 mL / OUT: 965 mL / NET: -606.5 mL          Physical Exam  * General Appearance: Alert, cooperative, interactive, oriented to time, place, and person, in no acute distress  * Lungs: Respirations unlabored, Good bilateral air entry, normal breath sounds  * Chest Wall: No tenderness or deformity  * Heart: Regular Rate and Rhythm, S1 and S2  * Abdomen: Symmetric, soft, non-tender, bowel sounds active all four quadrants  * Extremities: Extremities normal, atraumatic, no cyanosis, no lower extremity pitting edema bilaterally, warm , well perfused      Investigations   Laboratory Workup  - CBC:                        11.1   11.89 )-----------( 264      ( 02 May 2023 05:45 )             33.1     - Chemistry:  05-02    134<L>  |  93<L>  |  21<H>  ----------------------------<  108<H>  3.5   |  26  |  0.7    Ca    8.6      02 May 2023 05:45  Phos  2.2     05-02  Mg     1.7     05-02    TPro  6.2  /  Alb  3.1<L>  /  TBili  3.5<H>  /  DBili  x   /  AST  30  /  ALT  79<H>  /  AlkPhos  122<H>  05-02    - Coagulation Studies:  PTT - ( 02 May 2023 08:10 )  PTT:59.9 sec  - ABG:  ABG - ( 02 May 2023 05:11 )  pH, Arterial: 7.51  pH, Blood: x     /  pCO2: 38    /  pO2: 83    / HCO3: 30    / Base Excess: 6.9   /  SaO2: 97.5              - Cardiac Markers:  CARDIAC MARKERS ( 01 May 2023 05:25 )  x     / 0.02 ng/mL / x     / x     / x            Microbiological Workup        Radiological Workup  *  Current Medications  Standing Medications  buMETAnide Injectable 2 milliGRAM(s) IV Push every 8 hours  chlorhexidine 2% Cloths 1 Application(s) Topical daily  DOBUTamine Infusion 7.5 MICROgram(s)/kG/Min (7.14 mL/Hr) IV Continuous <Continuous>  heparin  Infusion.  Unit(s)/Hr (11 mL/Hr) IV Continuous <Continuous>  magnesium gluconate 500 milliGRAM(s) Oral daily  melatonin 5 milliGRAM(s) Oral at bedtime  milrinone Infusion 0.125 MICROgram(s)/kG/Min (2.38 mL/Hr) IV Continuous <Continuous>  norepinephrine Infusion 0.05 MICROgram(s)/kG/Min (2.98 mL/Hr) IV Continuous <Continuous>  pantoprazole    Tablet 40 milliGRAM(s) Oral before breakfast    PRN Medications  benzocaine 20% Spray 1 Spray(s) Topical four times a day PRN throat pain  heparin   Injectable 5000 Unit(s) IV Push every 6 hours PRN For aPTT less than 40  heparin   Injectable 2500 Unit(s) IV Push every 6 hours PRN For aPTT between 40 - 57    Singles Doses Administered  (ADM OVERRIDE) 1 each &lt;see task&gt; GiveOnce  (ADM OVERRIDE) 1 each &lt;see task&gt; GiveOnce  (ADM OVERRIDE) 1 each &lt;see task&gt; GiveOnce  (ADM OVERRIDE) 1 each &lt;see task&gt; GiveOnce  (ADM OVERRIDE) 1 each &lt;see task&gt; GiveOnce  (ADM OVERRIDE) 1 each &lt;see task&gt; GiveOnce  (ADM OVERRIDE) 3 each &lt;see task&gt; GiveOnce  (ADM OVERRIDE) 1 each &lt;see task&gt; GiveOnce  (ADM OVERRIDE) 1 each &lt;see task&gt; GiveOnce  (ADM OVERRIDE) 1 each &lt;see task&gt; GiveOnce  (ADM OVERRIDE) 1 each &lt;see task&gt; GiveOnce  albumin human 25% IVPB 100 milliLiter(s) IV Intermittent once  albumin human 25% IVPB 100 milliLiter(s) IV Intermittent once  albumin human 25% IVPB 50 milliLiter(s) IV Intermittent once  aluminum hydroxide/magnesium hydroxide/simethicone Suspension 30 milliLiter(s) Oral Once  benzocaine 20%/menthol 0.5% Spray 1 Spray(s) Topical once  buMETAnide Injectable 1 milliGRAM(s) IV Push once  buMETAnide Injectable 2 milliGRAM(s) IV Push once  buMETAnide Injectable 2 milliGRAM(s) IV Push once  calcium gluconate IVPB 2 Gram(s) IV Intermittent once  famotidine Injectable 20 milliGRAM(s) IV Push once  furosemide   Injectable 40 milliGRAM(s) IV Push once  furosemide   Injectable 20 milliGRAM(s) IV Push once  furosemide   Injectable 60 milliGRAM(s) IV Push once  guaifenesin/dextromethorphan Oral Liquid 15 milliLiter(s) Oral once  heparin   Injectable 5000 Unit(s) IV Push once  iron sucrose IVPB 200 milliGRAM(s) IV Intermittent every 24 hours  lidocaine 1% Injectable 10 milliLiter(s) Local Injection once  magnesium sulfate  IVPB 2 Gram(s) IV Intermittent once  magnesium sulfate  IVPB 2 Gram(s) IV Intermittent once  magnesium sulfate  IVPB 2 Gram(s) IV Intermittent once  magnesium sulfate  IVPB 2 Gram(s) IV Intermittent every 2 hours  magnesium sulfate  IVPB 2 Gram(s) IV Intermittent every 2 hours  magnesium sulfate  IVPB 2 Gram(s) IV Intermittent once  piperacillin/tazobactam IVPB. 3.375 Gram(s) IV Intermittent once  piperacillin/tazobactam IVPB.- 3.375 Gram(s) IV Intermittent once  piperacillin/tazobactam IVPB.. 3.375 Gram(s) IV Intermittent every 8 hours  potassium chloride    Tablet ER 40 milliEquivalent(s) Oral every 4 hours  potassium chloride   Powder 40 milliEquivalent(s) Oral once  potassium chloride   Powder 40 milliEquivalent(s) Oral every 4 hours  potassium chloride  20 mEq/100 mL IVPB 20 milliEquivalent(s) IV Intermittent every 2 hours  potassium chloride  20 mEq/100 mL IVPB 20 milliEquivalent(s) IV Intermittent every 2 hours  potassium chloride  20 mEq/100 mL IVPB 20 milliEquivalent(s) IV Intermittent every 2 hours  potassium chloride  20 mEq/100 mL IVPB 20 milliEquivalent(s) IV Intermittent every 2 hours  potassium chloride  20 mEq/100 mL IVPB 20 milliEquivalent(s) IV Intermittent every 2 hours  sodium chloride 0.9% Bolus 250 milliLiter(s) IV Bolus once  sodium chloride 3%. 150 milliLiter(s) IV Continuous <Continuous>  sodium chloride 3%. 150 milliLiter(s) IV Continuous <Continuous>  spironolactone 12.5 milliGRAM(s) Oral once

## 2023-05-02 NOTE — PROGRESS NOTE ADULT - SUBJECTIVE AND OBJECTIVE BOX
HPI:    HPI: 77F with PMH of HTN, HLD, NICCM, HFrEF s/p AICD, here from ED with worsening SOB, requiring CEU admission. Found to have cardiomegaly here, and course c/b worsening DAVID and suspected ischemic hepatitis, requiring dopatine gtt for BP support, and transferred to CCU. Also found to have LV thrombus, on heparin gtt. RHC (4/24) showed cardiogenic shock. Is also on IV zosyn for post-obstructive PNA. Found to have lung mass, awaiting PET outpatient to r/o malignancy. Patient is full code. Palliative care consulted for [facility].    Interval History:  - patient currently on dobutamine, milrinone, bumex infusion  - s/p RHC mary lund 5/1  - plan for possible bronchoscopy/bx tomorrow 5/3     ADVANCE DIRECTIVES:    [ X ] Full Code w/ AICD [ ] DNR  MOLST  [ ]  Living Will  [ ]   DECISION MAKER(s): Patient reports she makes her own decisions   [ ] Health Care Proxy(s)  [X  ] Surrogate(s)  [ ] Guardian           Name(s): Phone Number(s):  Lyle     BASELINE (I)ADL(s) (prior to admission):  Noble: [ X]Total  [ ] Moderate [ ]Dependent  Palliative Performance Status Version 2:    100     %    http://npcrc.org/files/news/palliative_performance_scale_ppsv2.pdf - lives @ home with  and is totally active and independent at home     Allergies    epinephrine (Unknown)    Intolerances    MEDICATIONS  (STANDING):  buMETAnide Injectable 2 milliGRAM(s) IV Push every 8 hours  chlorhexidine 2% Cloths 1 Application(s) Topical daily  DOBUTamine Infusion 7.5 MICROgram(s)/kG/Min (7.14 mL/Hr) IV Continuous <Continuous>  heparin  Infusion.  Unit(s)/Hr (11 mL/Hr) IV Continuous <Continuous>  magnesium gluconate 500 milliGRAM(s) Oral daily  melatonin 5 milliGRAM(s) Oral at bedtime  milrinone Infusion 0.125 MICROgram(s)/kG/Min (2.38 mL/Hr) IV Continuous <Continuous>  norepinephrine Infusion 0.05 MICROgram(s)/kG/Min (2.98 mL/Hr) IV Continuous <Continuous>  pantoprazole    Tablet 40 milliGRAM(s) Oral before breakfast    MEDICATIONS  (PRN):  benzocaine 20% Spray 1 Spray(s) Topical four times a day PRN throat pain  heparin   Injectable 5000 Unit(s) IV Push every 6 hours PRN For aPTT less than 40  heparin   Injectable 2500 Unit(s) IV Push every 6 hours PRN For aPTT between 40 - 57    PRESENT SYMPTOMS:  Pain: [ ]yes [ X]no  QOL impact -   Location -                    Aggravating factors -  Quality -  Radiation -  Timing-  Severity (0-10 scale):  Minimal acceptable level (0-10 scale):     CPOT:    https://www.[facility].org/getattachment/[email or web address]/Critical-Care-Pain-Observation-Tool-(CPOT)    PAIN AD Score:   http://geriatrictoolkit.[facility]/cog/painad.pdf (press ctrl +  left click to view)    Dyspnea:                           [X ]None[ ]Mild [ ]Moderate [ ]Severe     Respiratory Distress Observation Scale (RDOS):   A score of 0 to 2 signifies little or no respiratory distress, 3 signifies mild distress, scores 4 to 6 indicate moderate distress, and scores greater than 7 signify severe distress  https://[email or web address]/sites/default/files/PDFS/883004-palesyjopvq-gvkjtlmt-qxafpmirldu-sijsq.pdf    Anxiety:                             [ X]None[ ]Mild [ ]Moderate [ ]Severe   Fatigue:                             [ X]None[ ]Mild [ ]Moderate [ ]Severe   Nausea:                             [X ]None[ ]Mild [ ]Moderate [ ]Severe   Loss of appetite:              [ X]None[ ]Mild [ ]Moderate [ ]Severe   Constipation:                    [X ]None[ ]Mild [ ]Moderate [ ]Severe    Other Symptoms:  [X ]All other review of systems negative     Palliative Performance Status Version 2:     50    %    http://npcrc.org/files/news/palliative_performance_scale_ppsv2.pdf    PHYSICAL EXAM:  Vital Signs Last 24 Hrs  T(C): 37.2 (02 May 2023 12:00), Max: 37.5 (01 May 2023 20:00)  T(F): 99 (02 May 2023 12:00), Max: 99.5 (01 May 2023 20:00)  HR: 110 (02 May 2023 13:00) (96 - 110)  BP: 90/62 (02 May 2023 13:00) (81/60 - 112/68)  BP(mean): 72 (02 May 2023 13:00) (67 - 82)  RR: 25 (02 May 2023 13:00) (11 - 25)  SpO2: 98% (02 May 2023 13:00) (95% - 98%)    GENERAL:  [ X ] No acute distress [ ]Lethargic  [ ]Unarousable  [X ]Verbal  [ ]Non-Verbal [ ]Cachexia    BEHAVIORAL/PSYCH:  [X ]Alert and Oriented x 4  [ ] Anxiety [ ] Delirium [ ] Agitation [ X] Calm   EYES: [ X ] No scleral icterus [ ] Scleral icterus [ ] Closed  ENMT:  [ ]Dry mouth  [ X]No external oral lesions [X ] No external ear or nose lesions  CARDIOVASCULAR:  [ ]Regular [ ]Irregular [ ]Tachy [ ]Not Tachy  [ ]Tyson [ ] Edema [X ] No edema  PULMONARY:  [ ]Tachypnea  [ ]Audible excessive secretions [ X] No labored breathing [ ] labored breathing  GASTROINTESTINAL: [ ]Soft  [ ]Distended  [ X ]Not distended [ ]Non tender [ ]Tender  MUSCULOSKELETAL: [ X ]No clubbing [ ] clubbing  [ X ] No cyanosis [ ] cyanosis  NEUROLOGIC: [ X]No focal deficits  [ X]Follows commands  [ ]Does not follow commands  [ ]Cognitive impairment  [ ]Dysphagia  [ ]Dysarthria  [ ]Paresis   SKIN: [ ] Jaundiced [ X] Non-jaundiced [ ]Rash [ X]No Rash [ ] Warm [ ] Dry  MISC/LINES: [ ] ET tube [ ] Trach [ ]NGT/OGT [ ]PEG [ ]Saenz    CRITICAL CARE:  [ ] Shock Present  [ ]Septic [ ]Cardiogenic [ ]Neurologic [ ]Hypovolemic  [ ]  Vasopressors [ X]  Inotropes   [ ]Respiratory failure present [ ]Mechanical ventilation [ ]Non-invasive ventilatory support [ ]High flow  [ ]Acute  [ ]Chronic [ ]Hypoxic  [ ]Hypercarbic [ ]Other  [ ]Other organ failure     LABS: reviewed by me                        11.1   11.89 )-----------( 264      ( 02 May 2023 05:45 )             33.1   05-02    134<L>  |  93<L>  |  21<H>  ----------------------------<  108<H>  3.5   |  26  |  0.7    Ca    8.6      02 May 2023 05:45  Phos  2.2     05-02  Mg     1.7     05-02    TPro  6.2  /  Alb  3.1<L>  /  TBili  3.5<H>  /  DBili  x   /  AST  30  /  ALT  79<H>  /  AlkPhos  122<H>  05-02  PTT - ( 02 May 2023 08:10 )  PTT:59.9 sec      RADIOLOGY & ADDITIONAL STUDIES: reviewed by me    EKG: reviewed by me    < from: 12 Lead ECG (05.01.23 @ 05:16) >    Ventricular Rate 104 BPM    Atrial Rate 104 BPM    P-R Interval 152 ms    QRS Duration 116 ms    Q-T Interval 394 ms    QTC Calculation(Bazett) 518 ms    P Axis 72 degrees    R Axis -42 degrees    T Axis 92 degrees    Diagnosis Line Sinus tachycardia with occasional Premature ventricular complexes  Left axis deviation  Incomplete left bundle branch block  Abnormal ECG    Confirmed by Alejandra Franco MDfer ([id number]) on 5/1/2023 8:31:59 AM    < end of copied text >    < from: TTE Echo Complete w/ Contrast w/ Doppler (04.22.23 @ 08:36) >  Summary:   1. Left ventricular ejection fraction, by visual estimation, is <20%.   2. Severely decreased global left ventricular systolic function.   3. Severely enlarged left atrium.   4. Elevated mean left atrial pressure.   5. Large, fixed thrombus vs. mass on the apical wall of the left   ventricle.   6. Spectral Doppler shows pseudonormal pattern of left ventricular   myocardial filling (Grade II diastolic dysfunction).   7. Moderately reduced RV systolic function.   8. Mildly enlarged right atrium.   9. Moderate mitral valve regurgitation.  10. Moderate-severe tricuspid regurgitation.  11. Mild pulmonic valve regurgitation.  12. Estimated pulmonary artery systolic pressure is 42.9 mmHg assuming a   right atrial pressure of 15 mmHg, which is consistent withmild pulmonary   hypertension.  13. Endocardial visualization was enhanced with intravenous echo contrast.    < end of copied text >        Patient discussed with primary medical team MD  Palliative care education provided to patient and/or family

## 2023-05-02 NOTE — PROGRESS NOTE ADULT - ASSESSMENT
77 year old woman with PMHx of HTN, HLD, nonischemic cardiomyopathy, HFrEF s/p AICD presents to the ED with 2 weeks of worsening shortness of breath with increasing dry cough over this time period accepted to CCU for cardiogenic shock    #Cardiogenic Shock   #LV thrombus  #NICM  #HFrEF 25% s/p AICD  #HTN/HLD  - CT CAP - cardiomegaly w/ RH strain  - TTE 4/22/2023 EF <20%, large fixed thrombus vs mass on apical LV wall, GIIDD, mod-sev TR, mod MR, mild MS, mild PH  - LE venous duplex 4/21 negative  - dobutamine 7.5 micra, trying to wean off  --> IR consulted for PICC line on 5/3/2023  - Milrinone started on 5/2 increase to 0.2  - c/w heparin ggt - target ptt 60-99  - f/u HF ( Dr Sherwood), Cardio ([place]): 3% saline +bumex drip till 5/2 and stop torsemide--> Discuss in PM round if need to switch to Bumex 2mg Q8h  - s/p RHC on 5/1, swan kevon thru R femoral vein - 86 cm marking      #Post obstructive pneumonia  #Suspected lung malignancy  - CT CAP - R hilar LAD, R perihilar soft tissue density w/ indentation/possible invasion of R pulmonary artery and narrowing of proximal R lobar and segmental branches w/ consolidation of RLL  - MRSA -ve, procal 1.77  - completed zosyn 7 days course 4/27  - pulm following for EBUS - needs medical optimization   - Thoracic Surgery: possible biopsy on 5/3;  agree with PET scan as outpatient to r/o lung malignancy  - Incentive Spirometer    #HAGMA - resolved   #Lactic Acidosis - resolved   #DAVID on CKD - resolved   #Elevated liver enzymes predominantly hepatocellular (AST>ALT) with hyperbilirubinemia likely due to ischemic liver injury - resolved   - trend labs   - GI following   - holding lipitor    #Hypotonic Hyponatremia  - hypervolemic   - Improving  - trend Na BID    # Misc  - DVT Prophylaxis: heparin drip  - GI Prophylaxis: pantoprazole    Tablet 40 milliGRAM(s) Oral before breakfast  - Diet: Diet, DASH/TLC with 1.2 L fluid restriction  - Activity: Activity - Ambulate as Tolerated  - Code Status: Full , Palliative on board; Pending multidisciplinary discussion today btw Hillary, Dr. Guerrero, Dr. Sherwood, Dr. Soler and Dr. Daly for next steps  - Dispo: CCU, pending volume status optimization, CT sx and pulm intervention? and PICC line insertion    77 year old woman with PMHx of HTN, HLD, nonischemic cardiomyopathy, HFrEF s/p AICD presents to the ED with 2 weeks of worsening shortness of breath with increasing dry cough over this time period accepted to CCU for cardiogenic shock    #Cardiogenic Shock   #LV thrombus  #NICM  #HFrEF 25% s/p AICD  #HTN/HLD  - CT CAP - cardiomegaly w/ RH strain  - TTE 4/22/2023 EF <20%, large fixed thrombus vs mass on apical LV wall, GIIDD, mod-sev TR, mod MR, mild ME, mild PH  - LE venous duplex 4/21 negative  - dobutamine 7.5 micra, trying to wean off  --> IR consulted for PICC line on 5/3/2023  - Milrinone started on 5/2 increase to 0.2  - c/w heparin ggt - target ptt 60-99  - f/u HF ( Dr Sherwood), Cardio ([place]): 3% saline +bumex drip till 5/2 and stop torsemide--> Discuss in PM round if need to switch to Bumex 2mg Q8h  - s/p RHC on 5/1, swan kevon thru R femoral vein - 86 cm marking      #Post obstructive pneumonia  #Suspected lung malignancy  - CT CAP - R hilar LAD, R perihilar soft tissue density w/ indentation/possible invasion of R pulmonary artery and narrowing of proximal R lobar and segmental branches w/ consolidation of RLL  - MRSA -ve, procal 1.77  - completed zosyn 7 days course 4/27  - pulm following for EBUS - needs medical optimization   - Thoracic Surgery: possible biopsy on 5/3;  agree with PET scan as outpatient to r/o lung malignancy  - Incentive Spirometer    #Positive COVID-19 PCR  - Asymptomatic. Awaiting repeat to decide on Isolation, if 2 consecutive PCRs are negative no need for Isolation as per infectious control  - No need for further treatment or diagnostic w/u as per ID    #HAGMA - resolved   #Lactic Acidosis - resolved   #DAVID on CKD - resolved   #Elevated liver enzymes predominantly hepatocellular (AST>ALT) with hyperbilirubinemia likely due to ischemic liver injury - resolved   - trend labs   - GI following   - holding lipitor    #Hypotonic Hyponatremia  - hypervolemic   - Improving  - trend Na BID    # Misc  - DVT Prophylaxis: heparin drip  - GI Prophylaxis: pantoprazole    Tablet 40 milliGRAM(s) Oral before breakfast  - Diet: Diet, DASH/TLC with 1.2 L fluid restriction  - Activity: Activity - Ambulate as Tolerated  - Code Status: Full , Palliative on board; Pending multidisciplinary discussion today btw Dr. Yolanda Thornton, Dr. Sherwood, Dr. Soler and Dr. Daly for next steps  - Dispo: CCU, pending volume status optimization, CT sx and pulm intervention? and PICC line insertion

## 2023-05-02 NOTE — PROGRESS NOTE ADULT - ASSESSMENT
ASSESSMENT:   77 year old woman with pmh of HTN, HLD, nonischemic cardiomyopathy, HFrEF, s/p ICD and PPM presents to the ED with 2 weeks of worsening shortness of breath.     IMPRESSION:  Cardiogenic shock on inotropic support  HFrEF 20%, NICM  RLL Mass/opacity/ possible Pneumonia, r/o malignancy  LV Mass, ?Thrombus  Transaminitis in the setting of hypotension, shock liver    RHC 5/01: RA 14, RV 46/14, PA 56/26/37, PCWP: 25, CO/C.I mehdi 2.37/1.43, C.O. /C.I thermodilution 2.8/1.69    PLAN:  - s/p RHC 5/1 w/ high filling pressures, low CO/CI  - Continue Bumex gtt at 1 mg/hr  - Continue 3% Hypertonic Saline 150ml BID (If infused throughout a central line, run over one hour, if a peripheral line is to be used run over 3 hours)  - Continue Dobutamine @ 7.5 mcg/kg/min  - Increase Milrinone to 0.2 mcg/kg/min  - Incentive Spirometer x10 an hour while awake- please encourage use  - Continue PT / OOB to chair daily as tolerated once SWAN catheter removed (in groin)  - Pre-albumin 5  - R/o lung malignancy this admission to determine future heart failure management (advanced therapies vs hospice)  - s/p 5 day course of IV iron  - COVID management and rest of care as per CCU team  - Plan discussed with CCU team and CTSx  - Will continue to follow

## 2023-05-02 NOTE — CONSULT NOTE ADULT - ASSESSMENT
· Assessment	  77 year old woman with PMHx of HTN, HLD, nonischemic cardiomyopathy, HFrEF s/p AICD presents to the ED with 2 weeks of worsening shortness of breath with increasing dry cough over this time period accepted to CCU for cardiogenic shock    IMPRESSION/RECOMMENDATIONS   Hosp course : Cardiogenic Shock , LV thrombus, CT : cardiomegaly w/ RH strain  4/22  TTE : EF <20%, large fixed thrombus vs mass on apical LV wall.  Suspected lung mass with possible LLL post obstructive pneumonia   4/21 CT CAP - R hilar LAD, R perihilar soft tissue density w/ indentation/possible invasion of R pulmonary artery and narrowing of proximal R lobar and segmental branches w/ consolidation of RLL.  Clinically no ongoing bacterial PNA. On RA, comfortable.   · Assessment	  77 year old woman with PMHx of HTN, HLD, nonischemic cardiomyopathy, HFrEF s/p AICD presents to the ED with 2 weeks of worsening shortness of breath with increasing dry cough over this time period accepted to CCU for cardiogenic shock    IMPRESSION/RECOMMENDATIONS   Hosp course : Cardiogenic Shock , LV thrombus, CT : cardiomegaly w/ RH strain  4/22  TTE : EF <20%, large fixed thrombus vs mass on apical LV wall.  Suspected lung mass with possible LLL post obstructive pneumonia   4/21 CT CAP - R hilar LAD, R perihilar soft tissue density w/ indentation/possible invasion of R pulmonary artery and narrowing of proximal R lobar and segmental branches w/ consolidation of RLL.  Clinically no ongoing bacterial PNA. On RA, comfortable.  -no ABx    COVID with a positive test and asymptomatic  On RA  CXR no new GGO   S/p vaccination and one booster.   Timeline of infection is unclear   4/30 COVID-19 positive  5/1 COVID-19 NG  WBC 11.8 > goes against COVID-19   -No further diagnostic/therapeutic recommendations for COVID 19

## 2023-05-02 NOTE — CHART NOTE - NSCHARTNOTEFT_GEN_A_CORE
Thoracic Surgery Pre-op Note    Patient is a 77y old  Female who presents with a chief complaint of intra cardiac mass (02 May 2023 09:50)    Procedure: Bronchoscopy, Endobronchial Ultrasound   Surgeon: Dr. Bobo Soler    Vitals/Labs:  Vital Signs Last 24 Hrs  T(C): 37.2 (02 May 2023 12:00), Max: 37.5 (01 May 2023 20:00)  T(F): 99 (02 May 2023 12:00), Max: 99.5 (01 May 2023 20:00)  HR: 110 (02 May 2023 13:00) (96 - 110)  BP: 90/62 (02 May 2023 13:00) (81/60 - 112/68)  BP(mean): 72 (02 May 2023 13:00) (67 - 82)  RR: 25 (02 May 2023 13:00) (11 - 25)  SpO2: 98% (02 May 2023 13:00) (95% - 98%)    Parameters below as of 02 May 2023 13:00  Patient On (Oxygen Delivery Method): room air    I&O's Detail    01 May 2023 07:01  -  02 May 2023 07:00  --------------------------------------------------------  IN:    Bumetanide: 90 mL    DOBUTamine: 56.9 mL    DOBUTamine: 121.2 mL    Heparin Infusion: 24 mL    Heparin Infusion: 165 mL    IV PiggyBack: 150 mL    Milrinone: 43 mL  Total IN: 650.1 mL    OUT:    Norepinephrine: 0 mL    Ureteral Catheter (mL): 2045 mL  Total OUT: 2045 mL    Total NET: -1394.9 mL    02 May 2023 07:01  -  02 May 2023 14:00  --------------------------------------------------------  IN:    Bumetanide: 15 mL    DOBUTamine: 49.7 mL    Heparin Infusion: 77 mL    IV PiggyBack: 50 mL    Milrinone: 16.8 mL    Oral Fluid: 150 mL  Total IN: 358.5 mL    OUT:    Norepinephrine: 0 mL    Ureteral Catheter (mL): 965 mL  Total OUT: 965 mL    Total NET: -606.5 mL                        11.1   11.89 )-----------( 264      ( 02 May 2023 05:45 )             33.1       05-02    134<L>  |  93<L>  |  21<H>  ----------------------------<  108<H>  3.5   |  26  |  0.7    Ca    8.6      02 May 2023 05:45  Phos  2.2     05-02  Mg     1.7     05-02    TPro  6.2  /  Alb  3.1<L>  /  TBili  3.5<H>  /  DBili  x   /  AST  30  /  ALT  79<H>  /  AlkPhos  122<H>  05-02    CAPILLARY BLOOD GLUCOSE    LIVER FUNCTIONS - ( 02 May 2023 05:45 )  Alb: 3.1 g/dL / Pro: 6.2 g/dL / ALK PHOS: 122 U/L / ALT: 79 U/L / AST: 30 U/L / GGT: x           PTT - ( 02 May 2023 08:10 )  PTT:59.9 sec    Assessment & Plan:  MIKHAIL JOHNSON 77y Female     - NPO after midnight   - IVF while NPO   - Pain Control   - Inputs and outputs   - DVT ppx   - CBC, BMP w/ mag and phos, PTT and PT/INR, type and screen   - Pending cardiac anesthesia clearance     MEDICATIONS  (STANDING):  buMETAnide Injectable 2 milliGRAM(s) IV Push every 8 hours  chlorhexidine 2% Cloths 1 Application(s) Topical daily  DOBUTamine Infusion 7.5 MICROgram(s)/kG/Min (7.14 mL/Hr) IV Continuous <Continuous>  heparin  Infusion.  Unit(s)/Hr (11 mL/Hr) IV Continuous <Continuous>  magnesium gluconate 500 milliGRAM(s) Oral daily  melatonin 5 milliGRAM(s) Oral at bedtime  milrinone Infusion 0.125 MICROgram(s)/kG/Min (2.38 mL/Hr) IV Continuous <Continuous>  norepinephrine Infusion 0.05 MICROgram(s)/kG/Min (2.98 mL/Hr) IV Continuous <Continuous>  pantoprazole    Tablet 40 milliGRAM(s) Oral before breakfast    MEDICATIONS  (PRN):  benzocaine 20% Spray 1 Spray(s) Topical four times a day PRN throat pain  heparin   Injectable 5000 Unit(s) IV Push every 6 hours PRN For aPTT less than 40  heparin   Injectable 2500 Unit(s) IV Push every 6 hours PRN For aPTT between 40 - 57

## 2023-05-02 NOTE — PRE-ANESTHESIA EVALUATION ADULT - NSANTHSUBSTSD_GEN_ALL_CORE
Preventive Care Visit  [facility] DINO Slater MD, Family Medicine  Jan 27, 2023    Assessment & Plan   9 month old, here for preventive care.    Sonic was seen today for well child.    Diagnoses and all orders for this visit:    Encounter for routine child health examination w/o abnormal findings  -     DEVELOPMENTAL TEST, TURNER  -     sodium fluoride (VANISH) 5% white varnish 1 packet  -     ME APPLICATION TOPICAL FLUORIDE VARNISH BY [facility]  -     INFLUENZA VACCINE IM > 6 MONTHS VALENT IIV4 (AFLURIA/FLUZONE)        Growth      Normal OFC, length and weight    Immunizations   Appropriate vaccinations were ordered.  Patient/Parent(s) declined some/all vaccines today.  COVID-19  Immunizations Administered     Name Date Dose VIS Date Route    INFLUENZA VACCINE >6 MONTHS (Afluria, Fluzone) 1/27/23  4:13 PM 0.5 mL 08/06/2021, Given Today Intramuscular        Anticipatory Guidance    Reviewed age appropriate anticipatory guidance.       Referrals/Ongoing Specialty Care  None  Verbal Dental Referral: Verbal dental referral was given  Dental Fluoride Varnish: Yes, fluoride varnish application risks and benefits were discussed, and verbal consent was received.    Follow Up      Return in about 3 months (around 4/27/2023) for Preventive Care visit.    Subjective      Additional Questions 1/27/2023   Accompanied by mother   Questions for today's visit No   Surgery, major illness, or injury since last physical No     Social 1/27/2023   Lives with Parent(s), Sibling(s)   Who takes care of your child? Parent(s)   Recent potential stressors None   History of trauma No   Family Hx mental health challenges No   Lack of transportation has limited access to appts/meds No   Difficulty paying mortgage/rent on time No   Lack of steady place to sleep/has slept in a shelter No     Health Risks/Safety 1/27/2023   What type of car seat does your child use?  Infant car seat   Is your child's car seat forward or rear  facing? (!) FORWARD FACING   Where does your child sit in the car?  Back seat   Are stairs gated at home? Yes   Do you use space heaters, wood stove, or a fireplace in your home? (!) YES   Are poisons/cleaning supplies and medications kept out of reach? Yes        TB Screening: Consider immunosuppression as a risk factor for TB 1/27/2023   Recent TB infection or positive TB test in family/close contacts No   Recent travel outside [place] (child/family/close contacts) No   Recent residence in high-risk group setting (correctional facility/health care facility/homeless shelter/refugee camp) No      Dental Screening 1/27/2023   When was the last visit? Within the last 3 months   Have parents/caregivers/siblings had cavities in the last 2 years? No     Diet 1/27/2023   Do you have questions about feeding your baby? No   What does your baby eat? Formula   Formula type FML infent   How does your baby eat? Bottle   How often does baby eat? -   Vitamin or supplement use None   In past 12 months, concerned food might run out Never true   In past 12 months, food has run out/couldn't afford more Never true     Elimination 1/27/2023   Bowel or bladder concerns? No concerns     Media Use 1/27/2023   Hours per day of screen time (for entertainment) 0     Sleep 1/27/2023   Do you have any concerns about your child's sleep? No concerns, regular bedtime routine and sleeps well through the night   Where does your baby sleep? Crib   In what position does your baby sleep? Back, (!) SIDE, (!) TUMMY     Vision/Hearing 1/27/2023   Vision or hearing concerns No concerns     Development/ Social-Emotional Screen 1/27/2023   Does your child receive any special services? No     Development - ASQ required for C&TC  Screening tool used, reviewed with parent/guardian:   ASQ 9 M Communication Gross Motor Fine Motor Problem Solving Personal-social   Score 40 45 60 40 25   Cutoff 13.97 17.82 31.32 28.72 18.91   Result Passed Passed Passed Passed MONITOR  "             Objective     Exam  Pulse 112   Temp 98.4  F (36.9  C) (Axillary)   Resp 40   Ht 0.735 m (2' 4.94\")   Wt 9.327 kg (20 lb 9 oz)   HC 45 cm (17.72\")   SpO2 100%   BMI 17.27 kg/m    42 %ile (Z= -0.19) based on WHO (Boys, 0-2 years) head circumference-for-age based on Head Circumference recorded on 1/27/2023.  61 %ile (Z= 0.27) based on WHO (Boys, 0-2 years) weight-for-age data using vitals from 1/27/2023.  63 %ile (Z= 0.33) based on WHO (Boys, 0-2 years) Length-for-age data based on Length recorded on 1/27/2023.  57 %ile (Z= 0.17) based on WHO (Boys, 0-2 years) weight-for-recumbent length data based on body measurements available as of 1/27/2023.    Physical Exam  GENERAL: Active, alert, in no acute distress.  SKIN: Clear. No significant rash, abnormal pigmentation or lesions  HEAD: Normocephalic. Normal fontanels and sutures.  EYES: Conjunctivae and cornea normal. Red reflexes present bilaterally. Symmetric light reflex and no eye movement on cover/uncover test  EARS: Normal canals. Tympanic membranes are normal; gray and translucent.  NOSE: Normal without discharge.  MOUTH/THROAT: Clear. No oral lesions.  NECK: Supple, no masses.  LYMPH NODES: No adenopathy  LUNGS: Clear. No rales, rhonchi, wheezing or retractions  HEART: Regular rhythm. Normal S1/S2. No murmurs. Normal femoral pulses.  ABDOMEN: Soft, non-tender, not distended, no masses or hepatosplenomegaly. Normal umbilicus and bowel sounds.   GENITALIA: Normal male external genitalia. Stu stage I,  Testes descended bilaterally, no hernia or hydrocele.    EXTREMITIES: Hips normal with full range of motion. Symmetric extremities, no deformities  NEUROLOGIC: Normal tone throughout. Normal reflexes for age      MD PERLA Day [facility]  " No

## 2023-05-02 NOTE — PRE-ANESTHESIA EVALUATION ADULT - NSRADCARDRESULTSFT_GEN_ALL_CORE
Summary:   1. Left ventricular ejection fraction, by visual estimation, is <20%.   2. Severely decreased global left ventricular systolic function.   3. Severely enlarged left atrium.   4. Elevated mean left atrial pressure.   5. Large, fixed thrombus vs. mass on the apical wall of the left   ventricle.   6. Spectral Doppler shows pseudonormal pattern of left ventricular   myocardial filling (Grade II diastolic dysfunction).   7. Moderately reduced RV systolic function.   8. Mildly enlarged right atrium.   9. Moderate mitral valve regurgitation.  10. Moderate-severe tricuspid regurgitation.  11. Mild pulmonic valve regurgitation.  12. Estimated pulmonary artery systolic pressure is 42.9 mmHg assuming a   right atrial pressure of 15 mmHg, which is consistent withmild pulmonary   hypertension.  13. Endocardial visualization was enhanced with intravenous echo contrast.    PHYSICIAN INTERPRETATION:  Left Ventricle: Endocardial visualization was enhanced with intravenous   echo contrast. The left ventricular internal cavity size is severely   increased. Left ventricular wall thickness is normal. Global LV systolic   function was severely decreased. Left ventricular ejection fraction, by   visual estimation, is <20%. Spectral Doppler shows pseudonormal pattern   of left ventricular myocardial filling (Grade II diastolic dysfunction).   Elevated mean left atrial pressure. There is a large, fixed, apical left   ventricular thrombus vs mass. The mass appears spherical and irregular in   shape; and cystic andlayered in texture. The mass/thrombus measures   approximately 27 mm by 18 mm. Apical location.  Right Ventricle: The right ventricular size is normal. RV systolic   function is moderately reduced.  Left Atrium: Severely enlarged left atrium.  Right Atrium: Mildly enlarged right atrium.  Mitral Valve: Structurally normal mitral valve, with normal leaflet   excursion. Moderate mitral valve regurgitation is seen.  Tricuspid Valve: Structurally normal tricuspid valve, with normal leaflet   excursion. Moderate-severe tricuspid regurgitation is visualized.   Estimated pulmonary artery systolic pressure is 42.9 mmHg assuming a   right atrial pressure of 15 mmHg, which is consistent with mild pulmonary   hypertension.  Aortic Valve: The aortic valveis trileaflet. No evidence of aortic   stenosis. No evidence of aortic valve regurgitation is seen.  Pulmonic Valve: The pulmonic valve is normal. Mild pulmonic valve   regurgitation.  Aorta: The aortic root is normal in size and structure.  Venous: The inferior vena cava was normal sized, with respiratory size   variation less than 50%, consistent with elevated right atrial pressure.  Additional Comments: A pacer wire is visualized in the right ventricle.      CT angio chest:  "IMPRESSION:    No evidence of pulmonary embolism. (See discussion below for more   findings.)    Marked cardiomegaly.    There is a 2.2 x 2.8 cm rounded filling defect in the region of the apex   of the left ventricle (3/11; 607/95). Differential diagnosis includes an   intracardiac mass or thrombus.    The main pulmonary artery is dilated, measuring 3.6 cm in diameter, which   may be seen with pulmonary hypertension. There is reflux of contrast   material into the IVC and hepatic veins compatible with right heart   dysfunction.    Right hilar lymphadenopathy and right perihilar soft tissue density is   noted. There is segmental narrowing and consolidation noted in the medial   aspect of the right lower lobe. There is a small right pleural effusion.   There is extrinsic indentation and possible invasion of the right main   pulmonary artery (604/142; 605/129; 2/42). There is associated narrowing   of the proximal right lobar and segmental branches. A right hilar / right   lower lobe mass with postobstructive pneumonitis should be ruled out.      The findings were discussed with Dr. Leone at 4:10 PM 4/21/2023, with   read back."

## 2023-05-02 NOTE — PRE-ANESTHESIA EVALUATION ADULT - NSANTHPMHFT_GEN_ALL_CORE
" Patient seen and examined with family present at bedside. Currently on Dobutamine at 7.5 mcg/kg/min and Milrinone 0.125 mcg/kg/min. Without new complaints. Denies SOB.                                                                                                                                                            HISTORY OF PRESENT ILLNESS: 76 y/o F with PMHx of HTN, HLD, nonischemic cardiomyopathy, HFrEF s/p AICD presents to the ED with 2 weeks of worsening shortness of breath. Patient and her son state patient has been experiencing shortness of breath that has progressed from present on activity to now shortness of breath during conversation. Patient also notes an increasing dry cough over this time period.    In ED, patient's HR is 110 and saturating 98% on RA  CT C/A/P revealing cardiomegaly with evidence of right heart strain, R hilar lymphadenopathy with R perihilar soft tissue density with indentation/possible invasion of right pulmonary artery, and narrowing of proximal R lobar and segmental branches with associated consolidation of right lower lobe. 2.2 x 2.8 cm filling defect noted in apex of left ventricle.   Last ECHO 8/2021 revealing EF 20-25%, mild Mr, mild TR, mild Pulm HTN  Dimer 2600, trops negative BNP 73628  Duplex venous LE negative for DVT; CTA chest negative for PE  Cr 1.4 (0.7 in 2021), Na+ 128, T.Benjamin 2.3, LFTs elevated (hemolyzed)  Lactate 5.8-->4.6  Admitted to SDU (22 Apr 2023 01:10)      Patient reportedly was at baseline (active, ambulating long distances without issue) until about 2 weeks ago when patient began to have progressively worsening SOB with a dry cough. Patient reports Dr. Mead as per primary cardiologist, and Dr. Sherwood as her heart failure specialist (last seen in office in 2021). Endorses compliance with home medications. Family at bedside concerned about patient's recent weight loss as well.  " From heart failure note:    "Patient seen and examined with family present at bedside. Remains on Dobutamine at 7.5 mcg/kg/min and Milrinone 0.25 mcg/kg/min. Off diuretics. Bronch/EBUS cancelled for today. All questions and concerns answered at bedside for patient and family- to further discuss with other family members to make decision if patient will proceed with procedure. No new complaints at this time.                                                                                                                                                      HISTORY OF PRESENT ILLNESS: 76 y/o F with PMHx of HTN, HLD, nonischemic cardiomyopathy, HFrEF s/p AICD presents to the ED with 2 weeks of worsening shortness of breath. Patient and her son state patient has been experiencing shortness of breath that has progressed from present on activity to now shortness of breath during conversation. Patient also notes an increasing dry cough over this time period.    In ED, patient's HR is 110 and saturating 98% on RA  CT C/A/P revealing cardiomegaly with evidence of right heart strain, R hilar lymphadenopathy with R perihilar soft tissue density with indentation/possible invasion of right pulmonary artery, and narrowing of proximal R lobar and segmental branches with associated consolidation of right lower lobe. 2.2 x 2.8 cm filling defect noted in apex of left ventricle.   Last ECHO 8/2021 revealing EF 20-25%, mild Mr, mild TR, mild Pulm HTN  Dimer 2600, trops negative BNP 06812  Duplex venous LE negative for DVT; CTA chest negative for PE  Cr 1.4 (0.7 in 2021), Na+ 128, T.Benjamin 2.3, LFTs elevated (hemolyzed)  Lactate 5.8-->4.6  Admitted to SDU (22 Apr 2023 01:10)      Patient reportedly was at baseline (active, ambulating long distances without issue) until about 2 weeks ago when patient began to have progressively worsening SOB with a dry cough. Patient reports Dr. Mead as per primary cardiologist, and Dr. Sherwood as her heart failure specialist (last seen in office in 2021). Endorses compliance with home medications. Family at bedside concerned about patient's recent weight loss as well.

## 2023-05-03 LAB
ALBUMIN SERPL ELPH-MCNC: 3 G/DL — LOW (ref 3.5–5.2)
ALP SERPL-CCNC: 128 U/L — HIGH (ref 30–115)
ALT FLD-CCNC: 63 U/L — HIGH (ref 0–41)
ANION GAP SERPL CALC-SCNC: 10 MMOL/L — SIGNIFICANT CHANGE UP (ref 7–14)
ANION GAP SERPL CALC-SCNC: 10 MMOL/L — SIGNIFICANT CHANGE UP (ref 7–14)
APTT BLD: 32.1 SEC — SIGNIFICANT CHANGE UP (ref 27–39.2)
APTT BLD: 46 SEC — HIGH (ref 27–39.2)
AST SERPL-CCNC: 26 U/L — SIGNIFICANT CHANGE UP (ref 0–41)
BASE EXCESS BLDMV CALC-SCNC: 2 MMOL/L — SIGNIFICANT CHANGE UP
BASE EXCESS BLDV CALC-SCNC: 5.5 MMOL/L — HIGH (ref -2–3)
BASE EXCESS BLDV CALC-SCNC: 6.7 MMOL/L — HIGH (ref -2–3)
BASOPHILS # BLD AUTO: 0.02 K/UL — SIGNIFICANT CHANGE UP (ref 0–0.2)
BASOPHILS NFR BLD AUTO: 0.2 % — SIGNIFICANT CHANGE UP (ref 0–1)
BILIRUB SERPL-MCNC: 3.2 MG/DL — HIGH (ref 0.2–1.2)
BLD GP AB SCN SERPL QL: SIGNIFICANT CHANGE UP
BUN SERPL-MCNC: 25 MG/DL — HIGH (ref 10–20)
BUN SERPL-MCNC: 25 MG/DL — HIGH (ref 10–20)
CA-I SERPL-SCNC: 1.14 MMOL/L — LOW (ref 1.15–1.33)
CA-I SERPL-SCNC: 1.19 MMOL/L — SIGNIFICANT CHANGE UP (ref 1.15–1.33)
CALCIUM SERPL-MCNC: 8.5 MG/DL — SIGNIFICANT CHANGE UP (ref 8.4–10.5)
CALCIUM SERPL-MCNC: 8.8 MG/DL — SIGNIFICANT CHANGE UP (ref 8.4–10.5)
CHLORIDE SERPL-SCNC: 92 MMOL/L — LOW (ref 98–110)
CHLORIDE SERPL-SCNC: 94 MMOL/L — LOW (ref 98–110)
CO2 SERPL-SCNC: 28 MMOL/L — SIGNIFICANT CHANGE UP (ref 17–32)
CO2 SERPL-SCNC: 28 MMOL/L — SIGNIFICANT CHANGE UP (ref 17–32)
CREAT SERPL-MCNC: 0.7 MG/DL — SIGNIFICANT CHANGE UP (ref 0.7–1.5)
CREAT SERPL-MCNC: 0.8 MG/DL — SIGNIFICANT CHANGE UP (ref 0.7–1.5)
EGFR: 75 ML/MIN/1.73M2 — SIGNIFICANT CHANGE UP
EGFR: 88 ML/MIN/1.73M2 — SIGNIFICANT CHANGE UP
EOSINOPHIL # BLD AUTO: 0.04 K/UL — SIGNIFICANT CHANGE UP (ref 0–0.7)
EOSINOPHIL NFR BLD AUTO: 0.3 % — SIGNIFICANT CHANGE UP (ref 0–8)
GAS PNL BLDA: SIGNIFICANT CHANGE UP
GAS PNL BLDA: SIGNIFICANT CHANGE UP
GAS PNL BLDMV: SIGNIFICANT CHANGE UP
GAS PNL BLDV: 126 MMOL/L — LOW (ref 136–145)
GAS PNL BLDV: 126 MMOL/L — LOW (ref 136–145)
GAS PNL BLDV: SIGNIFICANT CHANGE UP
GAS PNL BLDV: SIGNIFICANT CHANGE UP
GLUCOSE SERPL-MCNC: 123 MG/DL — HIGH (ref 70–99)
GLUCOSE SERPL-MCNC: 148 MG/DL — HIGH (ref 70–99)
HCO3 BLDMV-SCNC: 26 MMOL/L — SIGNIFICANT CHANGE UP
HCO3 BLDV-SCNC: 29 MMOL/L — SIGNIFICANT CHANGE UP (ref 22–29)
HCO3 BLDV-SCNC: 30 MMOL/L — HIGH (ref 22–29)
HCT VFR BLD CALC: 32.9 % — LOW (ref 37–47)
HCT VFR BLDA CALC: 33 % — LOW (ref 39–51)
HCT VFR BLDA CALC: 42 % — SIGNIFICANT CHANGE UP (ref 39–51)
HDV IGM SER QL IA: SIGNIFICANT CHANGE UP
HGB BLD CALC-MCNC: 11 G/DL — LOW (ref 12.6–17.4)
HGB BLD CALC-MCNC: 13.9 G/DL — SIGNIFICANT CHANGE UP (ref 12.6–17.4)
HGB BLD-MCNC: 10.8 G/DL — LOW (ref 12–16)
IMM GRANULOCYTES NFR BLD AUTO: 0.7 % — HIGH (ref 0.1–0.3)
INR BLD: 1.39 RATIO — HIGH (ref 0.65–1.3)
LACTATE BLDV-MCNC: 1 MMOL/L — SIGNIFICANT CHANGE UP (ref 0.5–2)
LACTATE BLDV-MCNC: 1.6 MMOL/L — SIGNIFICANT CHANGE UP (ref 0.5–2)
LYMPHOCYTES # BLD AUTO: 1.57 K/UL — SIGNIFICANT CHANGE UP (ref 1.2–3.4)
LYMPHOCYTES # BLD AUTO: 13.6 % — LOW (ref 20.5–51.1)
MAGNESIUM SERPL-MCNC: 1.9 MG/DL — SIGNIFICANT CHANGE UP (ref 1.8–2.4)
MAGNESIUM SERPL-MCNC: 2 MG/DL — SIGNIFICANT CHANGE UP (ref 1.8–2.4)
MCHC RBC-ENTMCNC: 30.7 PG — SIGNIFICANT CHANGE UP (ref 27–31)
MCHC RBC-ENTMCNC: 32.8 G/DL — SIGNIFICANT CHANGE UP (ref 32–37)
MCV RBC AUTO: 93.5 FL — SIGNIFICANT CHANGE UP (ref 81–99)
MONOCYTES # BLD AUTO: 0.87 K/UL — HIGH (ref 0.1–0.6)
MONOCYTES NFR BLD AUTO: 7.5 % — SIGNIFICANT CHANGE UP (ref 1.7–9.3)
NEUTROPHILS # BLD AUTO: 8.97 K/UL — HIGH (ref 1.4–6.5)
NEUTROPHILS NFR BLD AUTO: 77.7 % — HIGH (ref 42.2–75.2)
NRBC # BLD: 0 /100 WBCS — SIGNIFICANT CHANGE UP (ref 0–0)
O2 CT VFR BLD CALC: 46 MMHG — SIGNIFICANT CHANGE UP
PCO2 BLDMV: 36 MMHG — SIGNIFICANT CHANGE UP
PCO2 BLDV: 37 MMHG — LOW (ref 39–42)
PCO2 BLDV: 39 MMHG — SIGNIFICANT CHANGE UP (ref 39–42)
PH BLDMV: 7.46 — SIGNIFICANT CHANGE UP
PH BLDV: 7.5 — HIGH (ref 7.32–7.43)
PH BLDV: 7.5 — HIGH (ref 7.32–7.43)
PHOSPHATE SERPL-MCNC: 2 MG/DL — LOW (ref 2.1–4.9)
PLATELET # BLD AUTO: 258 K/UL — SIGNIFICANT CHANGE UP (ref 130–400)
PMV BLD: 11.2 FL — HIGH (ref 7.4–10.4)
PO2 BLDV: 36 MMHG — SIGNIFICANT CHANGE UP
PO2 BLDV: 39 MMHG — SIGNIFICANT CHANGE UP
POTASSIUM BLDV-SCNC: 3.8 MMOL/L — SIGNIFICANT CHANGE UP (ref 3.5–5.1)
POTASSIUM BLDV-SCNC: 3.9 MMOL/L — SIGNIFICANT CHANGE UP (ref 3.5–5.1)
POTASSIUM SERPL-MCNC: 4.3 MMOL/L — SIGNIFICANT CHANGE UP (ref 3.5–5)
POTASSIUM SERPL-MCNC: 4.3 MMOL/L — SIGNIFICANT CHANGE UP (ref 3.5–5)
POTASSIUM SERPL-SCNC: 4.3 MMOL/L — SIGNIFICANT CHANGE UP (ref 3.5–5)
POTASSIUM SERPL-SCNC: 4.3 MMOL/L — SIGNIFICANT CHANGE UP (ref 3.5–5)
PROT SERPL-MCNC: 6.2 G/DL — SIGNIFICANT CHANGE UP (ref 6–8)
PROTHROM AB SERPL-ACNC: 16 SEC — HIGH (ref 9.95–12.87)
RBC # BLD: 3.52 M/UL — LOW (ref 4.2–5.4)
RBC # FLD: 15.3 % — HIGH (ref 11.5–14.5)
SAO2 % BLDMV: 81.5 % — SIGNIFICANT CHANGE UP
SAO2 % BLDV: 66.7 % — SIGNIFICANT CHANGE UP
SAO2 % BLDV: 67.2 % — SIGNIFICANT CHANGE UP
SODIUM SERPL-SCNC: 130 MMOL/L — LOW (ref 135–146)
SODIUM SERPL-SCNC: 132 MMOL/L — LOW (ref 135–146)
WBC # BLD: 11.55 K/UL — HIGH (ref 4.8–10.8)
WBC # FLD AUTO: 11.55 K/UL — HIGH (ref 4.8–10.8)

## 2023-05-03 PROCEDURE — 71045 X-RAY EXAM CHEST 1 VIEW: CPT | Mod: 26

## 2023-05-03 PROCEDURE — 99231 SBSQ HOSP IP/OBS SF/LOW 25: CPT

## 2023-05-03 PROCEDURE — 93010 ELECTROCARDIOGRAM REPORT: CPT

## 2023-05-03 PROCEDURE — 99232 SBSQ HOSP IP/OBS MODERATE 35: CPT

## 2023-05-03 PROCEDURE — 99291 CRITICAL CARE FIRST HOUR: CPT | Mod: 25

## 2023-05-03 PROCEDURE — 99291 CRITICAL CARE FIRST HOUR: CPT

## 2023-05-03 RX ORDER — HEPARIN SODIUM 5000 [USP'U]/ML
INJECTION INTRAVENOUS; SUBCUTANEOUS
Qty: 25000 | Refills: 0 | Status: DISCONTINUED | OUTPATIENT
Start: 2023-05-03 | End: 2023-05-04

## 2023-05-03 RX ORDER — HEPARIN SODIUM 5000 [USP'U]/ML
5000 INJECTION INTRAVENOUS; SUBCUTANEOUS EVERY 6 HOURS
Refills: 0 | Status: DISCONTINUED | OUTPATIENT
Start: 2023-05-03 | End: 2023-05-04

## 2023-05-03 RX ORDER — HEPARIN SODIUM 5000 [USP'U]/ML
2500 INJECTION INTRAVENOUS; SUBCUTANEOUS EVERY 6 HOURS
Refills: 0 | Status: DISCONTINUED | OUTPATIENT
Start: 2023-05-03 | End: 2023-05-04

## 2023-05-03 RX ADMIN — Medication 7.14 MICROGRAM(S)/KG/MIN: at 06:18

## 2023-05-03 RX ADMIN — BUMETANIDE 2 MILLIGRAM(S): 0.25 INJECTION INTRAMUSCULAR; INTRAVENOUS at 15:00

## 2023-05-03 RX ADMIN — MILRINONE LACTATE 4.76 MICROGRAM(S)/KG/MIN: 1 INJECTION, SOLUTION INTRAVENOUS at 20:37

## 2023-05-03 RX ADMIN — BUMETANIDE 2 MILLIGRAM(S): 0.25 INJECTION INTRAMUSCULAR; INTRAVENOUS at 22:15

## 2023-05-03 RX ADMIN — MILRINONE LACTATE 3.81 MICROGRAM(S)/KG/MIN: 1 INJECTION, SOLUTION INTRAVENOUS at 06:18

## 2023-05-03 RX ADMIN — HEPARIN SODIUM 1100 UNIT(S)/HR: 5000 INJECTION INTRAVENOUS; SUBCUTANEOUS at 19:20

## 2023-05-03 RX ADMIN — CHLORHEXIDINE GLUCONATE 1 APPLICATION(S): 213 SOLUTION TOPICAL at 12:47

## 2023-05-03 RX ADMIN — BUMETANIDE 2 MILLIGRAM(S): 0.25 INJECTION INTRAMUSCULAR; INTRAVENOUS at 06:19

## 2023-05-03 RX ADMIN — Medication 500 MILLIGRAM(S): at 12:45

## 2023-05-03 RX ADMIN — PANTOPRAZOLE SODIUM 40 MILLIGRAM(S): 20 TABLET, DELAYED RELEASE ORAL at 06:19

## 2023-05-03 RX ADMIN — MILRINONE LACTATE 4.76 MICROGRAM(S)/KG/MIN: 1 INJECTION, SOLUTION INTRAVENOUS at 12:40

## 2023-05-03 NOTE — PROGRESS NOTE ADULT - ASSESSMENT
77 year old woman with PMHx of HTN, HLD, nonischemic cardiomyopathy, HFrEF s/p AICD presents to the ED with 2 weeks of worsening shortness of breath with increasing dry cough over this time period accepted to CCU for cardiogenic shock    #Cardiogenic Shock   #LV thrombus  #NICM  #HFrEF 25% s/p AICD  #HTN/HLD  - CT CAP - cardiomegaly w/ RH strain  - TTE 4/22/2023 EF <20%, large fixed thrombus vs mass on apical LV wall, GIIDD, mod-sev TR, mod MR, mild NC, mild PH  - LE venous duplex 4/21 negative  - c/w dobutamine ggt trying to wean off  --> IR consulted for PICC line - tentative 5/4/23  - Milrinone started on 5/2 increased to 0.25  - c/w heparin ggt - target ptt 60-99  - f/u HF ( Dr Sherwood), Cardio ([place]): 3% saline +bumex drip till 5/2 and stop torsemide--> c/w bumex 2mg q8hr   - s/p RHC on 5/1, swan kevon thru R femoral vein - 86 cm marking      #Post obstructive pneumonia  #Suspected lung malignancy  - CT CAP - R hilar LAD, R perihilar soft tissue density w/ indentation/possible invasion of R pulmonary artery and narrowing of proximal R lobar and segmental branches w/ consolidation of RLL  - MRSA -ve, procal 1.77  - completed zosyn 7 days course 4/27  - pulm following for EBUS - needs medical optimization   - Thoracic Surgery: possible biopsy on 5/4;  agree with PET scan as outpatient to r/o lung malignancy  - Incentive Spirometer    #Positive COVID-19 PCR  - Asymptomatic, per infectious control if 2 consecutive PCRs are negative no need for Isolation - 3 repeats -ve will d/c isolation   - No need for further treatment or diagnostic w/u as per ID    #HAGMA - resolved   #Lactic Acidosis - resolved   #DAVID on CKD - resolved   #Elevated liver enzymes predominantly hepatocellular (AST>ALT) with hyperbilirubinemia likely due to ischemic liver injury - resolved   - trend labs   - GI following   - holding lipitor    #Hypotonic Hyponatremia  - hypervolemic   - Improving Na 132  - trend Na BID    #Follow Up  - PICC/EBUS tomorrow - NPO midnight   - wean off dobutamine       # Misc  - DVT Prophylaxis: heparin drip  - GI Prophylaxis: pantoprazole    Tablet 40 milliGRAM(s) Oral before breakfast  - Diet: Diet, DASH/TLC with 1.2 L fluid restriction  - Activity: Activity - Ambulate as Tolerated  - Code Status: Full , Palliative on board

## 2023-05-03 NOTE — PROGRESS NOTE ADULT - SUBJECTIVE AND OBJECTIVE BOX
GENERAL SURGERY PROGRESS NOTE    Patient: MIKHAIL JOHNSON , 77y ([birth year])Female   MRN: [id number]  Location: [address]  Visit: 04-21-23 Inpatient  Date: 05-03-23 @ 03:04    Procedure/Dx/Injuries: perihilar mass w/ possible right pulmonary artery invasion/right main bronchus invasion, 2.8cm left ventricle apical mass    Events of past 24 hours: Patient pre-opped for Bronch/EBUS today. Heparin gtt held at midnight.    PAST MEDICAL & SURGICAL HISTORY:  CHF, stage C      HTN (hypertension)          Vitals:   T(F): 99 (05-03-23 @ 00:00), Max: 99.3 (05-02-23 @ 16:00)  HR: 107 (05-03-23 @ 02:00)  BP: 87/56 (05-03-23 @ 02:00)  RR: 18 (05-03-23 @ 02:00)  SpO2: 96% (05-03-23 @ 02:00)      Diet, NPO after Midnight:      NPO Start Date: 02-May-2023,   NPO Start Time: 23:59  Except Medications  Diet, DASH/TLC:   Sodium & Cholesterol Restricted  1200mL Fluid Restriction ([id number])  Supplement Feeding Modality:  Oral  Ensure Plus High Protein Cans or Servings Per Day:  2       Frequency:  Daily      Fluids:     I & O's:    05-01-23 @ 07:01  -  05-02-23 @ 07:00  --------------------------------------------------------  IN:    Bumetanide: 90 mL    DOBUTamine: 56.9 mL    DOBUTamine: 121.2 mL    Heparin Infusion: 24 mL    Heparin Infusion: 165 mL    IV PiggyBack: 150 mL    Milrinone: 43 mL  Total IN: 650.1 mL    OUT:    Norepinephrine: 0 mL    Ureteral Catheter (mL): 2045 mL  Total OUT: 2045 mL    Total NET: -1394.9 mL      PHYSICAL EXAM:  General: NAD  Cardiac: S1, S2  Respiratory: no labored breathing, normal respiratory effort   Abdomen: Soft, non-distended, non-tender    MEDICATIONS  (STANDING):  buMETAnide Injectable 2 milliGRAM(s) IV Push every 8 hours  chlorhexidine 2% Cloths 1 Application(s) Topical daily  DOBUTamine Infusion 7.5 MICROgram(s)/kG/Min (7.14 mL/Hr) IV Continuous <Continuous>  magnesium gluconate 500 milliGRAM(s) Oral daily  melatonin 5 milliGRAM(s) Oral at bedtime  milrinone Infusion 0.2 MICROgram(s)/kG/Min (3.81 mL/Hr) IV Continuous <Continuous>  norepinephrine Infusion 0.05 MICROgram(s)/kG/Min (2.98 mL/Hr) IV Continuous <Continuous>  pantoprazole    Tablet 40 milliGRAM(s) Oral before breakfast    MEDICATIONS  (PRN):  benzocaine 20% Spray 1 Spray(s) Topical four times a day PRN throat pain      DVT PROPHYLAXIS:   GI PROPHYLAXIS: pantoprazole    Tablet 40 milliGRAM(s) Oral before breakfast    ANTICOAGULATION:   ANTIBIOTICS:            LAB/STUDIES:  Labs:  CAPILLARY BLOOD GLUCOSE                              11.1   11.89 )-----------( 264      ( 02 May 2023 05:45 )             33.1       Auto Neutrophil %: 79.2 % (05-02-23 @ 05:45)  Auto Immature Granulocyte %: 0.7 % (05-02-23 @ 05:45)    05-03    130<L>  |  92<L>  |  25<H>  ----------------------------<  148<H>  4.3   |  28  |  0.8      Calcium, Total Serum: 8.5 mg/dL (05-03-23 @ 00:40)      LFTs:             6.2  | 3.5  | 30       ------------------[122     ( 02 May 2023 05:45 )  3.1  | x    | 79          Lipase:x      Amylase:x         Blood Gas Venous - Lactate: 1.60 mmol/L (05-03-23 @ 00:35)  Blood Gas Arterial, Lactate: 1.40 mmol/L (05-03-23 @ 00:34)  Blood Gas Arterial, Lactate: 1.60 mmol/L (05-02-23 @ 05:11)  Blood Gas Arterial, Lactate: 1.40 mmol/L (05-01-23 @ 21:09)    ABG - ( 03 May 2023 00:34 )  pH: 7.56  /  pCO2: 32    /  pO2: 92    / HCO3: 29    / Base Excess: 6.5   /  SaO2: 99.0            ABG - ( 02 May 2023 05:11 )  pH: 7.51  /  pCO2: 38    /  pO2: 83    / HCO3: 30    / Base Excess: 6.9   /  SaO2: 97.5            ABG - ( 01 May 2023 21:09 )  pH: 7.55  /  pCO2: 30    /  pO2: 97    / HCO3: 26    / Base Excess: 4.2   /  SaO2: 98.5              Coags:     x      ----< x       ( 02 May 2023 08:10 )     59.9        CARDIAC MARKERS ( 01 May 2023 05:25 )  x     / 0.02 ng/mL / x     / x     / x            IMAGING:    < from: Xray Chest 1 View- PORTABLE-Routine (Xray Chest 1 View- PORTABLE-Routine in AM.) (05.02.23 @ 05:24) >  1. Unchanged bilateral opacities, right greater than left.

## 2023-05-03 NOTE — PROGRESS NOTE ADULT - NS ATTEND AMEND GEN_ALL_CORE FT
Patient is stable on dual inotropic support, CI today 2.2, CVP low 10s. Patient reports feeling well, renal function remains stable. Plan for EBUS for mediastinal mass.       Increase milrinone gtt to 0.25 mcg/kg/min   Continue dobutamine gtt at 7.5 mcg/kg/min   Monitor lactate, MVo2 and UOP   Get PICC line and get groin line out   Strict I/Os   Daily weight   Thoracic surgery follow up for EBUS/biopsy   Nutrition consult f/u   PT follow up   Patient remains critically ill, I discussed with family and primary team regarding possible candidacy for LVAD. Since there is a concern for possible malignancy, we would have to rule out any cancer before discussing possible LVAD.

## 2023-05-03 NOTE — PROGRESS NOTE ADULT - SUBJECTIVE AND OBJECTIVE BOX
Date of Admission: 23    Interval History: Patient seen and examined with family present at bedside. Remains on Dobutamine at 7.5 mcg/kg/min and Milrinone 0.25 mcg/kg/min. Off diuretics. Bronch/EBUS cancelled for today. All questions and concerns answered at bedside for patient and family- to further discuss with other family members to make decision if patient will proceed with procedure. No new complaints at this time.                                                                                                                                                      HISTORY OF PRESENT ILLNESS: 78 y/o F with PMHx of HTN, HLD, nonischemic cardiomyopathy, HFrEF s/p AICD presents to the ED with 2 weeks of worsening shortness of breath. Patient and her son state patient has been experiencing shortness of breath that has progressed from present on activity to now shortness of breath during conversation. Patient also notes an increasing dry cough over this time period.    In ED, patient's HR is 110 and saturating 98% on RA  CT C/A/P revealing cardiomegaly with evidence of right heart strain, R hilar lymphadenopathy with R perihilar soft tissue density with indentation/possible invasion of right pulmonary artery, and narrowing of proximal R lobar and segmental branches with associated consolidation of right lower lobe. 2.2 x 2.8 cm filling defect noted in apex of left ventricle.   Last ECHO 2021 revealing EF 20-25%, mild Mr, mild TR, mild Pulm HTN  Dimer 2600, trops negative BNP [id number]  Duplex venous LE negative for DVT; CTA chest negative for PE  Cr 1.4 (0.7 in ), Na+ 128, T.Benjamin 2.3, LFTs elevated (hemolyzed)  Lactate 5.8-->4.6  Admitted to SDU (2023 01:10)      Patient reportedly was at baseline (active, ambulating long distances without issue) until about 2 weeks ago when patient began to have progressively worsening SOB with a dry cough. Patient reports Dr. Mead as per primary cardiologist, and Dr. Sherwood as her heart failure specialist (last seen in office in ). Endorses compliance with home medications. Family at bedside concerned about patient's recent weight loss as well.         PAST MEDICAL & SURGICAL HISTORY:  CHF, stage C  HTN (hypertension)    Allergies  epinephrine (Unknown)    Intolerances      Vitals:    T(C): 37.1 (03 May 2023 16:00), Max: 37.3 (02 May 2023 20:00)  T(F): 98.8 (03 May 2023 16:00), Max: 99.1 (02 May 2023 20:00)  HR: 111 (03 May 2023 16:00) (102 - 119)  BP: 89/58 (03 May 2023 16:00) (82/53 - 100/57)  BP(mean): 67 (03 May 2023 16:00) (62 - 75)  RR: 28 (03 May 2023 16:00) (9 - 38)  SpO2: 97% (03 May 2023 16:00) (94% - 100%)    O2 Parameters below as of 03 May 2023 17:00  Patient On (Oxygen Delivery Method): room air        Physical Exam:  General Appearance: thin, frail, normal for age and gender. 	  Cardiovascular: RRR, +S1, S2, No edema  Respiratory: decreased @ bases  Psychiatry: Fatigued, oriented x 3, Mood & affect appropriate  Skin/Integumentary: No rashes, No ecchymoses, No cyanosis, +Reno cath   Neurologic: Non-focal  Musculoskeletal/ extremities: Normal range of motion, No clubbing, cyanosis or edema  Vascular: Peripheral pulses palpable 2+ bilaterally        LABS:	 	    CBC Full  -  ( 03 May 2023 04:45 )  WBC Count : 11.55 K/uL  RBC Count : 3.52 M/uL  Hemoglobin : 10.8 g/dL  Hematocrit : 32.9 %  Platelet Count - Automated : 258 K/uL  Mean Cell Volume : 93.5 fL  Mean Cell Hemoglobin : 30.7 pg  Mean Cell Hemoglobin Concentration : 32.8 g/dL  Auto Neutrophil # : 8.97 K/uL  Auto Lymphocyte # : 1.57 K/uL  Auto Monocyte # : 0.87 K/uL  Auto Eosinophil # : 0.04 K/uL  Auto Basophil # : 0.02 K/uL  Auto Neutrophil % : 77.7 %  Auto Lymphocyte % : 13.6 %  Auto Monocyte % : 7.5 %  Auto Eosinophil % : 0.3 %  Auto Basophil % : 0.2 %    Basic Metabolic Panel (23 @ 00:40)    Sodium, Serum: 130 mmol/L   Potassium, Serum: 4.3 mmol/L   Chloride, Serum: 92 mmol/L   Carbon Dioxide, Serum: 28 mmol/L   Anion Gap, Serum: 10 mmol/L   Blood Urea Nitrogen, Serum: 25 mg/dL   Creatinine, Serum: 0.8 mg/dL   Glucose, Serum: 148 mg/dL   Calcium, Total Serum: 8.5 mg/dL   eGFR: 75: The estimated glomerular filtration rate (eGFR) is calculated using the   CKD-EPI creatinine equation, which does not have a coefficient for  race and is validated in individuals 18 years of age and older (N Engl J  Med ; [id number]). Creatinine-based eGFR may be inaccurate in  various situations including but not limited to extremes of muscle mass,  altered dietary protein intake, or medications that affect renal tubular  creatinine secretion. mL/min/1.73m2        TELEMETRY EVENTS: 	    EC2023    Ventricular Rate 96 BPM  Atrial Rate 96 BPM  P-R Interval 164 ms  QRS Duration 116 ms  Q-T Interval 396 ms  QTC Calculation(Bazett) 500 ms  P Axis 51 degrees  R Axis -41 degrees  T Axis 87 degrees    Diagnosis Line Normal sinus rhythm  Possible Left atrial enlargement  Left axis deviation  Prolonged QT  Abnormal ECG    Confirmed by Seven Hensley ([id number]) on 2023 7:07:08 AM      CXR     Impression:  CHF. Support devices as described. Without difference.      CT Angio Chest PE Protocol   IMPRESSION:    No evidence of pulmonary embolism. (See discussion below for more   findings.)    Marked cardiomegaly.    There is a 2.2 x 2.8 cm rounded filling defect in the region of the apex   of the left ventricle (3/11; 607/95). Differential diagnosis includes an   intracardiac mass or thrombus.    The main pulmonary artery is dilated, measuring 3.6 cm in diameter, which   may be seen with pulmonary hypertension. There is reflux of contrast   material into the IVC and hepatic veins compatible with right heart   dysfunction.    Right hilar lymphadenopathy and right perihilar soft tissue density is   noted. There is segmental narrowing and consolidation noted in the medial   aspect of the right lower lobe. There is a small right pleural effusion.   There is extrinsic indentation and possible invasion of the right main   pulmonary artery (604/142; 605/129; /). There is associated narrowing   of the proximal right lobar and segmental branches. A right hilar / right   lower lobe mass with postobstructive pneumonitis should be ruled out.          PREVIOUS DIAGNOSTIC TESTING:    TTE 23    Summary:   1. Left ventricular ejection fraction, by visual estimation, is <20%.   2. Severely decreased global left ventricular systolic function.   3. Severely enlarged left atrium.   4. Elevated mean left atrial pressure.   5. Large, fixed thrombus vs. mass on the apical wall of the left   ventricle.   6. Spectral Doppler shows pseudonormal pattern of left ventricular   myocardial filling (Grade II diastolic dysfunction).   7. Moderately reduced RV systolic function.   8. Mildly enlarged right atrium.   9. Moderate mitral valve regurgitation.  10. Moderate-severe tricuspid regurgitation.  11. Mild pulmonic valve regurgitation.  12. Estimated pulmonary artery systolic pressure is 42.9 mmHg assuming a   right atrial pressure of 15 mmHg, which is consistent withmild pulmonary   hypertension.  13. Endocardial visualization was enhanced with intravenous echo contrast.      Right Heart Catheterization 23    FINDINGS:   Right Heart Cath  RA - 12  RV - 51//  PA - 51//  PCWP - 20    CO/CI Thermodilusion - 2.1/1.27  CO/CI Mehdi - 2.09/1.26    SVR (MAP 79 / RA 12 / CO 2.1) = 2552 dyn  PVR = 5.71 henriquez      RHC : RA 14, RV 46/14, PA 56/26/37, PCWP: 25, CO/C.I mehdi 2.37/1.43, C.O. /C.I thermodilution 2.8/1.69      Home Medications:  atorvastatin 20 mg oral tablet: 1 tab(s) orally once a day (04 Aug 2021 10:05)  carvedilol 6.25 mg oral tablet: 1 tab(s) orally 2 times a day (2023 02:02)  Entresto 49 mg-51 mg oral tablet: 1 tab(s) orally 2 times a day (04 Aug 2021 10:05)  Farxiga 10 mg oral tablet: 1 tab(s) orally once a day (2023 02:05)  Lasix 20 mg oral tablet: 1 tab(s) orally once a day (2023 02:04)  spironolactone 25 mg oral tablet: 1 tab(s) orally once a day (04 Aug 2021 10:05)    MEDICATIONS  (STANDING):  MEDICATIONS  (STANDING):  buMETAnide Injectable 2 milliGRAM(s) IV Push every 8 hours  chlorhexidine 2% Cloths 1 Application(s) Topical daily  DOBUTamine Infusion 7.5 MICROgram(s)/kG/Min (7.14 mL/Hr) IV Continuous <Continuous>  heparin  Infusion.  Unit(s)/Hr (11 mL/Hr) IV Continuous <Continuous>  magnesium gluconate 500 milliGRAM(s) Oral daily  melatonin 5 milliGRAM(s) Oral at bedtime  milrinone Infusion 0.125 MICROgram(s)/kG/Min (2.38 mL/Hr) IV Continuous <Continuous>  norepinephrine Infusion 0.05 MICROgram(s)/kG/Min (2.98 mL/Hr) IV Continuous <Continuous>  pantoprazole    Tablet 40 milliGRAM(s) Oral before breakfast    MEDICATIONS  (PRN):  benzocaine 20% Spray 1 Spray(s) Topical four times a day PRN throat pain  heparin   Injectable 5000 Unit(s) IV Push every 6 hours PRN For aPTT less than 40  heparin   Injectable 2500 Unit(s) IV Push every 6 hours PRN For aPTT between 40 - 57

## 2023-05-03 NOTE — PROGRESS NOTE ADULT - ASSESSMENT
· Assessment	  77 year old woman with PMHx of HTN, HLD, nonischemic cardiomyopathy, HFrEF s/p AICD presents to the ED with 2 weeks of worsening shortness of breath with increasing dry cough over this time period accepted to CCU for cardiogenic shock    IMPRESSION/RECOMMENDATIONS   Hosp course : Cardiogenic Shock , LV thrombus, CT : cardiomegaly w/ RH strain  4/22  TTE : EF <20%, large fixed thrombus vs mass on apical LV wall.  Suspected lung mass with possible LLL post obstructive pneumonia   4/21 CT CAP - R hilar LAD, R perihilar soft tissue density w/ indentation/possible invasion of R pulmonary artery and narrowing of proximal R lobar and segmental branches w/ consolidation of RLL.  Clinically no ongoing bacterial PNA. On RA, comfortable.  -no ABx  -d/c yoon catheter    COVID with a positive test and asymptomatic  On RA  CXR no new GGO   S/p vaccination and one booster.   Timeline of infection is unclear   4/30 COVID-19 positive  5/1,2  COVID-19 NG  WBC 11.5 > goes against COVID-19   -No further diagnostic/therapeutic recommendations for COVID 19     Please do not hesitate to recall ID if any questions arise either through [facility] or through [employer]

## 2023-05-03 NOTE — PROGRESS NOTE ADULT - ASSESSMENT
ASSESSMENT:  77 year old woman with pmh of HTN, HLD, nonischemic cardiomyopathy, HFrEF, s/p ICD and PPM presents to the ED with 2 weeks of worsening shortness of breath. Patient and her son state patient has been experiencing shortness of breath that has progressed from present on activity to now shortness of breath during conversation. Patient also notes an increasing dry cough over this time period. Denies history of smoking, fevers/chills, chest pain, nausea/vomiting, bloody sputum, dark/tarry/bloody bowel movements, upper respiratory symptoms. Upon arrival, patient is HD stable and saturating 98% on RA, talking in complete sentences, no increased work of breathing.  CT C/A/P revealing cardiomegaly with evidence of right heart strain, R hilar lymphadenopathy with R perihilar soft tissue density with indentation/possible invasion of right pulmonary artery, and narrowing of proximal R lobar and segmental branches with associated consolidation of right lower lobe. 2.2 x 2.8 cm filling defect noted in apex of left ventricle. Patient follows with Dr. Frank, last ECHO 8/2021 revealing EF 20-25%, mild Mr, mild TR, mild Pulm HTN.    PLAN:  - HD monitoring  - Bronch/EBUS today  - Will follow up post-op  - Remainder of care as per primary team    [phone]

## 2023-05-03 NOTE — PROGRESS NOTE ADULT - SUBJECTIVE AND OBJECTIVE BOX
ALEX, MIKHAIL  77y, Female    All available historical data reviewed    OVERNIGHT EVENTS:    no fevers  on RA  ROS:  not reliable    VITALS:  T(F): 98.2, Max: 99.3 (05-02-23 @ 16:00)  HR: 105  BP: 93/56  RR: 23Vital Signs Last 24 Hrs  T(C): 36.8 (03 May 2023 03:00), Max: 37.4 (02 May 2023 16:00)  T(F): 98.2 (03 May 2023 03:00), Max: 99.3 (02 May 2023 16:00)  HR: 105 (03 May 2023 07:00) (100 - 119)  BP: 93/56 (03 May 2023 07:00) (82/53 - 100/57)  BP(mean): 66 (03 May 2023 07:00) (62 - 75)  RR: 23 (03 May 2023 07:00) (9 - 30)  SpO2: 96% (03 May 2023 07:00) (95% - 98%)    Parameters below as of 03 May 2023 07:00  Patient On (Oxygen Delivery Method): room air        TESTS & MEASUREMENTS:                        10.8   11.55 )-----------( 258      ( 03 May 2023 04:45 )             32.9     05-03    132<L>  |  94<L>  |  25<H>  ----------------------------<  123<H>  4.3   |  28  |  0.7    Ca    8.8      03 May 2023 04:45  Phos  2.0     05-03  Mg     1.9     05-03    TPro  6.2  /  Alb  3.0<L>  /  TBili  3.2<H>  /  DBili  x   /  AST  26  /  ALT  63<H>  /  AlkPhos  128<H>  05-03    LIVER FUNCTIONS - ( 03 May 2023 04:45 )  Alb: 3.0 g/dL / Pro: 6.2 g/dL / ALK PHOS: 128 U/L / ALT: 63 U/L / AST: 26 U/L / GGT: x                   RADIOLOGY & ADDITIONAL TESTS:  Personal review of radiological diagnostics performed  Echo and EKG results noted when applicable.     MEDICATIONS:  benzocaine 20% Spray 1 Spray(s) Topical four times a day PRN  buMETAnide Injectable 2 milliGRAM(s) IV Push every 8 hours  chlorhexidine 2% Cloths 1 Application(s) Topical daily  DOBUTamine Infusion 7.5 MICROgram(s)/kG/Min IV Continuous <Continuous>  magnesium gluconate 500 milliGRAM(s) Oral daily  melatonin 5 milliGRAM(s) Oral at bedtime  milrinone Infusion 0.2 MICROgram(s)/kG/Min IV Continuous <Continuous>  norepinephrine Infusion 0.05 MICROgram(s)/kG/Min IV Continuous <Continuous>  pantoprazole    Tablet 40 milliGRAM(s) Oral before breakfast      ANTIBIOTICS:

## 2023-05-03 NOTE — PROGRESS NOTE ADULT - SUBJECTIVE AND OBJECTIVE BOX
HPI:  77 year old woman with PMHx of HTN, HLD, nonischemic cardiomyopathy, HFrEF s/p AICD presents to the ED with 2 weeks of worsening shortness of breath. Patient and her son state patient has been experiencing shortness of breath that has progressed from present on activity to now shortness of breath during conversation. Patient also notes an increasing dry cough over this time period. Denies history of smoking, fevers/chills, chest pain, nausea/vomiting, bloody sputum, dark/tarry/bloody bowel movements, upper respiratory symptoms.     In ED, patient's HR is 110 and saturating 98% on RA  CT C/A/P revealing cardiomegaly with evidence of right heart strain, R hilar lymphadenopathy with R perihilar soft tissue density with indentation/possible invasion of right pulmonary artery, and narrowing of proximal R lobar and segmental branches with associated consolidation of right lower lobe. 2.2 x 2.8 cm filling defect noted in apex of left ventricle.   Last ECHO 8/2021 revealing EF 20-25%, mild Mr, mild TR, mild Pulm HTN  Dimer 2600, trops negative BNP 64302  Duplex venous LE negative for DVT; CTA chest negative for PE  Cr 1.4 (0.7 in 2021), Na+ 128, T.Benjamin 2.3, LFTs elevated (hemolyzed)  Lactate 5.8-->4.6  Admitted to SDU (22 Apr 2023 01:10)    Currently admitted to medicine with the primary diagnosis of Lung mass       Today is hospital day 12d.     INTERVAL HPI / OVERNIGHT EVENTS:  Patient was examined and seen at bedside. This morning she is resting comfortably in bed and reports no new issues or overnight events. Patient planned for biopsy of pulmonary artery mass and PICC line. Anticipated to be scheduled tomorrow 5/4/23. Otherwise no complaints, denies chest pain/SOB. Making urine, having bowel movements. Will restart diet and make NPO midnight.   ROS: Otherwise unremarkable     PAST MEDICAL & SURGICAL HISTORY  CHF, stage C    HTN (hypertension)      ALLERGIES  epinephrine (Unknown)    MEDICATIONS  STANDING MEDICATIONS  buMETAnide Injectable 2 milliGRAM(s) IV Push every 8 hours  chlorhexidine 2% Cloths 1 Application(s) Topical daily  DOBUTamine Infusion 7.5 MICROgram(s)/kG/Min IV Continuous <Continuous>  magnesium gluconate 500 milliGRAM(s) Oral daily  melatonin 5 milliGRAM(s) Oral at bedtime  milrinone Infusion 0.25 MICROgram(s)/kG/Min IV Continuous <Continuous>  norepinephrine Infusion 0.05 MICROgram(s)/kG/Min IV Continuous <Continuous>  pantoprazole    Tablet 40 milliGRAM(s) Oral before breakfast    PRN MEDICATIONS  benzocaine 20% Spray 1 Spray(s) Topical four times a day PRN    VITALS:  T(F): 98.8  HR: 111  BP: 89/58  RR: 28  SpO2: 97%    PHYSICAL EXAM  GEN: NAD, Resting comfortably in bed, cooperative  PULM: Clear to auscultation bilaterally, No wheezing, rales, rhonchi, no respiratory distress  CVS: Regular rate and rhythm, S1-S2, no murmurs, heaves, thrills  ABD: Soft, non-tender, non-distended, no guarding, BS +  EXT: No edema/cyanosis, extremities warm/dry, peripheral pulses palpable   NEURO: A&Ox3, no focal deficits    LABS                        10.8   11.55 )-----------( 258      ( 03 May 2023 04:45 )             32.9     05-03    132<L>  |  94<L>  |  25<H>  ----------------------------<  123<H>  4.3   |  28  |  0.7    Ca    8.8      03 May 2023 04:45  Phos  2.0     05-03  Mg     1.9     05-03    TPro  6.2  /  Alb  3.0<L>  /  TBili  3.2<H>  /  DBili  x   /  AST  26  /  ALT  63<H>  /  AlkPhos  128<H>  05-03    PT/INR - ( 03 May 2023 04:45 )   PT: 16.00 sec;   INR: 1.39 ratio         PTT - ( 03 May 2023 04:45 )  PTT:32.1 sec    ABG - ( 03 May 2023 04:48 )  pH, Arterial: 7.52  pH, Blood: x     /  pCO2: 34    /  pO2: 88    / HCO3: 28    / Base Excess: 4.9   /  SaO2: 98.4                        RADIOLOGY  CTA Chest/abdomen/pelvis 4/21  No evidence of pulmonary embolism. (See discussion below for more   findings.)    Marked cardiomegaly.    There is a 2.2 x 2.8 cm rounded filling defect in the region of the apex   of the left ventricle (3/11; 607/95). Differential diagnosis includes an   intracardiac mass or thrombus.    The main pulmonary artery is dilated, measuring 3.6 cm in diameter, which   may be seen with pulmonary hypertension. There is reflux of contrast   material into the IVC and hepatic veins compatible with right heart   dysfunction.    Right hilar lymphadenopathy and right perihilar soft tissue density is   noted. There is segmental narrowing and consolidation noted in the medial   aspect of the right lower lobe. There is a small right pleural effusion.   There is extrinsic indentation and possible invasion of the right main   pulmonary artery (604/142; 605/129; 2/42). There is associated narrowing   of the proximal right lobar and segmental branches. A right hilar / right   lower lobe mass with postobstructive pneumonitis should be ruled out.    US Kidney Bladder 4/22  No sonographic evidence of hydronephrosis.    b/l le venous duplex 4/21  No evidence of deep venous thrombosis in either lower extremity.    TTE 4/22:   1. Left ventricular ejection fraction, by visual estimation, is <20%.   2. Severely decreased global left ventricular systolic function.   3. Severely enlarged left atrium.   4. Elevated mean left atrial pressure.   5. Large, fixed thrombus vs. mass on the apical wall of the left   ventricle.   6. Spectral Doppler shows pseudonormal pattern of left ventricular   myocardial filling (Grade II diastolic dysfunction).   7. Moderately reduced RV systolic function.   8. Mildly enlarged right atrium.   9. Moderate mitral valve regurgitation.  10. Moderate-severe tricuspid regurgitation.  11. Mild pulmonic valve regurgitation.  12. Estimated pulmonary artery systolic pressure is 42.9 mmHg assuming a   right atrial pressure of 15 mmHg, which is consistent with mild pulmonary   hypertension.  13. Endocardial visualization was enhanced with intravenous echo contrast.      CXR 5/3  Unchanged bilateral opacities, right greater than left.  Stable support devices.

## 2023-05-03 NOTE — PROGRESS NOTE ADULT - ASSESSMENT
ASSESSMENT:   77 year old woman with pmh of HTN, HLD, nonischemic cardiomyopathy, HFrEF, s/p ICD and PPM presents to the ED with 2 weeks of worsening shortness of breath.     IMPRESSION:  Cardiogenic shock on inotropic support  HFrEF 20%, NICM  RLL Mass/opacity/ possible Pneumonia, r/o malignancy  LV Mass, ?Thrombus  Transaminitis in the setting of hypotension, shock liver    RHC 5/01: RA 14, RV 46/14, PA 56/26/37, PCWP: 25, CO/C.I mehdi 2.37/1.43, C.O. /C.I thermodilution 2.8/1.69    PLAN:  - s/p RHC 5/1 w/ high filling pressures, low CO/CI  - Continue Dobutamine @ 7.5 mcg/kg/min  - Continue Milrinone @ 0.25 mcg/kg/min  - Incentive Spirometer x10 an hour while awake- please encourage use  - Continue PT / OOB to chair daily as tolerated once SWAN catheter removed (in groin)  - Pre-albumin 5  - s/p 5 day course of IV iron  - COVID management and rest of care as per CCU team  - Discussed with patient and family risk vs benefit of undergoing bronch/EBUS to r/o lung malignancy to determine if patient could possibly be evaluated for advanced heart failure therapies vs home hospice. All questions and concerns addressed, family/patient to further discuss with other family members to make decision of whether or not to proceed with procedure.   - Plan discussed with patient, family, CCU team and CTSx  - Will continue to follow

## 2023-05-03 NOTE — CHART NOTE - NSCHARTNOTEFT_GEN_A_CORE
IR was consulted by primary team and does not want PICC line for the patient at this time. Please reorder IR PICC order if PICC is wanted at a later date.

## 2023-05-04 LAB
ANION GAP SERPL CALC-SCNC: 12 MMOL/L — SIGNIFICANT CHANGE UP (ref 7–14)
APTT BLD: 57.5 SEC — HIGH (ref 27–39.2)
BASE EXCESS BLDV CALC-SCNC: 2.7 MMOL/L — SIGNIFICANT CHANGE UP (ref -2–3)
BASE EXCESS BLDV CALC-SCNC: 4.7 MMOL/L — HIGH (ref -2–3)
BASE EXCESS BLDV CALC-SCNC: 4.8 MMOL/L — HIGH (ref -2–3)
BASE EXCESS BLDV CALC-SCNC: 5.6 MMOL/L — HIGH (ref -2–3)
BASE EXCESS BLDV CALC-SCNC: 5.7 MMOL/L — HIGH (ref -2–3)
BASOPHILS # BLD AUTO: 0.03 K/UL — SIGNIFICANT CHANGE UP (ref 0–0.2)
BASOPHILS NFR BLD AUTO: 0.3 % — SIGNIFICANT CHANGE UP (ref 0–1)
BUN SERPL-MCNC: 24 MG/DL — HIGH (ref 10–20)
CA-I SERPL-SCNC: 1.13 MMOL/L — LOW (ref 1.15–1.33)
CA-I SERPL-SCNC: 1.17 MMOL/L — SIGNIFICANT CHANGE UP (ref 1.15–1.33)
CA-I SERPL-SCNC: 1.19 MMOL/L — SIGNIFICANT CHANGE UP (ref 1.15–1.33)
CA-I SERPL-SCNC: 1.2 MMOL/L — SIGNIFICANT CHANGE UP (ref 1.15–1.33)
CALCIUM SERPL-MCNC: 8.6 MG/DL — SIGNIFICANT CHANGE UP (ref 8.4–10.5)
CHLORIDE SERPL-SCNC: 93 MMOL/L — LOW (ref 98–110)
CO2 SERPL-SCNC: 24 MMOL/L — SIGNIFICANT CHANGE UP (ref 17–32)
CREAT SERPL-MCNC: 0.7 MG/DL — SIGNIFICANT CHANGE UP (ref 0.7–1.5)
EGFR: 88 ML/MIN/1.73M2 — SIGNIFICANT CHANGE UP
EOSINOPHIL # BLD AUTO: 0.06 K/UL — SIGNIFICANT CHANGE UP (ref 0–0.7)
EOSINOPHIL NFR BLD AUTO: 0.6 % — SIGNIFICANT CHANGE UP (ref 0–8)
GAS PNL BLDA: SIGNIFICANT CHANGE UP
GAS PNL BLDA: SIGNIFICANT CHANGE UP
GAS PNL BLDV: 124 MMOL/L — LOW (ref 136–145)
GAS PNL BLDV: 125 MMOL/L — LOW (ref 136–145)
GAS PNL BLDV: 126 MMOL/L — LOW (ref 136–145)
GAS PNL BLDV: 127 MMOL/L — LOW (ref 136–145)
GAS PNL BLDV: SIGNIFICANT CHANGE UP
GLUCOSE SERPL-MCNC: 118 MG/DL — HIGH (ref 70–99)
HCO3 BLDV-SCNC: 26 MMOL/L — SIGNIFICANT CHANGE UP (ref 22–29)
HCO3 BLDV-SCNC: 28 MMOL/L — SIGNIFICANT CHANGE UP (ref 22–29)
HCO3 BLDV-SCNC: 29 MMOL/L — SIGNIFICANT CHANGE UP (ref 22–29)
HCT VFR BLD CALC: 31.1 % — LOW (ref 37–47)
HCT VFR BLD CALC: 32.1 % — LOW (ref 37–47)
HCT VFR BLDA CALC: 31 % — LOW (ref 39–51)
HCT VFR BLDA CALC: 31 % — LOW (ref 39–51)
HCT VFR BLDA CALC: 33 % — LOW (ref 39–51)
HCT VFR BLDA CALC: 34 % — LOW (ref 39–51)
HGB BLD CALC-MCNC: 10.2 G/DL — LOW (ref 12.6–17.4)
HGB BLD CALC-MCNC: 10.3 G/DL — LOW (ref 12.6–17.4)
HGB BLD CALC-MCNC: 11.1 G/DL — LOW (ref 12.6–17.4)
HGB BLD CALC-MCNC: 11.2 G/DL — LOW (ref 12.6–17.4)
HGB BLD-MCNC: 10.3 G/DL — LOW (ref 12–16)
HGB BLD-MCNC: 10.9 G/DL — LOW (ref 12–16)
HOROWITZ INDEX BLDV+IHG-RTO: 21 — SIGNIFICANT CHANGE UP
IMM GRANULOCYTES NFR BLD AUTO: 0.7 % — HIGH (ref 0.1–0.3)
LACTATE BLDV-MCNC: 0.8 MMOL/L — SIGNIFICANT CHANGE UP (ref 0.5–2)
LACTATE BLDV-MCNC: 1 MMOL/L — SIGNIFICANT CHANGE UP (ref 0.5–2)
LACTATE BLDV-MCNC: 1.2 MMOL/L — SIGNIFICANT CHANGE UP (ref 0.5–2)
LACTATE BLDV-MCNC: 1.4 MMOL/L — SIGNIFICANT CHANGE UP (ref 0.5–2)
LACTATE BLDV-MCNC: 1.5 MMOL/L — SIGNIFICANT CHANGE UP (ref 0.5–2)
LYMPHOCYTES # BLD AUTO: 1.49 K/UL — SIGNIFICANT CHANGE UP (ref 1.2–3.4)
LYMPHOCYTES # BLD AUTO: 14.9 % — LOW (ref 20.5–51.1)
MAGNESIUM SERPL-MCNC: 1.8 MG/DL — SIGNIFICANT CHANGE UP (ref 1.8–2.4)
MCHC RBC-ENTMCNC: 30.7 PG — SIGNIFICANT CHANGE UP (ref 27–31)
MCHC RBC-ENTMCNC: 31.2 PG — HIGH (ref 27–31)
MCHC RBC-ENTMCNC: 33.1 G/DL — SIGNIFICANT CHANGE UP (ref 32–37)
MCHC RBC-ENTMCNC: 34 G/DL — SIGNIFICANT CHANGE UP (ref 32–37)
MCV RBC AUTO: 92 FL — SIGNIFICANT CHANGE UP (ref 81–99)
MCV RBC AUTO: 92.8 FL — SIGNIFICANT CHANGE UP (ref 81–99)
MONOCYTES # BLD AUTO: 0.73 K/UL — HIGH (ref 0.1–0.6)
MONOCYTES NFR BLD AUTO: 7.3 % — SIGNIFICANT CHANGE UP (ref 1.7–9.3)
NEUTROPHILS # BLD AUTO: 7.61 K/UL — HIGH (ref 1.4–6.5)
NEUTROPHILS NFR BLD AUTO: 76.2 % — HIGH (ref 42.2–75.2)
NRBC # BLD: 0 /100 WBCS — SIGNIFICANT CHANGE UP (ref 0–0)
NRBC # BLD: 0 /100 WBCS — SIGNIFICANT CHANGE UP (ref 0–0)
PCO2 BLDV: 34 MMHG — LOW (ref 39–42)
PCO2 BLDV: 34 MMHG — LOW (ref 39–42)
PCO2 BLDV: 35 MMHG — LOW (ref 39–42)
PCO2 BLDV: 36 MMHG — LOW (ref 39–42)
PCO2 BLDV: 36 MMHG — LOW (ref 39–42)
PH BLDV: 7.49 — HIGH (ref 7.32–7.43)
PH BLDV: 7.5 — HIGH (ref 7.32–7.43)
PH BLDV: 7.5 — HIGH (ref 7.32–7.43)
PH BLDV: 7.52 — HIGH (ref 7.32–7.43)
PH BLDV: 7.53 — HIGH (ref 7.32–7.43)
PHOSPHATE SERPL-MCNC: 2.4 MG/DL — SIGNIFICANT CHANGE UP (ref 2.1–4.9)
PLATELET # BLD AUTO: 263 K/UL — SIGNIFICANT CHANGE UP (ref 130–400)
PLATELET # BLD AUTO: 266 K/UL — SIGNIFICANT CHANGE UP (ref 130–400)
PMV BLD: 11 FL — HIGH (ref 7.4–10.4)
PMV BLD: 11.3 FL — HIGH (ref 7.4–10.4)
PO2 BLDV: 35 MMHG — SIGNIFICANT CHANGE UP
PO2 BLDV: 35 MMHG — SIGNIFICANT CHANGE UP
PO2 BLDV: 39 MMHG — SIGNIFICANT CHANGE UP
PO2 BLDV: 43 MMHG — SIGNIFICANT CHANGE UP
PO2 BLDV: 58 MMHG — SIGNIFICANT CHANGE UP
POTASSIUM BLDV-SCNC: 3.2 MMOL/L — LOW (ref 3.5–5.1)
POTASSIUM BLDV-SCNC: 3.5 MMOL/L — SIGNIFICANT CHANGE UP (ref 3.5–5.1)
POTASSIUM BLDV-SCNC: 3.6 MMOL/L — SIGNIFICANT CHANGE UP (ref 3.5–5.1)
POTASSIUM BLDV-SCNC: 3.7 MMOL/L — SIGNIFICANT CHANGE UP (ref 3.5–5.1)
POTASSIUM SERPL-MCNC: 4 MMOL/L — SIGNIFICANT CHANGE UP (ref 3.5–5)
POTASSIUM SERPL-SCNC: 4 MMOL/L — SIGNIFICANT CHANGE UP (ref 3.5–5)
RBC # BLD: 3.35 M/UL — LOW (ref 4.2–5.4)
RBC # BLD: 3.49 M/UL — LOW (ref 4.2–5.4)
RBC # FLD: 15.7 % — HIGH (ref 11.5–14.5)
RBC # FLD: 15.7 % — HIGH (ref 11.5–14.5)
SAO2 % BLDV: 60.2 % — SIGNIFICANT CHANGE UP
SAO2 % BLDV: 62.5 % — SIGNIFICANT CHANGE UP
SAO2 % BLDV: 70.6 % — SIGNIFICANT CHANGE UP
SAO2 % BLDV: 73.5 % — SIGNIFICANT CHANGE UP
SAO2 % BLDV: 89.2 % — SIGNIFICANT CHANGE UP
SODIUM SERPL-SCNC: 129 MMOL/L — LOW (ref 135–146)
WBC # BLD: 9.67 K/UL — SIGNIFICANT CHANGE UP (ref 4.8–10.8)
WBC # BLD: 9.99 K/UL — SIGNIFICANT CHANGE UP (ref 4.8–10.8)
WBC # FLD AUTO: 9.67 K/UL — SIGNIFICANT CHANGE UP (ref 4.8–10.8)
WBC # FLD AUTO: 9.99 K/UL — SIGNIFICANT CHANGE UP (ref 4.8–10.8)

## 2023-05-04 PROCEDURE — 99291 CRITICAL CARE FIRST HOUR: CPT | Mod: 25

## 2023-05-04 PROCEDURE — 99231 SBSQ HOSP IP/OBS SF/LOW 25: CPT

## 2023-05-04 PROCEDURE — 71275 CT ANGIOGRAPHY CHEST: CPT | Mod: 26

## 2023-05-04 PROCEDURE — 93010 ELECTROCARDIOGRAM REPORT: CPT

## 2023-05-04 PROCEDURE — 71045 X-RAY EXAM CHEST 1 VIEW: CPT | Mod: 26,77

## 2023-05-04 PROCEDURE — 36573 INSJ PICC RS&I 5 YR+: CPT

## 2023-05-04 PROCEDURE — 99233 SBSQ HOSP IP/OBS HIGH 50: CPT

## 2023-05-04 PROCEDURE — 71045 X-RAY EXAM CHEST 1 VIEW: CPT | Mod: 26

## 2023-05-04 RX ORDER — ALTEPLASE 100 MG
2 KIT INTRAVENOUS ONCE
Refills: 0 | Status: COMPLETED | OUTPATIENT
Start: 2023-05-04 | End: 2023-05-04

## 2023-05-04 RX ORDER — HEPARIN SODIUM 5000 [USP'U]/ML
5000 INJECTION INTRAVENOUS; SUBCUTANEOUS EVERY 6 HOURS
Refills: 0 | Status: DISCONTINUED | OUTPATIENT
Start: 2023-05-04 | End: 2023-05-05

## 2023-05-04 RX ORDER — MEGESTROL ACETATE 40 MG/ML
40 SUSPENSION ORAL
Refills: 0 | Status: DISCONTINUED | OUTPATIENT
Start: 2023-05-04 | End: 2023-05-05

## 2023-05-04 RX ORDER — HEPARIN SODIUM 5000 [USP'U]/ML
INJECTION INTRAVENOUS; SUBCUTANEOUS
Qty: 25000 | Refills: 0 | Status: DISCONTINUED | OUTPATIENT
Start: 2023-05-04 | End: 2023-05-05

## 2023-05-04 RX ORDER — HEPARIN SODIUM 5000 [USP'U]/ML
5000 INJECTION INTRAVENOUS; SUBCUTANEOUS ONCE
Refills: 0 | Status: COMPLETED | OUTPATIENT
Start: 2023-05-04 | End: 2023-05-04

## 2023-05-04 RX ORDER — HEPARIN SODIUM 5000 [USP'U]/ML
2500 INJECTION INTRAVENOUS; SUBCUTANEOUS EVERY 6 HOURS
Refills: 0 | Status: DISCONTINUED | OUTPATIENT
Start: 2023-05-04 | End: 2023-05-05

## 2023-05-04 RX ADMIN — Medication 4.76 MICROGRAM(S)/KG/MIN: at 01:50

## 2023-05-04 RX ADMIN — Medication 5 MILLIGRAM(S): at 21:30

## 2023-05-04 RX ADMIN — MEGESTROL ACETATE 40 MILLIGRAM(S): 40 SUSPENSION ORAL at 21:30

## 2023-05-04 RX ADMIN — Medication 500 MILLIGRAM(S): at 12:43

## 2023-05-04 RX ADMIN — HEPARIN SODIUM 1100 UNIT(S)/HR: 5000 INJECTION INTRAVENOUS; SUBCUTANEOUS at 16:38

## 2023-05-04 RX ADMIN — MILRINONE LACTATE 5.72 MICROGRAM(S)/KG/MIN: 1 INJECTION, SOLUTION INTRAVENOUS at 06:02

## 2023-05-04 RX ADMIN — BUMETANIDE 2 MILLIGRAM(S): 0.25 INJECTION INTRAMUSCULAR; INTRAVENOUS at 06:42

## 2023-05-04 RX ADMIN — ALTEPLASE 2 MILLIGRAM(S): KIT at 12:42

## 2023-05-04 RX ADMIN — HEPARIN SODIUM 5000 UNIT(S): 5000 INJECTION INTRAVENOUS; SUBCUTANEOUS at 16:37

## 2023-05-04 RX ADMIN — MEGESTROL ACETATE 40 MILLIGRAM(S): 40 SUSPENSION ORAL at 17:17

## 2023-05-04 RX ADMIN — CHLORHEXIDINE GLUCONATE 1 APPLICATION(S): 213 SOLUTION TOPICAL at 12:43

## 2023-05-04 NOTE — PROCEDURE NOTE - NSPROCNAME_GEN_A_CORE
PICC Line Insertion
Arterial Puncture/Cannulation
Central Line Insertion

## 2023-05-04 NOTE — PROGRESS NOTE ADULT - SUBJECTIVE AND OBJECTIVE BOX
HPI:  77 year old woman with PMHx of HTN, HLD, nonischemic cardiomyopathy, HFrEF s/p AICD presents to the ED with 2 weeks of worsening shortness of breath. Patient and her son state patient has been experiencing shortness of breath that has progressed from present on activity to now shortness of breath during conversation. Patient also notes an increasing dry cough over this time period. Denies history of smoking, fevers/chills, chest pain, nausea/vomiting, bloody sputum, dark/tarry/bloody bowel movements, upper respiratory symptoms.     In ED, patient's HR is 110 and saturating 98% on RA  CT C/A/P revealing cardiomegaly with evidence of right heart strain, R hilar lymphadenopathy with R perihilar soft tissue density with indentation/possible invasion of right pulmonary artery, and narrowing of proximal R lobar and segmental branches with associated consolidation of right lower lobe. 2.2 x 2.8 cm filling defect noted in apex of left ventricle.   Last ECHO 8/2021 revealing EF 20-25%, mild Mr, mild TR, mild Pulm HTN  Dimer 2600, trops negative BNP 83128  Duplex venous LE negative for DVT; CTA chest negative for PE  Cr 1.4 (0.7 in 2021), Na+ 128, T.Benjamin 2.3, LFTs elevated (hemolyzed)  Lactate 5.8-->4.6  Admitted to SDU (22 Apr 2023 01:10)    Currently admitted to medicine with the primary diagnosis of Lung mass       Today is hospital day 13d.     INTERVAL HPI / OVERNIGHT EVENTS:  Patient was examined and seen at bedside. This morning she is resting comfortably in bed and reports no new issues or overnight events. Patient planned for PICC line today, pulmonary artery mass biopsy pending either via IR/CT surgery. Otherwise no complaints, denies chest pain/SOB. Making urine, having bowel movements.    ROS: Otherwise unremarkable     PAST MEDICAL & SURGICAL HISTORY  CHF, stage C    HTN (hypertension)      ALLERGIES  epinephrine (Unknown)    MEDICATIONS  STANDING MEDICATIONS  chlorhexidine 2% Cloths 1 Application(s) Topical daily  DOBUTamine Infusion 2.5 MICROgram(s)/kG/Min IV Continuous <Continuous>  magnesium gluconate 500 milliGRAM(s) Oral daily  melatonin 5 milliGRAM(s) Oral at bedtime  milrinone Infusion 0.3 MICROgram(s)/kG/Min IV Continuous <Continuous>  pantoprazole    Tablet 40 milliGRAM(s) Oral before breakfast    PRN MEDICATIONS  benzocaine 20% Spray 1 Spray(s) Topical four times a day PRN    VITALS:  T(F): 99  HR: 106  BP: 97/63  RR: 31  SpO2: 100%    PHYSICAL EXAM  GEN: NAD, Resting comfortably in bed, cooperative  PULM: Clear to auscultation bilaterally, No wheezing, rales, rhonchi, no respiratory distress  CVS: Regular rate and rhythm, S1-S2, no murmurs, heaves, thrills  ABD: Soft, non-tender, non-distended, no guarding, BS +  EXT: No edema/cyanosis, extremities warm/dry, peripheral pulses palpable   NEURO: A&Ox3, no focal deficits    LABS                        10.3   9.99  )-----------( 263      ( 04 May 2023 04:41 )             31.1     05-04    129<L>  |  93<L>  |  24<H>  ----------------------------<  118<H>  4.0   |  24  |  0.7    Ca    8.6      04 May 2023 04:41  Phos  2.4     05-04  Mg     1.8     05-04    TPro  6.2  /  Alb  3.0<L>  /  TBili  3.2<H>  /  DBili  x   /  AST  26  /  ALT  63<H>  /  AlkPhos  128<H>  05-03    PT/INR - ( 03 May 2023 04:45 )   PT: 16.00 sec;   INR: 1.39 ratio         PTT - ( 04 May 2023 03:04 )  PTT:57.5 sec    ABG - ( 04 May 2023 09:41 )  pH, Arterial: 7.54  pH, Blood: x     /  pCO2: 31    /  pO2: 89    / HCO3: 26    / Base Excess: 4.4   /  SaO2: 98.7                        RADIOLOGY  CTA Chest/abdomen/pelvis 4/21  No evidence of pulmonary embolism. (See discussion below for more   findings.)    Marked cardiomegaly.    There is a 2.2 x 2.8 cm rounded filling defect in the region of the apex   of the left ventricle (3/11; 607/95). Differential diagnosis includes an   intracardiac mass or thrombus.    The main pulmonary artery is dilated, measuring 3.6 cm in diameter, which   may be seen with pulmonary hypertension. There is reflux of contrast   material into the IVC and hepatic veins compatible with right heart   dysfunction.    Right hilar lymphadenopathy and right perihilar soft tissue density is   noted. There is segmental narrowing and consolidation noted in the medial   aspect of the right lower lobe. There is a small right pleural effusion.   There is extrinsic indentation and possible invasion of the right main   pulmonary artery (604/142; 605/129; 2/42). There is associated narrowing   of the proximal right lobar and segmental branches. A right hilar / right   lower lobe mass with postobstructive pneumonitis should be ruled out.    US Kidney Bladder 4/22  No sonographic evidence of hydronephrosis.    b/l le venous duplex 4/21  No evidence of deep venous thrombosis in either lower extremity.    TTE 4/22:   1. Left ventricular ejection fraction, by visual estimation, is <20%.   2. Severely decreased global left ventricular systolic function.   3. Severely enlarged left atrium.   4. Elevated mean left atrial pressure.   5. Large, fixed thrombus vs. mass on the apical wall of the left   ventricle.   6. Spectral Doppler shows pseudonormal pattern of left ventricular   myocardial filling (Grade II diastolic dysfunction).   7. Moderately reduced RV systolic function.   8. Mildly enlarged right atrium.   9. Moderate mitral valve regurgitation.  10. Moderate-severe tricuspid regurgitation.  11. Mild pulmonic valve regurgitation.  12. Estimated pulmonary artery systolic pressure is 42.9 mmHg assuming a   right atrial pressure of 15 mmHg, which is consistent with mild pulmonary   hypertension.  13. Endocardial visualization was enhanced with intravenous echo contrast.      CXR 5/3  Unchanged bilateral opacities, right greater than left.  Stable support devices.

## 2023-05-04 NOTE — PROCEDURE NOTE - NSPOSTCAREGUIDE_GEN_A_CORE
Verbal/written post procedure instructions were given to patient/caregiver/Instructed patient/caregiver to follow-up with primary care physician/Instructed patient/caregiver regarding signs and symptoms of infection/Keep the cast/splint/dressing clean and dry/Care for catheter as per unit/ICU protocols
Verbal/written post procedure instructions were given to patient/caregiver
Verbal/written post procedure instructions were given to patient/caregiver
Care for catheter as per unit/ICU protocols

## 2023-05-04 NOTE — PROCEDURE NOTE - NSINDICATIONS_GEN_A_CORE
arterial puncture to obtain ABG's/blood sampling/critical patient/monitoring purposes
venous access
arterial puncture to obtain ABG's/monitoring purposes
arterial puncture to obtain ABG's/blood sampling/cannulation purposes/critical patient/monitoring purposes
venous access

## 2023-05-04 NOTE — PRE-ANESTHESIA EVALUATION ADULT - NSANTHOSAYNRD_GEN_A_CORE
No. KELLIE screening performed.  STOP BANG Legend: 0-2 = LOW Risk; 3-4 = INTERMEDIATE Risk; 5-8 = HIGH Risk
No. KELLIE screening performed.  STOP BANG Legend: 0-2 = LOW Risk; 3-4 = INTERMEDIATE Risk; 5-8 = HIGH Risk

## 2023-05-04 NOTE — PROGRESS NOTE ADULT - ASSESSMENT
HPI: 77F with PMH of HTN, HLD, NICCM, HFrEF s/p AICD, here from ED with worsening SOB, requiring CEU admission. Found to have cardiomegaly here, and course c/b worsening DAVID and suspected ischemic hepatitis, requiring dopatine gtt for BP support, and transferred to CCU. Also found to have LV thrombus, on heparin gtt. RHC (4/24) showed cardiogenic shock. Is also on IV zosyn for post-obstructive PNA. Found to have lung mass, awaiting PET outpatient to r/o malignancy. Patient is full code. Palliative care consulted for GOC.    Spoke with patient, son (Isidro), and  at bedside Re-introduced palliative care. Discussed GOC. They all agree they want to proceed with lung bx in order to see what HF interventions/treatments would be available to her. They discussing risks and benefits with Dr. Sherwood routinely. Will follow up for further GOC discussion.     Education about palliative care provided to patient/family.  See Recs below.    Please call [phone] with questions or concerns 24/7.   We will continue to follow.

## 2023-05-04 NOTE — PROGRESS NOTE ADULT - SUBJECTIVE AND OBJECTIVE BOX
THORACIC SURGERY PROGRESS NOTE    Patient: MIKHAIL JOHNSON , 77y ([birth year])Female   MRN: [id number]  Location: [address]  Visit: 04-21-23 Inpatient  Date: 05-04-23 @ 10:33    Hospital Day #: 14    Procedure/Dx/Injuries: perihilar mass w/ possible right pulmonary artery invasion/right main bronchus invasion, 2.8cm left ventricle apical mass    Events of past 24 hours: Procedure cancelled. F/U CT chest and IR consult for possible biopsy.      PAST MEDICAL & SURGICAL HISTORY:  CHF, stage C      HTN (hypertension)          Vitals:   T(F): 99.5 (05-04-23 @ 08:00), Max: 99.5 (05-04-23 @ 08:00)  HR: 109 (05-04-23 @ 08:27)  BP: 91/59 (05-04-23 @ 08:27)  RR: 30 (05-04-23 @ 08:27)  SpO2: 99% (05-04-23 @ 08:27)      Diet, NPO after Midnight:      NPO Start Date: 03-May-2023,   NPO Start Time: 23:59  Diet, DASH/TLC:   Sodium & Cholesterol Restricted  1200mL Fluid Restriction ([id number])  Supplement Feeding Modality:  Oral  Ensure Plus High Protein Cans or Servings Per Day:  2       Frequency:  Daily      Fluids:     I & O's:    05-03-23 @ 07:01  -  05-04-23 @ 07:00  --------------------------------------------------------  IN:    DOBUTamine: 127.8 mL    DOBUTamine: 9.6 mL    DOBUTamine: 7.2 mL    Heparin Infusion: 132 mL    Milrinone: 19 mL    Milrinone: 81.6 mL    Milrinone: 5.7 mL    Oral Fluid: 240 mL  Total IN: 622.9 mL    OUT:    Norepinephrine: 0 mL    Ureteral Catheter (mL): 1165 mL  Total OUT: 1165 mL    Total NET: -542.1 mL    PHYSICAL EXAM:  General: NAD  Cardiac: S1, S2  Respiratory: Chest rise equal bilaterally, no labored breathing  Abdomen: Soft, non-distended, non-tender    MEDICATIONS  (STANDING):  alteplase for catheter clearance 2 milliGRAM(s) Catheter once  chlorhexidine 2% Cloths 1 Application(s) Topical daily  DOBUTamine Infusion 2.5 MICROgram(s)/kG/Min (2.38 mL/Hr) IV Continuous <Continuous>  magnesium gluconate 500 milliGRAM(s) Oral daily  melatonin 5 milliGRAM(s) Oral at bedtime  milrinone Infusion 0.3 MICROgram(s)/kG/Min (5.72 mL/Hr) IV Continuous <Continuous>  pantoprazole    Tablet 40 milliGRAM(s) Oral before breakfast    MEDICATIONS  (PRN):  benzocaine 20% Spray 1 Spray(s) Topical four times a day PRN throat pain    GI PROPHYLAXIS: pantoprazole    Tablet 40 milliGRAM(s) Oral before breakfast        LAB/STUDIES:  Labs:                      10.3   9.99  )-----------( 263      ( 04 May 2023 04:41 )             31.1       Auto Neutrophil %: 76.2 % (05-04-23 @ 04:41)  Auto Immature Granulocyte %: 0.7 % (05-04-23 @ 04:41)    05-04    129<L>  |  93<L>  |  24<H>  ----------------------------<  118<H>  4.0   |  24  |  0.7      Calcium, Total Serum: 8.6 mg/dL (05-04-23 @ 04:41)      LFTs:             6.2  | 3.2  | 26       ------------------[128     ( 03 May 2023 04:45 )  3.0  | x    | 63           Blood Gas Venous - Lactate: 1.00 mmol/L (05-04-23 @ 09:41)  Blood Gas Arterial, Lactate: 1.20 mmol/L (05-04-23 @ 09:41)  Blood Gas Venous - Lactate: 0.80 mmol/L (05-04-23 @ 05:14)  Blood Gas Venous - Lactate: 1.20 mmol/L (05-04-23 @ 03:14)  Blood Gas Venous - Lactate: 1.40 mmol/L (05-04-23 @ 00:47)  Blood Gas Arterial, Lactate: 1.20 mmol/L (05-04-23 @ 00:47)  Blood Gas Arterial, Lactate: 1.00 mmol/L (05-03-23 @ 04:48)  Blood Gas Venous - Lactate: 1.00 mmol/L (05-03-23 @ 04:48)  Blood Gas Venous - Lactate: 1.60 mmol/L (05-03-23 @ 00:35)  Blood Gas Arterial, Lactate: 1.40 mmol/L (05-03-23 @ 00:34)  Blood Gas Arterial, Lactate: 1.60 mmol/L (05-02-23 @ 05:11)  Blood Gas Arterial, Lactate: 1.40 mmol/L (05-01-23 @ 21:09)    ABG - ( 04 May 2023 09:41 )  pH: 7.54  /  pCO2: 31    /  pO2: 89    / HCO3: 26    / Base Excess: 4.4   /  SaO2: 98.7      ABG - ( 04 May 2023 00:47 )  pH: 7.58  /  pCO2: 30    /  pO2: 111   / HCO3: 28    / Base Excess: 6.4   /  SaO2: 100.0     ABG - ( 03 May 2023 04:48 )  pH: 7.52  /  pCO2: 34    /  pO2: 88    / HCO3: 28    / Base Excess: 4.9   /  SaO2: 98.4        Coags:     x      ----< x       ( 04 May 2023 03:04 )     57.5          IMAGING:  Pending

## 2023-05-04 NOTE — CHART NOTE - NSCHARTNOTEFT_GEN_A_CORE
Thoracic Surgery Pre-op Note    Patient is a 77y old  Female who presents with a chief complaint of intra cardiac mass (04 May 2023 14:53)    Procedure: Bronchoscopy, Endobronchial Ultrasound, FNA of mediastinal LN  Surgeon: Dr. Bobo Soler    Vitals/Labs:  Vital Signs Last 24 Hrs  T(C): 37.5 (04 May 2023 17:00), Max: 37.5 (04 May 2023 08:00)  T(F): 99.5 (04 May 2023 17:00), Max: 99.5 (04 May 2023 08:00)  HR: 114 (04 May 2023 18:00) (104 - 120)  BP: 89/53 (04 May 2023 18:00) (79/57 - 99/67)  BP(mean): 65 (04 May 2023 18:00) (62 - 77)  RR: 30 (04 May 2023 18:00) (22 - 34)  SpO2: 97% (04 May 2023 18:00) (96% - 100%)    Parameters below as of 04 May 2023 17:00  Patient On (Oxygen Delivery Method): room air    I&O's Detail    03 May 2023 07:01  -  04 May 2023 07:00  --------------------------------------------------------  IN:    DOBUTamine: 127.8 mL    DOBUTamine: 9.6 mL    DOBUTamine: 9.5 mL    Heparin Infusion: 132 mL    Milrinone: 19 mL    Milrinone: 81.6 mL    Milrinone: 11.4 mL    Oral Fluid: 240 mL  Total IN: 630.9 mL    OUT:    Norepinephrine: 0 mL    Ureteral Catheter (mL): 1245 mL  Total OUT: 1245 mL    Total NET: -614.1 mL      04 May 2023 07:01  -  04 May 2023 19:49  --------------------------------------------------------  IN:    DOBUTamine: 27.6 mL    Heparin Infusion: 33 mL    Milrinone: 68.4 mL  Total IN: 129 mL    OUT:    Ureteral Catheter (mL): 580 mL  Total OUT: 580 mL    Total NET: -451 mL                        10.3   9.99  )-----------( 263      ( 04 May 2023 04:41 )             31.1       05-04    129<L>  |  93<L>  |  24<H>  ----------------------------<  118<H>  4.0   |  24  |  0.7    Ca    8.6      04 May 2023 04:41  Phos  2.4     05-04  Mg     1.8     05-04    TPro  6.2  /  Alb  3.0<L>  /  TBili  3.2<H>  /  DBili  x   /  AST  26  /  ALT  63<H>  /  AlkPhos  128<H>  05-03      CAPILLARY BLOOD GLUCOSE    LIVER FUNCTIONS - ( 03 May 2023 04:45 )  Alb: 3.0 g/dL / Pro: 6.2 g/dL / ALK PHOS: 128 U/L / ALT: 63 U/L / AST: 26 U/L / GGT: x           PT/INR - ( 03 May 2023 04:45 )   PT: 16.00 sec;   INR: 1.39 ratio       PTT - ( 04 May 2023 03:04 )  PTT:57.5 sec    Assessment & Plan:  MIKHAIL JOHNSON 77y Female     - NPO w IVF after midnight   - Please hold heparin gtt after midnight   - Pain Control   - Inputs and outputs   - ok for DVT ppx   - CBC, BMP w/ mag and phos, PTT and PT/INR, type and screen     MEDICATIONS  (STANDING):  chlorhexidine 2% Cloths 1 Application(s) Topical daily  DOBUTamine Infusion 2.5 MICROgram(s)/kG/Min (2.38 mL/Hr) IV Continuous <Continuous>  heparin  Infusion.  Unit(s)/Hr (11 mL/Hr) IV Continuous <Continuous>  magnesium gluconate 500 milliGRAM(s) Oral daily  megestrol 40 milliGRAM(s) Oral four times a day  melatonin 5 milliGRAM(s) Oral at bedtime  milrinone Infusion 0.3 MICROgram(s)/kG/Min (5.72 mL/Hr) IV Continuous <Continuous>  pantoprazole    Tablet 40 milliGRAM(s) Oral before breakfast    MEDICATIONS  (PRN):  benzocaine 20% Spray 1 Spray(s) Topical four times a day PRN throat pain  heparin   Injectable 5000 Unit(s) IV Push every 6 hours PRN For aPTT less than 40  heparin   Injectable 2500 Unit(s) IV Push every 6 hours PRN For aPTT between 40 - 57

## 2023-05-04 NOTE — PROGRESS NOTE ADULT - ASSESSMENT
77 year old woman with PMHx of HTN, HLD, nonischemic cardiomyopathy, HFrEF s/p AICD presents to the ED with 2 weeks of worsening shortness of breath with increasing dry cough over this time period accepted to CCU for cardiogenic shock    #Cardiogenic Shock   #LV thrombus  #NICM  #HFrEF 25% s/p AICD  #HTN/HLD  - CT CAP - cardiomegaly w/ RH strain  - TTE 4/22/2023 EF <20%, large fixed thrombus vs mass on apical LV wall, GIIDD, mod-sev TR, mod MR, mild HI, mild PH  - LE venous duplex 4/21 negative  - c/w dobutamine ggt trying to wean off  --> IR consulted for PICC line today 5/4  - Milrinone started on 5/2 increased to 0.3  - c/w heparin ggt - target ptt 60-99  - s/p 3% saline and bumex, holding diuretics for now   - s/p RHC on 5/1, swan kevon thru R femoral vein, repositioned 5/4 - 76 cm marking      #Post obstructive pneumonia  #Suspected lung malignancy  - CT CAP - R hilar LAD, R perihilar soft tissue density w/ indentation/possible invasion of R pulmonary artery and narrowing of proximal R lobar and segmental branches w/ consolidation of RLL  - MRSA -ve, procal 1.77  - completed zosyn 7 days course 4/27  - Thoracic Surgery/IR following for possible biopsy on 5/4;  agree with PET scan as outpatient to r/o lung malignancy  - Incentive Spirometer    #Positive COVID-19 PCR  - Asymptomatic, per infectious control if 2 consecutive PCRs are negative no need for Isolation - 3 repeats -ve will d/c isolation   - No need for further treatment or diagnostic w/u as per ID    #HAGMA - resolved   #Lactic Acidosis - resolved   #DAVID on CKD - resolved   #Elevated liver enzymes predominantly hepatocellular (AST>ALT) with hyperbilirubinemia likely due to ischemic liver injury - resolved   - trend labs, lactate  - holding lipitor    #Hypotonic Hyponatremia  - hypervolemic   - Na 132-> 129   - trend Na BID    #Follow Up  - PICC today   - pending biopsy via IR vs CTS  - optimize milrinone/dobutamine     # Misc  - DVT Prophylaxis: heparin drip  - GI Prophylaxis: pantoprazole    Tablet 40 milliGRAM(s) Oral before breakfast  - Diet: Diet, DASH/TLC with 1.2 L fluid restriction  - Activity: Activity - Ambulate as Tolerated  - Code Status: Full , Palliative on board

## 2023-05-04 NOTE — PROGRESS NOTE ADULT - SUBJECTIVE AND OBJECTIVE BOX
HPI:    HPI: [id number] with PMH of HTN, HLD, NICCM, HFrEF s/p AICD, here from ED with worsening SOB, requiring CEU admission. Found to have cardiomegaly here, and course c/b worsening DAVID and suspected ischemic hepatitis, requiring dopatine gtt for BP support, and transferred to CCU. Also found to have LV thrombus, on heparin gtt. RHC (4/24) showed cardiogenic shock. Is also on IV zosyn for post-obstructive PNA. Found to have lung mass, awaiting PET outpatient to r/o malignancy. Patient is full code. Palliative care consulted for GOC.    Interval History:  - patient currently on dobutamine, milrinone, bumex infusion  - s/p RHC mary lund 5/1  - plan for possible bx tomorrow     ADVANCE DIRECTIVES:    [ X ] Full Code w/ AICD [ ] DNR  MOLST  [ ]  Living Will  [ ]   DECISION MAKER(s): Patient reports she makes her own decisions   [ ] Health Care Proxy(s)  [X  ] Surrogate(s)  [ ] Guardian           Name(s): Phone Number(s):  Lyle     BASELINE (I)ADL(s) (prior to admission):  Jessamine: [ X]Total  [ ] Moderate [ ]Dependent  Palliative Performance Status Version 2:    100     %    http://npcrc.org/files/news/palliative_performance_scale_ppsv2.pdf - lives @ home with  and is totally active and independent at home     Allergies    epinephrine (Unknown)    Intolerances    MEDICATIONS  (STANDING):  buMETAnide Injectable 2 milliGRAM(s) IV Push every 8 hours  chlorhexidine 2% Cloths 1 Application(s) Topical daily  DOBUTamine Infusion 7.5 MICROgram(s)/kG/Min (7.14 mL/Hr) IV Continuous <Continuous>  heparin  Infusion.  Unit(s)/Hr (11 mL/Hr) IV Continuous <Continuous>  magnesium gluconate 500 milliGRAM(s) Oral daily  melatonin 5 milliGRAM(s) Oral at bedtime  milrinone Infusion 0.125 MICROgram(s)/kG/Min (2.38 mL/Hr) IV Continuous <Continuous>  norepinephrine Infusion 0.05 MICROgram(s)/kG/Min (2.98 mL/Hr) IV Continuous <Continuous>  pantoprazole    Tablet 40 milliGRAM(s) Oral before breakfast    MEDICATIONS  (PRN):  benzocaine 20% Spray 1 Spray(s) Topical four times a day PRN throat pain  heparin   Injectable 5000 Unit(s) IV Push every 6 hours PRN For aPTT less than 40  heparin   Injectable 2500 Unit(s) IV Push every 6 hours PRN For aPTT between 40 - 57    PRESENT SYMPTOMS:  Pain: [ ]yes [ X]no  QOL impact -   Location -                    Aggravating factors -  Quality -  Radiation -  Timing-  Severity (0-10 scale):  Minimal acceptable level (0-10 scale):     CPOT:    https://www.[facility].org/getattachment/[email or web address]/Critical-Care-Pain-Observation-Tool-(CPOT)    PAIN AD Score:   [email or web address].[email or web address]/cog/painad.pdf (press ctrl +  left click to view)    Dyspnea:                           [X ]None[ ]Mild [ ]Moderate [ ]Severe     Respiratory Distress Observation Scale (RDOS):   A score of 0 to 2 signifies little or no respiratory distress, 3 signifies mild distress, scores 4 to 6 indicate moderate distress, and scores greater than 7 signify severe distress  [email or web address]://[email or web address]/sites/default/files/PDFS/856778-wbyxtnakmqq-zkwqqiil-thnpvtcbxsg-qxrgq.pdf    Anxiety:                             [ X]None[ ]Mild [ ]Moderate [ ]Severe   Fatigue:                             [ X]None[ ]Mild [ ]Moderate [ ]Severe   Nausea:                             [X ]None[ ]Mild [ ]Moderate [ ]Severe   Loss of appetite:              [ X]None[ ]Mild [ ]Moderate [ ]Severe   Constipation:                    [X ]None[ ]Mild [ ]Moderate [ ]Severe    Other Symptoms:  [X ]All other review of systems negative     Palliative Performance Status Version 2:     50    %    http://npcrc.org/files/news/palliative_performance_scale_ppsv2.pdf    PHYSICAL EXAM:  ICU Vital Signs Last 24 Hrs  T(C): 37.2 (04 May 2023 12:00), Max: 37.5 (04 May 2023 08:00)  T(F): 99 (04 May 2023 12:00), Max: 99.5 (04 May 2023 08:00)  HR: 106 (04 May 2023 12:00) (104 - 120)  BP: 88/60 (04 May 2023 12:00) (79/57 - 99/67)  BP(mean): 70 (04 May 2023 12:00) (62 - 77)  RR: 26 (04 May 2023 12:00) (22 - 34)  SpO2: 98% (04 May 2023 12:00) (94% - 100%)    GENERAL:  [ X ] No acute distress [ ]Lethargic  [ ]Unarousable  [X ]Verbal  [ ]Non-Verbal [ ]Cachexia    BEHAVIORAL/PSYCH:  [X ]Alert and Oriented x 4  [ ] Anxiety [ ] Delirium [ ] Agitation [ X] Calm   EYES: [ X ] No scleral icterus [ ] Scleral icterus [ ] Closed  ENMT:  [ ]Dry mouth  [ X]No external oral lesions [X ] No external ear or nose lesions  CARDIOVASCULAR:  [ ]Regular [ ]Irregular [ ]Tachy [ ]Not Tachy  [ ]Tyson [ ] Edema [X ] No edema  PULMONARY:  [ ]Tachypnea  [ ]Audible excessive secretions [ X] No labored breathing [ ] labored breathing  GASTROINTESTINAL: [ ]Soft  [ ]Distended  [ X ]Not distended [ ]Non tender [ ]Tender  MUSCULOSKELETAL: [ X ]No clubbing [ ] clubbing  [ X ] No cyanosis [ ] cyanosis  NEUROLOGIC: [ X]No focal deficits  [ X]Follows commands  [ ]Does not follow commands  [ ]Cognitive impairment  [ ]Dysphagia  [ ]Dysarthria  [ ]Paresis   SKIN: [ ] Jaundiced [ X] Non-jaundiced [ ]Rash [ X]No Rash [ ] Warm [ ] Dry  MISC/LINES: [ ] ET tube [ ] Trach [ ]NGT/OGT [ ]PEG [ ]Saenz    CRITICAL CARE:  [ ] Shock Present  [ ]Septic [ ]Cardiogenic [ ]Neurologic [ ]Hypovolemic  [ ]  Vasopressors [ X]  Inotropes   [ ]Respiratory failure present [ ]Mechanical ventilation [ ]Non-invasive ventilatory support [ ]High flow  [ ]Acute  [ ]Chronic [ ]Hypoxic  [ ]Hypercarbic [ ]Other  [ ]Other organ failure     LABS: reviewed by me               05-04    129<L>  |  93<L>  |  24<H>  ----------------------------<  118<H>  4.0   |  24  |  0.7    Ca    8.6      04 May 2023 04:41  Phos  2.4     05-04  Mg     1.8     05-04    TPro  6.2  /  Alb  3.0<L>  /  TBili  3.2<H>  /  DBili  x   /  AST  26  /  ALT  63<H>  /  AlkPhos  128<H>  05-03                            10.3   9.99  )-----------( 263      ( 04 May 2023 04:41 )             31.1       RADIOLOGY & ADDITIONAL STUDIES: reviewed by me    EKG: reviewed by me    < from: 12 Lead ECG (05.01.23 @ 05:16) >    Ventricular Rate 104 BPM    Atrial Rate 104 BPM    P-R Interval 152 ms    QRS Duration 116 ms    Q-T Interval 394 ms    QTC Calculation(Bazett) 518 ms    P Axis 72 degrees    R Axis -42 degrees    T Axis 92 degrees    Diagnosis Line Sinus tachycardia with occasional Premature ventricular complexes  Left axis deviation  Incomplete left bundle branch block  Abnormal ECG    Confirmed by Alejandra Franco MDfer ([id number]) on 5/1/2023 8:31:59 AM    < end of copied text >    < from: TTE Echo Complete w/ Contrast w/ Doppler (04.22.23 @ 08:36) >  Summary:   1. Left ventricular ejection fraction, by visual estimation, is <20%.   2. Severely decreased global left ventricular systolic function.   3. Severely enlarged left atrium.   4. Elevated mean left atrial pressure.   5. Large, fixed thrombus vs. mass on the apical wall of the left   ventricle.   6. Spectral Doppler shows pseudonormal pattern of left ventricular   myocardial filling (Grade II diastolic dysfunction).   7. Moderately reduced RV systolic function.   8. Mildly enlarged right atrium.   9. Moderate mitral valve regurgitation.  10. Moderate-severe tricuspid regurgitation.  11. Mild pulmonic valve regurgitation.  12. Estimated pulmonary artery systolic pressure is 42.9 mmHg assuming a   right atrial pressure of 15 mmHg, which is consistent withmild pulmonary   hypertension.  13. Endocardial visualization was enhanced with intravenous echo contrast.    < end of copied text >        Patient discussed with primary medical team MD  Palliative care education provided to patient and/or family

## 2023-05-04 NOTE — PROCEDURE NOTE - ADDITIONAL PROCEDURE DETAILS
Placement of 5 Fr double lumen 45 cm BARD Power PICC in left upper extremity. Both lumens flush and aspirate well. Tip in cavoatrial junction. Ok to use immediately.

## 2023-05-04 NOTE — PROGRESS NOTE ADULT - ASSESSMENT
77 year old woman with pmh of HTN, HLD, nonischemic cardiomyopathy, HFrEF, s/p ICD and PPM presents to the ED with 2 weeks of worsening shortness of breath. Patient and her son state patient has been experiencing shortness of breath that has progressed from present on activity to now shortness of breath during conversation. Patient also notes an increasing dry cough over this time period. Denies history of smoking, fevers/chills, chest pain, nausea/vomiting, bloody sputum, dark/tarry/bloody bowel movements, upper respiratory symptoms. Upon arrival, patient is HD stable and saturating 98% on RA, talking in complete sentences, no increased work of breathing.  CT C/A/P revealing cardiomegaly with evidence of right heart strain, R hilar lymphadenopathy with R perihilar soft tissue density with indentation/possible invasion of right pulmonary artery, and narrowing of proximal R lobar and segmental branches with associated consolidation of right lower lobe. 2.2 x 2.8 cm filling defect noted in apex of left ventricle. Patient follows with Dr. Frank, last ECHO 8/2021 revealing EF 20-25%, mild Mr, mild TR, mild Pulm HTN.    PLAN:  - HD monitoring  - CT chest w/ IV contrast   - IR consult for possible biopsy  - Remainder of care as per primary team    GREEN SURGERY SPECTRA: [id number]

## 2023-05-04 NOTE — PROCEDURE NOTE - NSSITEPREP_SKIN_A_CORE
chlorhexidine
chlorhexidine
chlorhexidine/Adherence to aseptic technique: hand hygiene prior to donning barriers (gown, gloves), don cap and mask, sterile drape over patient
chlorhexidine/Adherence to aseptic technique: hand hygiene prior to donning barriers (gown, gloves), don cap and mask, sterile drape over patient
alcohol/chlorhexidine

## 2023-05-04 NOTE — PROCEDURE NOTE - NSPROCDETAILS_GEN_ALL_CORE
location identified, draped/prepped, sterile technique used, needle inserted/introduced/positive blood return obtained via catheter/connected to a pressurized flush line/sutured in place/hemostasis with direct pressure, dressing applied/all materials/supplies accounted for at end of procedure
guidewire recovered/lumen(s) aspirated and flushed/sterile dressing applied/sterile technique, catheter placed/ultrasound guidance with use of sterile gel and probe cove
location identified, draped/prepped, sterile technique used/sterile dressing applied/sterile technique, catheter placed/supine position/ultrasound guidance
location identified, draped/prepped, sterile technique used, needle inserted/introduced/positive blood return obtained via catheter/connected to a pressurized flush line/sutured in place
location identified, draped/prepped, sterile technique used, needle inserted/introduced/positive blood return obtained via catheter/connected to a pressurized flush line/sutured in place/hemostasis with direct pressure, dressing applied/all materials/supplies accounted for at end of procedure

## 2023-05-05 ENCOUNTER — RESULT REVIEW (OUTPATIENT)
Age: 78
End: 2023-05-05

## 2023-05-05 LAB
ANION GAP SERPL CALC-SCNC: 12 MMOL/L — SIGNIFICANT CHANGE UP (ref 7–14)
APTT BLD: 104.2 SEC — CRITICAL HIGH (ref 27–39.2)
BASE EXCESS BLDMV CALC-SCNC: 2.9 MMOL/L — SIGNIFICANT CHANGE UP
BASE EXCESS BLDV CALC-SCNC: -4.4 MMOL/L — LOW (ref -2–3)
BASE EXCESS BLDV CALC-SCNC: 4.6 MMOL/L — HIGH (ref -2–3)
BASE EXCESS BLDV CALC-SCNC: 6.3 MMOL/L — HIGH (ref -2–3)
BASE EXCESS BLDV CALC-SCNC: 6.5 MMOL/L — HIGH (ref -2–3)
BASOPHILS # BLD AUTO: 0.03 K/UL — SIGNIFICANT CHANGE UP (ref 0–0.2)
BASOPHILS NFR BLD AUTO: 0.3 % — SIGNIFICANT CHANGE UP (ref 0–1)
BUN SERPL-MCNC: 26 MG/DL — HIGH (ref 10–20)
CA-I SERPL-SCNC: 0.99 MMOL/L — LOW (ref 1.15–1.33)
CA-I SERPL-SCNC: 1.16 MMOL/L — SIGNIFICANT CHANGE UP (ref 1.15–1.33)
CA-I SERPL-SCNC: 1.18 MMOL/L — SIGNIFICANT CHANGE UP (ref 1.15–1.33)
CA-I SERPL-SCNC: 1.2 MMOL/L — SIGNIFICANT CHANGE UP (ref 1.15–1.33)
CALCIUM SERPL-MCNC: 8.8 MG/DL — SIGNIFICANT CHANGE UP (ref 8.4–10.4)
CHLORIDE SERPL-SCNC: 92 MMOL/L — LOW (ref 98–110)
CO2 SERPL-SCNC: 26 MMOL/L — SIGNIFICANT CHANGE UP (ref 17–32)
CREAT SERPL-MCNC: 0.7 MG/DL — SIGNIFICANT CHANGE UP (ref 0.7–1.5)
EGFR: 88 ML/MIN/1.73M2 — SIGNIFICANT CHANGE UP
EOSINOPHIL # BLD AUTO: 0.07 K/UL — SIGNIFICANT CHANGE UP (ref 0–0.7)
EOSINOPHIL NFR BLD AUTO: 0.8 % — SIGNIFICANT CHANGE UP (ref 0–8)
GAS PNL BLDA: SIGNIFICANT CHANGE UP
GAS PNL BLDV: 124 MMOL/L — LOW (ref 136–145)
GAS PNL BLDV: 125 MMOL/L — LOW (ref 136–145)
GAS PNL BLDV: 125 MMOL/L — LOW (ref 136–145)
GAS PNL BLDV: 133 MMOL/L — LOW (ref 136–145)
GAS PNL BLDV: SIGNIFICANT CHANGE UP
GLUCOSE SERPL-MCNC: 122 MG/DL — HIGH (ref 70–99)
HCO3 BLDMV-SCNC: 28 MMOL/L — SIGNIFICANT CHANGE UP
HCO3 BLDV-SCNC: 20 MMOL/L — LOW (ref 22–29)
HCO3 BLDV-SCNC: 28 MMOL/L — SIGNIFICANT CHANGE UP (ref 22–29)
HCO3 BLDV-SCNC: 29 MMOL/L — SIGNIFICANT CHANGE UP (ref 22–29)
HCO3 BLDV-SCNC: 29 MMOL/L — SIGNIFICANT CHANGE UP (ref 22–29)
HCT VFR BLD CALC: 32.6 % — LOW (ref 37–47)
HCT VFR BLDA CALC: 25 % — LOW (ref 39–51)
HCT VFR BLDA CALC: 36 % — LOW (ref 39–51)
HCT VFR BLDA CALC: 36 % — LOW (ref 39–51)
HCT VFR BLDA CALC: 39 % — SIGNIFICANT CHANGE UP (ref 39–51)
HGB BLD CALC-MCNC: 12 G/DL — LOW (ref 12.6–17.4)
HGB BLD CALC-MCNC: 12.1 G/DL — LOW (ref 12.6–17.4)
HGB BLD CALC-MCNC: 13.1 G/DL — SIGNIFICANT CHANGE UP (ref 12.6–17.4)
HGB BLD CALC-MCNC: 8.3 G/DL — LOW (ref 12.6–17.4)
HGB BLD-MCNC: 10.6 G/DL — LOW (ref 12–16)
IMM GRANULOCYTES NFR BLD AUTO: 0.7 % — HIGH (ref 0.1–0.3)
INR BLD: 1.62 RATIO — HIGH (ref 0.65–1.3)
IRON SATN MFR SERPL: 20 % — SIGNIFICANT CHANGE UP (ref 15–50)
IRON SATN MFR SERPL: 21 % — SIGNIFICANT CHANGE UP (ref 15–50)
IRON SATN MFR SERPL: 34 UG/DL — LOW (ref 35–150)
IRON SATN MFR SERPL: 35 UG/DL — SIGNIFICANT CHANGE UP (ref 35–150)
LACTATE BLDV-MCNC: 0.6 MMOL/L — SIGNIFICANT CHANGE UP (ref 0.5–2)
LACTATE BLDV-MCNC: 1.2 MMOL/L — SIGNIFICANT CHANGE UP (ref 0.5–2)
LACTATE BLDV-MCNC: 1.5 MMOL/L — SIGNIFICANT CHANGE UP (ref 0.5–2)
LACTATE BLDV-MCNC: 2 MMOL/L — SIGNIFICANT CHANGE UP (ref 0.5–2)
LYMPHOCYTES # BLD AUTO: 1.52 K/UL — SIGNIFICANT CHANGE UP (ref 1.2–3.4)
LYMPHOCYTES # BLD AUTO: 17.5 % — LOW (ref 20.5–51.1)
MAGNESIUM SERPL-MCNC: 1.7 MG/DL — LOW (ref 1.8–2.4)
MCHC RBC-ENTMCNC: 30.3 PG — SIGNIFICANT CHANGE UP (ref 27–31)
MCHC RBC-ENTMCNC: 32.5 G/DL — SIGNIFICANT CHANGE UP (ref 32–37)
MCV RBC AUTO: 93.1 FL — SIGNIFICANT CHANGE UP (ref 81–99)
MONOCYTES # BLD AUTO: 0.65 K/UL — HIGH (ref 0.1–0.6)
MONOCYTES NFR BLD AUTO: 7.5 % — SIGNIFICANT CHANGE UP (ref 1.7–9.3)
NEUTROPHILS # BLD AUTO: 6.36 K/UL — SIGNIFICANT CHANGE UP (ref 1.4–6.5)
NEUTROPHILS NFR BLD AUTO: 73.2 % — SIGNIFICANT CHANGE UP (ref 42.2–75.2)
NRBC # BLD: 0 /100 WBCS — SIGNIFICANT CHANGE UP (ref 0–0)
O2 CT VFR BLD CALC: 38 MMHG — SIGNIFICANT CHANGE UP
PCO2 BLDMV: 43 MMHG — SIGNIFICANT CHANGE UP
PCO2 BLDV: 33 MMHG — LOW (ref 39–42)
PCO2 BLDV: 34 MMHG — LOW (ref 39–42)
PCO2 BLDV: 36 MMHG — LOW (ref 39–42)
PCO2 BLDV: 38 MMHG — LOW (ref 39–42)
PH BLDMV: 7.42 — SIGNIFICANT CHANGE UP
PH BLDV: 7.39 — SIGNIFICANT CHANGE UP (ref 7.32–7.43)
PH BLDV: 7.48 — HIGH (ref 7.32–7.43)
PH BLDV: 7.52 — HIGH (ref 7.32–7.43)
PH BLDV: 7.54 — HIGH (ref 7.32–7.43)
PHOSPHATE SERPL-MCNC: 2.3 MG/DL — SIGNIFICANT CHANGE UP (ref 2.1–4.9)
PLATELET # BLD AUTO: 277 K/UL — SIGNIFICANT CHANGE UP (ref 130–400)
PMV BLD: 11.2 FL — HIGH (ref 7.4–10.4)
PO2 BLDV: 28 MMHG — SIGNIFICANT CHANGE UP
PO2 BLDV: 32 MMHG — SIGNIFICANT CHANGE UP
PO2 BLDV: 37 MMHG — SIGNIFICANT CHANGE UP
PO2 BLDV: 43 MMHG — SIGNIFICANT CHANGE UP
POTASSIUM BLDV-SCNC: 2.3 MMOL/L — CRITICAL LOW (ref 3.5–5.1)
POTASSIUM BLDV-SCNC: 3.3 MMOL/L — LOW (ref 3.5–5.1)
POTASSIUM BLDV-SCNC: 3.5 MMOL/L — SIGNIFICANT CHANGE UP (ref 3.5–5.1)
POTASSIUM BLDV-SCNC: 3.5 MMOL/L — SIGNIFICANT CHANGE UP (ref 3.5–5.1)
POTASSIUM SERPL-MCNC: 4.2 MMOL/L — SIGNIFICANT CHANGE UP (ref 3.5–5)
POTASSIUM SERPL-SCNC: 4.2 MMOL/L — SIGNIFICANT CHANGE UP (ref 3.5–5)
PROTHROM AB SERPL-ACNC: 18.8 SEC — HIGH (ref 9.95–12.87)
RBC # BLD: 3.5 M/UL — LOW (ref 4.2–5.4)
RBC # FLD: 15.9 % — HIGH (ref 11.5–14.5)
SAO2 % BLDMV: 61.5 % — SIGNIFICANT CHANGE UP
SAO2 % BLDV: 48 % — SIGNIFICANT CHANGE UP
SAO2 % BLDV: 59.8 % — SIGNIFICANT CHANGE UP
SAO2 % BLDV: 64.5 % — SIGNIFICANT CHANGE UP
SAO2 % BLDV: 71.5 % — SIGNIFICANT CHANGE UP
SODIUM SERPL-SCNC: 130 MMOL/L — LOW (ref 135–146)
TIBC SERPL-MCNC: 164 UG/DL — LOW (ref 220–430)
TIBC SERPL-MCNC: 171 UG/DL — LOW (ref 220–430)
UIBC SERPL-MCNC: 130 UG/DL — SIGNIFICANT CHANGE UP (ref 110–370)
UIBC SERPL-MCNC: 136 UG/DL — SIGNIFICANT CHANGE UP (ref 110–370)
WBC # BLD: 8.69 K/UL — SIGNIFICANT CHANGE UP (ref 4.8–10.8)
WBC # FLD AUTO: 8.69 K/UL — SIGNIFICANT CHANGE UP (ref 4.8–10.8)

## 2023-05-05 PROCEDURE — 99291 CRITICAL CARE FIRST HOUR: CPT | Mod: 25

## 2023-05-05 PROCEDURE — 88172 CYTP DX EVAL FNA 1ST EA SITE: CPT | Mod: 26

## 2023-05-05 PROCEDURE — 93010 ELECTROCARDIOGRAM REPORT: CPT

## 2023-05-05 PROCEDURE — 88173 CYTOPATH EVAL FNA REPORT: CPT | Mod: 26

## 2023-05-05 PROCEDURE — 88177 CYTP FNA EVAL EA ADDL: CPT | Mod: 26

## 2023-05-05 PROCEDURE — 99291 CRITICAL CARE FIRST HOUR: CPT

## 2023-05-05 PROCEDURE — 88305 TISSUE EXAM BY PATHOLOGIST: CPT | Mod: 26

## 2023-05-05 PROCEDURE — 71045 X-RAY EXAM CHEST 1 VIEW: CPT | Mod: 26

## 2023-05-05 PROCEDURE — 71045 X-RAY EXAM CHEST 1 VIEW: CPT | Mod: 26,77

## 2023-05-05 PROCEDURE — 31653 BRONCH EBUS SAMPLNG 3/> NODE: CPT

## 2023-05-05 RX ORDER — DOBUTAMINE HCL 250MG/20ML
7.5 VIAL (ML) INTRAVENOUS
Qty: 1000 | Refills: 0 | Status: DISCONTINUED | OUTPATIENT
Start: 2023-05-05 | End: 2023-05-12

## 2023-05-05 RX ORDER — DOBUTAMINE HCL 250MG/20ML
2.5 VIAL (ML) INTRAVENOUS
Qty: 1000 | Refills: 0 | Status: DISCONTINUED | OUTPATIENT
Start: 2023-05-05 | End: 2023-05-05

## 2023-05-05 RX ORDER — HEPARIN SODIUM 5000 [USP'U]/ML
5000 INJECTION INTRAVENOUS; SUBCUTANEOUS EVERY 6 HOURS
Refills: 0 | Status: DISCONTINUED | OUTPATIENT
Start: 2023-05-05 | End: 2023-05-06

## 2023-05-05 RX ORDER — PANTOPRAZOLE SODIUM 20 MG/1
40 TABLET, DELAYED RELEASE ORAL
Refills: 0 | Status: DISCONTINUED | OUTPATIENT
Start: 2023-05-05 | End: 2023-05-17

## 2023-05-05 RX ORDER — MILRINONE LACTATE 1 MG/ML
0.38 INJECTION, SOLUTION INTRAVENOUS
Qty: 20 | Refills: 0 | Status: DISCONTINUED | OUTPATIENT
Start: 2023-05-05 | End: 2023-05-07

## 2023-05-05 RX ORDER — HEPARIN SODIUM 5000 [USP'U]/ML
2500 INJECTION INTRAVENOUS; SUBCUTANEOUS EVERY 6 HOURS
Refills: 0 | Status: DISCONTINUED | OUTPATIENT
Start: 2023-05-05 | End: 2023-05-06

## 2023-05-05 RX ORDER — BENZOCAINE 10 %
1 GEL (GRAM) MUCOUS MEMBRANE
Refills: 0 | Status: DISCONTINUED | OUTPATIENT
Start: 2023-05-05 | End: 2023-05-17

## 2023-05-05 RX ORDER — HEPARIN SODIUM 5000 [USP'U]/ML
INJECTION INTRAVENOUS; SUBCUTANEOUS
Qty: 25000 | Refills: 0 | Status: DISCONTINUED | OUTPATIENT
Start: 2023-05-05 | End: 2023-05-06

## 2023-05-05 RX ORDER — CHLORHEXIDINE GLUCONATE 213 G/1000ML
1 SOLUTION TOPICAL DAILY
Refills: 0 | Status: DISCONTINUED | OUTPATIENT
Start: 2023-05-05 | End: 2023-05-17

## 2023-05-05 RX ORDER — METHYLPREDNISOLONE 4 MG
500 TABLET ORAL DAILY
Refills: 0 | Status: DISCONTINUED | OUTPATIENT
Start: 2023-05-05 | End: 2023-05-17

## 2023-05-05 RX ORDER — MEGESTROL ACETATE 40 MG/ML
40 SUSPENSION ORAL
Refills: 0 | Status: DISCONTINUED | OUTPATIENT
Start: 2023-05-05 | End: 2023-05-11

## 2023-05-05 RX ORDER — LANOLIN ALCOHOL/MO/W.PET/CERES
5 CREAM (GRAM) TOPICAL AT BEDTIME
Refills: 0 | Status: DISCONTINUED | OUTPATIENT
Start: 2023-05-05 | End: 2023-05-17

## 2023-05-05 RX ORDER — MAGNESIUM SULFATE 500 MG/ML
2 VIAL (ML) INJECTION ONCE
Refills: 0 | Status: COMPLETED | OUTPATIENT
Start: 2023-05-05 | End: 2023-05-05

## 2023-05-05 RX ADMIN — Medication 25 GRAM(S): at 07:55

## 2023-05-05 RX ADMIN — PANTOPRAZOLE SODIUM 40 MILLIGRAM(S): 20 TABLET, DELAYED RELEASE ORAL at 05:12

## 2023-05-05 RX ADMIN — MILRINONE LACTATE 7.14 MICROGRAM(S)/KG/MIN: 1 INJECTION, SOLUTION INTRAVENOUS at 05:12

## 2023-05-05 RX ADMIN — Medication 500 MILLIGRAM(S): at 11:11

## 2023-05-05 RX ADMIN — Medication 2.38 MICROGRAM(S)/KG/MIN: at 05:11

## 2023-05-05 RX ADMIN — MEGESTROL ACETATE 40 MILLIGRAM(S): 40 SUSPENSION ORAL at 11:11

## 2023-05-05 RX ADMIN — MEGESTROL ACETATE 40 MILLIGRAM(S): 40 SUSPENSION ORAL at 05:11

## 2023-05-05 RX ADMIN — HEPARIN SODIUM 1000 UNIT(S)/HR: 5000 INJECTION INTRAVENOUS; SUBCUTANEOUS at 00:25

## 2023-05-05 RX ADMIN — CHLORHEXIDINE GLUCONATE 1 APPLICATION(S): 213 SOLUTION TOPICAL at 11:11

## 2023-05-05 RX ADMIN — Medication 4.76 MICROGRAM(S)/KG/MIN: at 07:56

## 2023-05-05 RX ADMIN — HEPARIN SODIUM 1100 UNIT(S)/HR: 5000 INJECTION INTRAVENOUS; SUBCUTANEOUS at 19:01

## 2023-05-05 NOTE — PROGRESS NOTE ADULT - ASSESSMENT
77 year old woman with pmh of HTN, HLD, nonischemic cardiomyopathy, HFrEF, s/p ICD and PPM presents to the ED with 2 weeks of worsening shortness of breath. Patient and her son state patient has been experiencing shortness of breath that has progressed from present on activity to now shortness of breath during conversation. Patient also notes an increasing dry cough over this time period. Denies history of smoking, fevers/chills, chest pain, nausea/vomiting, bloody sputum, dark/tarry/bloody bowel movements, upper respiratory symptoms. Upon arrival, patient is HD stable and saturating 98% on RA, talking in complete sentences, no increased work of breathing.  CT C/A/P revealing cardiomegaly with evidence of right heart strain, R hilar lymphadenopathy with R perihilar soft tissue density with indentation/possible invasion of right pulmonary artery, and narrowing of proximal R lobar and segmental branches with associated consolidation of right lower lobe. 2.2 x 2.8 cm filling defect noted in apex of left ventricle. Patient follows with Dr. Frank, last ECHO 8/2021 revealing EF 20-25%, mild Mr, mild TR, mild Pulm HTN.    PLAN:  - CT chest reviewed  - IR unable to Bx safely   - will plan for bronch with EBUS and bx today if patient stable   - cardiac anesthesia following   - patient remains on pressors, hypotensive and tachycardic     GREEN SURGERY SPECTRA: [id number]

## 2023-05-05 NOTE — PROGRESS NOTE ADULT - SUBJECTIVE AND OBJECTIVE BOX
THORACIC SURGERY PROGRESS NOTE    Patient: MIKHAIL JOHNSON , 77y ([birth year])Female   MRN: [id number]  Location: [address]  Visit: 04-21-23 Inpatient  Date: 05-05-23 @ 01:59    Hospital Day #: 15  Post-Op Day #:    Procedure/Dx/Injuries: cardiac mass, HF, LV thrombus     Events of past 24 hours: dobutamine and milrinone, BP soft     PAST MEDICAL & SURGICAL HISTORY:  CHF, stage C      HTN (hypertension)          Vitals:   T(F): 99 (05-05-23 @ 00:00), Max: 99.5 (05-04-23 @ 08:00)  HR: 117 (05-05-23 @ 00:00)  BP: 94/63 (05-05-23 @ 00:00)  RR: 20 (05-05-23 @ 00:00)  SpO2: 98% (05-05-23 @ 00:00)      Diet, NPO after Midnight:      NPO Start Date: 04-May-2023,   NPO Start Time: 23:59  Diet, DASH/TLC:   Sodium & Cholesterol Restricted  1200mL Fluid Restriction ([id number])  Supplement Feeding Modality:  Oral  Ensure Plus High Protein Cans or Servings Per Day:  2       Frequency:  Daily      Fluids:     I & O's:    05-03-23 @ 07:01  -  05-04-23 @ 07:00  --------------------------------------------------------  IN:    DOBUTamine: 127.8 mL    DOBUTamine: 9.6 mL    DOBUTamine: 9.5 mL    Heparin Infusion: 132 mL    Milrinone: 19 mL    Milrinone: 81.6 mL    Milrinone: 11.4 mL    Oral Fluid: 240 mL  Total IN: 630.9 mL    OUT:    Norepinephrine: 0 mL    Ureteral Catheter (mL): 1245 mL  Total OUT: 1245 mL    Total NET: -614.1 mL      PHYSICAL EXAM:  General: NAD, AAOx3, calm and cooperative  HEENT: NCAT, WANDA, EOMI, Trachea ML, Neck supple  Cardiac: RRR S1, S2, no Murmurs, rubs or gallops  Respiratory: CTAB, normal respiratory effort  Abdomen: Soft, non-distended, non-tender, no rebound, no guarding. +BS.  Musculoskeletal: Strength 5/5 BL UE/LE, ROM intact, compartments soft  Neuro: Sensation grossly intact and equal throughout, no focal deficits  Vascular: Pulses 2+ throughout, extremities well perfused  Skin: Warm/dry, normal color, no jaundice      MEDICATIONS  (STANDING):  chlorhexidine 2% Cloths 1 Application(s) Topical daily  DOBUTamine Infusion 2.5 MICROgram(s)/kG/Min (2.38 mL/Hr) IV Continuous <Continuous>  heparin  Infusion.  Unit(s)/Hr (11 mL/Hr) IV Continuous <Continuous>  magnesium gluconate 500 milliGRAM(s) Oral daily  megestrol 40 milliGRAM(s) Oral four times a day  melatonin 5 milliGRAM(s) Oral at bedtime  milrinone Infusion 0.375 MICROgram(s)/kG/Min (7.14 mL/Hr) IV Continuous <Continuous>  pantoprazole    Tablet 40 milliGRAM(s) Oral before breakfast    MEDICATIONS  (PRN):  benzocaine 20% Spray 1 Spray(s) Topical four times a day PRN throat pain  heparin   Injectable 5000 Unit(s) IV Push every 6 hours PRN For aPTT less than 40  heparin   Injectable 2500 Unit(s) IV Push every 6 hours PRN For aPTT between 40 - 57      DVT PROPHYLAXIS: heparin   Injectable 5000 Unit(s) IV Push every 6 hours PRN  heparin   Injectable 2500 Unit(s) IV Push every 6 hours PRN  heparin  Infusion.  Unit(s)/Hr IV Continuous <Continuous>    GI PROPHYLAXIS: pantoprazole    Tablet 40 milliGRAM(s) Oral before breakfast    ANTICOAGULATION:   ANTIBIOTICS:            LAB/STUDIES:  Labs:  CAPILLARY BLOOD GLUCOSE                              10.3   9.99  )-----------( 263      ( 04 May 2023 04:41 )             31.1       Auto Neutrophil %: 76.2 % (05-04-23 @ 04:41)  Auto Immature Granulocyte %: 0.7 % (05-04-23 @ 04:41)    05-04    129<L>  |  93<L>  |  24<H>  ----------------------------<  118<H>  4.0   |  24  |  0.7      Calcium, Total Serum: 8.6 mg/dL (05-04-23 @ 04:41)      LFTs:             6.2  | 3.2  | 26       ------------------[128     ( 03 May 2023 04:45 )  3.0  | x    | 63          Lipase:x      Amylase:x         Blood Gas Venous - Lactate: 2.00 mmol/L (05-05-23 @ 00:09)  Blood Gas Venous - Lactate: 1.50 mmol/L (05-04-23 @ 21:12)  Blood Gas Venous - Lactate: 1.00 mmol/L (05-04-23 @ 09:41)  Blood Gas Arterial, Lactate: 1.20 mmol/L (05-04-23 @ 09:41)  Blood Gas Venous - Lactate: 0.80 mmol/L (05-04-23 @ 05:14)  Blood Gas Venous - Lactate: 1.20 mmol/L (05-04-23 @ 03:14)  Blood Gas Venous - Lactate: 1.40 mmol/L (05-04-23 @ 00:47)  Blood Gas Arterial, Lactate: 1.20 mmol/L (05-04-23 @ 00:47)  Blood Gas Arterial, Lactate: 1.00 mmol/L (05-03-23 @ 04:48)  Blood Gas Venous - Lactate: 1.00 mmol/L (05-03-23 @ 04:48)  Blood Gas Venous - Lactate: 1.60 mmol/L (05-03-23 @ 00:35)  Blood Gas Arterial, Lactate: 1.40 mmol/L (05-03-23 @ 00:34)  Blood Gas Arterial, Lactate: 1.60 mmol/L (05-02-23 @ 05:11)    ABG - ( 04 May 2023 09:41 )  pH: 7.54  /  pCO2: 31    /  pO2: 89    / HCO3: 26    / Base Excess: 4.4   /  SaO2: 98.7        ABG - ( 04 May 2023 00:47 )  pH: 7.58  /  pCO2: 30    /  pO2: 111   / HCO3: 28    / Base Excess: 6.4   /  SaO2: 100.0       ABG - ( 03 May 2023 04:48 )  pH: 7.52  /  pCO2: 34    /  pO2: 88    / HCO3: 28    / Base Excess: 4.9   /  SaO2: 98.4          Coags:     x      ----< x       ( 04 May 2023 20:00 )     104.2       IMAGING:      ACCESS/ DEVICES:  [X ] Peripheral IV  [ ] Central Venous Line	[ ] R	[ ] L	[ ] IJ	[ ] Fem	[ ] SC	Placed:   [ ] Arterial Line		[ ] R	[ ] L	[ ] Fem	[ ] Rad	[ ] Ax	Placed:   [ ] PICC:					[ ] Mediport  [ ] Urinary Catheter,  Date Placed:   [ ] Chest tube: [ ] Right, [ ] Left  [ ] GRACE/Erwin Drains

## 2023-05-05 NOTE — PROGRESS NOTE ADULT - ASSESSMENT
77 year old woman with PMHx of HTN, HLD, nonischemic cardiomyopathy, HFrEF s/p AICD presents to the ED with 2 weeks of worsening shortness of breath with increasing dry cough over this time period accepted to CCU for cardiogenic shock    #Cardiogenic Shock   #LV thrombus, suspected new PE  #NICM  #HFrEF 25% s/p AICD  #HTN/HLD  - CT CAP - cardiomegaly w/ RH strain  - TTE 4/22/2023 EF <20%, large fixed thrombus vs mass on apical LV wall, GIIDD, mod-sev TR, mod MR, mild [place], mild PH  - LE venous duplex 4/21 negative - pending repeat   - c/w dobutamine and milrinone ggt - optimize post biopsy  - c/w heparin ggt - target ptt 60-99 - holding now restart post biopsy   - s/p 3% saline and bumex, holding diuretics for now   - s/p RHC on 5/1, swan kevon thru R femoral vein, repositioned 5/4 - 76 cm marking  - CT Chest 5/4 subacute PE in R mainstem and subsegmental branches - will confirm with radiology if PE was present on admission CT scan 4/21.     #Post obstructive pneumonia  #Suspected lung malignancy  - CT CAP - R hilar LAD, R perihilar soft tissue density w/ indentation/possible invasion of R pulmonary artery and narrowing of proximal R lobar and segmental branches w/ consolidation of RLL  - MRSA -ve  - completed zosyn 7 days course 4/27  - Thoracic Surgery/IR following for possible biopsy on 5/5  - PET scan as outpatient to r/o lung malignancy  - Incentive Spirometer    #Positive COVID-19 PCR  - Asymptomatic, off isolation   - No need for further treatment or diagnostic w/u as per ID    #HAGMA - resolved   #Lactic Acidosis - resolved   #DAVID on CKD - resolved   #Elevated liver enzymes predominantly hepatocellular (AST>ALT) with hyperbilirubinemia likely due to ischemic liver injury - resolved   - trend labs, lactate  - holding lipitor    #Hypotonic Hyponatremia  - hypervolemic   - Na 132-> 129 -> 130  - trend Na BID    #Follow Up  - pending biopsy via CTS today   - restart AC post procedure   - optimize milrinone/dobutamine     # Misc  - DVT Prophylaxis: heparin drip - holding   - GI Prophylaxis: pantoprazole    Tablet 40 milliGRAM(s) Oral before breakfast  - Diet: Diet, DASH/TLC with 1.2 L fluid restriction - NPO for now   - Activity: Activity - Ambulate as Tolerated  - Code Status: Full , Palliative on board

## 2023-05-05 NOTE — PROGRESS NOTE ADULT - ASSESSMENT
ASSESSMENT  Cardiogenic Shock   LV thrombus  NICM  HFrEF 25% s/p AICD  HTN/HLD  - SOB x2 weeks  Post obstructive pneumonia  Suspected lung mass   - HAGMA, Lactic Acidosis  DAVID on CKD   Ischemic hepatitis   hyponatremia/hypomagnesemia    PLAN  encourage po intake   waldemar count   if po intake is less than 50% suggest to place NG feeding tube (not [place] sump)      would then supplement post-prandially with TwoCal  HN at 240ml after each attempted po meal at which intake < 50%      flush NG with only 20 ml water brisk syringe push before & after each feeding  check bmp/phos/mg and correct lytes  will follow ASSESSMENT  Cardiogenic Shock   LV thrombus  NICM  HFrEF 25% s/p AICD  HTN/HLD  - SOB x2 weeks  Post obstructive pneumonia  Suspected lung mass   - HAGMA, Lactic Acidosis  DAVID on CKD   Ischemic hepatitis   hyponatremia/hypomagnesemia    PLAN  NPO today   when ok to restart po diet continue with current nutrition  encourage po intake   waldemar count   if po intake is less than 50% suggest to place NG feeding tube (not [place] sump)      would then supplement post-prandially with TwoCal  HN at 240ml after each attempted po meal at which intake < 50%      flush NG with only 20 ml water brisk syringe push before & after each feeding  check bmp/phos/mg and correct lytes  will follow

## 2023-05-05 NOTE — PROGRESS NOTE ADULT - SUBJECTIVE AND OBJECTIVE BOX
Date of Admission: 23    CHIEF COMPLAINT: Patient is a 77y old  Female who presents with a chief complaint of intra cardiac mass (2023 16:11)    HISTORY OF PRESENT ILLNESS: 77 year old woman with PMHx of HTN, HLD, nonischemic cardiomyopathy, HFrEF s/p AICD presents to the ED with 2 weeks of worsening shortness of breath. Patient and her son state patient has been experiencing shortness of breath that has progressed from present on activity to now shortness of breath during conversation. Patient also notes an increasing dry cough over this time period. Denies history of smoking, fevers/chills, chest pain, nausea/vomiting, bloody sputum, dark/tarry/bloody bowel movements, upper respiratory symptoms.   In ED, patient's HR is 110 and saturating 98% on RA  CT C/A/P revealing cardiomegaly with evidence of right heart strain, R hilar lymphadenopathy with R perihilar soft tissue density with indentation/possible invasion of right pulmonary artery, and narrowing of proximal R lobar and segmental branches with associated consolidation of right lower lobe. 2.2 x 2.8 cm filling defect noted in apex of left ventricle.   Last ECHO 2021 revealing EF 20-25%, mild Mr, mild TR, mild Pulm HTN  Dimer 2600, trops negative BNP 82451  Duplex venous LE negative for DVT; CTA chest negative for PE  Cr 1.4 (0.7 in ), Na+ 128, T.Benjamin 2.3, LFTs elevated (hemolyzed)  Lactate 5.8-->4.6  Admitted to SDU (2023 01:10)      Patient seen and examined in CCU, family present at bedside. Reports patient was at baseline (active, ambulating long distances without issue) until about 2 weeks ago when patient began to have progressively worsening SOB with a dry cough. Patient reports Dr. Guerrero as per primary cardiologist, and Dr. Sherwood as her heart failure specialist (last seen in office in ). Endorses compliance with home medications. Family at bedside concerned about patient's recent weight loss as well.           PAST MEDICAL & SURGICAL HISTORY:  CHF, stage C  HTN (hypertension)      FAMILY HISTORY: No pertinent family history of premature cardiovascular disease in first degree relatives.    SOCIAL HISTORY:  Denies smoking, alcohol, or drug use    Allergies  epinephrine (Unknown)    Intolerances      REVIEW OF SYSTEMS:  CONSTITUTIONAL: No fever, + weight loss, or fatigue.  CARDIOLOGY: Patient denies chest pain, + shortness of breath, no syncopal episodes.   RESPIRATORY: + shortness of breath, no wheezing.   NEUROLOGICAL: + generalized weakness, no focal deficits to report.  GI: no BRBPR, no n/v, diarrhea.    PSYCHIATRY: normal mood and affect  HEENT: no nasal discharge, no ecchymosis  SKIN: no ecchymosis, no breakdown  MUSCULOSKELETAL: Full range of motion x4.       PHYSICAL EXAM:  T(C): 36.9 (05 May 2023 14:45), Max: 37.5 (04 May 2023 20:00)  T(F): 98.4 (05 May 2023 14:45), Max: 99.5 (04 May 2023 20:00)  HR: 107 (05 May 2023 14:45) (103 - 125)  BP: 90/53 (05 May 2023 14:45) (81/61 - 94/65)  BP(mean): 67 (05 May 2023 13:00) (61 - 74)  RR: 23 (05 May 2023 14:45) (18 - 32)  SpO2: 96% (05 May 2023 14:45) (93% - 100%)    O2 Parameters below as of 05 May 2023 12:00  Patient On (Oxygen Delivery Method): room air            General Appearance: thin, frail, normal for age and gender. 	  Neck: +central line  Eyes: Extra Ocular muscles intact.   Cardiovascular: regular rate and rhythm S1 S2, No edema  Respiratory: decreased	  Psychiatry: Alert and oriented x 3, Mood & affect appropriate  Gastrointestinal:  Soft, Non-tender  Skin/Integumentary: No rashes, No ecchymoses, No cyanosis	  Neurologic: Non-focal  Musculoskeletal/ extremities: Normal range of motion, No clubbing, cyanosis or edema  Vascular: Peripheral pulses palpable 2+ bilaterally        LABS:	 	                          12.0   9.25  )-----------( 142      ( 2023 04:50 )             35.9     04-25    133<L>  |  96<L>  |  28<H>  ----------------------------<  139<H>  3.3<L>   |  25  |  0.8    Ca    8.1<L>      2023 17:12  Phos  2.1     04-25  Mg     1.8     04-25    TPro  5.1<L>  /  Alb  2.5<L>  /  TBili  2.2<H>  /  DBili  1.1<H>  /  AST  294<H>  /  ALT  450<H>  /  AlkPhos  108  04-25        PT/INR - ( 2023 04:50 )   PT: 19.30 sec;   INR: 1.67 ratio         PTT - ( 2023 04:50 )  PTT:100.0 sec    CARDIAC MARKERS:  Pro-Brain Natriuretic Peptide: 66399 pg/mL (23 @ 11:43)      TELEMETRY EVENTS: 	    EC23    Ventricular Rate 85 BPM  Atrial Rate 85 BPM  P-R Interval 122 ms  QRS Duration 112 ms  Q-T Interval 408 ms  QTC Calculation(Bazett) 485 ms  P Axis 40 degrees  R Axis -40 degrees  T Axis 94 degrees    Diagnosis Line Sinus rhythm withoccasional Premature ventricular complexes  Possible Left atrial enlargement  Left axis deviation  Incomplete right bundle branch block  Septal infarct , age undetermined  Abnormal ECG    Confirmed by Alejandra Franco MDfer ([id number]) on 2023 7:50:12 AM  	  	    PREVIOUS DIAGNOSTIC TESTING:    TTE 23    Summary:   1. Left ventricular ejection fraction, by visual estimation, is <20%.   2. Severely decreased global left ventricular systolic function.   3. Severely enlarged left atrium.   4. Elevated mean left atrial pressure.   5. Large, fixed thrombus vs. mass on the apical wall of the left   ventricle.   6. Spectral Doppler shows pseudonormal pattern of left ventricular   myocardial filling (Grade II diastolic dysfunction).   7. Moderately reduced RV systolic function.   8. Mildly enlarged right atrium.   9. Moderate mitral valve regurgitation.  10. Moderate-severe tricuspid regurgitation.  11. Mild pulmonic valve regurgitation.  12. Estimated pulmonary artery systolic pressure is 42.9 mmHg assuming a   right atrial pressure of 15 mmHg, which is consistent withmild pulmonary   hypertension.  13. Endocardial visualization was enhanced with intravenous echo contrast.      Right Heart Catheterization 23    FINDINGS:   Right Heart Cath  RA - 12  RV - 51//13  PA - 51//32  PCWP - 20    CO/CI Thermodilusion - 2.1/1.27  CO/CI Radha - 2.09/1.26    SVR (MAP 79 / RA 12 / CO 2.1) = 2552 dyn  PVR = 5.71 henriquez      POST-OP DIAGNOSIS:    [x] Decreased cardiac output with increased SVR + PVR + PCWP consistent with cardiogenic shock.    Combined pre and post capillary PH.   	    Home Medications:  atorvastatin 20 mg oral tablet: 1 tab(s) orally once a day (04 Aug 2021 10:05)  carvedilol 6.25 mg oral tablet: 1 tab(s) orally 2 times a day (2023 02:02)  Entresto 49 mg-51 mg oral tablet: 1 tab(s) orally 2 times a day (04 Aug 2021 10:05)  Farxiga 10 mg oral tablet: 1 tab(s) orally once a day (2023 02:05)  Lasix 20 mg oral tablet: 1 tab(s) orally once a day (2023 02:04)  spironolactone 25 mg oral tablet: 1 tab(s) orally once a day (04 Aug 2021 10:05)    MEDICATIONS  (STANDING):  chlorhexidine 2% Cloths 1 Application(s) Topical daily  DOBUTamine Infusion 7 MICROgram(s)/kG/Min (6.67 mL/Hr) IV Continuous <Continuous>  guaifenesin/dextromethorphan Oral Liquid 15 milliLiter(s) Oral once  heparin  Infusion. 800 Unit(s)/Hr (8 mL/Hr) IV Continuous <Continuous>  melatonin 5 milliGRAM(s) Oral at bedtime  pantoprazole    Tablet 40 milliGRAM(s) Oral before breakfast  piperacillin/tazobactam IVPB.. 3.375 Gram(s) IV Intermittent every 8 hours  potassium chloride    Tablet ER 40 milliEquivalent(s) Oral every 4 hours    MEDICATIONS  (PRN):  heparin   Injectable 5000 Unit(s) IV Push every 6 hours PRN For aPTT less than 40  heparin   Injectable 2500 Unit(s) IV Push every 6 hours PRN For aPTT between 40 - 57         Date of Admission: 23    Interval History: Patient s/p intraoperative bronch/EBUS biopsy. Extubated, family present at bedside.                                                                                                                                                      HISTORY OF PRESENT ILLNESS: 78 y/o F with PMHx of HTN, HLD, nonischemic cardiomyopathy, HFrEF s/p AICD presents to the ED with 2 weeks of worsening shortness of breath. Patient and her son state patient has been experiencing shortness of breath that has progressed from present on activity to now shortness of breath during conversation. Patient also notes an increasing dry cough over this time period.    In ED, patient's HR is 110 and saturating 98% on RA  CT C/A/P revealing cardiomegaly with evidence of right heart strain, R hilar lymphadenopathy with R perihilar soft tissue density with indentation/possible invasion of right pulmonary artery, and narrowing of proximal R lobar and segmental branches with associated consolidation of right lower lobe. 2.2 x 2.8 cm filling defect noted in apex of left ventricle.   Last ECHO 2021 revealing EF 20-25%, mild Mr, mild TR, mild Pulm HTN  Dimer 2600, trops negative BNP 86078  Duplex venous LE negative for DVT; CTA chest negative for PE  Cr 1.4 (0.7 in ), Na+ 128, T.Benjamin 2.3, LFTs elevated (hemolyzed)  Lactate 5.8-->4.6  Admitted to SDU (2023 01:10)      Patient reportedly was at baseline (active, ambulating long distances without issue) until about 2 weeks ago when patient began to have progressively worsening SOB with a dry cough. Patient reports Dr. Mead as per primary cardiologist, and Dr. Sherwood as her heart failure specialist (last seen in office in ). Endorses compliance with home medications. Family at bedside concerned about patient's recent weight loss as well.         PAST MEDICAL & SURGICAL HISTORY:  CHF, stage C  HTN (hypertension)    Allergies  epinephrine (Unknown)    Intolerances      Vitals:  T(C): 36.9 (05 May 2023 14:45), Max: 37.5 (04 May 2023 20:00)  T(F): 98.4 (05 May 2023 14:45), Max: 99.5 (04 May 2023 20:00)  HR: 109 (05 May 2023 18:00) (102 - 125)  BP: 90/53 (05 May 2023 14:45) (81/61 - 94/65)  BP(mean): 67 (05 May 2023 13:00) (61 - 74)  ABP: 116/66 (05 May 2023 18:00) (116/66 - 116/66)  ABP(mean): 82 (05 May 2023 18:00) (82 - 82)  RR: 25 (05 May 2023 18:00) (18 - 32)  SpO2: 100% (05 May 2023 18:00) (93% - 100%)    O2 Parameters below as of 05 May 2023 18:00  Patient On (Oxygen Delivery Method): room air        Physical Exam:  General Appearance: thin, frail, normal for age and gender. 	  Cardiovascular: RRR, +S1, S2, No edema  Respiratory: decreased @ bases  Psychiatry: Fatigued, oriented x 3, Mood & affect appropriate  Skin/Integumentary: No rashes, No ecchymoses, No cyanosis  Neurologic: Non-focal  Musculoskeletal/ extremities: Normal range of motion, No clubbing, cyanosis or edema  Vascular: Peripheral pulses palpable 2+ bilaterally        LABS:	 	    Complete Blood Count + Automated Diff in AM (23 @ 06:00)    WBC Count: 8.69 K/uL   RBC Count: 3.50 M/uL   Hemoglobin: 10.6 g/dL   Hematocrit: 32.6 %   Mean Cell Volume: 93.1 fL   Mean Cell Hemoglobin: 30.3 pg   Mean Cell Hemoglobin Conc: 32.5 g/dL   Red Cell Distrib Width: 15.9 %   Platelet Count - Automated: 277 K/uL   MPV: 11.2 fL   Auto Neutrophil #: 6.36 K/uL   Auto Lymphocyte #: 1.52 K/uL   Auto Monocyte #: 0.65 K/uL   Auto Eosinophil #: 0.07 K/uL   Auto Basophil #: 0.03 K/uL   Auto Neutrophil %: 73.2: Differential percentages must be correlated with absolute numbers for  clinical significance. %   Auto Lymphocyte %: 17.5 %   Auto Monocyte %: 7.5 %   Auto Eosinophil %: 0.8 %   Auto Basophil %: 0.3 %   Auto Immature Granulocyte %: 0.7: (Includes meta, myelo and promyelocytes). Mild elevations in immature  granulocytes may be seen with many inflammatory processes and pregnancy;  clinical correlation suggested. %   Nucleated RBC: 0 /100 WBCs    Basic Metabolic Panel in AM (23 @ 06:00)    Sodium, Serum: 130 mmol/L   Potassium, Serum: 4.2 mmol/L   Chloride, Serum: 92 mmol/L   Carbon Dioxide, Serum: 26 mmol/L   Anion Gap, Serum: 12 mmol/L   Blood Urea Nitrogen, Serum: 26 mg/dL   Creatinine, Serum: 0.7 mg/dL   Glucose, Serum: 122 mg/dL   Calcium, Total Serum: 8.8 mg/dL   eGFR: 88: The estimated glomerular filtration rate (eGFR) is calculated using the   CKD-EPI creatinine equation, which does not have a coefficient for  race and is validated in individuals 18 years of age and older (N Engl J  Med ; [id number]). Creatinine-based eGFR may be inaccurate in  various situations including but not limited to extremes of muscle mass,  altered dietary protein intake, or medications that affect renal tubular  creatinine secretion. mL/min/1.73m2      TELEMETRY EVENTS: 	    EC2023    Ventricular Rate 96 BPM  Atrial Rate 96 BPM  P-R Interval 164 ms  QRS Duration 116 ms  Q-T Interval 396 ms  QTC Calculation(Bazett) 500 ms  P Axis 51 degrees  R Axis -41 degrees  T Axis 87 degrees    Diagnosis Line Normal sinus rhythm  Possible Left atrial enlargement  Left axis deviation  Prolonged QT  Abnormal ECG    Confirmed by Seven Hensley ([id number]) on 2023 7:07:08 AM      CXR     Impression:  CHF. Support devices as described. Without difference.      CT Angio Chest PE Protocol   IMPRESSION:    No evidence of pulmonary embolism. (See discussion below for more   findings.)    Marked cardiomegaly.    There is a 2.2 x 2.8 cm rounded filling defect in the region of the apex   of the left ventricle (3/11; 607/95). Differential diagnosis includes an   intracardiac mass or thrombus.    The main pulmonary artery is dilated, measuring 3.6 cm in diameter, which   may be seen with pulmonary hypertension. There is reflux of contrast   material into the IVC and hepatic veins compatible with right heart   dysfunction.    Right hilar lymphadenopathy and right perihilar soft tissue density is   noted. There is segmental narrowing and consolidation noted in the medial   aspect of the right lower lobe. There is a small right pleural effusion.   There is extrinsic indentation and possible invasion of the right main   pulmonary artery (604/142; 605/129; ). There is associated narrowing   of the proximal right lobar and segmental branches. A right hilar / right   lower lobe mass with postobstructive pneumonitis should be ruled out.          PREVIOUS DIAGNOSTIC TESTING:    TTE 23    Summary:   1. Left ventricular ejection fraction, by visual estimation, is <20%.   2. Severely decreased global left ventricular systolic function.   3. Severely enlarged left atrium.   4. Elevated mean left atrial pressure.   5. Large, fixed thrombus vs. mass on the apical wall of the left   ventricle.   6. Spectral Doppler shows pseudonormal pattern of left ventricular   myocardial filling (Grade II diastolic dysfunction).   7. Moderately reduced RV systolic function.   8. Mildly enlarged right atrium.   9. Moderate mitral valve regurgitation.  10. Moderate-severe tricuspid regurgitation.  11. Mild pulmonic valve regurgitation.  12. Estimated pulmonary artery systolic pressure is 42.9 mmHg assuming a   right atrial pressure of 15 mmHg, which is consistent withmild pulmonary   hypertension.  13. Endocardial visualization was enhanced with intravenous echo contrast.      Right Heart Catheterization 23    FINDINGS:   Right Heart Cath  RA - 12  RV - 51//13  PA - 51/24/32  PCWP - 20    CO/CI Thermodilusion - 2.1/1.27  CO/CI Mehdi - 2.09/1.26    SVR (MAP 79 / RA 12 / CO 2.1) = 2552 dyn  PVR = 5.71 henriquez      RHC : RA 14, RV 46/14, PA 56/26/37, PCWP: 25, CO/C.I mehdi 2.37/1.43, C.O. /C.I thermodilution 2.8/1.69      Home Medications:  atorvastatin 20 mg oral tablet: 1 tab(s) orally once a day (04 Aug 2021 10:05)  carvedilol 6.25 mg oral tablet: 1 tab(s) orally 2 times a day (2023 02:02)  Entresto 49 mg-51 mg oral tablet: 1 tab(s) orally 2 times a day (04 Aug 2021 10:05)  Farxiga 10 mg oral tablet: 1 tab(s) orally once a day (2023 02:05)  Lasix 20 mg oral tablet: 1 tab(s) orally once a day (2023 02:04)  spironolactone 25 mg oral tablet: 1 tab(s) orally once a day (04 Aug 2021 10:05)    MEDICATIONS  (STANDING):  MEDICATIONS  (STANDING):  buMETAnide Injectable 2 milliGRAM(s) IV Push every 8 hours  chlorhexidine 2% Cloths 1 Application(s) Topical daily  DOBUTamine Infusion 7.5 MICROgram(s)/kG/Min (7.14 mL/Hr) IV Continuous <Continuous>  heparin  Infusion.  Unit(s)/Hr (11 mL/Hr) IV Continuous <Continuous>  magnesium gluconate 500 milliGRAM(s) Oral daily  melatonin 5 milliGRAM(s) Oral at bedtime  milrinone Infusion 0.125 MICROgram(s)/kG/Min (2.38 mL/Hr) IV Continuous <Continuous>  norepinephrine Infusion 0.05 MICROgram(s)/kG/Min (2.98 mL/Hr) IV Continuous <Continuous>  pantoprazole    Tablet 40 milliGRAM(s) Oral before breakfast    MEDICATIONS  (PRN):  benzocaine 20% Spray 1 Spray(s) Topical four times a day PRN throat pain  heparin   Injectable 5000 Unit(s) IV Push every 6 hours PRN For aPTT less than 40  heparin   Injectable 2500 Unit(s) IV Push every 6 hours PRN For aPTT between 40 - 57

## 2023-05-05 NOTE — PROGRESS NOTE ADULT - ASSESSMENT
Assessment:   77 year old woman with pmh of HTN, HLD, nonischemic cardiomyopathy, HFrEF, s/p ICD and PPM presents to the ED with 2 weeks of worsening shortness of breath.       IMPRESSION:  Cardiogenic shock on inotropic suppport  HFrEF 20%, NICM  RLL Mass/opacity/ possible Pneumonia, r/o malignancy  LV Mass, ?Thrombus  Transaminitis in the setting of hypotension, shock liver      Plan:    Patient is grossly fluid overloaded on exam  POCUS exam: IVC dilated, non collapsing, hepatic vein engorged  Give Bumex 2 mg IV push, then start a Bumex gtt at 1 mg/hr   Start 3% Hypertonic Saline 150ml BID (If infused throughout a central line, run over one hour, if a peripheral line is to be used run over 3 hours)   Continue inotropic support- dobutamine gtt at 7.5 mcg/kg/min   Once more euvolemic, will try to wean inotropic support   Get BMP twice daily with magnesium   Maintain potassium >4.0, Mg >2.2  Strict intake and output  Daily weight   Iron sat 6%, follow up ferritin level  Thoracic Surgery and pulm recs appreciated  Rest of care as per CCU team  Plan discussed with patient, family at bedside, and CCU team  Will continue to follow Assessment:   77 year old woman with pmh of HTN, HLD, nonischemic cardiomyopathy, HFrEF, s/p ICD and PPM presents to the ED with 2 weeks of worsening shortness of breath.       IMPRESSION:  Cardiogenic shock on inotropic suppport  HFrEF 20%, NICM  RLL Mass/opacity/ possible Pneumonia, r/o malignancy  LV Mass, ?Thrombus  Transaminitis in the setting of hypotension, shock liver      Plan:  s/p bronch/EBUS biopsy 5/05 - awaiting official pathology results   Continue inotropic support through PICC line- dobutamine and milrinone   Place feeding tube for nutritional support  Frequent physical therapy as tolerated  Get BMP twice daily with magnesium   Maintain potassium >4.0, Mg >2.2  Strict intake and output  Daily weight   Repeat iron sat 21%, follow up repeat ferritin level  Rest of care as per primary team  Plan discussed with patient, family at bedside, and CTU team  Will continue to follow

## 2023-05-05 NOTE — PROGRESS NOTE ADULT - SUBJECTIVE AND OBJECTIVE BOX
HPI:  77 year old woman with PMHx of HTN, HLD, nonischemic cardiomyopathy, HFrEF s/p AICD presents to the ED with 2 weeks of worsening shortness of breath. Patient and her son state patient has been experiencing shortness of breath that has progressed from present on activity to now shortness of breath during conversation. Patient also notes an increasing dry cough over this time period. Denies history of smoking, fevers/chills, chest pain, nausea/vomiting, bloody sputum, dark/tarry/bloody bowel movements, upper respiratory symptoms.     In ED, patient's HR is 110 and saturating 98% on RA  CT C/A/P revealing cardiomegaly with evidence of right heart strain, R hilar lymphadenopathy with R perihilar soft tissue density with indentation/possible invasion of right pulmonary artery, and narrowing of proximal R lobar and segmental branches with associated consolidation of right lower lobe. 2.2 x 2.8 cm filling defect noted in apex of left ventricle.   Last ECHO 8/2021 revealing EF 20-25%, mild Mr, mild TR, mild Pulm HTN  Dimer 2600, trops negative BNP 60058  Duplex venous LE negative for DVT; CTA chest negative for PE  Cr 1.4 (0.7 in 2021), Na+ 128, T.Benjamin 2.3, LFTs elevated (hemolyzed)  Lactate 5.8-->4.6  Admitted to SDU (22 Apr 2023 01:10)    Currently admitted to medicine with the primary diagnosis of Lung mass       Today is hospital day 14d.     INTERVAL HPI / OVERNIGHT EVENTS:  Patient was examined and seen at bedside. This morning she is resting comfortably in bed and reports no new issues or overnight events. Hemodynamically stable on dobutamine/milrinone, patient NPO for bronch and biopsy today, voiding appropriately, having regular BMs. No complaints at this time.     ROS: Otherwise unremarkable     PAST MEDICAL & SURGICAL HISTORY  CHF, stage C    HTN (hypertension)      ALLERGIES  epinephrine (Unknown)    MEDICATIONS  STANDING MEDICATIONS  chlorhexidine 2% Cloths 1 Application(s) Topical daily  DOBUTamine Infusion 5 MICROgram(s)/kG/Min IV Continuous <Continuous>  magnesium gluconate 500 milliGRAM(s) Oral daily  megestrol 40 milliGRAM(s) Oral four times a day  melatonin 5 milliGRAM(s) Oral at bedtime  milrinone Infusion 0.375 MICROgram(s)/kG/Min IV Continuous <Continuous>  pantoprazole    Tablet 40 milliGRAM(s) Oral before breakfast    PRN MEDICATIONS  benzocaine 20% Spray 1 Spray(s) Topical four times a day PRN    VITALS:  T(F): 98.4  HR: 104  BP: 89/57  RR: 23  SpO2: 99%    PHYSICAL EXAM  GEN: NAD, Resting comfortably in bed, cooperative  PULM: Clear to auscultation bilaterally, No wheezing, rales, rhonchi, no respiratory distress  CVS: Regular rate and rhythm, S1-S2, no murmurs, heaves, thrills  ABD: Soft, non-tender, non-distended, no guarding, BS +  EXT: No edema/cyanosis, extremities warm/dry, peripheral pulses palpable, R fem cath  NEURO: A&Ox3, no focal deficits    LABS                        10.6   8.69  )-----------( 277      ( 05 May 2023 06:00 )             32.6     05-05    130<L>  |  92<L>  |  26<H>  ----------------------------<  122<H>  4.2   |  26  |  0.7    Ca    8.8      05 May 2023 06:00  Phos  2.3     05-05  Mg     1.7     05-05      PT/INR - ( 05 May 2023 06:15 )   PT: 18.80 sec;   INR: 1.62 ratio         PTT - ( 04 May 2023 20:00 )  PTT:104.2 sec    ABG - ( 04 May 2023 09:41 )  pH, Arterial: 7.54  pH, Blood: x     /  pCO2: 31    /  pO2: 89    / HCO3: 26    / Base Excess: 4.4   /  SaO2: 98.7                        RADIOLOGY  CTA Chest 5/4  Redemonstrated right hilar lesion with compression and possible invasion   of adjacent main pulmonary artery segment (unchanged from previous study.    Subacute right main and segmental pulmonary embolus.    Increased right lower lobe consolidative and groundglass opacities,   likely infectious/inflammatory in etiology.    4 mm left anterior upper lobe groundglass nodule.    US Kidney Bladder 4/22  No sonographic evidence of hydronephrosis.    b/l le venous duplex 4/21  No evidence of deep venous thrombosis in either lower extremity.    TTE 4/22:   1. Left ventricular ejection fraction, by visual estimation, is <20%.   2. Severely decreased global left ventricular systolic function.   3. Severely enlarged left atrium.   4. Elevated mean left atrial pressure.   5. Large, fixed thrombus vs. mass on the apical wall of the left   ventricle.   6. Spectral Doppler shows pseudonormal pattern of left ventricular   myocardial filling (Grade II diastolic dysfunction).   7. Moderately reduced RV systolic function.   8. Mildly enlarged right atrium.   9. Moderate mitral valve regurgitation.  10. Moderate-severe tricuspid regurgitation.  11. Mild pulmonic valve regurgitation.  12. Estimated pulmonary artery systolic pressure is 42.9 mmHg assuming a   right atrial pressure of 15 mmHg, which is consistent with mild pulmonary   hypertension.  13. Endocardial visualization was enhanced with intravenous echo contrast.      CXR 5/3  Unchanged bilateral opacities, right greater than left.  Stable support devices.

## 2023-05-05 NOTE — BRIEF OPERATIVE NOTE - NSICDXBRIEFPROCEDURE_GEN_ALL_CORE_FT
PROCEDURES:  Bronchoscopy, intraoperative, with endobronchial ultrasound and biopsy 05-May-2023 17:08:14  Miguel Angel Nicole  Fine needle aspiration of mediastinal lymph node using endoscopic ultrasound guidance 05-May-2023 17:09:05  Miguel Angel Nicole

## 2023-05-05 NOTE — PROGRESS NOTE ADULT - NS ATTEND AMEND GEN_ALL_CORE FT
Patient s/p EBUS/biopsy, tolerated procedure well, already extubated. She remains dependent on two inotropes, overloaded on exam.     Continue Milrinone and dobutamine gtt   Start Bumex 2 mg ivp + hypertonic saline  Monitor strict I/Os  Monitor lactate, LFTs, renal function 1-2 times/day   Get groin line out   Start NG feeds given significant malnutrition and poor PO intake   Nutrition service f/u   Get OOB to chair /Give incentive inspirometer   Pulmonary biopsy results pending   Discussed with CCU and family at bedside  Patient remains critically ill Patient s/p EBUS/biopsy, tolerated procedure well, already extubated. She remains dependent on two inotropes, overloaded on exam.     Continue Milrinone and dobutamine gtt   Start Bumex 2 mg ivp + hypertonic saline  Monitor strict I/Os  Monitor lactate, LFTs, renal function 1-2 times/day   Check procalcitonin  Check pre-albumin  Get groin line out   Start NG feeds given significant malnutrition and poor PO intake   Nutrition service f/u   Get OOB to chair /Give incentive inspirometer   Pulmonary biopsy results pending   Discussed with CCU and family at bedside  Patient remains critically ill

## 2023-05-05 NOTE — PROGRESS NOTE ADULT - SUBJECTIVE AND OBJECTIVE BOX
NUTRITION SUPPORT TEAM  -  PROGRESS NOTE   resting comfortably   on po diet   medical f/u noted  REVIEW OF SYSTEMS:  Negative except as noted above.     VITALS:  T(F): 98.6 (05-05 @ 08:00), Max: 99 (05-05 @ 00:00)  HR: 105 (05-05 @ 09:00) (103 - 117)  BP: 90/53 (05-05 @ 09:00) (81/61 - 94/63)  RR: 28 (05-05 @ 09:00) (18 - 28)  SpO2: 100% (05-05 @ 09:00) (93% - 100%)    HEIGHT/WEIGHT/BMI:   Height (cm): 160 (05-04)  Weight (kg): 63.5 (05-04)  BMI (kg/m2): 24.8 (05-04)  05-04-23 @ 07:01  -  05-05-23 @ 07:00  --------------------------------------------------------  IN:    DOBUTamine: 55.2 mL    Heparin Infusion: 138 mL    Milrinone: 85.5 mL    Milrinone: 63.9 mL    Oral Fluid: 120 mL  Total IN: 462.6 mL    OUT:    Ureteral Catheter (mL): 980 mL  Total OUT: 980 mL  Total NET: -517.4 mL  MEDICATIONS:   benzocaine 20% Spray 1 Spray(s) Topical four times a day PRN  chlorhexidine 2% Cloths 1 Application(s) Topical daily  DOBUTamine Infusion 5 MICROgram(s)/kG/Min (4.76 mL/Hr) IV Continuous <Continuous>  magnesium gluconate 500 milliGRAM(s) Oral daily  megestrol 40 milliGRAM(s) Oral four times a day  melatonin 5 milliGRAM(s) Oral at bedtime  milrinone Infusion 0.375 MICROgram(s)/kG/Min (7.14 mL/Hr) IV Continuous <Continuous>  pantoprazole    Tablet 40 milliGRAM(s) Oral before breakfast    LABS:                         10.6   8.69  )-----------( 277      ( 05 May 2023 06:00 )             32.6     130<L>  |  92<L>  |  26<H>  ----------------------------<  122<H>          (05-05-23 @ 06:00)  4.2   |  26  |  0.7    Ca    8.8          (05-05-23 @ 06:00)  Phos  2.3         (05-05-23 @ 06:00)  Mg     1.7         (05-05-23 @ 06:00)  Triglycerides, Serum: 91 mg/dL (04-25 @ 04:50)    DIET:   Diet, NPO after Midnight:      NPO Start Date: 04-May-2023,   NPO Start Time: 23:59 (05-04-23 @ 15:48) [Active]  Diet, DASH/TLC:   Sodium & Cholesterol Restricted  1200mL Fluid Restriction ([id number])  Supplement Feeding Modality:  Oral  Ensure Plus High Protein Cans or Servings Per Day:  2       Frequency:  Daily (04-28-23 @ 11:51) [Active]  RADIOLOGY:   < from: Xray Chest 1 View- PORTABLE-Urgent (Xray Chest 1 View- PORTABLE-Urgent .) (05.04.23 @ 17:00) >  IMPRESSION:  Stable support devices. Grossly clear lungs.     NUTRITION SUPPORT TEAM  -  PROGRESS NOTE   resting comfortably   NPO today  medical f/u noted  REVIEW OF SYSTEMS:  Negative except as noted above.     VITALS:  T(F): 98.6 (05-05 @ 08:00), Max: 99 (05-05 @ 00:00)  HR: 105 (05-05 @ 09:00) (103 - 117)  BP: 90/53 (05-05 @ 09:00) (81/61 - 94/63)  RR: 28 (05-05 @ 09:00) (18 - 28)  SpO2: 100% (05-05 @ 09:00) (93% - 100%)    HEIGHT/WEIGHT/BMI:   Height (cm): 160 (05-04)  Weight (kg): 63.5 (05-04)  BMI (kg/m2): 24.8 (05-04)  05-04-23 @ 07:01  -  05-05-23 @ 07:00  --------------------------------------------------------  IN:    DOBUTamine: 55.2 mL    Heparin Infusion: 138 mL    Milrinone: 85.5 mL    Milrinone: 63.9 mL    Oral Fluid: 120 mL  Total IN: 462.6 mL    OUT:    Ureteral Catheter (mL): 980 mL  Total OUT: 980 mL  Total NET: -517.4 mL  MEDICATIONS:   benzocaine 20% Spray 1 Spray(s) Topical four times a day PRN  chlorhexidine 2% Cloths 1 Application(s) Topical daily  DOBUTamine Infusion 5 MICROgram(s)/kG/Min (4.76 mL/Hr) IV Continuous <Continuous>  magnesium gluconate 500 milliGRAM(s) Oral daily  megestrol 40 milliGRAM(s) Oral four times a day  melatonin 5 milliGRAM(s) Oral at bedtime  milrinone Infusion 0.375 MICROgram(s)/kG/Min (7.14 mL/Hr) IV Continuous <Continuous>  pantoprazole    Tablet 40 milliGRAM(s) Oral before breakfast    LABS:                         10.6   8.69  )-----------( 277      ( 05 May 2023 06:00 )             32.6     130<L>  |  92<L>  |  26<H>  ----------------------------<  122<H>          (05-05-23 @ 06:00)  4.2   |  26  |  0.7    Ca    8.8          (05-05-23 @ 06:00)  Phos  2.3         (05-05-23 @ 06:00)  Mg     1.7         (05-05-23 @ 06:00)  Triglycerides, Serum: 91 mg/dL (04-25 @ 04:50)    DIET:   Diet, NPO after Midnight:      NPO Start Date: 04-May-2023,   NPO Start Time: 23:59 (05-04-23 @ 15:48) [Active]  Diet, DASH/TLC:   Sodium & Cholesterol Restricted  1200mL Fluid Restriction ([id number])  Supplement Feeding Modality:  Oral  Ensure Plus High Protein Cans or Servings Per Day:  2       Frequency:  Daily (04-28-23 @ 11:51) [Active]  RADIOLOGY:   < from: Xray Chest 1 View- PORTABLE-Urgent (Xray Chest 1 View- PORTABLE-Urgent .) (05.04.23 @ 17:00) >  IMPRESSION:  Stable support devices. Grossly clear lungs.

## 2023-05-05 NOTE — BRIEF OPERATIVE NOTE - OPERATION/FINDINGS
Bronchoscopy, endobronchial ultrasound with fine needle-aspiration of level 7 lymph node, right level 10 lymph node, and right level 4 lymph node. All needle aspirations sent for cytology and pathology.

## 2023-05-06 LAB
ANION GAP SERPL CALC-SCNC: 11 MMOL/L — SIGNIFICANT CHANGE UP (ref 7–14)
APTT BLD: 60.3 SEC — HIGH (ref 27–39.2)
APTT BLD: 69.2 SEC — HIGH (ref 27–39.2)
BASE EXCESS BLDV CALC-SCNC: 6.4 MMOL/L — HIGH (ref -2–3)
BASOPHILS # BLD AUTO: 0.03 K/UL — SIGNIFICANT CHANGE UP (ref 0–0.2)
BASOPHILS NFR BLD AUTO: 0.3 % — SIGNIFICANT CHANGE UP (ref 0–1)
BUN SERPL-MCNC: 22 MG/DL — HIGH (ref 10–20)
CA-I SERPL-SCNC: 1.17 MMOL/L — SIGNIFICANT CHANGE UP (ref 1.15–1.33)
CALCIUM SERPL-MCNC: 8.2 MG/DL — LOW (ref 8.4–10.5)
CHLORIDE SERPL-SCNC: 91 MMOL/L — LOW (ref 98–110)
CO2 SERPL-SCNC: 23 MMOL/L — SIGNIFICANT CHANGE UP (ref 17–32)
CREAT SERPL-MCNC: 0.5 MG/DL — LOW (ref 0.7–1.5)
EGFR: 96 ML/MIN/1.73M2 — SIGNIFICANT CHANGE UP
EOSINOPHIL # BLD AUTO: 0.08 K/UL — SIGNIFICANT CHANGE UP (ref 0–0.7)
EOSINOPHIL NFR BLD AUTO: 0.9 % — SIGNIFICANT CHANGE UP (ref 0–8)
FERRITIN SERPL-MCNC: 3339 NG/ML — HIGH (ref 15–150)
GAS PNL BLDV: 122 MMOL/L — LOW (ref 136–145)
GAS PNL BLDV: SIGNIFICANT CHANGE UP
GLUCOSE SERPL-MCNC: 87 MG/DL — SIGNIFICANT CHANGE UP (ref 70–99)
HCO3 BLDV-SCNC: 29 MMOL/L — SIGNIFICANT CHANGE UP (ref 22–29)
HCT VFR BLD CALC: 29.2 % — LOW (ref 37–47)
HCT VFR BLD CALC: 31.2 % — LOW (ref 37–47)
HCT VFR BLDA CALC: 45 % — SIGNIFICANT CHANGE UP (ref 39–51)
HDV AB SER-ACNC: NEGATIVE — SIGNIFICANT CHANGE UP
HGB BLD CALC-MCNC: 15 G/DL — SIGNIFICANT CHANGE UP (ref 12.6–17.4)
HGB BLD-MCNC: 10.2 G/DL — LOW (ref 12–16)
HGB BLD-MCNC: 9.9 G/DL — LOW (ref 12–16)
IMM GRANULOCYTES NFR BLD AUTO: 0.7 % — HIGH (ref 0.1–0.3)
IRON SATN MFR SERPL: 19 % — SIGNIFICANT CHANGE UP (ref 15–50)
IRON SATN MFR SERPL: 30 UG/DL — LOW (ref 35–150)
LACTATE BLDV-MCNC: 1.2 MMOL/L — SIGNIFICANT CHANGE UP (ref 0.5–2)
LYMPHOCYTES # BLD AUTO: 1.31 K/UL — SIGNIFICANT CHANGE UP (ref 1.2–3.4)
LYMPHOCYTES # BLD AUTO: 14.5 % — LOW (ref 20.5–51.1)
MAGNESIUM SERPL-MCNC: 2.2 MG/DL — SIGNIFICANT CHANGE UP (ref 1.8–2.4)
MCHC RBC-ENTMCNC: 30.1 PG — SIGNIFICANT CHANGE UP (ref 27–31)
MCHC RBC-ENTMCNC: 31.2 PG — HIGH (ref 27–31)
MCHC RBC-ENTMCNC: 32.7 G/DL — SIGNIFICANT CHANGE UP (ref 32–37)
MCHC RBC-ENTMCNC: 33.9 G/DL — SIGNIFICANT CHANGE UP (ref 32–37)
MCV RBC AUTO: 92 FL — SIGNIFICANT CHANGE UP (ref 81–99)
MCV RBC AUTO: 92.1 FL — SIGNIFICANT CHANGE UP (ref 81–99)
MONOCYTES # BLD AUTO: 0.63 K/UL — HIGH (ref 0.1–0.6)
MONOCYTES NFR BLD AUTO: 7 % — SIGNIFICANT CHANGE UP (ref 1.7–9.3)
NEUTROPHILS # BLD AUTO: 6.95 K/UL — HIGH (ref 1.4–6.5)
NEUTROPHILS NFR BLD AUTO: 76.6 % — HIGH (ref 42.2–75.2)
NRBC # BLD: 0 /100 WBCS — SIGNIFICANT CHANGE UP (ref 0–0)
NRBC # BLD: 0 /100 WBCS — SIGNIFICANT CHANGE UP (ref 0–0)
PCO2 BLDV: 36 MMHG — LOW (ref 39–42)
PH BLDV: 7.52 — HIGH (ref 7.32–7.43)
PHOSPHATE SERPL-MCNC: 2.9 MG/DL — SIGNIFICANT CHANGE UP (ref 2.1–4.9)
PLATELET # BLD AUTO: 265 K/UL — SIGNIFICANT CHANGE UP (ref 130–400)
PLATELET # BLD AUTO: 293 K/UL — SIGNIFICANT CHANGE UP (ref 130–400)
PMV BLD: 11.1 FL — HIGH (ref 7.4–10.4)
PMV BLD: 11.6 FL — HIGH (ref 7.4–10.4)
PO2 BLDV: 38 MMHG — SIGNIFICANT CHANGE UP
POTASSIUM BLDV-SCNC: 3.4 MMOL/L — LOW (ref 3.5–5.1)
POTASSIUM SERPL-MCNC: 3.6 MMOL/L — SIGNIFICANT CHANGE UP (ref 3.5–5)
POTASSIUM SERPL-SCNC: 3.6 MMOL/L — SIGNIFICANT CHANGE UP (ref 3.5–5)
PREALB SERPL-MCNC: 6 MG/DL — LOW (ref 20–40)
RBC # BLD: 3.17 M/UL — LOW (ref 4.2–5.4)
RBC # BLD: 3.39 M/UL — LOW (ref 4.2–5.4)
RBC # FLD: 15.5 % — HIGH (ref 11.5–14.5)
RBC # FLD: 15.8 % — HIGH (ref 11.5–14.5)
SAO2 % BLDV: 66.1 % — SIGNIFICANT CHANGE UP
SODIUM SERPL-SCNC: 125 MMOL/L — LOW (ref 135–146)
TIBC SERPL-MCNC: 160 UG/DL — LOW (ref 220–430)
TRANSFERRIN SERPL-MCNC: 131 MG/DL — LOW (ref 200–360)
UIBC SERPL-MCNC: 130 UG/DL — SIGNIFICANT CHANGE UP (ref 110–370)
WBC # BLD: 10.25 K/UL — SIGNIFICANT CHANGE UP (ref 4.8–10.8)
WBC # BLD: 9.06 K/UL — SIGNIFICANT CHANGE UP (ref 4.8–10.8)
WBC # FLD AUTO: 10.25 K/UL — SIGNIFICANT CHANGE UP (ref 4.8–10.8)
WBC # FLD AUTO: 9.06 K/UL — SIGNIFICANT CHANGE UP (ref 4.8–10.8)

## 2023-05-06 PROCEDURE — 93970 EXTREMITY STUDY: CPT | Mod: 26

## 2023-05-06 PROCEDURE — 93010 ELECTROCARDIOGRAM REPORT: CPT | Mod: 76

## 2023-05-06 PROCEDURE — 71045 X-RAY EXAM CHEST 1 VIEW: CPT | Mod: 26

## 2023-05-06 PROCEDURE — 99291 CRITICAL CARE FIRST HOUR: CPT

## 2023-05-06 RX ORDER — AMIODARONE HYDROCHLORIDE 400 MG/1
200 TABLET ORAL
Refills: 0 | Status: DISCONTINUED | OUTPATIENT
Start: 2023-05-06 | End: 2023-05-17

## 2023-05-06 RX ORDER — SODIUM CHLORIDE 5 G/100ML
150 INJECTION, SOLUTION INTRAVENOUS ONCE
Refills: 0 | Status: COMPLETED | OUTPATIENT
Start: 2023-05-07 | End: 2023-05-07

## 2023-05-06 RX ORDER — SODIUM CHLORIDE 5 G/100ML
150 INJECTION, SOLUTION INTRAVENOUS ONCE
Refills: 0 | Status: COMPLETED | OUTPATIENT
Start: 2023-05-06 | End: 2023-05-06

## 2023-05-06 RX ORDER — APIXABAN 2.5 MG/1
5 TABLET, FILM COATED ORAL
Refills: 0 | Status: DISCONTINUED | OUTPATIENT
Start: 2023-05-06 | End: 2023-05-06

## 2023-05-06 RX ORDER — BUMETANIDE 0.25 MG/ML
2 INJECTION INTRAMUSCULAR; INTRAVENOUS
Qty: 20 | Refills: 0 | Status: DISCONTINUED | OUTPATIENT
Start: 2023-05-06 | End: 2023-05-11

## 2023-05-06 RX ORDER — HEPARIN SODIUM 5000 [USP'U]/ML
5000 INJECTION INTRAVENOUS; SUBCUTANEOUS EVERY 6 HOURS
Refills: 0 | Status: DISCONTINUED | OUTPATIENT
Start: 2023-05-06 | End: 2023-05-08

## 2023-05-06 RX ORDER — BUMETANIDE 0.25 MG/ML
2 INJECTION INTRAMUSCULAR; INTRAVENOUS ONCE
Refills: 0 | Status: COMPLETED | OUTPATIENT
Start: 2023-05-06 | End: 2023-05-06

## 2023-05-06 RX ORDER — POTASSIUM CHLORIDE 20 MEQ
20 PACKET (EA) ORAL
Refills: 0 | Status: COMPLETED | OUTPATIENT
Start: 2023-05-06 | End: 2023-05-06

## 2023-05-06 RX ORDER — HEPARIN SODIUM 5000 [USP'U]/ML
2500 INJECTION INTRAVENOUS; SUBCUTANEOUS EVERY 6 HOURS
Refills: 0 | Status: DISCONTINUED | OUTPATIENT
Start: 2023-05-06 | End: 2023-05-08

## 2023-05-06 RX ORDER — HEPARIN SODIUM 5000 [USP'U]/ML
INJECTION INTRAVENOUS; SUBCUTANEOUS
Qty: 25000 | Refills: 0 | Status: DISCONTINUED | OUTPATIENT
Start: 2023-05-06 | End: 2023-05-08

## 2023-05-06 RX ADMIN — MEGESTROL ACETATE 40 MILLIGRAM(S): 40 SUSPENSION ORAL at 12:16

## 2023-05-06 RX ADMIN — MEGESTROL ACETATE 40 MILLIGRAM(S): 40 SUSPENSION ORAL at 17:32

## 2023-05-06 RX ADMIN — BUMETANIDE 10 MG/HR: 0.25 INJECTION INTRAMUSCULAR; INTRAVENOUS at 18:34

## 2023-05-06 RX ADMIN — Medication 5 MILLIGRAM(S): at 21:30

## 2023-05-06 RX ADMIN — HEPARIN SODIUM 1100 UNIT(S)/HR: 5000 INJECTION INTRAVENOUS; SUBCUTANEOUS at 09:58

## 2023-05-06 RX ADMIN — HEPARIN SODIUM 1100 UNIT(S)/HR: 5000 INJECTION INTRAVENOUS; SUBCUTANEOUS at 16:00

## 2023-05-06 RX ADMIN — Medication 20 MILLIEQUIVALENT(S): at 18:05

## 2023-05-06 RX ADMIN — Medication 3.81 MICROGRAM(S)/KG/MIN: at 16:51

## 2023-05-06 RX ADMIN — Medication 500 MILLIGRAM(S): at 12:16

## 2023-05-06 RX ADMIN — Medication 20 MILLIEQUIVALENT(S): at 21:30

## 2023-05-06 RX ADMIN — HEPARIN SODIUM 1100 UNIT(S)/HR: 5000 INJECTION INTRAVENOUS; SUBCUTANEOUS at 02:39

## 2023-05-06 RX ADMIN — BUMETANIDE 2 MILLIGRAM(S): 0.25 INJECTION INTRAMUSCULAR; INTRAVENOUS at 18:24

## 2023-05-06 RX ADMIN — PANTOPRAZOLE SODIUM 40 MILLIGRAM(S): 20 TABLET, DELAYED RELEASE ORAL at 06:45

## 2023-05-06 RX ADMIN — MEGESTROL ACETATE 40 MILLIGRAM(S): 40 SUSPENSION ORAL at 01:22

## 2023-05-06 RX ADMIN — Medication 1 SPRAY(S): at 06:44

## 2023-05-06 RX ADMIN — CHLORHEXIDINE GLUCONATE 1 APPLICATION(S): 213 SOLUTION TOPICAL at 12:21

## 2023-05-06 RX ADMIN — MILRINONE LACTATE 7.14 MICROGRAM(S)/KG/MIN: 1 INJECTION, SOLUTION INTRAVENOUS at 10:06

## 2023-05-06 RX ADMIN — SODIUM CHLORIDE 50 MILLILITER(S): 5 INJECTION, SOLUTION INTRAVENOUS at 17:45

## 2023-05-06 RX ADMIN — MEGESTROL ACETATE 40 MILLIGRAM(S): 40 SUSPENSION ORAL at 06:44

## 2023-05-06 NOTE — PROGRESS NOTE ADULT - ASSESSMENT
77 year old woman with pmh of HTN, HLD, nonischemic cardiomyopathy, HFrEF, s/p ICD and PPM presents to the ED with 2 weeks of worsening shortness of breath. Patient and her son state patient has been experiencing shortness of breath that has progressed from present on activity to now shortness of breath during conversation. Patient also notes an increasing dry cough over this time period. Denies history of smoking, fevers/chills, chest pain, nausea/vomiting, bloody sputum, dark/tarry/bloody bowel movements, upper respiratory symptoms. Upon arrival, patient is HD stable and saturating 98% on RA, talking in complete sentences, no increased work of breathing.  CT C/A/P revealing cardiomegaly with evidence of right heart strain, R hilar lymphadenopathy with R perihilar soft tissue density with indentation/possible invasion of right pulmonary artery, and narrowing of proximal R lobar and segmental branches with associated consolidation of right lower lobe. 2.2 x 2.8 cm filling defect noted in apex of left ventricle. Patient follows with Dr. Frank, last ECHO 8/2021 revealing EF 20-25%, mild Mr, mild TR, mild Pulm HTN.    Patient now s/p bronchoscopy, EBUS, lymph node Bx    PLAN:  - CCU management   - will await pathology   - monitor pressor requirements and daily CXR    can recall as needed    GREEN SURGERY SPECTRA: [id number]

## 2023-05-06 NOTE — PROGRESS NOTE ADULT - SUBJECTIVE AND OBJECTIVE BOX
HPI:  77 year old woman with PMHx of HTN, HLD, nonischemic cardiomyopathy, HFrEF s/p AICD presents to the ED with 2 weeks of worsening shortness of breath. Patient and her son state patient has been experiencing shortness of breath that has progressed from present on activity to now shortness of breath during conversation. Patient also notes an increasing dry cough over this time period. Denies history of smoking, fevers/chills, chest pain, nausea/vomiting, bloody sputum, dark/tarry/bloody bowel movements, upper respiratory symptoms.     In ED, patient's HR is 110 and saturating 98% on RA  CT C/A/P revealing cardiomegaly with evidence of right heart strain, R hilar lymphadenopathy with R perihilar soft tissue density with indentation/possible invasion of right pulmonary artery, and narrowing of proximal R lobar and segmental branches with associated consolidation of right lower lobe. 2.2 x 2.8 cm filling defect noted in apex of left ventricle.   Last ECHO 2021 revealing EF 20-25%, mild Mr, mild TR, mild Pulm HTN  Dimer 2600, trops negative BNP 24832  Duplex venous LE negative for DVT; CTA chest negative for PE  Cr 1.4 (0.7 in ), Na+ 128, T.Benjamin 2.3, LFTs elevated (hemolyzed)  Lactate 5.8-->4.6  Admitted to SDU (2023 01:10)      PAST MEDICAL & SURGICAL HISTORY  CHF, stage C    HTN (hypertension)        FAMILY HISTORY:  FAMILY HISTORY:  FH: heart failure (Mother)        SOCIAL HISTORY:  Social History:      ALLERGIES:  epinephrine (Unknown)      MEDICATIONS:  chlorhexidine 2% Cloths 1 Application(s) Topical daily  DOBUTamine Infusion 5 MICROgram(s)/kG/Min (4.76 mL/Hr) IV Continuous <Continuous>  heparin  Infusion.  Unit(s)/Hr (11 mL/Hr) IV Continuous <Continuous>  magnesium gluconate 500 milliGRAM(s) Oral daily  megestrol 40 milliGRAM(s) Oral four times a day  melatonin 5 milliGRAM(s) Oral at bedtime  milrinone Infusion 0.375 MICROgram(s)/kG/Min (7.14 mL/Hr) IV Continuous <Continuous>  pantoprazole    Tablet 40 milliGRAM(s) Oral before breakfast    PRN:  benzocaine 20% Spray 1 Spray(s) Topical four times a day PRN  heparin   Injectable 5000 Unit(s) IV Push every 6 hours PRN  heparin   Injectable 2500 Unit(s) IV Push every 6 hours PRN      HOME MEDICATIONS:  Home Medications:  atorvastatin 20 mg oral tablet: 1 tab(s) orally once a day (04 Aug 2021 10:05)  carvedilol 6.25 mg oral tablet: 1 tab(s) orally 2 times a day (2023 02:02)  Entresto 49 mg-51 mg oral tablet: 1 tab(s) orally 2 times a day (04 Aug 2021 10:05)  Farxiga 10 mg oral tablet: 1 tab(s) orally once a day (2023 02:05)  Lasix 20 mg oral tablet: 1 tab(s) orally once a day (2023 02:04)  spironolactone 25 mg oral tablet: 1 tab(s) orally once a day (04 Aug 2021 10:05)      VITALS:   T(F): 97.2 ( @ 08:00), Max: 99.5 ( @ 08:00)  HR: 114 ( @ 08:00) (102 - 125)  BP: 90/53 ( @ 14:45) (79/57 - 99/67)  BP(mean): 67 ( @ 13:00) (61 - 77)  RR: 17 ( @ 08:00) (11 - 38)  SpO2: 97% ( @ 08:00) (93% - 100%)    I&O's Summary    05 May 2023 07:01  -  06 May 2023 07:00  --------------------------------------------------------  IN: 475 mL / OUT: 775 mL / NET: -300 mL    06 May 2023 07:01  -  06 May 2023 08:51  --------------------------------------------------------  IN: 120.8 mL / OUT: 85 mL / NET: 35.8 mL        REVIEW OF SYSTEMS:  CONSTITUTIONAL: No weakness, fevers or chills  HEENT: No visual changes, neck/ear pain  RESPIRATORY: No cough, sob  CARDIOVASCULAR: See HPI  GASTROINTESTINAL: No abdominal pain. No nausea, vomiting, diarrhea   GENITOURINARY: No dysuria, frequency or hematuria  NEUROLOGICAL: No new focal deficits  SKIN: No new rashes      PHYSICAL EXAM:  General: Not in distress.  Non-toxic appearing.   HEENT: EOMI  Cardio: regular, S1, S2, no murmur  Pulm: B/L BS.  No wheezing / crackles / rales  Abdomen: Soft, non-tender, non-distended. Normoactive bowel sounds  Extremities: No edema b/l le  Neuro: A&Ox2. No focal deficits  (+) yoon, (+) NG tube    LABS:                        9.9    9.06  )-----------( 265      ( 06 May 2023 04:30 )             29.2     05-06    125<L>  |  91<L>  |  22<H>  ----------------------------<  87  3.6   |  23  |  0.5<L>    Ca    8.2<L>      06 May 2023 07:57  Phos  2.9     05-06  Mg     2.2     05-06      PT/INR - ( 05 May 2023 06:15 )   PT: 18.80 sec;   INR: 1.62 ratio         PTT - ( 06 May 2023 01:28 )  PTT:60.3 sec        04-25 Chol 104 LDL -- HDL 17<L> Trig 91  COVID-19 PCR: NotDetec (02 May 2023 17:06)  COVID-19 PCR: NotDetec (02 May 2023 11:50)  COVID-19 PCR: NotDetec (01 May 2023 17:04)  COVID-19 PCR: Detected (2023 22:49)      RADIOLOGY:  - TTE 2023 EF <20%, large fixed thrombus vs mass on apical LV wall, GIIDD, mod-sev TR, mod MR, mild NH, mild PH      EC Lead ECG:   Ventricular Rate 112 BPM    Atrial Rate 112 BPM    P-R Interval 146 ms    QRS Duration 110 ms    Q-T Interval 374 ms    QTC Calculation(Bazett) 510 ms    P Axis 52 degrees    R Axis -36 degrees    T Axis 93 degrees    Diagnosis Line Sinus tachycardia  Possible Left atrial enlargement  Left axis deviation  Nonspecific ST and T wave abnormality  Abnormal ECG    Confirmed by ALMA KELLY MD ([id number]) on 2023 7:06:04 AM ( @ 05:29) HPI:  77 year old woman with PMHx of HTN, HLD, nonischemic cardiomyopathy, HFrEF s/p AICD presents to the ED with 2 weeks of worsening shortness of breath. Patient and her son state patient has been experiencing shortness of breath that has progressed from present on activity to now shortness of breath during conversation. Patient also notes an increasing dry cough over this time period. Denies history of smoking, fevers/chills, chest pain, nausea/vomiting, bloody sputum, dark/tarry/bloody bowel movements, upper respiratory symptoms.     In ED, patient's HR is 110 and saturating 98% on RA  CT C/A/P revealing cardiomegaly with evidence of right heart strain, R hilar lymphadenopathy with R perihilar soft tissue density with indentation/possible invasion of right pulmonary artery, and narrowing of proximal R lobar and segmental branches with associated consolidation of right lower lobe. 2.2 x 2.8 cm filling defect noted in apex of left ventricle.   Last ECHO 2021 revealing EF 20-25%, mild Mr, mild TR, mild Pulm HTN  Dimer 2600, trops negative BNP 02021  Duplex venous LE negative for DVT; CTA chest negative for PE  Cr 1.4 (0.7 in ), Na+ 128, T.Benjamin 2.3, LFTs elevated (hemolyzed)  Lactate 5.8-->4.6  Admitted to SDU (2023 01:10)      PAST MEDICAL & SURGICAL HISTORY  CHF, stage C    HTN (hypertension)        FAMILY HISTORY:  FAMILY HISTORY:  FH: heart failure (Mother)        SOCIAL HISTORY:  Social History:      ALLERGIES:  epinephrine (Unknown)      MEDICATIONS:  chlorhexidine 2% Cloths 1 Application(s) Topical daily  DOBUTamine Infusion 5 MICROgram(s)/kG/Min (4.76 mL/Hr) IV Continuous <Continuous>  heparin  Infusion.  Unit(s)/Hr (11 mL/Hr) IV Continuous <Continuous>  magnesium gluconate 500 milliGRAM(s) Oral daily  megestrol 40 milliGRAM(s) Oral four times a day  melatonin 5 milliGRAM(s) Oral at bedtime  milrinone Infusion 0.375 MICROgram(s)/kG/Min (7.14 mL/Hr) IV Continuous <Continuous>  pantoprazole    Tablet 40 milliGRAM(s) Oral before breakfast    PRN:  benzocaine 20% Spray 1 Spray(s) Topical four times a day PRN  heparin   Injectable 5000 Unit(s) IV Push every 6 hours PRN  heparin   Injectable 2500 Unit(s) IV Push every 6 hours PRN      HOME MEDICATIONS:  Home Medications:  atorvastatin 20 mg oral tablet: 1 tab(s) orally once a day (04 Aug 2021 10:05)  carvedilol 6.25 mg oral tablet: 1 tab(s) orally 2 times a day (2023 02:02)  Entresto 49 mg-51 mg oral tablet: 1 tab(s) orally 2 times a day (04 Aug 2021 10:05)  Farxiga 10 mg oral tablet: 1 tab(s) orally once a day (2023 02:05)  Lasix 20 mg oral tablet: 1 tab(s) orally once a day (2023 02:04)  spironolactone 25 mg oral tablet: 1 tab(s) orally once a day (04 Aug 2021 10:05)      VITALS:   T(F): 97.2 ( @ 08:00), Max: 99.5 ( @ 08:00)  HR: 114 ( @ 08:00) (102 - 125)  BP: 90/53 ( @ 14:45) (79/57 - 99/67)  BP(mean): 67 ( @ 13:00) (61 - 77)  RR: 17 ( @ 08:00) (11 - 38)  SpO2: 97% ( @ 08:00) (93% - 100%)    I&O's Summary    05 May 2023 07:01  -  06 May 2023 07:00  --------------------------------------------------------  IN: 475 mL / OUT: 775 mL / NET: -300 mL    06 May 2023 07:01  -  06 May 2023 08:51  --------------------------------------------------------  IN: 120.8 mL / OUT: 85 mL / NET: 35.8 mL        REVIEW OF SYSTEMS:  CONSTITUTIONAL: No weakness, fevers or chills  HEENT: No visual changes, neck/ear pain  RESPIRATORY: No cough, sob  CARDIOVASCULAR: See HPI  GASTROINTESTINAL: No abdominal pain. No nausea, vomiting, diarrhea   GENITOURINARY: No dysuria, frequency or hematuria  NEUROLOGICAL: No new focal deficits  SKIN: No new rashes      PHYSICAL EXAM:  General: Not in distress.  Non-toxic appearing.   HEENT: EOMI  Cardio: regular, S1, S2, no murmur  Pulm: B/L BS.  No wheezing / crackles / rales  Abdomen: Soft, non-tender, non-distended. Normoactive bowel sounds  Extremities: No edema b/l le  Neuro: A&Ox2. No focal deficits  (+) Saenz (+) NG tube  (+) PICC (+) A Line      LABS:                        9.9    9.06  )-----------( 265      ( 06 May 2023 04:30 )             29.2     05-06    125<L>  |  91<L>  |  22<H>  ----------------------------<  87  3.6   |  23  |  0.5<L>    Ca    8.2<L>      06 May 2023 07:57  Phos  2.9     05-06  Mg     2.2     05-06      PT/INR - ( 05 May 2023 06:15 )   PT: 18.80 sec;   INR: 1.62 ratio         PTT - ( 06 May 2023 01:28 )  PTT:60.3 sec        04-25 Chol 104 LDL -- HDL 17<L> Trig 91    RADIOLOGY:  - TTE 2023 EF <20%, large fixed thrombus vs mass on apical LV wall, GIIDD, mod-sev TR, mod MR, mild [place], mild PH      EC Lead ECG:   Ventricular Rate 112 BPM    Atrial Rate 112 BPM    P-R Interval 146 ms    QRS Duration 110 ms    Q-T Interval 374 ms    QTC Calculation(Bazett) 510 ms    P Axis 52 degrees    R Axis -36 degrees    T Axis 93 degrees    Diagnosis Line Sinus tachycardia  Possible Left atrial enlargement  Left axis deviation  Nonspecific ST and T wave abnormality  Abnormal ECG    Confirmed by ALMA KELLY MD ([id number]) on 2023 7:06:04 AM ( @ 05:29) HPI:  77 year old woman with PMHx of HTN, HLD, nonischemic cardiomyopathy, HFrEF s/p AICD presents to the ED with 2 weeks of worsening shortness of breath. Patient and her son state patient has been experiencing shortness of breath that has progressed from present on activity to now shortness of breath during conversation. Patient also notes an increasing dry cough over this time period. Denies history of smoking, fevers/chills, chest pain, nausea/vomiting, bloody sputum, dark/tarry/bloody bowel movements, upper respiratory symptoms.     In ED, patient's HR is 110 and saturating 98% on RA  CT C/A/P revealing cardiomegaly with evidence of right heart strain, R hilar lymphadenopathy with R perihilar soft tissue density with indentation/possible invasion of right pulmonary artery, and narrowing of proximal R lobar and segmental branches with associated consolidation of right lower lobe. 2.2 x 2.8 cm filling defect noted in apex of left ventricle.   Last ECHO 2021 revealing EF 20-25%, mild Mr, mild TR, mild Pulm HTN  Dimer 2600, trops negative BNP 65119  Duplex venous LE negative for DVT; CTA chest negative for PE  Cr 1.4 (0.7 in ), Na+ 128, T.Benjamin 2.3, LFTs elevated (hemolyzed)  Lactate 5.8-->4.6  Admitted to SDU (2023 01:10)      PAST MEDICAL & SURGICAL HISTORY  CHF, stage C    HTN (hypertension)        FAMILY HISTORY:  FAMILY HISTORY:  FH: heart failure (Mother)        SOCIAL HISTORY:  Social History: negx3      ALLERGIES:  epinephrine (Unknown)      MEDICATIONS:  chlorhexidine 2% Cloths 1 Application(s) Topical daily  DOBUTamine Infusion 5 MICROgram(s)/kG/Min (4.76 mL/Hr) IV Continuous <Continuous>  heparin  Infusion.  Unit(s)/Hr (11 mL/Hr) IV Continuous <Continuous>  magnesium gluconate 500 milliGRAM(s) Oral daily  megestrol 40 milliGRAM(s) Oral four times a day  melatonin 5 milliGRAM(s) Oral at bedtime  milrinone Infusion 0.375 MICROgram(s)/kG/Min (7.14 mL/Hr) IV Continuous <Continuous>  pantoprazole    Tablet 40 milliGRAM(s) Oral before breakfast    PRN:  benzocaine 20% Spray 1 Spray(s) Topical four times a day PRN  heparin   Injectable 5000 Unit(s) IV Push every 6 hours PRN  heparin   Injectable 2500 Unit(s) IV Push every 6 hours PRN      HOME MEDICATIONS:  Home Medications:  atorvastatin 20 mg oral tablet: 1 tab(s) orally once a day (04 Aug 2021 10:05)  carvedilol 6.25 mg oral tablet: 1 tab(s) orally 2 times a day (2023 02:02)  Entresto 49 mg-51 mg oral tablet: 1 tab(s) orally 2 times a day (04 Aug 2021 10:05)  Farxiga 10 mg oral tablet: 1 tab(s) orally once a day (2023 02:05)  Lasix 20 mg oral tablet: 1 tab(s) orally once a day (2023 02:04)  spironolactone 25 mg oral tablet: 1 tab(s) orally once a day (04 Aug 2021 10:05)      VITALS:   T(F): 97.2 ( @ 08:00), Max: 99.5 ( @ 08:00)  HR: 114 ( @ 08:00) (102 - 125)  BP: 90/53 ( @ 14:45) (79/57 - 99/67)  BP(mean): 67 ( @ 13:00) (61 - 77)  RR: 17 ( @ 08:00) (11 - 38)  SpO2: 97% ( @ 08:00) (93% - 100%)    I&O's Summary    05 May 2023 07:01  -  06 May 2023 07:00  --------------------------------------------------------  IN: 475 mL / OUT: 775 mL / NET: -300 mL    06 May 2023 07:01  -  06 May 2023 08:51  --------------------------------------------------------  IN: 120.8 mL / OUT: 85 mL / NET: 35.8 mL        REVIEW OF SYSTEMS:  CONSTITUTIONAL: No weakness, fevers or chills  HEENT: No visual changes, neck/ear pain  RESPIRATORY: No cough, sob  CARDIOVASCULAR: See HPI  GASTROINTESTINAL: No abdominal pain. No nausea, vomiting, diarrhea   GENITOURINARY: No dysuria, frequency or hematuria  NEUROLOGICAL: No new focal deficits  SKIN: No new rashes      PHYSICAL EXAM:  General: frail  HEENT: EOMI  Cardio: regular, S1, S2, no murmur  Pulm: B/L BS.  No wheezing / crackles / rales  Abdomen: Soft, non-tender, non-distended. Normoactive bowel sounds  Extremities: No edema b/l le  Neuro: A&Ox2. No focal deficits  (+) Saezn (+) NG tube  (+) PICC (+) A Line      LABS:                        9.9    9.06  )-----------( 265      ( 06 May 2023 04:30 )             29.2     05-06    125<L>  |  91<L>  |  22<H>  ----------------------------<  87  3.6   |  23  |  0.5<L>    Ca    8.2<L>      06 May 2023 07:57  Phos  2.9     05-06  Mg     2.2     05-06      PT/INR - ( 05 May 2023 06:15 )   PT: 18.80 sec;   INR: 1.62 ratio         PTT - ( 06 May 2023 01:28 )  PTT:60.3 sec        04-25 Chol 104 LDL -- HDL 17<L> Trig 91    RADIOLOGY:  - TTE 2023 EF <20%, large fixed thrombus vs mass on apical LV wall, GIIDD, mod-sev TR, mod MR, mild CT, mild PH      EC Lead ECG:   Ventricular Rate 112 BPM    Atrial Rate 112 BPM    P-R Interval 146 ms    QRS Duration 110 ms    Q-T Interval 374 ms    QTC Calculation(Bazett) 510 ms    P Axis 52 degrees    R Axis -36 degrees    T Axis 93 degrees    Diagnosis Line Sinus tachycardia  Possible Left atrial enlargement  Left axis deviation  Nonspecific ST and T wave abnormality  Abnormal ECG    Confirmed by ALMA KELLY MD ([id number]) on 2023 7:06:04 AM (05-05 @ 05:29)

## 2023-05-06 NOTE — PROGRESS NOTE ADULT - ASSESSMENT
77 year old woman with PMHx of HTN, HLD, nonischemic cardiomyopathy, HFrEF s/p AICD presents to the ED with 2 weeks of worsening shortness of breath with increasing dry cough over this time period accepted to CCU for cardiogenic shock    #Cardiogenic Shock   #LV thrombus, suspected new PE  #NICM  #HFrEF 25% s/p AICD  #HTN/HLD  - CT CAP - cardiomegaly w/ RH strain  - TTE 4/22/2023 EF <20%, large fixed thrombus vs mass on apical LV wall, GIIDD, mod-sev TR, mod MR, mild OR, mild PH  - LE venous duplex 4/21 negative - pending repeat   - c/w dobutamine and milrinone ggt  - c/w heparin ggt - target ptt 60-99   - CT Chest 5/4 subacute PE in R mainstem and subsegmental branches  - repeat iron profile    #Post obstructive pneumonia  #Suspected lung malignancy  - s/p bronch/EBUS biopsy 5/05 - awaiting official pathology results   - CT CAP - R hilar LAD, R perihilar soft tissue density w/ indentation/possible invasion of R pulmonary artery and narrowing of proximal R lobar and segmental branches w/ consolidation of RLL  - MRSA -ve  - completed zosyn 7 days course 4/27  - Incentive Spirometer    #Positive COVID-19 PCR  - Asymptomatic, off isolation   - No need for further treatment or diagnostic w/u as per ID    #Hypotonic Hyponatremia  - hypervolemic   - Na 132-> 129 -> 130  - trend Na BID    # Misc  - DVT Prophylaxis: heparin drip - holding   - GI Prophylaxis: pantoprazole    Tablet 40 milliGRAM(s) Oral before breakfast  - Diet: Diet, DASH/TLC with 1.2 L fluid restriction - NPO for now   - Activity: Activity - Ambulate as Tolerated  - Code Status: Full , Palliative on board 77 year old woman with PMHx of HTN, HLD, nonischemic cardiomyopathy, HFrEF s/p AICD presents to the ED with 2 weeks of worsening shortness of breath with increasing dry cough over this time period accepted to CCU for cardiogenic shock    #Cardiogenic Shock   #LV thrombus, suspected new PE  #NICM  #HFrEF 25% s/p AICD  #HTN/HLD  - CT CAP - cardiomegaly w/ RH strain  - TTE 4/22/2023 EF <20%, large fixed thrombus vs mass on apical LV wall, GIIDD, mod-sev TR, mod MR, mild MI, mild PH  - LE venous duplex 4/21 negative - pending repeat   - dec dobutamine ggt to 4  - c/w milrinone ggt to 0.375  - d/c heparin and start Eliquis   - CT Chest 5/4 subacute PE in R mainstem and subsegmental branches  - repeat iron profile    #Post obstructive pneumonia  #Suspected lung malignancy  - s/p bronch/EBUS biopsy 5/05 - awaiting official pathology results   - CT CAP - R hilar LAD, R perihilar soft tissue density w/ indentation/possible invasion of R pulmonary artery and narrowing of proximal R lobar and segmental branches w/ consolidation of RLL  - completed zosyn 7 days course 4/27  - Incentive Spirometer    #Positive COVID-19 PCR  - Asymptomatic, off isolation   - No need for further treatment or diagnostic w/u as per ID    #Hypotonic Hyponatremia  - hypervolemic   - Na 132-> 129 -> 130  - trend Na BID    # Misc  - DVT Prophylaxis: heparin drip - holding   - GI Prophylaxis: pantoprazole    Tablet 40 milliGRAM(s) Oral before breakfast  - Diet: Diet, DASH/TLC with 1.2 L fluid restriction - NPO for now   - Activity: Activity - Ambulate as Tolerated  - Code Status: Full , Palliative on board 77 year old woman with PMHx of HTN, HLD, nonischemic cardiomyopathy, HFrEF s/p AICD presents to the ED with 2 weeks of worsening shortness of breath with increasing dry cough over this time period accepted to CCU for cardiogenic shock    #Cardiogenic Shock   #LV thrombus, suspected new PE  #NICM  #HFrEF 25% s/p AICD  #HTN/HLD  - CT CAP - cardiomegaly w/ RH strain  - TTE 4/22/2023 EF <20%, large fixed thrombus vs mass on apical LV wall, GIIDD, mod-sev TR, mod MR, mild MS, mild PH  - LE venous duplex 4/21 negative - pending repeat   - dec dobutamine ggt to 4  - c/w milrinone ggt to 0.375  - d/c heparin and start Eliquis   - CT Chest 5/4 subacute PE in R mainstem and subsegmental branches  - repeat iron profile  - monitor Urine Output    #Post obstructive pneumonia  #Suspected lung malignancy  - s/p bronch/EBUS biopsy 5/05 - awaiting official pathology results   - CT CAP - R hilar LAD, R perihilar soft tissue density w/ indentation/possible invasion of R pulmonary artery and narrowing of proximal R lobar and segmental branches w/ consolidation of RLL  - completed zosyn 7 days course 4/27  - Incentive Spirometer    #Positive COVID-19 PCR  - Asymptomatic, off isolation   - No need for further treatment or diagnostic w/u as per ID    #Hypotonic Hyponatremia  - hypervolemic   - Na 132-> 129 -> 130  - trend Na BID    # Misc  - DVT Prophylaxis: heparin drip - holding   - GI Prophylaxis: pantoprazole    Tablet 40 milliGRAM(s) Oral before breakfast  - Diet: Diet, DASH/TLC with 1.2 L fluid restriction - NPO for now   - Activity: Activity - Ambulate as Tolerated  - Code Status: Full , Palliative on board

## 2023-05-06 NOTE — PROGRESS NOTE ADULT - SUBJECTIVE AND OBJECTIVE BOX
THORACIC SURGERY PROGRESS NOTE    Patient: MIKHAIL JOHNSON , 77y ([birth year])Female   MRN: [id number]  Location: [address]  Visit: 04-21-23 Inpatient  Date: 05-06-23 @ 01:20    Hospital Day #: 16  Post-Op Day #: 1    Procedure/Dx/Injuries: Intra cardiac mass, s/p bronch and EBUS lymph node bx    Events of past 24 hours: remains on inotropic support in CCU, CXR stable     PAST MEDICAL & SURGICAL HISTORY:  CHF, stage C      HTN (hypertension)          Vitals:   T(F): 97.4 (05-05-23 @ 20:00), Max: 99 (05-05-23 @ 04:00)  HR: 107 (05-06-23 @ 00:00)  BP: 90/53 (05-05-23 @ 14:45)  RR: 21 (05-06-23 @ 00:00)  SpO2: 100% (05-06-23 @ 00:00)      Diet, DASH/TLC:   Sodium & Cholesterol Restricted  1200mL Fluid Restriction ([id number])  Supplement Feeding Modality:  Oral  Ensure Plus High Protein Cans or Servings Per Day:  2       Frequency:  Daily      Fluids:     I & O's:    05-04-23 @ 07:01  -  05-05-23 @ 07:00  --------------------------------------------------------  IN:    DOBUTamine: 55.2 mL    Heparin Infusion: 138 mL    Milrinone: 85.5 mL    Milrinone: 63.9 mL    Oral Fluid: 120 mL  Total IN: 462.6 mL    OUT:    Ureteral Catheter (mL): 980 mL  Total OUT: 980 mL    Total NET: -517.4 mL      PHYSICAL EXAM:  General: NAD, AAOx3, calm and cooperative  HEENT: NCAT, WANDA, EOMI, Trachea ML, Neck supple  Cardiac: RRR S1, S2, no Murmurs, rubs or gallops  Respiratory: CTAB, normal respiratory effort, breath sounds equal BL  Abdomen: Soft, non-distended, non-tender, no rebound, no guarding. +BS.  Vascular: Pulses 2+ throughout, extremities well perfused  Skin: Warm/dry, normal color, no jaundice      MEDICATIONS  (STANDING):  chlorhexidine 2% Cloths 1 Application(s) Topical daily  DOBUTamine Infusion 5 MICROgram(s)/kG/Min (4.76 mL/Hr) IV Continuous <Continuous>  heparin  Infusion.  Unit(s)/Hr (11 mL/Hr) IV Continuous <Continuous>  magnesium gluconate 500 milliGRAM(s) Oral daily  megestrol 40 milliGRAM(s) Oral four times a day  melatonin 5 milliGRAM(s) Oral at bedtime  milrinone Infusion 0.375 MICROgram(s)/kG/Min (7.14 mL/Hr) IV Continuous <Continuous>  pantoprazole    Tablet 40 milliGRAM(s) Oral before breakfast    MEDICATIONS  (PRN):  benzocaine 20% Spray 1 Spray(s) Topical four times a day PRN pain  heparin   Injectable 5000 Unit(s) IV Push every 6 hours PRN For aPTT less than 40  heparin   Injectable 2500 Unit(s) IV Push every 6 hours PRN For aPTT between 40 - 57      DVT PROPHYLAXIS: heparin   Injectable 5000 Unit(s) IV Push every 6 hours PRN  heparin   Injectable 2500 Unit(s) IV Push every 6 hours PRN  heparin  Infusion.  Unit(s)/Hr IV Continuous <Continuous>    GI PROPHYLAXIS: pantoprazole    Tablet 40 milliGRAM(s) Oral before breakfast    ANTICOAGULATION:   ANTIBIOTICS:        LAB/STUDIES:  Labs:  CAPILLARY BLOOD GLUCOSE                          10.6   8.69  )-----------( 277      ( 05 May 2023 06:00 )             32.6       Auto Neutrophil %: 73.2 % (05-05-23 @ 06:00)  Auto Immature Granulocyte %: 0.7 % (05-05-23 @ 06:00)    05-05    130<L>  |  92<L>  |  26<H>  ----------------------------<  122<H>  4.2   |  26  |  0.7      Magnesium, Serum: 1.7 mg/dL (05-05-23 @ 06:00)      LFTs:     Blood Gas Venous - Lactate: 1.20 mmol/L (05-06-23 @ 00:09)  Blood Gas Arterial, Lactate: 1.00 mmol/L (05-05-23 @ 17:45)  Blood Gas Venous - Lactate: 0.60 mmol/L (05-05-23 @ 17:45)  Blood Gas Venous - Lactate: 1.20 mmol/L (05-05-23 @ 06:04)  Blood Gas Venous - Lactate: 1.50 mmol/L (05-05-23 @ 02:33)  Blood Gas Venous - Lactate: 2.00 mmol/L (05-05-23 @ 00:09)  Blood Gas Venous - Lactate: 1.50 mmol/L (05-04-23 @ 21:12)  Blood Gas Venous - Lactate: 1.00 mmol/L (05-04-23 @ 09:41)  Blood Gas Arterial, Lactate: 1.20 mmol/L (05-04-23 @ 09:41)  Blood Gas Venous - Lactate: 0.80 mmol/L (05-04-23 @ 05:14)  Blood Gas Venous - Lactate: 1.20 mmol/L (05-04-23 @ 03:14)  Blood Gas Venous - Lactate: 1.40 mmol/L (05-04-23 @ 00:47)  Blood Gas Arterial, Lactate: 1.20 mmol/L (05-04-23 @ 00:47)  Blood Gas Arterial, Lactate: 1.00 mmol/L (05-03-23 @ 04:48)  Blood Gas Venous - Lactate: 1.00 mmol/L (05-03-23 @ 04:48)    ABG - ( 05 May 2023 17:45 )  pH: 7.47  /  pCO2: 35    /  pO2: 103   / HCO3: 26    / Base Excess: 2.1   /  SaO2: 99.1        ABG - ( 04 May 2023 09:41 )  pH: 7.54  /  pCO2: 31    /  pO2: 89    / HCO3: 26    / Base Excess: 4.4   /  SaO2: 98.7        ABG - ( 04 May 2023 00:47 )  pH: 7.58  /  pCO2: 30    /  pO2: 111   / HCO3: 28    / Base Excess: 6.4   /  SaO2: 100.0             Coags:     18.80  ----< 1.62    ( 05 May 2023 06:15 )     x           IMAGING:      ACCESS/ DEVICES:  [X ] Peripheral IV  [ ] Central Venous Line	[ ] R	[ ] L	[ ] IJ	[ ] Fem	[ ] SC	Placed:   [ ] Arterial Line		[ ] R	[ ] L	[ ] Fem	[ ] Rad	[ ] Ax	Placed:   [ X] PICC:					[ ] Anum  [ ] Urinary Catheter,  Date Placed:

## 2023-05-07 LAB
ALBUMIN SERPL ELPH-MCNC: 2.7 G/DL — LOW (ref 3.5–5.2)
ALBUMIN SERPL ELPH-MCNC: 2.8 G/DL — LOW (ref 3.5–5.2)
ALP SERPL-CCNC: 141 U/L — HIGH (ref 30–115)
ALP SERPL-CCNC: 144 U/L — HIGH (ref 30–115)
ALT FLD-CCNC: 44 U/L — HIGH (ref 0–41)
ALT FLD-CCNC: 45 U/L — HIGH (ref 0–41)
ANION GAP SERPL CALC-SCNC: 11 MMOL/L — SIGNIFICANT CHANGE UP (ref 7–14)
ANION GAP SERPL CALC-SCNC: 13 MMOL/L — SIGNIFICANT CHANGE UP (ref 7–14)
ANION GAP SERPL CALC-SCNC: 15 MMOL/L — HIGH (ref 7–14)
APTT BLD: 77.6 SEC — CRITICAL HIGH (ref 27–39.2)
APTT BLD: 99.6 SEC — CRITICAL HIGH (ref 27–39.2)
AST SERPL-CCNC: 37 U/L — SIGNIFICANT CHANGE UP (ref 0–41)
AST SERPL-CCNC: 41 U/L — SIGNIFICANT CHANGE UP (ref 0–41)
BASE EXCESS BLDV CALC-SCNC: 3.3 MMOL/L — HIGH (ref -2–3)
BASOPHILS # BLD AUTO: 0.03 K/UL — SIGNIFICANT CHANGE UP (ref 0–0.2)
BASOPHILS NFR BLD AUTO: 0.3 % — SIGNIFICANT CHANGE UP (ref 0–1)
BILIRUB SERPL-MCNC: 2.6 MG/DL — HIGH (ref 0.2–1.2)
BILIRUB SERPL-MCNC: 2.7 MG/DL — HIGH (ref 0.2–1.2)
BUN SERPL-MCNC: 25 MG/DL — HIGH (ref 10–20)
BUN SERPL-MCNC: 26 MG/DL — HIGH (ref 10–20)
BUN SERPL-MCNC: 27 MG/DL — HIGH (ref 10–20)
CA-I SERPL-SCNC: 1.12 MMOL/L — LOW (ref 1.15–1.33)
CALCIUM SERPL-MCNC: 8.2 MG/DL — LOW (ref 8.4–10.5)
CALCIUM SERPL-MCNC: 8.3 MG/DL — LOW (ref 8.4–10.5)
CALCIUM SERPL-MCNC: 8.4 MG/DL — SIGNIFICANT CHANGE UP (ref 8.4–10.4)
CHLORIDE SERPL-SCNC: 90 MMOL/L — LOW (ref 98–110)
CHLORIDE SERPL-SCNC: 92 MMOL/L — LOW (ref 98–110)
CHLORIDE SERPL-SCNC: 92 MMOL/L — LOW (ref 98–110)
CO2 SERPL-SCNC: 22 MMOL/L — SIGNIFICANT CHANGE UP (ref 17–32)
CO2 SERPL-SCNC: 22 MMOL/L — SIGNIFICANT CHANGE UP (ref 17–32)
CO2 SERPL-SCNC: 24 MMOL/L — SIGNIFICANT CHANGE UP (ref 17–32)
CREAT SERPL-MCNC: 0.6 MG/DL — LOW (ref 0.7–1.5)
CREAT SERPL-MCNC: 0.7 MG/DL — SIGNIFICANT CHANGE UP (ref 0.7–1.5)
CREAT SERPL-MCNC: 0.7 MG/DL — SIGNIFICANT CHANGE UP (ref 0.7–1.5)
EGFR: 88 ML/MIN/1.73M2 — SIGNIFICANT CHANGE UP
EGFR: 88 ML/MIN/1.73M2 — SIGNIFICANT CHANGE UP
EGFR: 92 ML/MIN/1.73M2 — SIGNIFICANT CHANGE UP
EOSINOPHIL # BLD AUTO: 0.08 K/UL — SIGNIFICANT CHANGE UP (ref 0–0.7)
EOSINOPHIL NFR BLD AUTO: 0.9 % — SIGNIFICANT CHANGE UP (ref 0–8)
FERRITIN SERPL-MCNC: 3159 NG/ML — HIGH (ref 15–150)
GAS PNL BLDV: 127 MMOL/L — LOW (ref 136–145)
GAS PNL BLDV: SIGNIFICANT CHANGE UP
GAS PNL BLDV: SIGNIFICANT CHANGE UP
GLUCOSE SERPL-MCNC: 125 MG/DL — HIGH (ref 70–99)
GLUCOSE SERPL-MCNC: 141 MG/DL — HIGH (ref 70–99)
GLUCOSE SERPL-MCNC: 182 MG/DL — HIGH (ref 70–99)
HCO3 BLDV-SCNC: 27 MMOL/L — SIGNIFICANT CHANGE UP (ref 22–29)
HCT VFR BLD CALC: 31.2 % — LOW (ref 37–47)
HCT VFR BLDA CALC: 32 % — LOW (ref 39–51)
HGB BLD CALC-MCNC: 10.7 G/DL — LOW (ref 12.6–17.4)
HGB BLD-MCNC: 10.4 G/DL — LOW (ref 12–16)
IMM GRANULOCYTES NFR BLD AUTO: 0.8 % — HIGH (ref 0.1–0.3)
LACTATE BLDV-MCNC: 1.7 MMOL/L — SIGNIFICANT CHANGE UP (ref 0.5–2)
LACTATE SERPL-SCNC: 1.6 MMOL/L — SIGNIFICANT CHANGE UP (ref 0.7–2)
LACTATE SERPL-SCNC: 1.9 MMOL/L — SIGNIFICANT CHANGE UP (ref 0.7–2)
LYMPHOCYTES # BLD AUTO: 1.32 K/UL — SIGNIFICANT CHANGE UP (ref 1.2–3.4)
LYMPHOCYTES # BLD AUTO: 14.4 % — LOW (ref 20.5–51.1)
MAGNESIUM SERPL-MCNC: 1.7 MG/DL — LOW (ref 1.8–2.4)
MAGNESIUM SERPL-MCNC: 1.8 MG/DL — SIGNIFICANT CHANGE UP (ref 1.8–2.4)
MCHC RBC-ENTMCNC: 30.8 PG — SIGNIFICANT CHANGE UP (ref 27–31)
MCHC RBC-ENTMCNC: 33.3 G/DL — SIGNIFICANT CHANGE UP (ref 32–37)
MCV RBC AUTO: 92.3 FL — SIGNIFICANT CHANGE UP (ref 81–99)
MONOCYTES # BLD AUTO: 0.67 K/UL — HIGH (ref 0.1–0.6)
MONOCYTES NFR BLD AUTO: 7.3 % — SIGNIFICANT CHANGE UP (ref 1.7–9.3)
NEUTROPHILS # BLD AUTO: 7 K/UL — HIGH (ref 1.4–6.5)
NEUTROPHILS NFR BLD AUTO: 76.3 % — HIGH (ref 42.2–75.2)
NRBC # BLD: 0 /100 WBCS — SIGNIFICANT CHANGE UP (ref 0–0)
PCO2 BLDV: 36 MMHG — LOW (ref 39–42)
PH BLDV: 7.48 — HIGH (ref 7.32–7.43)
PHOSPHATE SERPL-MCNC: 2.3 MG/DL — SIGNIFICANT CHANGE UP (ref 2.1–4.9)
PLATELET # BLD AUTO: 265 K/UL — SIGNIFICANT CHANGE UP (ref 130–400)
PMV BLD: 12 FL — HIGH (ref 7.4–10.4)
PO2 BLDV: 27 MMHG — SIGNIFICANT CHANGE UP
POTASSIUM BLDV-SCNC: 3.9 MMOL/L — SIGNIFICANT CHANGE UP (ref 3.5–5.1)
POTASSIUM SERPL-MCNC: 4 MMOL/L — SIGNIFICANT CHANGE UP (ref 3.5–5)
POTASSIUM SERPL-MCNC: 4.1 MMOL/L — SIGNIFICANT CHANGE UP (ref 3.5–5)
POTASSIUM SERPL-MCNC: 4.2 MMOL/L — SIGNIFICANT CHANGE UP (ref 3.5–5)
POTASSIUM SERPL-SCNC: 4 MMOL/L — SIGNIFICANT CHANGE UP (ref 3.5–5)
POTASSIUM SERPL-SCNC: 4.1 MMOL/L — SIGNIFICANT CHANGE UP (ref 3.5–5)
POTASSIUM SERPL-SCNC: 4.2 MMOL/L — SIGNIFICANT CHANGE UP (ref 3.5–5)
PROT SERPL-MCNC: 6.2 G/DL — SIGNIFICANT CHANGE UP (ref 6–8)
PROT SERPL-MCNC: 6.2 G/DL — SIGNIFICANT CHANGE UP (ref 6–8)
RBC # BLD: 3.38 M/UL — LOW (ref 4.2–5.4)
RBC # FLD: 16.1 % — HIGH (ref 11.5–14.5)
SAO2 % BLDV: 44.4 % — SIGNIFICANT CHANGE UP
SODIUM SERPL-SCNC: 125 MMOL/L — LOW (ref 135–146)
SODIUM SERPL-SCNC: 127 MMOL/L — LOW (ref 135–146)
SODIUM SERPL-SCNC: 129 MMOL/L — LOW (ref 135–146)
TROPONIN T SERPL-MCNC: 0.03 NG/ML — CRITICAL HIGH
WBC # BLD: 9.17 K/UL — SIGNIFICANT CHANGE UP (ref 4.8–10.8)
WBC # FLD AUTO: 9.17 K/UL — SIGNIFICANT CHANGE UP (ref 4.8–10.8)

## 2023-05-07 PROCEDURE — 99291 CRITICAL CARE FIRST HOUR: CPT

## 2023-05-07 PROCEDURE — 93010 ELECTROCARDIOGRAM REPORT: CPT

## 2023-05-07 PROCEDURE — 71045 X-RAY EXAM CHEST 1 VIEW: CPT | Mod: 26,76

## 2023-05-07 RX ORDER — SODIUM CHLORIDE 5 G/100ML
150 INJECTION, SOLUTION INTRAVENOUS
Refills: 0 | Status: COMPLETED | OUTPATIENT
Start: 2023-05-07 | End: 2023-05-07

## 2023-05-07 RX ORDER — SODIUM CHLORIDE 5 G/100ML
150 INJECTION, SOLUTION INTRAVENOUS ONCE
Refills: 0 | Status: DISCONTINUED | OUTPATIENT
Start: 2023-05-07 | End: 2023-05-07

## 2023-05-07 RX ORDER — MILRINONE LACTATE 1 MG/ML
0.25 INJECTION, SOLUTION INTRAVENOUS
Qty: 20 | Refills: 0 | Status: DISCONTINUED | OUTPATIENT
Start: 2023-05-07 | End: 2023-05-17

## 2023-05-07 RX ORDER — MAGNESIUM SULFATE 500 MG/ML
2 VIAL (ML) INJECTION ONCE
Refills: 0 | Status: COMPLETED | OUTPATIENT
Start: 2023-05-07 | End: 2023-05-07

## 2023-05-07 RX ADMIN — SODIUM CHLORIDE 50 MILLILITER(S): 5 INJECTION, SOLUTION INTRAVENOUS at 18:23

## 2023-05-07 RX ADMIN — PANTOPRAZOLE SODIUM 40 MILLIGRAM(S): 20 TABLET, DELAYED RELEASE ORAL at 05:38

## 2023-05-07 RX ADMIN — MEGESTROL ACETATE 40 MILLIGRAM(S): 40 SUSPENSION ORAL at 05:38

## 2023-05-07 RX ADMIN — MILRINONE LACTATE 4.76 MICROGRAM(S)/KG/MIN: 1 INJECTION, SOLUTION INTRAVENOUS at 21:45

## 2023-05-07 RX ADMIN — Medication 500 MILLIGRAM(S): at 11:16

## 2023-05-07 RX ADMIN — HEPARIN SODIUM 1000 UNIT(S)/HR: 5000 INJECTION INTRAVENOUS; SUBCUTANEOUS at 08:34

## 2023-05-07 RX ADMIN — HEPARIN SODIUM 1000 UNIT(S)/HR: 5000 INJECTION INTRAVENOUS; SUBCUTANEOUS at 17:54

## 2023-05-07 RX ADMIN — AMIODARONE HYDROCHLORIDE 200 MILLIGRAM(S): 400 TABLET ORAL at 17:58

## 2023-05-07 RX ADMIN — HEPARIN SODIUM 1000 UNIT(S)/HR: 5000 INJECTION INTRAVENOUS; SUBCUTANEOUS at 16:01

## 2023-05-07 RX ADMIN — MEGESTROL ACETATE 40 MILLIGRAM(S): 40 SUSPENSION ORAL at 17:59

## 2023-05-07 RX ADMIN — AMIODARONE HYDROCHLORIDE 200 MILLIGRAM(S): 400 TABLET ORAL at 00:48

## 2023-05-07 RX ADMIN — Medication 5 MILLIGRAM(S): at 21:45

## 2023-05-07 RX ADMIN — MEGESTROL ACETATE 40 MILLIGRAM(S): 40 SUSPENSION ORAL at 00:48

## 2023-05-07 RX ADMIN — CHLORHEXIDINE GLUCONATE 1 APPLICATION(S): 213 SOLUTION TOPICAL at 11:16

## 2023-05-07 RX ADMIN — Medication 25 GRAM(S): at 21:45

## 2023-05-07 RX ADMIN — MEGESTROL ACETATE 40 MILLIGRAM(S): 40 SUSPENSION ORAL at 11:17

## 2023-05-07 RX ADMIN — Medication 20 MILLIEQUIVALENT(S): at 00:47

## 2023-05-07 RX ADMIN — AMIODARONE HYDROCHLORIDE 200 MILLIGRAM(S): 400 TABLET ORAL at 05:38

## 2023-05-07 RX ADMIN — SODIUM CHLORIDE 50 MILLILITER(S): 5 INJECTION, SOLUTION INTRAVENOUS at 06:36

## 2023-05-07 NOTE — PROGRESS NOTE ADULT - ASSESSMENT
77 year old woman with PMHx of HTN, HLD, nonischemic cardiomyopathy, HFrEF s/p AICD presents to the ED with 2 weeks of worsening shortness of breath with increasing dry cough over this time period accepted to CCU for cardiogenic shock    #Cardiogenic Shock   #LV thrombus, suspected new PE  #NICM  #HFrEF 25% s/p AICD  #HTN/HLD  - CT CAP - cardiomegaly w/ RH strain  - TTE 4/22/2023 EF <20%, large fixed thrombus vs mass on apical LV wall, GIIDD, mod-sev TR, mod MR, mild MN, mild PH  - LE venous duplex 4/21 negative - pending repeat   - dobutamine ggt at 7  - milrinone ggt 0.25  - amiodarone 200 BID  - Eliquis   - CT Chest 5/4 subacute PE in R mainstem and subsegmental branches  - repeat iron profile  - monitor Urine Output    #Post obstructive pneumonia  #Suspected lung malignancy  - s/p bronch/EBUS biopsy 5/05 - awaiting official pathology results   - CT CAP - R hilar LAD, R perihilar soft tissue density w/ indentation/possible invasion of R pulmonary artery and narrowing of proximal R lobar and segmental branches w/ consolidation of RLL  - completed zosyn 7 days course 4/27  - Incentive Spirometer  - pending pathology    #Positive COVID-19 PCR  - Asymptomatic, off isolation   - No need for further treatment or diagnostic w/u as per ID    #Hypotonic Hyponatremia  - hypervolemic   - Na 132-> 129 -> 130  - trend Na     # Misc  - DVT Prophylaxis: heparin drip - holding   - GI Prophylaxis: pantoprazole    Tablet 40 milliGRAM(s) Oral before breakfast  - Diet: DASH/TLC  - Activity: Activity - Ambulate as Tolerated  - Code Status: Full , Palliative on board   77 year old woman with PMHx of HTN, HLD, nonischemic cardiomyopathy, HFrEF s/p AICD presents to the ED with 2 weeks of worsening shortness of breath with increasing dry cough over this time period accepted to CCU for cardiogenic shock    #Cardiogenic Shock   #LV thrombus, suspected new PE  #NICM  #HFrEF 25% s/p AICD  #HTN/HLD  - CT CAP - cardiomegaly w/ RH strain  - TTE 4/22/2023 EF <20%, large fixed thrombus vs mass on apical LV wall, GIIDD, mod-sev TR, mod MR, mild NM, mild PH  - LE venous duplex 4/21 negative - pending repeat   - dobutamine ggt at 7  - milrinone ggt 0.25  - amiodarone 200 BID  - Eliquis   - CT Chest 5/4 subacute PE in R mainstem and subsegmental branches  - repeat iron profile  - monitor Urine Output    #Post obstructive pneumonia  #Suspected lung malignancy  - s/p bronch/EBUS biopsy 5/05 - awaiting official pathology results   - CT CAP - R hilar LAD, R perihilar soft tissue density w/ indentation/possible invasion of R pulmonary artery and narrowing of proximal R lobar and segmental branches w/ consolidation of RLL  - completed zosyn 7 days course 4/27  - Incentive Spirometer  - pending pathology    #Positive COVID-19 PCR  - Asymptomatic, off isolation   - No need for further treatment or diagnostic w/u as per ID    #Hypotonic Hyponatremia  - hypervolemic   - Na 132-> 129 -> 130  - trend Na     # Misc  - DVT Prophylaxis: heparin drip   - GI Prophylaxis: pantoprazole    Tablet 40 milliGRAM(s) Oral before breakfast  - Diet: DASH/TLC  - Activity: Activity - Ambulate as Tolerated  - Code Status: Full , Palliative on board

## 2023-05-07 NOTE — SWALLOW BEDSIDE ASSESSMENT ADULT - SWALLOW EVAL: FUNCTIONAL LEVEL AT TIME OF EVAL
awake, alert, NGT in situ, low vocal intensity, hoarse vocal quality, +odynophagia (pt reports with saliva as well as food/drink), son at bedside

## 2023-05-07 NOTE — PROGRESS NOTE ADULT - ASSESSMENT
77 year old woman with pmh of HTN, HLD, nonischemic cardiomyopathy, HFrEF, s/p ICD and PPM presents to the ED with 2 weeks of worsening shortness of breath. Patient and her son state patient has been experiencing shortness of breath that has progressed from present on activity to now shortness of breath during conversation. Patient also notes an increasing dry cough over this time period. Denies history of smoking, fevers/chills, chest pain, nausea/vomiting, bloody sputum, dark/tarry/bloody bowel movements, upper respiratory symptoms. Upon arrival, patient is HD stable and saturating 98% on RA, talking in complete sentences, no increased work of breathing.  CT C/A/P revealing cardiomegaly with evidence of right heart strain, R hilar lymphadenopathy with R perihilar soft tissue density with indentation/possible invasion of right pulmonary artery, and narrowing of proximal R lobar and segmental branches with associated consolidation of right lower lobe. 2.2 x 2.8 cm filling defect noted in apex of left ventricle. Patient follows with Dr. Frank, last ECHO 8/2021 revealing EF 20-25%, mild Mr, mild TR, mild Pulm HTN.    Patient now s/p bronchoscopy, EBUS, lymph node Bx    PLAN:  - CCU management   - will await pathology   -hemodynamic monitoring    can recall as needed    [facility]GERY SPECTRA: [id number]

## 2023-05-07 NOTE — PROGRESS NOTE ADULT - SUBJECTIVE AND OBJECTIVE BOX
GENERAL SURGERY PROGRESS NOTE    Patient: MIKHAIL JOHNSON , 77y ([birth year])Female   MRN: [id number]  Location: [address]  Visit: 04-21-23 Inpatient  Date: 05-07-23 @ 03:14    Procedure/Dx/Injuries: s/p bronchoscopy EBUS, FNA    Events of past 24 hours:  NAEON  Heparin gtt at 11, Milrinone 0.375, bumex started  -150cc/hour  Dobutamine 7    PAST MEDICAL & SURGICAL HISTORY:  CHF, stage C      HTN (hypertension)          Vitals:   T(F): 98.5 (05-06-23 @ 20:00), Max: 98.5 (05-06-23 @ 20:00)  HR: 112 (05-07-23 @ 02:00)  BP: 97/61 (05-07-23 @ 02:00)  RR: 25 (05-07-23 @ 02:00)  SpO2: 99% (05-07-23 @ 02:00)      Diet, DASH/TLC:   Sodium & Cholesterol Restricted  1200mL Fluid Restriction ([id number])  Tube Feeding Modality: Nasogastric  Jevity 1.2 Maksim  Total Volume for 24 Hours (mL): 600  Continuous  Starting Tube Feed Rate mL per Hour: 20  Increase Tube Feed Rate by (mL): 10     Every 4 hours  Until Goal Tube Feed Rate (mL per Hour): 50  Tube Feed Duration (in Hours): 12  Tube Feed Start Time: 19:00  Tube Feed Stop Time: 07:00  Supplement Feeding Modality:  Oral  Ensure Plus High Protein Cans or Servings Per Day:  2       Frequency:  Daily      Fluids:     I & O's:    05-05-23 @ 07:01  -  05-06-23 @ 07:00  --------------------------------------------------------  IN:    DOBUTamine: 2.3 mL    DOBUTamine: 51.7 mL    DOBUTamine: 57.6 mL    Heparin Infusion: 143 mL    IV PiggyBack: 50 mL    Milrinone: 85.2 mL    Milrinone: 85.2 mL  Total IN: 475 mL    OUT:    Ureteral Catheter (mL): 775 mL  Total OUT: 775 mL    Total NET: -300 mL        PHYSICAL EXAM:  General: NAD, AAOx3, calm and cooperative  HEENT: NCAT, WANDA, EOMI, Trachea ML, Neck supple  Cardiac: RRR S1, S2, no Murmurs, rubs or gallops  Respiratory: CTAB, normal respiratory effort, breath sounds equal BL  Abdomen: Soft, non-distended, non-tender, no rebound, no guarding. +BS.  Vascular: Pulses 2+ throughout, extremities well perfused  Skin: Warm/dry, normal color, no jaundice    MEDICATIONS  (STANDING):  aMIOdarone    Tablet 200 milliGRAM(s) Oral two times a day  buMETAnide Infusion 2 mG/Hr (10 mL/Hr) IV Continuous <Continuous>  chlorhexidine 2% Cloths 1 Application(s) Topical daily  DOBUTamine Infusion 6 MICROgram(s)/kG/Min (5.72 mL/Hr) IV Continuous <Continuous>  heparin  Infusion.  Unit(s)/Hr (11 mL/Hr) IV Continuous <Continuous>  magnesium gluconate 500 milliGRAM(s) Oral daily  megestrol 40 milliGRAM(s) Oral four times a day  melatonin 5 milliGRAM(s) Oral at bedtime  milrinone Infusion 0.375 MICROgram(s)/kG/Min (7.14 mL/Hr) IV Continuous <Continuous>  pantoprazole    Tablet 40 milliGRAM(s) Oral before breakfast  sodium chloride 3% Bolus 150 milliLiter(s) IV Bolus once    MEDICATIONS  (PRN):  benzocaine 20% Spray 1 Spray(s) Topical four times a day PRN pain  heparin   Injectable 5000 Unit(s) IV Push every 6 hours PRN For aPTT less than 40  heparin   Injectable 2500 Unit(s) IV Push every 6 hours PRN For aPTT between 40 - 57      DVT PROPHYLAXIS: heparin   Injectable 2500 Unit(s) IV Push every 6 hours PRN  heparin   Injectable 5000 Unit(s) IV Push every 6 hours PRN  heparin  Infusion.  Unit(s)/Hr IV Continuous <Continuous>    GI PROPHYLAXIS: pantoprazole    Tablet 40 milliGRAM(s) Oral before breakfast    ANTICOAGULATION:   ANTIBIOTICS:            LAB/STUDIES:  Labs:  CAPILLARY BLOOD GLUCOSE                              10.2   10.25 )-----------( 293      ( 06 May 2023 22:50 )             31.2       Auto Neutrophil %: 76.6 % (05-06-23 @ 04:30)  Auto Immature Granulocyte %: 0.7 % (05-06-23 @ 04:30)    05-06    127<L>  |  92<L>  |  26<H>  ----------------------------<  125<H>  4.0   |  24  |  0.7      Calcium, Total Serum: 8.3 mg/dL (05-06-23 @ 22:50)      LFTs:             6.2  | 2.7  | 41       ------------------[141     ( 06 May 2023 22:50 )  2.8  | x    | 45          Lipase:x      Amylase:x         Lactate, Blood: 1.9 mmol/L (05-06-23 @ 22:50)  Blood Gas Venous - Lactate: 1.20 mmol/L (05-06-23 @ 00:09)  Blood Gas Arterial, Lactate: 1.00 mmol/L (05-05-23 @ 17:45)  Blood Gas Venous - Lactate: 0.60 mmol/L (05-05-23 @ 17:45)  Blood Gas Venous - Lactate: 1.20 mmol/L (05-05-23 @ 06:04)  Blood Gas Venous - Lactate: 1.50 mmol/L (05-05-23 @ 02:33)  Blood Gas Venous - Lactate: 2.00 mmol/L (05-05-23 @ 00:09)  Blood Gas Venous - Lactate: 1.50 mmol/L (05-04-23 @ 21:12)  Blood Gas Venous - Lactate: 1.00 mmol/L (05-04-23 @ 09:41)  Blood Gas Arterial, Lactate: 1.20 mmol/L (05-04-23 @ 09:41)  Blood Gas Venous - Lactate: 0.80 mmol/L (05-04-23 @ 05:14)  Blood Gas Venous - Lactate: 1.20 mmol/L (05-04-23 @ 03:14)    ABG - ( 05 May 2023 17:45 )  pH: 7.47  /  pCO2: 35    /  pO2: 103   / HCO3: 26    / Base Excess: 2.1   /  SaO2: 99.1            ABG - ( 04 May 2023 09:41 )  pH: 7.54  /  pCO2: 31    /  pO2: 89    / HCO3: 26    / Base Excess: 4.4   /  SaO2: 98.7            ABG - ( 04 May 2023 00:47 )  pH: 7.58  /  pCO2: 30    /  pO2: 111   / HCO3: 28    / Base Excess: 6.4   /  SaO2: 100.0             Coags:     x      ----< x       ( 06 May 2023 08:17 )     69.2

## 2023-05-08 LAB
ALBUMIN SERPL ELPH-MCNC: 2.7 G/DL — LOW (ref 3.5–5.2)
ALP SERPL-CCNC: 152 U/L — HIGH (ref 30–115)
ALT FLD-CCNC: 40 U/L — SIGNIFICANT CHANGE UP (ref 0–41)
ANION GAP SERPL CALC-SCNC: 12 MMOL/L — SIGNIFICANT CHANGE UP (ref 7–14)
ANION GAP SERPL CALC-SCNC: 13 MMOL/L — SIGNIFICANT CHANGE UP (ref 7–14)
ANION GAP SERPL CALC-SCNC: 14 MMOL/L — SIGNIFICANT CHANGE UP (ref 7–14)
APTT BLD: 44.7 SEC — HIGH (ref 27–39.2)
APTT BLD: >200 SEC — CRITICAL HIGH (ref 27–39.2)
AST SERPL-CCNC: 30 U/L — SIGNIFICANT CHANGE UP (ref 0–41)
BASE EXCESS BLDV CALC-SCNC: 4.1 MMOL/L — HIGH (ref -2–3)
BASE EXCESS BLDV CALC-SCNC: 5 MMOL/L — HIGH (ref -2–3)
BASOPHILS # BLD AUTO: 0.02 K/UL — SIGNIFICANT CHANGE UP (ref 0–0.2)
BASOPHILS NFR BLD AUTO: 0.2 % — SIGNIFICANT CHANGE UP (ref 0–1)
BILIRUB SERPL-MCNC: 2.3 MG/DL — HIGH (ref 0.2–1.2)
BUN SERPL-MCNC: 25 MG/DL — HIGH (ref 10–20)
BUN SERPL-MCNC: 28 MG/DL — HIGH (ref 10–20)
BUN SERPL-MCNC: 28 MG/DL — HIGH (ref 10–20)
CA-I SERPL-SCNC: 1 MMOL/L — LOW (ref 1.15–1.33)
CA-I SERPL-SCNC: 1.11 MMOL/L — LOW (ref 1.15–1.33)
CALCIUM SERPL-MCNC: 8.3 MG/DL — LOW (ref 8.4–10.4)
CALCIUM SERPL-MCNC: 8.4 MG/DL — SIGNIFICANT CHANGE UP (ref 8.4–10.5)
CALCIUM SERPL-MCNC: 8.4 MG/DL — SIGNIFICANT CHANGE UP (ref 8.4–10.5)
CHLORIDE SERPL-SCNC: 90 MMOL/L — LOW (ref 98–110)
CHLORIDE SERPL-SCNC: 91 MMOL/L — LOW (ref 98–110)
CHLORIDE SERPL-SCNC: 94 MMOL/L — LOW (ref 98–110)
CO2 SERPL-SCNC: 23 MMOL/L — SIGNIFICANT CHANGE UP (ref 17–32)
CO2 SERPL-SCNC: 24 MMOL/L — SIGNIFICANT CHANGE UP (ref 17–32)
CO2 SERPL-SCNC: 24 MMOL/L — SIGNIFICANT CHANGE UP (ref 17–32)
CREAT SERPL-MCNC: 0.7 MG/DL — SIGNIFICANT CHANGE UP (ref 0.7–1.5)
CREAT SERPL-MCNC: 0.7 MG/DL — SIGNIFICANT CHANGE UP (ref 0.7–1.5)
CREAT SERPL-MCNC: 0.8 MG/DL — SIGNIFICANT CHANGE UP (ref 0.7–1.5)
EGFR: 75 ML/MIN/1.73M2 — SIGNIFICANT CHANGE UP
EGFR: 88 ML/MIN/1.73M2 — SIGNIFICANT CHANGE UP
EGFR: 88 ML/MIN/1.73M2 — SIGNIFICANT CHANGE UP
EOSINOPHIL # BLD AUTO: 0.04 K/UL — SIGNIFICANT CHANGE UP (ref 0–0.7)
EOSINOPHIL NFR BLD AUTO: 0.4 % — SIGNIFICANT CHANGE UP (ref 0–8)
GAS PNL BLDV: 124 MMOL/L — LOW (ref 136–145)
GAS PNL BLDV: 126 MMOL/L — LOW (ref 136–145)
GAS PNL BLDV: SIGNIFICANT CHANGE UP
GLUCOSE SERPL-MCNC: 131 MG/DL — HIGH (ref 70–99)
GLUCOSE SERPL-MCNC: 167 MG/DL — HIGH (ref 70–99)
GLUCOSE SERPL-MCNC: 266 MG/DL — HIGH (ref 70–99)
HCO3 BLDV-SCNC: 27 MMOL/L — SIGNIFICANT CHANGE UP (ref 22–29)
HCO3 BLDV-SCNC: 28 MMOL/L — SIGNIFICANT CHANGE UP (ref 22–29)
HCT VFR BLD CALC: 31 % — LOW (ref 37–47)
HCT VFR BLDA CALC: 31 % — LOW (ref 39–51)
HCT VFR BLDA CALC: 34 % — LOW (ref 39–51)
HGB BLD CALC-MCNC: 10.3 G/DL — LOW (ref 12.6–17.4)
HGB BLD CALC-MCNC: 11.2 G/DL — LOW (ref 12.6–17.4)
HGB BLD-MCNC: 10.4 G/DL — LOW (ref 12–16)
IMM GRANULOCYTES NFR BLD AUTO: 0.6 % — HIGH (ref 0.1–0.3)
INR BLD: 1.55 RATIO — HIGH (ref 0.65–1.3)
LACTATE BLDV-MCNC: 1.3 MMOL/L — SIGNIFICANT CHANGE UP (ref 0.5–2)
LACTATE BLDV-MCNC: 1.4 MMOL/L — SIGNIFICANT CHANGE UP (ref 0.5–2)
LACTATE SERPL-SCNC: 2 MMOL/L — SIGNIFICANT CHANGE UP (ref 0.7–2)
LYMPHOCYTES # BLD AUTO: 1.15 K/UL — LOW (ref 1.2–3.4)
LYMPHOCYTES # BLD AUTO: 12.4 % — LOW (ref 20.5–51.1)
MAGNESIUM SERPL-MCNC: 1.9 MG/DL — SIGNIFICANT CHANGE UP (ref 1.8–2.4)
MAGNESIUM SERPL-MCNC: 2.1 MG/DL — SIGNIFICANT CHANGE UP (ref 1.8–2.4)
MAGNESIUM SERPL-MCNC: 2.1 MG/DL — SIGNIFICANT CHANGE UP (ref 1.8–2.4)
MCHC RBC-ENTMCNC: 31.3 PG — HIGH (ref 27–31)
MCHC RBC-ENTMCNC: 33.5 G/DL — SIGNIFICANT CHANGE UP (ref 32–37)
MCV RBC AUTO: 93.4 FL — SIGNIFICANT CHANGE UP (ref 81–99)
MONOCYTES # BLD AUTO: 0.67 K/UL — HIGH (ref 0.1–0.6)
MONOCYTES NFR BLD AUTO: 7.2 % — SIGNIFICANT CHANGE UP (ref 1.7–9.3)
NEUTROPHILS # BLD AUTO: 7.31 K/UL — HIGH (ref 1.4–6.5)
NEUTROPHILS NFR BLD AUTO: 79.2 % — HIGH (ref 42.2–75.2)
NRBC # BLD: 0 /100 WBCS — SIGNIFICANT CHANGE UP (ref 0–0)
PCO2 BLDV: 33 MMHG — LOW (ref 39–42)
PCO2 BLDV: 34 MMHG — LOW (ref 39–42)
PH BLDV: 7.51 — HIGH (ref 7.32–7.43)
PH BLDV: 7.53 — HIGH (ref 7.32–7.43)
PHOSPHATE SERPL-MCNC: 2.5 MG/DL — SIGNIFICANT CHANGE UP (ref 2.1–4.9)
PLATELET # BLD AUTO: 317 K/UL — SIGNIFICANT CHANGE UP (ref 130–400)
PMV BLD: 11 FL — HIGH (ref 7.4–10.4)
PO2 BLDV: 29 MMHG — SIGNIFICANT CHANGE UP
PO2 BLDV: 72 MMHG — SIGNIFICANT CHANGE UP
POTASSIUM BLDV-SCNC: 3.6 MMOL/L — SIGNIFICANT CHANGE UP (ref 3.5–5.1)
POTASSIUM BLDV-SCNC: 3.7 MMOL/L — SIGNIFICANT CHANGE UP (ref 3.5–5.1)
POTASSIUM SERPL-MCNC: 3.5 MMOL/L — SIGNIFICANT CHANGE UP (ref 3.5–5)
POTASSIUM SERPL-MCNC: 3.9 MMOL/L — SIGNIFICANT CHANGE UP (ref 3.5–5)
POTASSIUM SERPL-MCNC: 4 MMOL/L — SIGNIFICANT CHANGE UP (ref 3.5–5)
POTASSIUM SERPL-SCNC: 3.5 MMOL/L — SIGNIFICANT CHANGE UP (ref 3.5–5)
POTASSIUM SERPL-SCNC: 3.9 MMOL/L — SIGNIFICANT CHANGE UP (ref 3.5–5)
POTASSIUM SERPL-SCNC: 4 MMOL/L — SIGNIFICANT CHANGE UP (ref 3.5–5)
PREALB SERPL-MCNC: 5 MG/DL — LOW (ref 20–40)
PROCALCITONIN SERPL-MCNC: 0.42 NG/ML — HIGH (ref 0.02–0.1)
PROCALCITONIN SERPL-MCNC: 0.5 NG/ML — HIGH (ref 0.02–0.1)
PROCALCITONIN SERPL-MCNC: 0.52 NG/ML — HIGH (ref 0.02–0.1)
PROT SERPL-MCNC: 6.2 G/DL — SIGNIFICANT CHANGE UP (ref 6–8)
PROTHROM AB SERPL-ACNC: 17.9 SEC — HIGH (ref 9.95–12.87)
RBC # BLD: 3.32 M/UL — LOW (ref 4.2–5.4)
RBC # FLD: 16.2 % — HIGH (ref 11.5–14.5)
SAO2 % BLDV: 50.7 % — SIGNIFICANT CHANGE UP
SAO2 % BLDV: 96.5 % — SIGNIFICANT CHANGE UP
SODIUM SERPL-SCNC: 126 MMOL/L — LOW (ref 135–146)
SODIUM SERPL-SCNC: 128 MMOL/L — LOW (ref 135–146)
SODIUM SERPL-SCNC: 131 MMOL/L — LOW (ref 135–146)
WBC # BLD: 9.25 K/UL — SIGNIFICANT CHANGE UP (ref 4.8–10.8)
WBC # FLD AUTO: 9.25 K/UL — SIGNIFICANT CHANGE UP (ref 4.8–10.8)

## 2023-05-08 PROCEDURE — 99291 CRITICAL CARE FIRST HOUR: CPT

## 2023-05-08 PROCEDURE — 99233 SBSQ HOSP IP/OBS HIGH 50: CPT

## 2023-05-08 PROCEDURE — 99497 ADVNCD CARE PLAN 30 MIN: CPT

## 2023-05-08 PROCEDURE — 93010 ELECTROCARDIOGRAM REPORT: CPT

## 2023-05-08 PROCEDURE — 99291 CRITICAL CARE FIRST HOUR: CPT | Mod: 25

## 2023-05-08 RX ORDER — HEPARIN SODIUM 5000 [USP'U]/ML
INJECTION INTRAVENOUS; SUBCUTANEOUS
Qty: 25000 | Refills: 0 | Status: DISCONTINUED | OUTPATIENT
Start: 2023-05-08 | End: 2023-05-08

## 2023-05-08 RX ORDER — BUMETANIDE 0.25 MG/ML
2 INJECTION INTRAMUSCULAR; INTRAVENOUS ONCE
Refills: 0 | Status: COMPLETED | OUTPATIENT
Start: 2023-05-08 | End: 2023-05-08

## 2023-05-08 RX ORDER — AMIODARONE HYDROCHLORIDE 400 MG/1
150 TABLET ORAL ONCE
Refills: 0 | Status: COMPLETED | OUTPATIENT
Start: 2023-05-08 | End: 2023-05-08

## 2023-05-08 RX ORDER — SODIUM CHLORIDE 5 G/100ML
150 INJECTION, SOLUTION INTRAVENOUS ONCE
Refills: 0 | Status: COMPLETED | OUTPATIENT
Start: 2023-05-08 | End: 2023-05-08

## 2023-05-08 RX ORDER — ACETAZOLAMIDE 250 MG/1
500 TABLET ORAL ONCE
Refills: 0 | Status: COMPLETED | OUTPATIENT
Start: 2023-05-08 | End: 2023-05-08

## 2023-05-08 RX ORDER — POTASSIUM CHLORIDE 20 MEQ
20 PACKET (EA) ORAL
Refills: 0 | Status: COMPLETED | OUTPATIENT
Start: 2023-05-08 | End: 2023-05-08

## 2023-05-08 RX ORDER — HEPARIN SODIUM 5000 [USP'U]/ML
1000 INJECTION INTRAVENOUS; SUBCUTANEOUS
Qty: 25000 | Refills: 0 | Status: DISCONTINUED | OUTPATIENT
Start: 2023-05-08 | End: 2023-05-10

## 2023-05-08 RX ORDER — SODIUM CHLORIDE 5 G/100ML
150 INJECTION, SOLUTION INTRAVENOUS ONCE
Refills: 0 | Status: COMPLETED | OUTPATIENT
Start: 2023-05-09 | End: 2023-05-09

## 2023-05-08 RX ADMIN — Medication 20 MILLIEQUIVALENT(S): at 20:59

## 2023-05-08 RX ADMIN — AMIODARONE HYDROCHLORIDE 200 MILLIGRAM(S): 400 TABLET ORAL at 18:20

## 2023-05-08 RX ADMIN — BUMETANIDE 2 MILLIGRAM(S): 0.25 INJECTION INTRAMUSCULAR; INTRAVENOUS at 10:29

## 2023-05-08 RX ADMIN — SODIUM CHLORIDE 50 MILLILITER(S): 5 INJECTION, SOLUTION INTRAVENOUS at 18:19

## 2023-05-08 RX ADMIN — HEPARIN SODIUM 1100 UNIT(S)/HR: 5000 INJECTION INTRAVENOUS; SUBCUTANEOUS at 03:45

## 2023-05-08 RX ADMIN — MEGESTROL ACETATE 40 MILLIGRAM(S): 40 SUSPENSION ORAL at 18:20

## 2023-05-08 RX ADMIN — PANTOPRAZOLE SODIUM 40 MILLIGRAM(S): 20 TABLET, DELAYED RELEASE ORAL at 06:07

## 2023-05-08 RX ADMIN — HEPARIN SODIUM 100 UNIT(S)/HR: 5000 INJECTION INTRAVENOUS; SUBCUTANEOUS at 20:30

## 2023-05-08 RX ADMIN — AMIODARONE HYDROCHLORIDE 200 MILLIGRAM(S): 400 TABLET ORAL at 06:07

## 2023-05-08 RX ADMIN — BUMETANIDE 2 MILLIGRAM(S): 0.25 INJECTION INTRAMUSCULAR; INTRAVENOUS at 18:32

## 2023-05-08 RX ADMIN — Medication 20 MILLIEQUIVALENT(S): at 21:26

## 2023-05-08 RX ADMIN — HEPARIN SODIUM 1000 UNIT(S)/HR: 5000 INJECTION INTRAVENOUS; SUBCUTANEOUS at 21:07

## 2023-05-08 RX ADMIN — MEGESTROL ACETATE 40 MILLIGRAM(S): 40 SUSPENSION ORAL at 11:06

## 2023-05-08 RX ADMIN — MEGESTROL ACETATE 40 MILLIGRAM(S): 40 SUSPENSION ORAL at 02:37

## 2023-05-08 RX ADMIN — HEPARIN SODIUM 0 UNIT(S)/HR: 5000 INJECTION INTRAVENOUS; SUBCUTANEOUS at 12:26

## 2023-05-08 RX ADMIN — Medication 500 MILLIGRAM(S): at 11:06

## 2023-05-08 RX ADMIN — Medication 30 MILLILITER(S): at 02:39

## 2023-05-08 RX ADMIN — Medication 5 MILLIGRAM(S): at 22:12

## 2023-05-08 RX ADMIN — ACETAZOLAMIDE 110 MILLIGRAM(S): 250 TABLET ORAL at 10:30

## 2023-05-08 RX ADMIN — BUMETANIDE 10 MG/HR: 0.25 INJECTION INTRAMUSCULAR; INTRAVENOUS at 21:48

## 2023-05-08 RX ADMIN — AMIODARONE HYDROCHLORIDE 600 MILLIGRAM(S): 400 TABLET ORAL at 10:29

## 2023-05-08 RX ADMIN — MEGESTROL ACETATE 40 MILLIGRAM(S): 40 SUSPENSION ORAL at 06:07

## 2023-05-08 RX ADMIN — CHLORHEXIDINE GLUCONATE 1 APPLICATION(S): 213 SOLUTION TOPICAL at 11:08

## 2023-05-08 NOTE — PROGRESS NOTE ADULT - ASSESSMENT
HPI: [id number] with PMH of HTN, HLD, NICCM, HFrEF s/p AICD, here from ED with worsening SOB, requiring CEU admission. Found to have cardiomegaly here, and course c/b worsening DAVID and suspected ischemic hepatitis, requiring dopatine gtt for BP support, and transferred to CCU. Also found to have LV thrombus, on heparin gtt. RHC (4/24) showed cardiogenic shock. Is also on IV zosyn for post-obstructive PNA. Found to have lung mass, awaiting PET outpatient to r/o malignancy. Patient is full code. Palliative care consulted for GOC.    Spoke with patient and  re: advance directives. She will think about this. Will follow up for further GOC discussion.     Education about palliative care provided to patient/family.  See Recs below.    Please call [phone] with questions or concerns 24/7.   We will continue to follow.

## 2023-05-08 NOTE — PROGRESS NOTE ADULT - PROBLEM SELECTOR PLAN 4
re-introduced palliative to patient and   they want to hear all options for treatment before making any decisions  sons are involved in decision making
re-introduced palliative to patient and family  they want to hear all options for treatment before making any decisions  sons are involved in decision making   plan to start with biopsy and go from there
for now plan is to continue aggressive medical management  patient is considering DNR/DNI

## 2023-05-08 NOTE — PROGRESS NOTE ADULT - CONVERSATION DETAILS
Spoke with patient and  at bedside. Reviewed advanced directives. She expresses that if she were unable to make her own medical decisions she would want her  Lyle to be her surrogate decision maker. Discussed code status as well including CPR and intubation. She and Lyle agree she would not want to live on machines and would rather pass away naturally. Discussed the option for placing a DNR/DNI if she is sure she does not want to be intubated or resuscitated if her heart stops. She is going to think about this and will let the medical team know.

## 2023-05-08 NOTE — PROGRESS NOTE ADULT - NS ATTEND AMEND GEN_ALL_CORE FT
Patient remains on dual inotropic support with dobutamine and milrinone gtt, responding poor to aggressive diuresis. She failed S/S yesterday and is getting NG feeds. She got out of bed yesterday. S/p mediastinal mass biopsy pending results.     Continue inotropic support   Aggressive diuresis with Bumex gtt / HS  Get OOB to recliner / PT evaluation / IS   Monitor daily lactate   Continue NG feeds   Strict I/Os  Patient remains critically ill   Discussed with patient and family at bedside

## 2023-05-08 NOTE — PROGRESS NOTE ADULT - PROBLEM SELECTOR PROBLEM 2
LV (left ventricular) mural thrombus

## 2023-05-08 NOTE — PROGRESS NOTE ADULT - PROBLEM SELECTOR PLAN 1
currently on dobutamine, milrinone, bumex  recommendations as per HF team  pending FU for possible available interventions for HF tx  mary lund placed s/p RHC 5/1
currently on dobutamine, milrinone  recommendations as per HF team  pending FU for possible available interventions for HF tx  mary felton s/p C 5/1
currently on dobutamine, milrinone  recommendations as per HF team  pending FU for possible available interventions for HF tx  mary felton s/p C 5/1

## 2023-05-08 NOTE — PROGRESS NOTE ADULT - PROBLEM SELECTOR PLAN 5
Full Code w/ AICD  Continue current workup and medical management  Will follow up with team/patient over the next few days re: GOC - will also reach out to sons
Full Code w/ AICD  Continue current workup and medical management  Will follow up
Full Code w/ AICD  Continue current workup and medical management  Will follow up with team/patient over the next few days re: [place]

## 2023-05-08 NOTE — PROGRESS NOTE ADULT - PROBLEM SELECTOR PLAN 3
- c/w IV zosyn, high risk, monitor counts  - possible lung mass--> plan for possible bx if patient wants cancer workup
- s/p IV zosyn, high risk, monitor counts  - lung mass--> plan for bx if patient wants cancer workup --> patient and family agreeable to pursuit of bx
- s/p IV zosyn, high risk, monitor counts  - lung mass--> s/p bx

## 2023-05-08 NOTE — PROGRESS NOTE ADULT - SUBJECTIVE AND OBJECTIVE BOX
HPI:  77 year old woman with PMHx of HTN, HLD, nonischemic cardiomyopathy, HFrEF s/p AICD presents to the ED with 2 weeks of worsening shortness of breath. Patient and her son state patient has been experiencing shortness of breath that has progressed from present on activity to now shortness of breath during conversation. Patient also notes an increasing dry cough over this time period. Denies history of smoking, fevers/chills, chest pain, nausea/vomiting, bloody sputum, dark/tarry/bloody bowel movements, upper respiratory symptoms.     In ED, patient's HR is 110 and saturating 98% on RA  CT C/A/P revealing cardiomegaly with evidence of right heart strain, R hilar lymphadenopathy with R perihilar soft tissue density with indentation/possible invasion of right pulmonary artery, and narrowing of proximal R lobar and segmental branches with associated consolidation of right lower lobe. 2.2 x 2.8 cm filling defect noted in apex of left ventricle.   Last ECHO 8/2021 revealing EF 20-25%, mild Mr, mild TR, mild Pulm HTN  Dimer 2600, trops negative BNP 97897  Duplex venous LE negative for DVT; CTA chest negative for PE  Cr 1.4 (0.7 in 2021), Na+ 128, T.Benjamin 2.3, LFTs elevated (hemolyzed)  Lactate 5.8-->4.6  Admitted to SDU (22 Apr 2023 01:10)    Currently admitted to medicine with the primary diagnosis of Lung mass       Today is hospital day 17d.     INTERVAL HPI / OVERNIGHT EVENTS:  Patient was examined and seen at bedside. This morning she is resting comfortably in bed and reports no new issues or overnight events. Hemodynamically stable on dobutamine and milrinone, passed SLP eval today restarted on po diet but with poor appetite, supplementing w/ NG tube feeds.  Making minimal urine on bumex ggt and hypertonic saline. Occasional NSVTs on monitor.      ROS: Otherwise unremarkable     PAST MEDICAL & SURGICAL HISTORY  CHF, stage C    HTN (hypertension)      ALLERGIES  epinephrine (Unknown)    MEDICATIONS  STANDING MEDICATIONS  aMIOdarone    Tablet 200 milliGRAM(s) Oral two times a day  buMETAnide Infusion 2 mG/Hr IV Continuous <Continuous>  buMETAnide Injectable 2 milliGRAM(s) IV Push once  chlorhexidine 2% Cloths 1 Application(s) Topical daily  DOBUTamine Infusion 7.5 MICROgram(s)/kG/Min IV Continuous <Continuous>  heparin  Infusion.  Unit(s)/Hr IV Continuous <Continuous>  magnesium gluconate 500 milliGRAM(s) Oral daily  megestrol 40 milliGRAM(s) Oral four times a day  melatonin 5 milliGRAM(s) Oral at bedtime  metolazone 5 milliGRAM(s) Oral <User Schedule>  milrinone Infusion 0.25 MICROgram(s)/kG/Min IV Continuous <Continuous>  pantoprazole    Tablet 40 milliGRAM(s) Oral before breakfast  sodium chloride 3% Bolus 150 milliLiter(s) IV Bolus once    PRN MEDICATIONS  benzocaine 20% Spray 1 Spray(s) Topical four times a day PRN    VITALS:  T(F): 96.3  HR: 110  BP: 89/59  RR: 28  SpO2: 98%    PHYSICAL EXAM  GEN: NAD, Resting comfortably in bed, cooperative, elderly, frail, NG tube in place  PULM: Clear to auscultation bilaterally, No wheezing, rales, rhonchi  CVS: Regular rate and rhythm, S1-S2, no murmurs, heaves, thrills  ABD: Soft, non-tender, non-distended, no guarding, BS +  EXT: No edema/cyanosis, extremities warm/dry, peripheral pulses palpable   NEURO: A&Ox3, no focal deficits    LABS                        10.4   9.25  )-----------( 317      ( 08 May 2023 05:03 )             31.0     05-08    131<L>  |  94<L>  |  25<H>  ----------------------------<  131<H>  4.0   |  23  |  0.7    Ca    8.4      08 May 2023 05:03  Phos  2.5     05-08  Mg     2.1     05-08    TPro  6.2  /  Alb  2.7<L>  /  TBili  2.3<H>  /  DBili  x   /  AST  30  /  ALT  40  /  AlkPhos  152<H>  05-08    PT/INR - ( 07 May 2023 23:50 )   PT: 17.90 sec;   INR: 1.55 ratio         PTT - ( 08 May 2023 09:22 )  PTT:>200.0 sec      Lactate, Blood: 2.0 mmol/L (05-08-23 @ 05:03)      CARDIAC MARKERS ( 07 May 2023 04:51 )  x     / 0.03 ng/mL / x     / x     / x            RADIOLOGY  CTA Chest 5/4  Redemonstrated right hilar lesion with compression and possible invasion   of adjacent main pulmonary artery segment (unchanged from previous study.    Subacute right main and segmental pulmonary embolus.    Increased right lower lobe consolidative and groundglass opacities,   likely infectious/inflammatory in etiology.    4 mm left anterior upper lobe groundglass nodule.    US Kidney Bladder 4/22  No sonographic evidence of hydronephrosis.    b/l le venous duplex 5/6  No evidence of deep venous thrombosis in either lower extremity.    TTE 4/22:   1. Left ventricular ejection fraction, by visual estimation, is <20%.   2. Severely decreased global left ventricular systolic function.   3. Severely enlarged left atrium.   4. Elevated mean left atrial pressure.   5. Large, fixed thrombus vs. mass on the apical wall of the left   ventricle.   6. Spectral Doppler shows pseudonormal pattern of left ventricular   myocardial filling (Grade II diastolic dysfunction).   7. Moderately reduced RV systolic function.   8. Mildly enlarged right atrium.   9. Moderate mitral valve regurgitation.  10. Moderate-severe tricuspid regurgitation.  11. Mild pulmonic valve regurgitation.  12. Estimated pulmonary artery systolic pressure is 42.9 mmHg assuming a   right atrial pressure of 15 mmHg, which is consistent with mild pulmonary   hypertension.  13. Endocardial visualization was enhanced with intravenous echo contrast.      CXR 5/7  Congestive changes. Bibasilar opacities/effusions, unchanged

## 2023-05-08 NOTE — PROGRESS NOTE ADULT - ASSESSMENT
77 year old woman with PMHx of HTN, HLD, nonischemic cardiomyopathy, HFrEF s/p AICD presents to the ED with 2 weeks of worsening shortness of breath with increasing dry cough over this time period accepted to CCU for cardiogenic shock    #Cardiogenic Shock   #LV thrombus, suspected new PE  #NICM  #HFrEF 25% s/p AICD  #HTN/HLD  - CT CAP - cardiomegaly w/ RH strain  - CT Chest 5/4 subacute PE in R mainstem and subsegmental branches  - TTE 4/22/2023 EF <20%, large fixed thrombus vs mass on apical LV wall, GIIDD, mod-sev TR, mod MR, mild CA, mild PH  - LE venous duplex repeat -ve 5/6  - NSVT runs on monitor   - c/w amiodarone 200mg po qD, s/p amio bolus 5/8  - c/w dobutamine and milrinone ggt - optimize therapy via PICC line   - c/w heparin ggt - target ptt 60-99   - IVC noncompressible, JVD distension on exam   - c/w 3% NaCl 150cc IV bolus BID run over 3 hours + bumex ggt @ 2mg/hr  - s/p additional bumex 2mg IV push and diamox 500mg x1  - started metolazone 5mg IV BID  - BMP, Mg Q8hr   - can give extra bumex and metolazone IVP - goal net -ve 1-2L/day  - HF following   - Mg >2, K>4  - daily weight  - strict I/O  - if persistent runs of NSVT will get EP consult       #Post obstructive pneumonia  #Suspected lung malignancy  - CT CAP - R hilar LAD, R perihilar soft tissue density w/ indentation/possible invasion of R pulmonary artery and narrowing of proximal R lobar and segmental branches w/ consolidation of RLL  - MRSA -ve  - completed zosyn 7 days course 4/27  - s/p EBUS with biopsy 5/5 - pending pathology   - PET scan as outpatient to r/o lung malignancy  - c/w Incentive Spirometer - patient educated about importance     #Positive COVID-19 PCR  - Asymptomatic, off isolation   - No need for further treatment or diagnostic w/u as per ID    #HAGMA - resolved   #Lactic Acidosis - resolved   #DAVID on CKD - resolved   #Elevated liver enzymes predominantly hepatocellular (AST>ALT) with hyperbilirubinemia likely due to ischemic liver injury - resolved   - lactate now wnl   - previously holding lipitor 20mg qhs - can restart now w/ LFTs wnl     #Deconditioning   #Reduced appetite/po intake  #Hypotonic Hyponatremia  - hypervolemic   - Na 132-> 129 -> 131  - pending prealbumin  - c/w megestrol 40mg QID  - patient passed SLP today - c/w po diet  - IF patient does not have meal, will supplement with NG tube feed  - nutrition following   - continue to trend lytes, Cr   - PT/OT following     #Follow Up  - monitor UOP, trend lytes  - continue diuretic therapy, monitor for alkalosis - if increasing can give additional diamox   - PT/OT, encourage OOBTC and ambulation     # Misc  - DVT Prophylaxis: heparin drip   - GI Prophylaxis: pantoprazole    Tablet 40 milliGRAM(s) Oral before breakfast  - Diet: Diet, DASH/TLC with 1.2 L fluid restriction    - Activity: Activity - Ambulate as Tolerated  - Code Status: Full , Palliative on board

## 2023-05-08 NOTE — PROGRESS NOTE ADULT - SUBJECTIVE AND OBJECTIVE BOX
NUTRITION SUPPORT TEAM  -  PROGRESS NOTE   resting comfortably  on NGT feed  swallow evaluation  noted  abdomen soft n/d  REVIEW OF SYSTEMS:  Negative except as noted above.     VITALS:  T(F): 96.5 (05-08 @ 08:00), Max: 98 (05-08 @ 04:00)  HR: 117 (05-08 @ 10:00) (109 - 117)  BP: 86/59 (05-08 @ 10:00) (85/57 - 102/58)  RR: 34 (05-08 @ 10:00) (13 - 34)  SpO2: 99% (05-08 @ 10:00) (94% - 99%)    HEIGHT/WEIGHT/BMI:   Height (cm): 160 (05-05)  Weight (kg): 63.5 (05-05)  BMI (kg/m2): 24.8 (05-05)  05-07-23 @ 07:01  -  05-08-23 @ 07:00  --------------------------------------------------------  IN:    Bumetanide: 240 mL    DOBUTamine: 35.3 mL    DOBUTamine: 59.9 mL    DOBUTamine: 85.5 mL    Enteral Tube Flush: 90 mL    Heparin Infusion: 191 mL    Heparin Infusion: 55 mL    IV PiggyBack: 150 mL    Jevity 1.2: 970 mL    Milrinone: 40.5 mL    Milrinone: 85.7 mL    Oral Fluid: 50 mL  Total IN: 2052.8 mL    OUT:    Ureteral Catheter (mL): 1665 mL  Total OUT: 1665 mL    Total NET: 387.8 mL  MEDICATIONS:   aMIOdarone    Tablet 200 milliGRAM(s) Oral two times a day  benzocaine 20% Spray 1 Spray(s) Topical four times a day PRN  buMETAnide Infusion 2 mG/Hr (10 mL/Hr) IV Continuous <Continuous>  chlorhexidine 2% Cloths 1 Application(s) Topical daily  DOBUTamine Infusion 7.5 MICROgram(s)/kG/Min (7.14 mL/Hr) IV Continuous <Continuous>  heparin  Infusion.  Unit(s)/Hr (11 mL/Hr) IV Continuous <Continuous>  magnesium gluconate 500 milliGRAM(s) Oral daily  megestrol 40 milliGRAM(s) Oral four times a day  melatonin 5 milliGRAM(s) Oral at bedtime  milrinone Infusion 0.25 MICROgram(s)/kG/Min (4.76 mL/Hr) IV Continuous <Continuous>  pantoprazole    Tablet 40 milliGRAM(s) Oral before breakfast    LABS:                         10.4   9.25  )-----------( 317      ( 08 May 2023 05:03 )             31.0     131<L>  |  94<L>  |  25<H>  ----------------------------<  131<H>          (05-08-23 @ 05:03)  4.0   |  23  |  0.7    Ca    8.4          (05-08-23 @ 05:03)  Phos  2.5         (05-08-23 @ 05:03)  Mg     2.1         (05-08-23 @ 05:03)    TPro  6.2  /  Alb  2.7<L>  /  TBili  2.3<H>  /  DBili  x   /  AST  30  /  ALT  40  /  AlkPhos  152<H>       05-08-23 @ 05:03  Triglycerides, Serum: 91 mg/dL (04-25 @ 04:50)  Prealbumin, Serum: 6 mg/dL (05-05-23 @ 06:00)  DIET:   Diet, DASH/TLC:   Sodium & Cholesterol Restricted  Soft and Bite Sized (SOFTBTSZ)  1200mL Fluid Restriction ([id number])  Mildly Thick Liquids (MILDTHICKLIQS)  Tube Feeding Modality: Nasogastric  TwoCal HN  Total Volume for 24 Hours (mL): 720  Bolus  Total Volume of Bolus (mL):  240  Tube Feed Frequency: Every 8 hours   Tube Feed Start Time: 12:00  Bolus Feed Rate (mL per Hour): 500   Bolus Feed Duration (in Hours): 0.5  Bolus Feed Instructions:  feed via NG AFTER each attempted po meal - with pt seated upright during and half hour after a feeding  flush NG with 20 ml water brisk syringe push before & afer each feed  Supplement Feeding Modality:  Oral  Ensure Enlive Cans or Servings Per Day:  2       Frequency:  Daily (05-08-23 @ 10:31) [Active]   NUTRITION SUPPORT TEAM  -  PROGRESS NOTE   resting comfortably  on NGT feed  swallow evaluation  noted  to add  po diet   abdomen soft n/d  REVIEW OF SYSTEMS:  Negative except as noted above.     VITALS:  T(F): 96.5 (05-08 @ 08:00), Max: 98 (05-08 @ 04:00)  HR: 117 (05-08 @ 10:00) (109 - 117)  BP: 86/59 (05-08 @ 10:00) (85/57 - 102/58)  RR: 34 (05-08 @ 10:00) (13 - 34)  SpO2: 99% (05-08 @ 10:00) (94% - 99%)    HEIGHT/WEIGHT/BMI:   Height (cm): 160 (05-05)  Weight (kg): 63.5 (05-05)  BMI (kg/m2): 24.8 (05-05)  05-07-23 @ 07:01  -  05-08-23 @ 07:00  --------------------------------------------------------  IN:    Bumetanide: 240 mL    DOBUTamine: 35.3 mL    DOBUTamine: 59.9 mL    DOBUTamine: 85.5 mL    Enteral Tube Flush: 90 mL    Heparin Infusion: 191 mL    Heparin Infusion: 55 mL    IV PiggyBack: 150 mL    Jevity 1.2: 970 mL    Milrinone: 40.5 mL    Milrinone: 85.7 mL    Oral Fluid: 50 mL  Total IN: 2052.8 mL    OUT:    Ureteral Catheter (mL): 1665 mL  Total OUT: 1665 mL    Total NET: 387.8 mL  MEDICATIONS:   aMIOdarone    Tablet 200 milliGRAM(s) Oral two times a day  benzocaine 20% Spray 1 Spray(s) Topical four times a day PRN  buMETAnide Infusion 2 mG/Hr (10 mL/Hr) IV Continuous <Continuous>  chlorhexidine 2% Cloths 1 Application(s) Topical daily  DOBUTamine Infusion 7.5 MICROgram(s)/kG/Min (7.14 mL/Hr) IV Continuous <Continuous>  heparin  Infusion.  Unit(s)/Hr (11 mL/Hr) IV Continuous <Continuous>  magnesium gluconate 500 milliGRAM(s) Oral daily  megestrol 40 milliGRAM(s) Oral four times a day  melatonin 5 milliGRAM(s) Oral at bedtime  milrinone Infusion 0.25 MICROgram(s)/kG/Min (4.76 mL/Hr) IV Continuous <Continuous>  pantoprazole    Tablet 40 milliGRAM(s) Oral before breakfast    LABS:                         10.4   9.25  )-----------( 317      ( 08 May 2023 05:03 )             31.0     131<L>  |  94<L>  |  25<H>  ----------------------------<  131<H>          (05-08-23 @ 05:03)  4.0   |  23  |  0.7    Ca    8.4          (05-08-23 @ 05:03)  Phos  2.5         (05-08-23 @ 05:03)  Mg     2.1         (05-08-23 @ 05:03)    TPro  6.2  /  Alb  2.7<L>  /  TBili  2.3<H>  /  DBili  x   /  AST  30  /  ALT  40  /  AlkPhos  152<H>       05-08-23 @ 05:03  Triglycerides, Serum: 91 mg/dL (04-25 @ 04:50)  Prealbumin, Serum: 6 mg/dL (05-05-23 @ 06:00)  DIET:   Diet, DASH/TLC:   Sodium & Cholesterol Restricted  Soft and Bite Sized (SOFTBTSZ)  1200mL Fluid Restriction ([id number])  Mildly Thick Liquids (MILDTHICKLIQS)  Tube Feeding Modality: Nasogastric  TwoCal HN  Total Volume for 24 Hours (mL): 720  Bolus  Total Volume of Bolus (mL):  240  Tube Feed Frequency: Every 8 hours   Tube Feed Start Time: 12:00  Bolus Feed Rate (mL per Hour): 500   Bolus Feed Duration (in Hours): 0.5  Bolus Feed Instructions:  feed via NG AFTER each attempted po meal - with pt seated upright during and half hour after a feeding  flush NG with 20 ml water brisk syringe push before & afer each feed  Supplement Feeding Modality:  Oral  Ensure Enlive Cans or Servings Per Day:  2       Frequency:  Daily (05-08-23 @ 10:31) [Active]   NUTRITION SUPPORT TEAM  -  PROGRESS NOTE   resting comfortably, awake, sluggish, still on pressor  on NGT feed  swallow evaluation  noted  to add  po diet   abdomen soft n/d    REVIEW OF SYSTEMS:  Negative except as noted above.     VITALS:  T(F): 96.5 (05-08 @ 08:00), Max: 98 (05-08 @ 04:00)  HR: 117 (05-08 @ 10:00) (109 - 117)  BP: 86/59 (05-08 @ 10:00) (85/57 - 102/58)  RR: 34 (05-08 @ 10:00) (13 - 34)  SpO2: 99% (05-08 @ 10:00) (94% - 99%)    HEIGHT/WEIGHT/BMI:   Height (cm): 160 (05-05)  Weight (kg): 63.5 (05-05)  BMI (kg/m2): 24.8 (05-05)    05-07-23 @ 07:01  -  05-08-23 @ 07:00  --------------------------------------------------------  IN:    Bumetanide: 240 mL    DOBUTamine: 35.3 mL    DOBUTamine: 59.9 mL    DOBUTamine: 85.5 mL    Enteral Tube Flush: 90 mL    Heparin Infusion: 191 mL    Heparin Infusion: 55 mL    IV PiggyBack: 150 mL    Jevity 1.2: 970 mL    Milrinone: 40.5 mL    Milrinone: 85.7 mL    Oral Fluid: 50 mL  Total IN: 2052.8 mL  OUT:    Ureteral Catheter (mL): 1665 mL  Total OUT: 1665 mL  Total NET: 387.8 mL    MEDICATIONS:   aMIOdarone    Tablet 200 milliGRAM(s) Oral two times a day  benzocaine 20% Spray 1 Spray(s) Topical four times a day PRN  buMETAnide Infusion 2 mG/Hr (10 mL/Hr) IV Continuous <Continuous>  chlorhexidine 2% Cloths 1 Application(s) Topical daily  DOBUTamine Infusion 7.5 MICROgram(s)/kG/Min (7.14 mL/Hr) IV Continuous <Continuous>  heparin  Infusion.  Unit(s)/Hr (11 mL/Hr) IV Continuous <Continuous>  magnesium gluconate 500 milliGRAM(s) Oral daily  megestrol 40 milliGRAM(s) Oral four times a day  melatonin 5 milliGRAM(s) Oral at bedtime  milrinone Infusion 0.25 MICROgram(s)/kG/Min (4.76 mL/Hr) IV Continuous <Continuous>  pantoprazole    Tablet 40 milliGRAM(s) Oral before breakfast    LABS:                         10.4   9.25  )-----------( 317      ( 08 May 2023 05:03 )             31.0     131<L>  |  94<L>  |  25<H>  ----------------------------<  131<H>          (05-08-23 @ 05:03)  4.0   |  23  |  0.7    Ca    8.4          (05-08-23 @ 05:03)  Phos  2.5         (05-08-23 @ 05:03)  Mg     2.1         (05-08-23 @ 05:03)    TPro  6.2  /  Alb  2.7<L>  /  TBili  2.3<H>  /  DBili  x   /  AST  30  /  ALT  40  /  AlkPhos  152<H>       05-08-23 @ 05:03  Triglycerides, Serum: 91 mg/dL (04-25 @ 04:50)  Prealbumin, Serum: 6 mg/dL (05-05-23 @ 06:00)    DIET:   Diet, DASH/TLC:   Sodium & Cholesterol Restricted  Soft and Bite Sized (SOFTBTSZ)  1200mL Fluid Restriction ([id number])  Mildly Thick Liquids (MILDTHICKLIQS)  Tube Feeding Modality: Nasogastric  TwoCal HN  Total Volume for 24 Hours (mL): 720   Bolus (mL):  240  Every 8 hours     Bolus Feed Rate (mL per Hour): 500   Bolus Feed Duration (in Hours): 0.5  Bolus Feed Instructions:  feed via NG AFTER each attempted po meal - with pt seated upright during and half hour after a feeding  flush NG with 20 ml water brisk syringe push before & afer each feed  Supplement Feeding Modality:  Oral  Ensure Enlive Cans or Servings Per Day:  2       Frequency:  Daily (05-08-23 @ 10:31) [Active]

## 2023-05-08 NOTE — PROGRESS NOTE ADULT - ASSESSMENT
Assessment:   77 year old woman with pmh of HTN, HLD, nonischemic cardiomyopathy, HFrEF, s/p ICD and PPM presents to the ED with 2 weeks of worsening shortness of breath.       IMPRESSION:  Cardiogenic shock on inotropic suppport  HFrEF 20%, NICM  RLL Mass/opacity/ possible Pneumonia, r/o malignancy  LV Mass, ?Thrombus  Transaminitis in the setting of hypotension, shock liver      Plan:  s/p bronch/EBUS biopsy 5/05 - awaiting official pathology results   Continue inotropic support through PICC line- dobutamine @ 7.5mcg/kg/min and milrinone @ 0.25 mcg/kg/min  Continue feeding tube for nutritional support  Repeat speech and swallow  Physical therapy follow-up / OOB to chair daily  Get BMP twice daily with magnesium   Maintain potassium >4.0, Mg >2.2  Strict intake and output  Daily weight   Rest of care as per primary team  Plan discussed with patient, family at bedside, and CTU team  Will continue to follow Assessment:   77 year old woman with pmh of HTN, HLD, nonischemic cardiomyopathy, HFrEF, s/p ICD and PPM presents to the ED with 2 weeks of worsening shortness of breath.       IMPRESSION:  Cardiogenic shock on inotropic suppport  HFrEF 20%, NICM  RLL Mass/opacity/ possible Pneumonia, r/o malignancy  LV Mass, ?Thrombus  Transaminitis in the setting of hypotension, shock liver      Plan:  s/p bronch/EBUS biopsy 5/05 - awaiting official pathology results   Continue inotropic support through PICC line- dobutamine @ 7.5mcg/kg/min and milrinone @ 0.25 mcg/kg/min  Patient having runs of NSVT on tele- would increase amiodarone or consider EP consult  JVP ~ [id number], continue Bumex gtt at 2mg/hr   Can give extra Bumex IVP and metolazone PRN for a net negative goal of 1-2L per day  Monitor for contraction alkalosis- give Diamox PRN  Continue feeding tube for nutritional support  Repeat speech and swallow- if patient passes, OK for family to bring outside food to encourage food intake  Physical therapy follow-up / OOB to chair daily  Please encourage incentive spirometer 10x/hr while awake   Get BMP twice daily with magnesium   Maintain potassium >4.0, Mg >2.2  Strict intake and output  Daily weight   Rest of care as per primary team  Plan discussed with patient, family at bedside, and CCU team  Will continue to follow

## 2023-05-08 NOTE — PROGRESS NOTE ADULT - ASSESSMENT
ASSESSMENT  Cardiogenic Shock   LV thrombus  NICM  HFrEF 25% s/p AICD  HTN/HLD  - SOB x2 weeks  Post obstructive pneumonia  Suspected lung mass   - HAGMA, Lactic Acidosis  DAVID on CKD   Ischemic hepatitis   hyponatremia/hypomagnesemia    PLAN  swallow evaluation noted  on NGT feed   TwoCal  HN at 240ml after each attempted po meal at which intake < 50%     flush NG with only 20 ml water brisk syringe push before & after each feeding  check bmp/phos/mg and correct lytes  will follow ASSESSMENT  Cardiogenic Shock   LV thrombus  NICM  HFrEF 25% s/p AICD  HTN/HLD  - SOB x2 weeks  Post obstructive pneumonia  Suspected lung mass   - HAGMA, Lactic Acidosis  DAVID on CKD   Ischemic hepatitis   hyponatremia/hypomagnesemia    PLAN  swallow evaluation noted  on NGT feed   to add po diet  continue with nutrition    add supplement post-prandially with TwoCal  HN at 240ml after each attempted po meal at which intake < 50%      flush NG with only 20 ml water brisk syringe push before & after each feeding  check bmp/phos/mg and correct lytes   ASSESSMENT  Cardiogenic Shock   LV thrombus  NICM  HFrEF 25% s/p AICD  HTN/HLD  - SOB x2 weeks  Post obstructive pneumonia  Suspected lung mass   - HAGMA, Lactic Acidosis  DAVID on CKD   Ischemic hepatitis   hyponatremia/hypomagnesemia    PLAN  swallow evaluation noted  on po diet with post-prandial NGT feedings        TwoCal  HN 240ml after each attempted po meal at which intake < 50%      flush NG with only 20 ml water brisk syringe push before & after each feeding  correct phos to > 3.5

## 2023-05-08 NOTE — PROGRESS NOTE ADULT - SUBJECTIVE AND OBJECTIVE BOX
HPI:    HPI: 77F with PMH of HTN, HLD, NICCM, HFrEF s/p AICD, here from ED with worsening SOB, requiring CEU admission. Found to have cardiomegaly here, and course c/b worsening DAVID and suspected ischemic hepatitis, requiring dopatine gtt for BP support, and transferred to CCU. Also found to have LV thrombus, on heparin gtt. RHC (4/24) showed cardiogenic shock. Is also on IV zosyn for post-obstructive PNA. Found to have lung mass, awaiting PET outpatient to r/o malignancy. Patient is full code. Palliative care consulted for GOC.    Interval History:  - patient currently remains in inotropic support, bumex infusion   - s/p RHC swan kevon 5/1, lung bx on 5/6   -  Lyle is at bedside    ADVANCE DIRECTIVES:    [ X ] Full Code w/ AICD [ ] DNR  MOLST  [ ]  Living Will  [ ]   DECISION MAKER(s): Patient reports she makes her own decisions   [ ] Health Care Proxy(s)  [X  ] Surrogate(s)  [ ] Guardian           Name(s): Phone Number(s):  Lyle     BASELINE (I)ADL(s) (prior to admission):  [place]: [ X]Total  [ ] Moderate [ ]Dependent  Palliative Performance Status Version 2:    100     %    http://npcrc.org/files/news/palliative_performance_scale_ppsv2.pdf - lives @ home with  and is totally active and independent at home     Allergies    epinephrine (Unknown)    Intolerances    MEDICATIONS  (STANDING):  buMETAnide Injectable 2 milliGRAM(s) IV Push every 8 hours  chlorhexidine 2% Cloths 1 Application(s) Topical daily  DOBUTamine Infusion 7.5 MICROgram(s)/kG/Min (7.14 mL/Hr) IV Continuous <Continuous>  heparin  Infusion.  Unit(s)/Hr (11 mL/Hr) IV Continuous <Continuous>  magnesium gluconate 500 milliGRAM(s) Oral daily  melatonin 5 milliGRAM(s) Oral at bedtime  milrinone Infusion 0.125 MICROgram(s)/kG/Min (2.38 mL/Hr) IV Continuous <Continuous>  norepinephrine Infusion 0.05 MICROgram(s)/kG/Min (2.98 mL/Hr) IV Continuous <Continuous>  pantoprazole    Tablet 40 milliGRAM(s) Oral before breakfast    MEDICATIONS  (PRN):  benzocaine 20% Spray 1 Spray(s) Topical four times a day PRN throat pain  heparin   Injectable 5000 Unit(s) IV Push every 6 hours PRN For aPTT less than 40  heparin   Injectable 2500 Unit(s) IV Push every 6 hours PRN For aPTT between 40 - 57    PRESENT SYMPTOMS:  Pain: [ ]yes [ X]no  QOL impact -   Location -                    Aggravating factors -  Quality -  Radiation -  Timing-  Severity (0-10 scale):  Minimal acceptable level (0-10 scale):     CPOT:    https://www.[email or web address]/getattachment/[email or web address]/Critical-Care-Pain-Observation-Tool-(CPOT)    PAIN AD Score:   http://geriatrictoolkit.[email or web address]/painad.pdf (press ctrl +  left click to view)    Dyspnea:                           [X ]None[ ]Mild [ ]Moderate [ ]Severe     Respiratory Distress Observation Scale (RDOS):   A score of 0 to 2 signifies little or no respiratory distress, 3 signifies mild distress, scores 4 to 6 indicate moderate distress, and scores greater than 7 signify severe distress  [email or web address]://[email or web address]/sites/default/files/PDFS/468205-irermdnoayt-jhwkbnha-omycpycyofz-wyicn.pdf    Anxiety:                             [ X]None[ ]Mild [ ]Moderate [ ]Severe   Fatigue:                             [ X]None[ ]Mild [ ]Moderate [ ]Severe   Nausea:                             [X ]None[ ]Mild [ ]Moderate [ ]Severe   Loss of appetite:              [ X]None[ ]Mild [ ]Moderate [ ]Severe   Constipation:                    [X ]None[ ]Mild [ ]Moderate [ ]Severe    Other Symptoms:  [X ]All other review of systems negative     Palliative Performance Status Version 2:     50    %    http://npcrc.org/files/news/palliative_performance_scale_ppsv2.pdf    PHYSICAL EXAM:  ICU Vital Signs Last 24 Hrs  T(C): 36.1 (08 May 2023 12:00), Max: 36.7 (08 May 2023 04:00)  T(F): 97 (08 May 2023 12:00), Max: 98 (08 May 2023 04:00)  HR: 112 (08 May 2023 14:05) (109 - 121)  BP: 80/55 (08 May 2023 14:05) (72/46 - 102/58)  BP(mean): 63 (08 May 2023 14:05) (53 - 708)  RR: 35 (08 May 2023 14:05) (13 - 35)  SpO2: 99% (08 May 2023 14:05) (94% - 100%)    GENERAL:  [ X ] No acute distress [ ]Lethargic  [ ]Unarousable  [X ]Verbal  [ ]Non-Verbal [ ]Cachexia    BEHAVIORAL/PSYCH:  [X ]Alert and Oriented x 4  [ ] Anxiety [ ] Delirium [ ] Agitation [ X] Calm   EYES: [ X ] No scleral icterus [ ] Scleral icterus [ ] Closed  ENMT:  [ ]Dry mouth  [ X]No external oral lesions [X ] No external ear or nose lesions  CARDIOVASCULAR:  [ ]Regular [ ]Irregular [ ]Tachy [ ]Not Tachy  [ ]Tyson [ ] Edema [X ] No edema  PULMONARY:  [ ]Tachypnea  [ ]Audible excessive secretions [ X] No labored breathing [ ] labored breathing  GASTROINTESTINAL: [ ]Soft  [ ]Distended  [ X ]Not distended [ ]Non tender [ ]Tender  MUSCULOSKELETAL: [ X ]No clubbing [ ] clubbing  [ X ] No cyanosis [ ] cyanosis  NEUROLOGIC: [ X]No focal deficits  [ X]Follows commands  [ ]Does not follow commands  [ ]Cognitive impairment  [ ]Dysphagia  [ ]Dysarthria  [ ]Paresis   SKIN: [ ] Jaundiced [ X] Non-jaundiced [ ]Rash [ X]No Rash [ ] Warm [ ] Dry  MISC/LINES: [ ] ET tube [ ] Trach [ ]NGT/OGT [ ]PEG [ ]Saenz    CRITICAL CARE:  [ ] Shock Present  [ ]Septic [ ]Cardiogenic [ ]Neurologic [ ]Hypovolemic  [ ]  Vasopressors [ X]  Inotropes   [ ]Respiratory failure present [ ]Mechanical ventilation [ ]Non-invasive ventilatory support [ ]High flow  [ ]Acute  [ ]Chronic [ ]Hypoxic  [ ]Hypercarbic [ ]Other  [ ]Other organ failure     LABS: reviewed by me              05-08    131<L>  |  94<L>  |  25<H>  ----------------------------<  131<H>  4.0   |  23  |  0.7    Ca    8.4      08 May 2023 05:03  Phos  2.5     05-08  Mg     2.1     05-08    TPro  6.2  /  Alb  2.7<L>  /  TBili  2.3<H>  /  DBili  x   /  AST  30  /  ALT  40  /  AlkPhos  152<H>  05-08                            10.4   9.25  )-----------( 317      ( 08 May 2023 05:03 )             31.0     RADIOLOGY & ADDITIONAL STUDIES: reviewed by me    EKG: reviewed by me    < from: 12 Lead ECG (05.01.23 @ 05:16) >    Ventricular Rate 104 BPM    Atrial Rate 104 BPM    P-R Interval 152 ms    QRS Duration 116 ms    Q-T Interval 394 ms    QTC Calculation(Bazett) 518 ms    P Axis 72 degrees    R Axis -42 degrees    T Axis 92 degrees    Diagnosis Line Sinus tachycardia with occasional Premature ventricular complexes  Left axis deviation  Incomplete left bundle branch block  Abnormal ECG    Confirmed by Stacie Franco MD ([id number]) on 5/1/2023 8:31:59 AM    < end of copied text >    < from: TTE Echo Complete w/ Contrast w/ Doppler (04.22.23 @ 08:36) >  Summary:   1. Left ventricular ejection fraction, by visual estimation, is <20%.   2. Severely decreased global left ventricular systolic function.   3. Severely enlarged left atrium.   4. Elevated mean left atrial pressure.   5. Large, fixed thrombus vs. mass on the apical wall of the left   ventricle.   6. Spectral Doppler shows pseudonormal pattern of left ventricular   myocardial filling (Grade II diastolic dysfunction).   7. Moderately reduced RV systolic function.   8. Mildly enlarged right atrium.   9. Moderate mitral valve regurgitation.  10. Moderate-severe tricuspid regurgitation.  11. Mild pulmonic valve regurgitation.  12. Estimated pulmonary artery systolic pressure is 42.9 mmHg assuming a   right atrial pressure of 15 mmHg, which is consistent withmild pulmonary   hypertension.  13. Endocardial visualization was enhanced with intravenous echo contrast.    < end of copied text >        Patient discussed with primary medical team MD  Palliative care education provided to patient and/or family

## 2023-05-08 NOTE — PROGRESS NOTE ADULT - SUBJECTIVE AND OBJECTIVE BOX
Date of Admission: 23    Interval History:                                                                                                                                                     HISTORY OF PRESENT ILLNESS: 76 y/o F with PMHx of HTN, HLD, nonischemic cardiomyopathy, HFrEF s/p AICD presents to the ED with 2 weeks of worsening shortness of breath. Patient and her son state patient has been experiencing shortness of breath that has progressed from present on activity to now shortness of breath during conversation. Patient also notes an increasing dry cough over this time period.    In ED, patient's HR is 110 and saturating 98% on RA  CT C/A/P revealing cardiomegaly with evidence of right heart strain, R hilar lymphadenopathy with R perihilar soft tissue density with indentation/possible invasion of right pulmonary artery, and narrowing of proximal R lobar and segmental branches with associated consolidation of right lower lobe. 2.2 x 2.8 cm filling defect noted in apex of left ventricle.   Last ECHO 2021 revealing EF 20-25%, mild Mr, mild TR, mild Pulm HTN  Dimer 2600, trops negative BNP 16716  Duplex venous LE negative for DVT; CTA chest negative for PE  Cr 1.4 (0.7 in ), Na+ 128, T.Benjamin 2.3, LFTs elevated (hemolyzed)  Lactate 5.8-->4.6  Admitted to SDU (2023 01:10)      Patient reportedly was at baseline (active, ambulating long distances without issue) until about 2 weeks ago when patient began to have progressively worsening SOB with a dry cough. Patient reports Dr. Mead as per primary cardiologist, and Dr. Sherwood as her heart failure specialist (last seen in office in ). Endorses compliance with home medications. Family at bedside concerned about patient's recent weight loss as well.         PAST MEDICAL & SURGICAL HISTORY:  CHF, stage C  HTN (hypertension)    Allergies  epinephrine (Unknown)    Intolerances      Vitals:          Physical Exam:  General Appearance: NG tube, thin, frail, normal for age and gender. 	  Cardiovascular: RRR, +S1, S2, No edema  Respiratory: decreased @ bases  Psychiatry: Fatigued, oriented x 3, Mood & affect appropriate  Skin/Integumentary: No rashes, No ecchymoses, No cyanosis  Neurologic: Non-focal  Musculoskeletal/ extremities: Normal range of motion, No clubbing, cyanosis or edema  Vascular: Peripheral pulses palpable 2+ bilaterally        LABS:	 	          TELEMETRY EVENTS: 	    EC2023    Ventricular Rate 96 BPM  Atrial Rate 96 BPM  P-R Interval 164 ms  QRS Duration 116 ms  Q-T Interval 396 ms  QTC Calculation(Bazett) 500 ms  P Axis 51 degrees  R Axis -41 degrees  T Axis 87 degrees    Diagnosis Line Normal sinus rhythm  Possible Left atrial enlargement  Left axis deviation  Prolonged QT  Abnormal ECG    Confirmed by Seven Hensley ([id number]) on 2023 7:07:08 AM      CXR     Impression:  CHF. Support devices as described. Without difference.      CT Angio Chest PE Protocol   IMPRESSION:    No evidence of pulmonary embolism. (See discussion below for more   findings.)    Marked cardiomegaly.    There is a 2.2 x 2.8 cm rounded filling defect in the region of the apex   of the left ventricle (3/11; 607/95). Differential diagnosis includes an   intracardiac mass or thrombus.    The main pulmonary artery is dilated, measuring 3.6 cm in diameter, which   may be seen with pulmonary hypertension. There is reflux of contrast   material into the IVC and hepatic veins compatible with right heart   dysfunction.    Right hilar lymphadenopathy and right perihilar soft tissue density is   noted. There is segmental narrowing and consolidation noted in the medial   aspect of the right lower lobe. There is a small right pleural effusion.   There is extrinsic indentation and possible invasion of the right main   pulmonary artery (604/142; 605/129; ). There is associated narrowing   of the proximal right lobar and segmental branches. A right hilar / right   lower lobe mass with postobstructive pneumonitis should be ruled out.          PREVIOUS DIAGNOSTIC TESTING:    TTE 23    Summary:   1. Left ventricular ejection fraction, by visual estimation, is <20%.   2. Severely decreased global left ventricular systolic function.   3. Severely enlarged left atrium.   4. Elevated mean left atrial pressure.   5. Large, fixed thrombus vs. mass on the apical wall of the left   ventricle.   6. Spectral Doppler shows pseudonormal pattern of left ventricular   myocardial filling (Grade II diastolic dysfunction).   7. Moderately reduced RV systolic function.   8. Mildly enlarged right atrium.   9. Moderate mitral valve regurgitation.  10. Moderate-severe tricuspid regurgitation.  11. Mild pulmonic valve regurgitation.  12. Estimated pulmonary artery systolic pressure is 42.9 mmHg assuming a   right atrial pressure of 15 mmHg, which is consistent withmild pulmonary   hypertension.  13. Endocardial visualization was enhanced with intravenous echo contrast.      Right Heart Catheterization 23    FINDINGS:   Right Heart Cath  RA - 12  RV - 51/5/13  PA - 51/24/32  PCWP - 20    CO/CI Thermodilusion - 2.1/1.27  CO/CI Mehdi - 2.09/1.26    SVR (MAP 79 / RA 12 / CO 2.1) = 2552 dyn  PVR = 5.71 henriquez      C : RA 14, RV 46/14, PA 56/26/37, PCWP: 25, CO/C.I mehdi 2.37/1.43, C.O. /C.I thermodilution 2.8/1.69      Home Medications:  atorvastatin 20 mg oral tablet: 1 tab(s) orally once a day (04 Aug 2021 10:05)  carvedilol 6.25 mg oral tablet: 1 tab(s) orally 2 times a day (2023 02:02)  Entresto 49 mg-51 mg oral tablet: 1 tab(s) orally 2 times a day (04 Aug 2021 10:05)  Farxiga 10 mg oral tablet: 1 tab(s) orally once a day (2023 02:05)  Lasix 20 mg oral tablet: 1 tab(s) orally once a day (2023 02:04)  spironolactone 25 mg oral tablet: 1 tab(s) orally once a day (04 Aug 2021 10:05)    MEDICATIONS  (STANDING):  MEDICATIONS  (STANDING):  buMETAnide Injectable 2 milliGRAM(s) IV Push every 8 hours  chlorhexidine 2% Cloths 1 Application(s) Topical daily  DOBUTamine Infusion 7.5 MICROgram(s)/kG/Min (7.14 mL/Hr) IV Continuous <Continuous>  heparin  Infusion.  Unit(s)/Hr (11 mL/Hr) IV Continuous <Continuous>  magnesium gluconate 500 milliGRAM(s) Oral daily  melatonin 5 milliGRAM(s) Oral at bedtime  milrinone Infusion 0.125 MICROgram(s)/kG/Min (2.38 mL/Hr) IV Continuous <Continuous>  norepinephrine Infusion 0.05 MICROgram(s)/kG/Min (2.98 mL/Hr) IV Continuous <Continuous>  pantoprazole    Tablet 40 milliGRAM(s) Oral before breakfast    MEDICATIONS  (PRN):  benzocaine 20% Spray 1 Spray(s) Topical four times a day PRN throat pain  heparin   Injectable 5000 Unit(s) IV Push every 6 hours PRN For aPTT less than 40  heparin   Injectable 2500 Unit(s) IV Push every 6 hours PRN For aPTT between 40 - 57   Date of Admission: 23    Interval History: Patient sitting up in bed, in NAD. Family present at bedside. s/p bronch/EBUS biopsy - awaiting pathology results. NG tube for nutritional support. Remains on dual inotropic support and Bumex gtt. Runs of NSVT noted on tele. Kidney function stable. Remains overloaded on exam.                                                                                                                                                     HISTORY OF PRESENT ILLNESS: 78 y/o F with PMHx of HTN, HLD, nonischemic cardiomyopathy, HFrEF s/p AICD presents to the ED with 2 weeks of worsening shortness of breath. Patient and her son state patient has been experiencing shortness of breath that has progressed from present on activity to now shortness of breath during conversation. Patient also notes an increasing dry cough over this time period.    In ED, patient's HR is 110 and saturating 98% on RA  CT C/A/P revealing cardiomegaly with evidence of right heart strain, R hilar lymphadenopathy with R perihilar soft tissue density with indentation/possible invasion of right pulmonary artery, and narrowing of proximal R lobar and segmental branches with associated consolidation of right lower lobe. 2.2 x 2.8 cm filling defect noted in apex of left ventricle.   Last ECHO 2021 revealing EF 20-25%, mild Mr, mild TR, mild Pulm HTN  Dimer 2600, trops negative BNP 41779  Duplex venous LE negative for DVT; CTA chest negative for PE  Cr 1.4 (0.7 in ), Na+ 128, T.Benjamin 2.3, LFTs elevated (hemolyzed)  Lactate 5.8-->4.6  Admitted to SDU (2023 01:10)      Patient reportedly was at baseline (active, ambulating long distances without issue) until about 2 weeks ago when patient began to have progressively worsening SOB with a dry cough. Patient reports Dr. Mead as per primary cardiologist, and Dr. Sherwood as her heart failure specialist (last seen in office in ). Endorses compliance with home medications. Family at bedside concerned about patient's recent weight loss as well.         PAST MEDICAL & SURGICAL HISTORY:  CHF, stage C  HTN (hypertension)    Allergies  epinephrine (Unknown)    Intolerances      Vitals:  ICU Vital Signs Last 24 Hrs  T(C): 35.8 (08 May 2023 08:00), Max: 36.7 (08 May 2023 04:00)  T(F): 96.5 (08 May 2023 08:00), Max: 98 (08 May 2023 04:00)  HR: 114 (08 May 2023 11:00) (109 - 121)  BP: 78/52 (08 May 2023 11:00) (78/52 - 102/58)  BP(mean): 60 (08 May 2023 11:00) (60 - 708)  ABP: --  ABP(mean): --  RR: 30 (08 May 2023 11:00) (13 - 34)  SpO2: 99% (08 May 2023 11:00) (94% - 100%)    O2 Parameters below as of 08 May 2023 11:00  Patient On (Oxygen Delivery Method): room air        Physical Exam:  General Appearance: NG tube, thin, frail, normal for age and gender. 	  Cardiovascular: +NSVT on tele, +S1, S2, No edema  Respiratory: decreased @ bases  Psychiatry: Fatigued, oriented x 3, Mood & affect appropriate  Skin/Integumentary: No rashes, No ecchymoses, No cyanosis  Neurologic: Non-focal  Musculoskeletal/ extremities: Normal range of motion, No clubbing, cyanosis or edema  Vascular: Peripheral pulses palpable 2+ bilaterally        LABS:	 	    CBC Full  -  ( 08 May 2023 05:03 )  WBC Count : 9.25 K/uL  RBC Count : 3.32 M/uL  Hemoglobin : 10.4 g/dL  Hematocrit : 31.0 %  Platelet Count - Automated : 317 K/uL  Mean Cell Volume : 93.4 fL  Mean Cell Hemoglobin : 31.3 pg  Mean Cell Hemoglobin Concentration : 33.5 g/dL  Auto Neutrophil # : 7.31 K/uL  Auto Lymphocyte # : 1.15 K/uL  Auto Monocyte # : 0.67 K/uL  Auto Eosinophil # : 0.04 K/uL  Auto Basophil # : 0.02 K/uL  Auto Neutrophil % : 79.2 %  Auto Lymphocyte % : 12.4 %  Auto Monocyte % : 7.2 %  Auto Eosinophil % : 0.4 %  Auto Basophil % : 0.2 %    Comprehensive Metabolic Panel in AM (23 @ 05:03)    Sodium, Serum: 131 mmol/L   Potassium, Serum: 4.0 mmol/L   Chloride, Serum: 94 mmol/L   Carbon Dioxide, Serum: 23 mmol/L   Anion Gap, Serum: 14 mmol/L   Blood Urea Nitrogen, Serum: 25 mg/dL   Creatinine, Serum: 0.7 mg/dL   Glucose, Serum: 131 mg/dL   Calcium, Total Serum: 8.4 mg/dL   Protein Total, Serum: 6.2 g/dL   Albumin, Serum: 2.7 g/dL   Bilirubin Total, Serum: 2.3 mg/dL   Alkaline Phosphatase, Serum: 152 U/L   Aspartate Aminotransferase (AST/SGOT): 30 U/L   Alanine Aminotransferase (ALT/SGPT): 40 U/L   eGFR: 88: The estimated glomerular filtration rate (eGFR) is calculated using the   CKD-EPI creatinine equation, which does not have a coefficient for  race and is validated in individuals 18 years of age and older (N Engl J  Med ; [id number]:[id number]). Creatinine-based eGFR may be inaccurate in  various situations including but not limited to extremes of muscle mass,  altered dietary protein intake, or medications that affect renal tubular  creatinine secretion. mL/min/1.73m2      TELEMETRY EVENTS: 	    EC2023    Ventricular Rate 96 BPM  Atrial Rate 96 BPM  P-R Interval 164 ms  QRS Duration 116 ms  Q-T Interval 396 ms  QTC Calculation(Bazett) 500 ms  P Axis 51 degrees  R Axis -41 degrees  T Axis 87 degrees    Diagnosis Line Normal sinus rhythm  Possible Left atrial enlargement  Left axis deviation  Prolonged QT  Abnormal ECG    Confirmed by Seven Hensley ([id number]) on 2023 7:07:08 AM      CXR     Impression:  CHF. Support devices as described. Without difference.      CT Angio Chest PE Protocol   IMPRESSION:    No evidence of pulmonary embolism. (See discussion below for more   findings.)    Marked cardiomegaly.    There is a 2.2 x 2.8 cm rounded filling defect in the region of the apex   of the left ventricle (3/11; 607/95). Differential diagnosis includes an   intracardiac mass or thrombus.    The main pulmonary artery is dilated, measuring 3.6 cm in diameter, which   may be seen with pulmonary hypertension. There is reflux of contrast   material into the IVC and hepatic veins compatible with right heart   dysfunction.    Right hilar lymphadenopathy and right perihilar soft tissue density is   noted. There is segmental narrowing and consolidation noted in the medial   aspect of the right lower lobe. There is a small right pleural effusion.   There is extrinsic indentation and possible invasion of the right main   pulmonary artery (604/142; 605/129; ). There is associated narrowing   of the proximal right lobar and segmental branches. A right hilar / right   lower lobe mass with postobstructive pneumonitis should be ruled out.          PREVIOUS DIAGNOSTIC TESTING:    TTE 23    Summary:   1. Left ventricular ejection fraction, by visual estimation, is <20%.   2. Severely decreased global left ventricular systolic function.   3. Severely enlarged left atrium.   4. Elevated mean left atrial pressure.   5. Large, fixed thrombus vs. mass on the apical wall of the left   ventricle.   6. Spectral Doppler shows pseudonormal pattern of left ventricular   myocardial filling (Grade II diastolic dysfunction).   7. Moderately reduced RV systolic function.   8. Mildly enlarged right atrium.   9. Moderate mitral valve regurgitation.  10. Moderate-severe tricuspid regurgitation.  11. Mild pulmonic valve regurgitation.  12. Estimated pulmonary artery systolic pressure is 42.9 mmHg assuming a   right atrial pressure of 15 mmHg, which is consistent withmild pulmonary   hypertension.  13. Endocardial visualization was enhanced with intravenous echo contrast.      Right Heart Catheterization 23    FINDINGS:   Right Heart Cath  RA - 12  RV - 51/5/13  PA - 51//32  PCWP - 20    CO/CI Thermodilusion - 2.1/1.27  CO/CI Mehdi - 2.09/1.26    SVR (MAP 79 / RA 12 / CO 2.1) = 2552 dyn  PVR = 5.71 henriquez      RHC : RA 14, RV 46/14, PA 56/26/37, PCWP: 25, CO/C.I mehdi 2.37/1.43, C.O. /C.I thermodilution 2.8/1.69      Home Medications:  atorvastatin 20 mg oral tablet: 1 tab(s) orally once a day (04 Aug 2021 10:05)  carvedilol 6.25 mg oral tablet: 1 tab(s) orally 2 times a day (2023 02:02)  Entresto 49 mg-51 mg oral tablet: 1 tab(s) orally 2 times a day (04 Aug 2021 10:05)  Farxiga 10 mg oral tablet: 1 tab(s) orally once a day (2023 02:05)  Lasix 20 mg oral tablet: 1 tab(s) orally once a day (2023 02:04)  spironolactone 25 mg oral tablet: 1 tab(s) orally once a day (04 Aug 2021 10:05)    MEDICATIONS  (STANDING):  MEDICATIONS  (STANDING):  buMETAnide Injectable 2 milliGRAM(s) IV Push every 8 hours  chlorhexidine 2% Cloths 1 Application(s) Topical daily  DOBUTamine Infusion 7.5 MICROgram(s)/kG/Min (7.14 mL/Hr) IV Continuous <Continuous>  heparin  Infusion.  Unit(s)/Hr (11 mL/Hr) IV Continuous <Continuous>  magnesium gluconate 500 milliGRAM(s) Oral daily  melatonin 5 milliGRAM(s) Oral at bedtime  milrinone Infusion 0.125 MICROgram(s)/kG/Min (2.38 mL/Hr) IV Continuous <Continuous>  norepinephrine Infusion 0.05 MICROgram(s)/kG/Min (2.98 mL/Hr) IV Continuous <Continuous>  pantoprazole    Tablet 40 milliGRAM(s) Oral before breakfast    MEDICATIONS  (PRN):  benzocaine 20% Spray 1 Spray(s) Topical four times a day PRN throat pain  heparin   Injectable 5000 Unit(s) IV Push every 6 hours PRN For aPTT less than 40  heparin   Injectable 2500 Unit(s) IV Push every 6 hours PRN For aPTT between 40 - 57

## 2023-05-09 LAB
ANION GAP SERPL CALC-SCNC: 12 MMOL/L — SIGNIFICANT CHANGE UP (ref 7–14)
ANION GAP SERPL CALC-SCNC: 14 MMOL/L — SIGNIFICANT CHANGE UP (ref 7–14)
ANION GAP SERPL CALC-SCNC: 15 MMOL/L — HIGH (ref 7–14)
ANION GAP SERPL CALC-SCNC: 7 MMOL/L — SIGNIFICANT CHANGE UP (ref 7–14)
APTT BLD: 53.4 SEC — HIGH (ref 27–39.2)
APTT BLD: 62.9 SEC — HIGH (ref 27–39.2)
APTT BLD: 74.4 SEC — CRITICAL HIGH (ref 27–39.2)
BASE EXCESS BLDV CALC-SCNC: 8.3 MMOL/L — HIGH (ref -2–3)
BUN SERPL-MCNC: 27 MG/DL — HIGH (ref 10–20)
BUN SERPL-MCNC: 30 MG/DL — HIGH (ref 10–20)
BUN SERPL-MCNC: 34 MG/DL — HIGH (ref 10–20)
BUN SERPL-MCNC: 37 MG/DL — HIGH (ref 10–20)
CA-I SERPL-SCNC: 1.21 MMOL/L — SIGNIFICANT CHANGE UP (ref 1.15–1.33)
CALCIUM SERPL-MCNC: 8.8 MG/DL — SIGNIFICANT CHANGE UP (ref 8.4–10.5)
CALCIUM SERPL-MCNC: 8.9 MG/DL — SIGNIFICANT CHANGE UP (ref 8.4–10.5)
CALCIUM SERPL-MCNC: 9 MG/DL — SIGNIFICANT CHANGE UP (ref 8.4–10.4)
CALCIUM SERPL-MCNC: 9.2 MG/DL — SIGNIFICANT CHANGE UP (ref 8.4–10.5)
CHLORIDE SERPL-SCNC: 88 MMOL/L — LOW (ref 98–110)
CHLORIDE SERPL-SCNC: 90 MMOL/L — LOW (ref 98–110)
CHLORIDE SERPL-SCNC: 92 MMOL/L — LOW (ref 98–110)
CHLORIDE SERPL-SCNC: 92 MMOL/L — LOW (ref 98–110)
CO2 SERPL-SCNC: 25 MMOL/L — SIGNIFICANT CHANGE UP (ref 17–32)
CO2 SERPL-SCNC: 25 MMOL/L — SIGNIFICANT CHANGE UP (ref 17–32)
CO2 SERPL-SCNC: 26 MMOL/L — SIGNIFICANT CHANGE UP (ref 17–32)
CO2 SERPL-SCNC: 30 MMOL/L — SIGNIFICANT CHANGE UP (ref 17–32)
CREAT SERPL-MCNC: 0.7 MG/DL — SIGNIFICANT CHANGE UP (ref 0.7–1.5)
CREAT SERPL-MCNC: 0.7 MG/DL — SIGNIFICANT CHANGE UP (ref 0.7–1.5)
CREAT SERPL-MCNC: 0.8 MG/DL — SIGNIFICANT CHANGE UP (ref 0.7–1.5)
CREAT SERPL-MCNC: 0.9 MG/DL — SIGNIFICANT CHANGE UP (ref 0.7–1.5)
EGFR: 65 ML/MIN/1.73M2 — SIGNIFICANT CHANGE UP
EGFR: 75 ML/MIN/1.73M2 — SIGNIFICANT CHANGE UP
EGFR: 88 ML/MIN/1.73M2 — SIGNIFICANT CHANGE UP
EGFR: 88 ML/MIN/1.73M2 — SIGNIFICANT CHANGE UP
GAS PNL BLDV: 125 MMOL/L — LOW (ref 136–145)
GAS PNL BLDV: SIGNIFICANT CHANGE UP
GAS PNL BLDV: SIGNIFICANT CHANGE UP
GLUCOSE SERPL-MCNC: 115 MG/DL — HIGH (ref 70–99)
GLUCOSE SERPL-MCNC: 131 MG/DL — HIGH (ref 70–99)
GLUCOSE SERPL-MCNC: 170 MG/DL — HIGH (ref 70–99)
GLUCOSE SERPL-MCNC: 177 MG/DL — HIGH (ref 70–99)
HCO3 BLDV-SCNC: 32 MMOL/L — HIGH (ref 22–29)
HCT VFR BLD CALC: 30.6 % — LOW (ref 37–47)
HCT VFR BLDA CALC: 32 % — LOW (ref 39–51)
HGB BLD CALC-MCNC: 10.6 G/DL — LOW (ref 12.6–17.4)
HGB BLD-MCNC: 10.1 G/DL — LOW (ref 12–16)
LACTATE BLDV-MCNC: 1.3 MMOL/L — SIGNIFICANT CHANGE UP (ref 0.5–2)
MAGNESIUM SERPL-MCNC: 2 MG/DL — SIGNIFICANT CHANGE UP (ref 1.8–2.4)
MAGNESIUM SERPL-MCNC: 2 MG/DL — SIGNIFICANT CHANGE UP (ref 1.8–2.4)
MAGNESIUM SERPL-MCNC: 2.1 MG/DL — SIGNIFICANT CHANGE UP (ref 1.8–2.4)
MAGNESIUM SERPL-MCNC: 2.2 MG/DL — SIGNIFICANT CHANGE UP (ref 1.8–2.4)
MCHC RBC-ENTMCNC: 30.7 PG — SIGNIFICANT CHANGE UP (ref 27–31)
MCHC RBC-ENTMCNC: 33 G/DL — SIGNIFICANT CHANGE UP (ref 32–37)
MCV RBC AUTO: 93 FL — SIGNIFICANT CHANGE UP (ref 81–99)
NON-GYNECOLOGICAL CYTOLOGY STUDY: SIGNIFICANT CHANGE UP
NRBC # BLD: 0 /100 WBCS — SIGNIFICANT CHANGE UP (ref 0–0)
PCO2 BLDV: 39 MMHG — SIGNIFICANT CHANGE UP (ref 39–42)
PH BLDV: 7.52 — HIGH (ref 7.32–7.43)
PLATELET # BLD AUTO: 361 K/UL — SIGNIFICANT CHANGE UP (ref 130–400)
PMV BLD: 11.5 FL — HIGH (ref 7.4–10.4)
PO2 BLDV: 29 MMHG — SIGNIFICANT CHANGE UP
POTASSIUM BLDV-SCNC: 4.3 MMOL/L — SIGNIFICANT CHANGE UP (ref 3.5–5.1)
POTASSIUM SERPL-MCNC: 3.5 MMOL/L — SIGNIFICANT CHANGE UP (ref 3.5–5)
POTASSIUM SERPL-MCNC: 3.7 MMOL/L — SIGNIFICANT CHANGE UP (ref 3.5–5)
POTASSIUM SERPL-MCNC: 3.9 MMOL/L — SIGNIFICANT CHANGE UP (ref 3.5–5)
POTASSIUM SERPL-MCNC: 4.1 MMOL/L — SIGNIFICANT CHANGE UP (ref 3.5–5)
POTASSIUM SERPL-SCNC: 3.5 MMOL/L — SIGNIFICANT CHANGE UP (ref 3.5–5)
POTASSIUM SERPL-SCNC: 3.7 MMOL/L — SIGNIFICANT CHANGE UP (ref 3.5–5)
POTASSIUM SERPL-SCNC: 3.9 MMOL/L — SIGNIFICANT CHANGE UP (ref 3.5–5)
POTASSIUM SERPL-SCNC: 4.1 MMOL/L — SIGNIFICANT CHANGE UP (ref 3.5–5)
RBC # BLD: 3.29 M/UL — LOW (ref 4.2–5.4)
RBC # FLD: 16.5 % — HIGH (ref 11.5–14.5)
SAO2 % BLDV: 49.8 % — SIGNIFICANT CHANGE UP
SODIUM SERPL-SCNC: 127 MMOL/L — LOW (ref 135–146)
SODIUM SERPL-SCNC: 127 MMOL/L — LOW (ref 135–146)
SODIUM SERPL-SCNC: 130 MMOL/L — LOW (ref 135–146)
SODIUM SERPL-SCNC: 132 MMOL/L — LOW (ref 135–146)
WBC # BLD: 9.5 K/UL — SIGNIFICANT CHANGE UP (ref 4.8–10.8)
WBC # FLD AUTO: 9.5 K/UL — SIGNIFICANT CHANGE UP (ref 4.8–10.8)

## 2023-05-09 PROCEDURE — 99291 CRITICAL CARE FIRST HOUR: CPT

## 2023-05-09 RX ORDER — POTASSIUM CHLORIDE 20 MEQ
20 PACKET (EA) ORAL
Refills: 0 | Status: COMPLETED | OUTPATIENT
Start: 2023-05-09 | End: 2023-05-09

## 2023-05-09 RX ORDER — SODIUM CHLORIDE 5 G/100ML
150 INJECTION, SOLUTION INTRAVENOUS ONCE
Refills: 0 | Status: COMPLETED | OUTPATIENT
Start: 2023-05-10 | End: 2023-05-10

## 2023-05-09 RX ORDER — MAGNESIUM SULFATE 500 MG/ML
1 VIAL (ML) INJECTION ONCE
Refills: 0 | Status: COMPLETED | OUTPATIENT
Start: 2023-05-09 | End: 2023-05-09

## 2023-05-09 RX ADMIN — MEGESTROL ACETATE 40 MILLIGRAM(S): 40 SUSPENSION ORAL at 11:51

## 2023-05-09 RX ADMIN — AMIODARONE HYDROCHLORIDE 200 MILLIGRAM(S): 400 TABLET ORAL at 06:03

## 2023-05-09 RX ADMIN — CHLORHEXIDINE GLUCONATE 1 APPLICATION(S): 213 SOLUTION TOPICAL at 11:51

## 2023-05-09 RX ADMIN — Medication 50 MILLIEQUIVALENT(S): at 05:12

## 2023-05-09 RX ADMIN — SODIUM CHLORIDE 50 MILLILITER(S): 5 INJECTION, SOLUTION INTRAVENOUS at 08:46

## 2023-05-09 RX ADMIN — Medication 100 GRAM(S): at 06:02

## 2023-05-09 RX ADMIN — HEPARIN SODIUM 1100 UNIT(S)/HR: 5000 INJECTION INTRAVENOUS; SUBCUTANEOUS at 06:23

## 2023-05-09 RX ADMIN — BUMETANIDE 10 MG/HR: 0.25 INJECTION INTRAMUSCULAR; INTRAVENOUS at 06:38

## 2023-05-09 RX ADMIN — Medication 50 MILLIEQUIVALENT(S): at 06:13

## 2023-05-09 RX ADMIN — Medication 50 MILLIEQUIVALENT(S): at 08:48

## 2023-05-09 RX ADMIN — PANTOPRAZOLE SODIUM 40 MILLIGRAM(S): 20 TABLET, DELAYED RELEASE ORAL at 06:04

## 2023-05-09 RX ADMIN — SODIUM CHLORIDE 50 MILLILITER(S): 5 INJECTION, SOLUTION INTRAVENOUS at 18:20

## 2023-05-09 RX ADMIN — MEGESTROL ACETATE 40 MILLIGRAM(S): 40 SUSPENSION ORAL at 06:03

## 2023-05-09 RX ADMIN — AMIODARONE HYDROCHLORIDE 200 MILLIGRAM(S): 400 TABLET ORAL at 17:31

## 2023-05-09 RX ADMIN — Medication 5 MILLIGRAM(S): at 21:47

## 2023-05-09 RX ADMIN — MEGESTROL ACETATE 40 MILLIGRAM(S): 40 SUSPENSION ORAL at 00:04

## 2023-05-09 RX ADMIN — MEGESTROL ACETATE 40 MILLIGRAM(S): 40 SUSPENSION ORAL at 23:33

## 2023-05-09 RX ADMIN — Medication 500 MILLIGRAM(S): at 11:51

## 2023-05-09 RX ADMIN — Medication 7.14 MICROGRAM(S)/KG/MIN: at 00:05

## 2023-05-09 RX ADMIN — Medication 50 MILLIEQUIVALENT(S): at 10:30

## 2023-05-09 RX ADMIN — MEGESTROL ACETATE 40 MILLIGRAM(S): 40 SUSPENSION ORAL at 17:30

## 2023-05-09 RX ADMIN — HEPARIN SODIUM 1100 UNIT(S)/HR: 5000 INJECTION INTRAVENOUS; SUBCUTANEOUS at 16:31

## 2023-05-09 RX ADMIN — HEPARIN SODIUM 1100 UNIT(S)/HR: 5000 INJECTION INTRAVENOUS; SUBCUTANEOUS at 23:41

## 2023-05-09 RX ADMIN — Medication 50 MILLIEQUIVALENT(S): at 23:34

## 2023-05-09 NOTE — PROGRESS NOTE ADULT - SUBJECTIVE AND OBJECTIVE BOX
HPI:  77 year old woman with PMHx of HTN, HLD, nonischemic cardiomyopathy, HFrEF s/p AICD presents to the ED with 2 weeks of worsening shortness of breath. Patient and her son state patient has been experiencing shortness of breath that has progressed from present on activity to now shortness of breath during conversation. Patient also notes an increasing dry cough over this time period. Denies history of smoking, fevers/chills, chest pain, nausea/vomiting, bloody sputum, dark/tarry/bloody bowel movements, upper respiratory symptoms.     In ED, patient's HR is 110 and saturating 98% on RA  CT C/A/P revealing cardiomegaly with evidence of right heart strain, R hilar lymphadenopathy with R perihilar soft tissue density with indentation/possible invasion of right pulmonary artery, and narrowing of proximal R lobar and segmental branches with associated consolidation of right lower lobe. 2.2 x 2.8 cm filling defect noted in apex of left ventricle.   Last ECHO 8/2021 revealing EF 20-25%, mild Mr, mild TR, mild Pulm HTN  Dimer 2600, trops negative BNP 70610  Duplex venous LE negative for DVT; CTA chest negative for PE  Cr 1.4 (0.7 in 2021), Na+ 128, T.Benjamin 2.3, LFTs elevated (hemolyzed)  Lactate 5.8-->4.6  Admitted to SDU (22 Apr 2023 01:10)    Currently admitted to medicine with the primary diagnosis of Lung mass       Today is hospital day 18d.     INTERVAL HPI / OVERNIGHT EVENTS:  Patient was examined and seen at bedside. This morning she is resting comfortably in bed and reports no new issues or overnight events. Hemodynamically stable patient tolerating oral diet with NG tube feed supplementation, still volume overloaded voiding appropriately on diuretics, having regular BMs. Continue to diurese and improve functional status with PT. s/p FEES today with some dysphagia will change diet to pureed.     ROS: Otherwise unremarkable     PAST MEDICAL & SURGICAL HISTORY  CHF, stage C    HTN (hypertension)      ALLERGIES  epinephrine (Unknown)    MEDICATIONS  STANDING MEDICATIONS  aMIOdarone    Tablet 200 milliGRAM(s) Oral two times a day  buMETAnide Infusion 2 mG/Hr IV Continuous <Continuous>  chlorhexidine 2% Cloths 1 Application(s) Topical daily  DOBUTamine Infusion 7.5 MICROgram(s)/kG/Min IV Continuous <Continuous>  heparin  Infusion. 1000 Unit(s)/Hr IV Continuous <Continuous>  magnesium gluconate 500 milliGRAM(s) Oral daily  megestrol 40 milliGRAM(s) Oral four times a day  melatonin 5 milliGRAM(s) Oral at bedtime  metolazone 5 milliGRAM(s) Oral <User Schedule>  milrinone Infusion 0.25 MICROgram(s)/kG/Min IV Continuous <Continuous>  pantoprazole    Tablet 40 milliGRAM(s) Oral before breakfast  sodium chloride 3% Bolus 150 milliLiter(s) IV Bolus once    PRN MEDICATIONS  benzocaine 20% Spray 1 Spray(s) Topical four times a day PRN    VITALS:  T(F): 98  HR: 106  BP: 82/51  RR: 29  SpO2: 99%    PHYSICAL EXAM  GEN: NAD, Resting comfortably in bed, cooperative, elderly, frail, NG tube in place  PULM: Clear to auscultation bilaterally, No wheezing, rales, rhonchi  CVS: Regular rate and rhythm, S1-S2, no murmurs, heaves, thrills, JVD >15cm H20 b/l   ABD: Soft, non-tender, non-distended, no guarding, BS +  EXT: No edema/cyanosis, extremities warm/dry, peripheral pulses palpable   NEURO: A&Ox3, no focal deficits    LABS                        10.1   9.50  )-----------( 361      ( 09 May 2023 04:00 )             30.6     05-09    132<L>  |  92<L>  |  30<H>  ----------------------------<  177<H>  3.7   |  25  |  0.7    Ca    8.9      09 May 2023 04:00  Phos  2.5     05-08  Mg     2.0     05-09    TPro  6.2  /  Alb  2.7<L>  /  TBili  2.3<H>  /  DBili  x   /  AST  30  /  ALT  40  /  AlkPhos  152<H>  05-08    PT/INR - ( 07 May 2023 23:50 )   PT: 17.90 sec;   INR: 1.55 ratio         PTT - ( 09 May 2023 14:00 )  PTT:74.4 sec                RADIOLOGY  CTA Chest 5/4  Redemonstrated right hilar lesion with compression and possible invasion   of adjacent main pulmonary artery segment (unchanged from previous study.    Subacute right main and segmental pulmonary embolus.    Increased right lower lobe consolidative and groundglass opacities,   likely infectious/inflammatory in etiology.    4 mm left anterior upper lobe groundglass nodule.    US Kidney Bladder 4/22  No sonographic evidence of hydronephrosis.    b/l le venous duplex 5/6  No evidence of deep venous thrombosis in either lower extremity.    TTE 4/22:   1. Left ventricular ejection fraction, by visual estimation, is <20%.   2. Severely decreased global left ventricular systolic function.   3. Severely enlarged left atrium.   4. Elevated mean left atrial pressure.   5. Large, fixed thrombus vs. mass on the apical wall of the left   ventricle.   6. Spectral Doppler shows pseudonormal pattern of left ventricular   myocardial filling (Grade II diastolic dysfunction).   7. Moderately reduced RV systolic function.   8. Mildly enlarged right atrium.   9. Moderate mitral valve regurgitation.  10. Moderate-severe tricuspid regurgitation.  11. Mild pulmonic valve regurgitation.  12. Estimated pulmonary artery systolic pressure is 42.9 mmHg assuming a   right atrial pressure of 15 mmHg, which is consistent with mild pulmonary   hypertension.  13. Endocardial visualization was enhanced with intravenous echo contrast.      CXR 5/7  Congestive changes. Bibasilar opacities/effusions, unchanged

## 2023-05-09 NOTE — SWALLOW FEES ASSESSMENT ADULT - RECOMMENDED FEEDING/EATING TECHNIQUES
volitional bolus hold prior to the swallow X2 seconds, swallow 2X w/ each bite/sip/alternate food with liquid/oral hygiene/position upright (90 degrees)/small sips/bites

## 2023-05-09 NOTE — PROGRESS NOTE ADULT - NS ATTEND AMEND GEN_ALL_CORE FT
Patient remains very weak and debilitated, on dual inotropic support. Markers of organ function remain intact. She is getting OOB to chair but needs assistance even to get up. She is getting intermittent bolus feeds.     Continue dual inotropic support   Monitor daily lactate   Strict I/Os   Daily weight   Aggressive PT/ IS   Nutrition support / appetite stimulant   Mediastinal biopsy pending  Palliative care follow up    Patient remains critically ill

## 2023-05-09 NOTE — SWALLOW FEES ASSESSMENT ADULT - ADDITIONAL RECOMMENDATIONS
Pt may warrant ongoing supplemental NGT feeds as she may not intake enough by mouth to meet caloric needs.

## 2023-05-09 NOTE — SWALLOW FEES ASSESSMENT ADULT - PHARYNGEAL PHASE COMMENTS
Severe pharyngeal dysphagia for thin liquids Moderate pharyngeal dysphagia for puree, moderately thick and mildly thick liquids, solids not provided 2' increased residue w/ puree

## 2023-05-09 NOTE — SWALLOW FEES ASSESSMENT ADULT - ORAL PHASE
Uncontrolled bolus/spillover in hypopharynx premature spillage over BOT prior to the swallow, improved w/ a volitional bolus hold

## 2023-05-09 NOTE — SWALLOW FEES ASSESSMENT ADULT - ROSENBEK'S PENETRATION ASPIRATION SCALE
clears w/ liquid wash and consecutive swallows (usually spontaneous)/(3) material remains above the vocal cords, visible residue remains (penetration) (7) material passes glottis, visible subglottic residue remains despite patient’s response (aspiration)

## 2023-05-09 NOTE — CHART NOTE - NSCHARTNOTEFT_GEN_A_CORE
Palliative following for GOC.   Goals clear for now- continue current med mgmt as per cardiology recommendations.   patient and  to think about code status.   Will sign off- please reconsult PRN X [phone]

## 2023-05-09 NOTE — SWALLOW FEES ASSESSMENT ADULT - SLP PERTINENT HISTORY OF CURRENT PROBLEM
Pt w/ hx HTN, HLD, nonischemic cardiomyopathy, HFrEF s/p AICD adm s/p ~2 weeks SOB, dry cough.  Tx for cardiogenic shock, LV thrombus.  CT chest 5/4 showed subacute PE in R mainstem & subsegmental branches, post obstructive PNA, suspected lung malignancy.  Pt is s/p biopsy 5/5, awaiting pathology report.  Pt is being seen by nutrition services who recommended a combination of PO and NGT feeds if poor PO intake continued. NGT placed.

## 2023-05-09 NOTE — SWALLOW FEES ASSESSMENT ADULT - DIAGNOSTIC IMPRESSIONS
Severe pharyngeal dysphagia for thin liquids, moderate for puree, moderately thick, and mildly thick liquids, Increased residue noted w/ heavier viscosities, a liquid wash assisted bolus flow

## 2023-05-09 NOTE — SWALLOW FEES ASSESSMENT ADULT - COMMENTS
L sided residual food noted from lunch (?chicken or banana) in pyriform sinus w/ penetration over the arytenoid (cough response), pt denied globus sensation. Clear only after multiple attempts of dry swallows, liquid washes and puree washes.

## 2023-05-09 NOTE — SWALLOW BEDSIDE ASSESSMENT ADULT - ASR SWALLOW RECOMMEND DIAG
will f/u to determine candidacy and need for an instrumental swallow study/FEES
will f/u w/ FEES today/FEES

## 2023-05-09 NOTE — PROGRESS NOTE ADULT - SUBJECTIVE AND OBJECTIVE BOX
Date of Admission: 23    Interval History: Patient sitting up in chair at bedside. Family present. Remains on dual inotropic support and Bumex gtt. Patient stood up from chair, unable to maintain standing position- very deconditioned, needs aggressive PT. Repeat S&S yesterday- diet advanced, but patient's family report patient is eating minimal amounts. NG tube remains in place for nutritional support.  Kidney function stable. Remains overloaded on exam.                                                                                                                                                     HISTORY OF PRESENT ILLNESS: 76 y/o F with PMHx of HTN, HLD, nonischemic cardiomyopathy, HFrEF s/p AICD presents to the ED with 2 weeks of worsening shortness of breath. Patient and her son state patient has been experiencing shortness of breath that has progressed from present on activity to now shortness of breath during conversation. Patient also notes an increasing dry cough over this time period.    In ED, patient's HR is 110 and saturating 98% on RA  CT C/A/P revealing cardiomegaly with evidence of right heart strain, R hilar lymphadenopathy with R perihilar soft tissue density with indentation/possible invasion of right pulmonary artery, and narrowing of proximal R lobar and segmental branches with associated consolidation of right lower lobe. 2.2 x 2.8 cm filling defect noted in apex of left ventricle.   Last ECHO 2021 revealing EF 20-25%, mild Mr, mild TR, mild Pulm HTN  Dimer 2600, trops negative BNP 06071  Duplex venous LE negative for DVT; CTA chest negative for PE  Cr 1.4 (0.7 in ), Na+ 128, T.Benjamin 2.3, LFTs elevated (hemolyzed)  Lactate 5.8-->4.6  Admitted to SDU (2023 01:10)      Patient reportedly was at baseline (active, ambulating long distances without issue) until about 2 weeks ago when patient began to have progressively worsening SOB with a dry cough. Patient reports Dr. Mead as per primary cardiologist, and Dr. Sherwood as her heart failure specialist (last seen in office in ). Endorses compliance with home medications. Family at bedside concerned about patient's recent weight loss as well.         PAST MEDICAL & SURGICAL HISTORY:  CHF, stage C  HTN (hypertension)    Allergies  epinephrine (Unknown)    Intolerances      Vitals:  T(C): 36.7 (09 May 2023 08:00), Max: 36.7 (09 May 2023 08:00)  T(F): 98 (09 May 2023 08:00), Max: 98 (09 May 2023 08:00)  HR: 107 (09 May 2023 10:00) (101 - 113)  BP: 83/55 (09 May 2023 10:00) (72/46 - 95/56)  BP(mean): 63 (09 May 2023 10:00) (53 - 70)  RR: 31 (09 May 2023 10:00) (17 - 35)  SpO2: 100% (09 May 2023 10:00) (97% - 100%)    O2 Parameters below as of 09 May 2023 10:00  Patient On (Oxygen Delivery Method): room air        Physical Exam:  General Appearance: NG tube, thin, frail, normal for age and gender. 	  Cardiovascular: +NSVT on tele, +S1, S2, No edema  Respiratory: decreased @ bases  Psychiatry: Fatigued, oriented x 3, Mood & affect appropriate  Skin/Integumentary: No rashes, No ecchymoses, No cyanosis  Neurologic: Non-focal  Musculoskeletal/ extremities: Normal range of motion, No clubbing, cyanosis or edema  Vascular: Peripheral pulses palpable 2+ bilaterally        LABS:	 	    CBC Full  -  ( 09 May 2023 04:00 )  WBC Count : 9.50 K/uL  RBC Count : 3.29 M/uL  Hemoglobin : 10.1 g/dL  Hematocrit : 30.6 %  Platelet Count - Automated : 361 K/uL  Mean Cell Volume : 93.0 fL  Mean Cell Hemoglobin : 30.7 pg  Mean Cell Hemoglobin Concentration : 33.0 g/dL      Basic Metabolic Panel in AM (23 @ 04:00)    Sodium, Serum: 132 mmol/L   Potassium, Serum: 3.7 mmol/L   Chloride, Serum: 92 mmol/L   Carbon Dioxide, Serum: 25 mmol/L   Anion Gap, Serum: 15 mmol/L   Blood Urea Nitrogen, Serum: 30 mg/dL   Creatinine, Serum: 0.7 mg/dL   Glucose, Serum: 177 mg/dL   Calcium, Total Serum: 8.9 mg/dL   eGFR: 88: The estimated glomerular filtration rate (eGFR) is calculated using the   CKD-EPI creatinine equation, which does not have a coefficient for  race and is validated in individuals 18 years of age and older (N Engl J  Med ; [id number]). Creatinine-based eGFR may be inaccurate in  various situations including but not limited to extremes of muscle mass,  altered dietary protein intake, or medications that affect renal tubular  creatinine secretion. mL/min/1.73m2        TELEMETRY EVENTS: 	    EC2023    Ventricular Rate 96 BPM  Atrial Rate 96 BPM  P-R Interval 164 ms  QRS Duration 116 ms  Q-T Interval 396 ms  QTC Calculation(Bazett) 500 ms  P Axis 51 degrees  R Axis -41 degrees  T Axis 87 degrees    Diagnosis Line Normal sinus rhythm  Possible Left atrial enlargement  Left axis deviation  Prolonged QT  Abnormal ECG    Confirmed by Seven Hensley ([id number]) on 2023 7:07:08 AM      CXR     Impression:  CHF. Support devices as described. Without difference.      CT Angio Chest PE Protocol   IMPRESSION:    No evidence of pulmonary embolism. (See discussion below for more   findings.)    Marked cardiomegaly.    There is a 2.2 x 2.8 cm rounded filling defect in the region of the apex   of the left ventricle (3/11; 607/95). Differential diagnosis includes an   intracardiac mass or thrombus.    The main pulmonary artery is dilated, measuring 3.6 cm in diameter, which   may be seen with pulmonary hypertension. There is reflux of contrast   material into the IVC and hepatic veins compatible with right heart   dysfunction.    Right hilar lymphadenopathy and right perihilar soft tissue density is   noted. There is segmental narrowing and consolidation noted in the medial   aspect of the right lower lobe. There is a small right pleural effusion.   There is extrinsic indentation and possible invasion of the right main   pulmonary artery (604/142; 605/129; ). There is associated narrowing   of the proximal right lobar and segmental branches. A right hilar / right   lower lobe mass with postobstructive pneumonitis should be ruled out.          PREVIOUS DIAGNOSTIC TESTING:    TTE 23    Summary:   1. Left ventricular ejection fraction, by visual estimation, is <20%.   2. Severely decreased global left ventricular systolic function.   3. Severely enlarged left atrium.   4. Elevated mean left atrial pressure.   5. Large, fixed thrombus vs. mass on the apical wall of the left   ventricle.   6. Spectral Doppler shows pseudonormal pattern of left ventricular   myocardial filling (Grade II diastolic dysfunction).   7. Moderately reduced RV systolic function.   8. Mildly enlarged right atrium.   9. Moderate mitral valve regurgitation.  10. Moderate-severe tricuspid regurgitation.  11. Mild pulmonic valve regurgitation.  12. Estimated pulmonary artery systolic pressure is 42.9 mmHg assuming a   right atrial pressure of 15 mmHg, which is consistent withmild pulmonary   hypertension.  13. Endocardial visualization was enhanced with intravenous echo contrast.        Right Heart Catheterization 23    FINDINGS:   Right Heart Cath  RA - 12  RV - 51/5/13  PA - 51/24/32  PCWP - 20    CO/CI Thermodilusion - 2.1/1.27  CO/CI Mehdi - 2.09/1.26    SVR (MAP 79 / RA 12 / CO 2.1) = 2552 dyn  PVR = 5.71 henriquez      RHC : RA 14, RV 46/14, PA 56/26/37, PCWP: 25, CO/C.I mehdi 2.37/1.43, C.O. /C.I thermodilution 2.8/1.69      Home Medications:  atorvastatin 20 mg oral tablet: 1 tab(s) orally once a day (04 Aug 2021 10:05)  carvedilol 6.25 mg oral tablet: 1 tab(s) orally 2 times a day (2023 02:02)  Entresto 49 mg-51 mg oral tablet: 1 tab(s) orally 2 times a day (04 Aug 2021 10:05)  Farxiga 10 mg oral tablet: 1 tab(s) orally once a day (2023 02:05)  Lasix 20 mg oral tablet: 1 tab(s) orally once a day (2023 02:04)  spironolactone 25 mg oral tablet: 1 tab(s) orally once a day (04 Aug 2021 10:05)    MEDICATIONS  (STANDING):  MEDICATIONS  (STANDING):  buMETAnide Injectable 2 milliGRAM(s) IV Push every 8 hours  chlorhexidine 2% Cloths 1 Application(s) Topical daily  DOBUTamine Infusion 7.5 MICROgram(s)/kG/Min (7.14 mL/Hr) IV Continuous <Continuous>  heparin  Infusion.  Unit(s)/Hr (11 mL/Hr) IV Continuous <Continuous>  magnesium gluconate 500 milliGRAM(s) Oral daily  melatonin 5 milliGRAM(s) Oral at bedtime  milrinone Infusion 0.125 MICROgram(s)/kG/Min (2.38 mL/Hr) IV Continuous <Continuous>  norepinephrine Infusion 0.05 MICROgram(s)/kG/Min (2.98 mL/Hr) IV Continuous <Continuous>  pantoprazole    Tablet 40 milliGRAM(s) Oral before breakfast    MEDICATIONS  (PRN):  benzocaine 20% Spray 1 Spray(s) Topical four times a day PRN throat pain  heparin   Injectable 5000 Unit(s) IV Push every 6 hours PRN For aPTT less than 40  heparin   Injectable 2500 Unit(s) IV Push every 6 hours PRN For aPTT between 40 - 57

## 2023-05-09 NOTE — PROGRESS NOTE ADULT - ASSESSMENT
77 year old woman with PMHx of HTN, HLD, nonischemic cardiomyopathy, HFrEF s/p AICD presents to the ED with 2 weeks of worsening shortness of breath with increasing dry cough over this time period accepted to CCU for cardiogenic shock    #Cardiogenic Shock   #LV thrombus, suspected new PE  #NICM  #HFrEF 25% s/p AICD  #HTN/HLD  - CT CAP - cardiomegaly w/ RH strain  - CT Chest 5/4 subacute PE in R mainstem and subsegmental branches  - TTE 4/22/2023 EF <20%, large fixed thrombus vs mass on apical LV wall, GIIDD, mod-sev TR, mod MR, mild SD, mild PH  - LE venous duplex repeat -ve 5/6  - NSVT runs on monitor   - c/w amiodarone 200mg po qD, s/p amio bolus 5/8 - for periodic NSVT - continue to check telemetry for episodes   - c/w dobutamine and milrinone ggt - optimize therapy via PICC line in the setting of continued diuresis   - c/w heparin ggt - target ptt 60-99   - IVC noncompressible, JVD 15cm H20 distension on exam   - c/w 3% NaCl 150cc IV bolus BID run over 3 hours + bumex ggt @ 2mg/hr  - no additional bumex or diamox today   - c/w metolazone 5mg IV BID  - BMP, Mg TID   - goal net -ve 1-2L/day  - HF following   - Mg >2, K>4  - daily weight  - strict I/O  - if persistent runs of NSVT will get EP consult       #Post obstructive pneumonia  #Suspected lung malignancy  - CT CAP - R hilar LAD, R perihilar soft tissue density w/ indentation/possible invasion of R pulmonary artery and narrowing of proximal R lobar and segmental branches w/ consolidation of RLL  - MRSA -ve  - completed zosyn 7 days course 4/27  - s/p EBUS with biopsy 5/5 - pathology negative for malignancy   - PET scan as outpatient to r/o lung malignancy  - c/w Incentive Spirometer - patient educated about importance     #Positive COVID-19 PCR  - Asymptomatic, off isolation   - No need for further treatment or diagnostic w/u as per ID    #HAGMA - resolved   #Lactic Acidosis - resolved   #DAVID on CKD - resolved   #Elevated liver enzymes predominantly hepatocellular (AST>ALT) with hyperbilirubinemia likely due to ischemic liver injury - resolved   - lactate now wnl   - previously holding lipitor 20mg qhs - can restart now w/ LFTs wnl     #Deconditioning   #Reduced appetite/po intake  #Hypotonic Hyponatremia  - hypervolemic   - Na 132-> 129 -> 132  - prealbumin 5 low   - c/w megestrol 40mg QID  - s/p FEES - pureed w/ mildly thick liquids w/ supervised feeds  - c/w NG feeds TID for supplementation   - nutrition following   - continue to trend lytes, Cr   - PT/OT following     #Follow Up  - monitor UOP, trend lytes  - continue diuretic therapy, monitor for alkalosis - if increasing can give additional diamox   - PT/OT, encourage OOBTC and ambulation     # Misc  - DVT Prophylaxis: heparin drip   - GI Prophylaxis: pantoprazole    Tablet 40 milliGRAM(s) Oral before breakfast  - Diet: Diet, DASH/TLC with 1.2 L fluid restriction    - Activity: Activity - Ambulate as Tolerated  - Code Status: Full , Palliative on board

## 2023-05-09 NOTE — PROGRESS NOTE ADULT - ASSESSMENT
Assessment:   77 year old woman with pmh of HTN, HLD, nonischemic cardiomyopathy, HFrEF, s/p ICD and PPM presents to the ED with 2 weeks of worsening shortness of breath.       IMPRESSION:  Cardiogenic shock on inotropic support  HFrEF 20%, NICM  RLL Mass/opacity/ possible Pneumonia, r/o malignancy  LV Mass, ?Thrombus  Transaminitis in the setting of hypotension, shock liver      Plan:  s/p bronch/EBUS biopsy 5/05 - awaiting official pathology results   Continue inotropic support through PICC line- dobutamine @ 7.5mcg/kg/min and milrinone @ 0.25 mcg/kg/min  Continue Bumex gtt at 2mg/hr + 3% hypertonic saline BID  Net negative goal  Monitor for contraction alkalosis- give Diamox PRN  Repeat speech and swallow yesterday- diet advanced  Continue feeding tube as well as encourage food intake to maximize nutrition  Physical therapy follow-up / OOB to chair daily  Please encourage incentive spirometer 10x/hr while awake   Get BMP twice daily with magnesium   Maintain potassium >4.0, Mg >2.2  Strict intake and output  Daily weight   Rest of care as per primary team  Plan discussed with patient, family at bedside, and CCU team  Will continue to follow

## 2023-05-10 LAB
ANION GAP SERPL CALC-SCNC: 11 MMOL/L — SIGNIFICANT CHANGE UP (ref 7–14)
ANION GAP SERPL CALC-SCNC: 16 MMOL/L — HIGH (ref 7–14)
APTT BLD: 73.6 SEC — CRITICAL HIGH (ref 27–39.2)
BUN SERPL-MCNC: 36 MG/DL — HIGH (ref 10–20)
BUN SERPL-MCNC: 41 MG/DL — HIGH (ref 10–20)
CALCIUM SERPL-MCNC: 8.9 MG/DL — SIGNIFICANT CHANGE UP (ref 8.4–10.5)
CALCIUM SERPL-MCNC: 9.4 MG/DL — SIGNIFICANT CHANGE UP (ref 8.4–10.4)
CHLORIDE SERPL-SCNC: 82 MMOL/L — LOW (ref 98–110)
CHLORIDE SERPL-SCNC: 87 MMOL/L — LOW (ref 98–110)
CO2 SERPL-SCNC: 29 MMOL/L — SIGNIFICANT CHANGE UP (ref 17–32)
CO2 SERPL-SCNC: 30 MMOL/L — SIGNIFICANT CHANGE UP (ref 17–32)
CREAT SERPL-MCNC: 0.8 MG/DL — SIGNIFICANT CHANGE UP (ref 0.7–1.5)
CREAT SERPL-MCNC: 0.9 MG/DL — SIGNIFICANT CHANGE UP (ref 0.7–1.5)
EGFR: 65 ML/MIN/1.73M2 — SIGNIFICANT CHANGE UP
EGFR: 75 ML/MIN/1.73M2 — SIGNIFICANT CHANGE UP
GLUCOSE SERPL-MCNC: 118 MG/DL — HIGH (ref 70–99)
GLUCOSE SERPL-MCNC: 84 MG/DL — SIGNIFICANT CHANGE UP (ref 70–99)
HCT VFR BLD CALC: 31.1 % — LOW (ref 37–47)
HGB BLD-MCNC: 10.3 G/DL — LOW (ref 12–16)
MAGNESIUM SERPL-MCNC: 1.9 MG/DL — SIGNIFICANT CHANGE UP (ref 1.8–2.4)
MAGNESIUM SERPL-MCNC: 2 MG/DL — SIGNIFICANT CHANGE UP (ref 1.8–2.4)
MCHC RBC-ENTMCNC: 30.2 PG — SIGNIFICANT CHANGE UP (ref 27–31)
MCHC RBC-ENTMCNC: 33.1 G/DL — SIGNIFICANT CHANGE UP (ref 32–37)
MCV RBC AUTO: 91.2 FL — SIGNIFICANT CHANGE UP (ref 81–99)
NRBC # BLD: 0 /100 WBCS — SIGNIFICANT CHANGE UP (ref 0–0)
PLATELET # BLD AUTO: 396 K/UL — SIGNIFICANT CHANGE UP (ref 130–400)
PMV BLD: 11 FL — HIGH (ref 7.4–10.4)
POTASSIUM SERPL-MCNC: 3.7 MMOL/L — SIGNIFICANT CHANGE UP (ref 3.5–5)
POTASSIUM SERPL-MCNC: 4.2 MMOL/L — SIGNIFICANT CHANGE UP (ref 3.5–5)
POTASSIUM SERPL-SCNC: 3.7 MMOL/L — SIGNIFICANT CHANGE UP (ref 3.5–5)
POTASSIUM SERPL-SCNC: 4.2 MMOL/L — SIGNIFICANT CHANGE UP (ref 3.5–5)
RBC # BLD: 3.41 M/UL — LOW (ref 4.2–5.4)
RBC # FLD: 16.4 % — HIGH (ref 11.5–14.5)
SODIUM SERPL-SCNC: 127 MMOL/L — LOW (ref 135–146)
SODIUM SERPL-SCNC: 128 MMOL/L — LOW (ref 135–146)
WBC # BLD: 10.09 K/UL — SIGNIFICANT CHANGE UP (ref 4.8–10.8)
WBC # FLD AUTO: 10.09 K/UL — SIGNIFICANT CHANGE UP (ref 4.8–10.8)

## 2023-05-10 PROCEDURE — 99292 CRITICAL CARE ADDL 30 MIN: CPT

## 2023-05-10 PROCEDURE — 99291 CRITICAL CARE FIRST HOUR: CPT

## 2023-05-10 PROCEDURE — 93010 ELECTROCARDIOGRAM REPORT: CPT

## 2023-05-10 PROCEDURE — 71045 X-RAY EXAM CHEST 1 VIEW: CPT | Mod: 26

## 2023-05-10 RX ORDER — POTASSIUM CHLORIDE 20 MEQ
20 PACKET (EA) ORAL
Refills: 0 | Status: COMPLETED | OUTPATIENT
Start: 2023-05-10 | End: 2023-05-11

## 2023-05-10 RX ORDER — SODIUM CHLORIDE 5 G/100ML
150 INJECTION, SOLUTION INTRAVENOUS ONCE
Refills: 0 | Status: COMPLETED | OUTPATIENT
Start: 2023-05-11 | End: 2023-05-11

## 2023-05-10 RX ORDER — MAGNESIUM SULFATE 500 MG/ML
2 VIAL (ML) INJECTION ONCE
Refills: 0 | Status: COMPLETED | OUTPATIENT
Start: 2023-05-10 | End: 2023-05-10

## 2023-05-10 RX ORDER — APIXABAN 2.5 MG/1
5 TABLET, FILM COATED ORAL
Refills: 0 | Status: DISCONTINUED | OUTPATIENT
Start: 2023-05-10 | End: 2023-05-10

## 2023-05-10 RX ORDER — APIXABAN 2.5 MG/1
10 TABLET, FILM COATED ORAL ONCE
Refills: 0 | Status: COMPLETED | OUTPATIENT
Start: 2023-05-10 | End: 2023-05-10

## 2023-05-10 RX ORDER — APIXABAN 2.5 MG/1
10 TABLET, FILM COATED ORAL
Refills: 0 | Status: DISCONTINUED | OUTPATIENT
Start: 2023-05-10 | End: 2023-05-14

## 2023-05-10 RX ORDER — APIXABAN 2.5 MG/1
5 TABLET, FILM COATED ORAL ONCE
Refills: 0 | Status: DISCONTINUED | OUTPATIENT
Start: 2023-05-10 | End: 2023-05-10

## 2023-05-10 RX ORDER — SENNA PLUS 8.6 MG/1
2 TABLET ORAL AT BEDTIME
Refills: 0 | Status: DISCONTINUED | OUTPATIENT
Start: 2023-05-10 | End: 2023-05-17

## 2023-05-10 RX ORDER — SODIUM CHLORIDE 5 G/100ML
150 INJECTION, SOLUTION INTRAVENOUS ONCE
Refills: 0 | Status: DISCONTINUED | OUTPATIENT
Start: 2023-05-11 | End: 2023-05-11

## 2023-05-10 RX ORDER — POLYETHYLENE GLYCOL 3350 17 G/17G
17 POWDER, FOR SOLUTION ORAL
Refills: 0 | Status: DISCONTINUED | OUTPATIENT
Start: 2023-05-10 | End: 2023-05-16

## 2023-05-10 RX ADMIN — HEPARIN SODIUM 1100 UNIT(S)/HR: 5000 INJECTION INTRAVENOUS; SUBCUTANEOUS at 06:44

## 2023-05-10 RX ADMIN — APIXABAN 10 MILLIGRAM(S): 2.5 TABLET, FILM COATED ORAL at 18:28

## 2023-05-10 RX ADMIN — SENNA PLUS 2 TABLET(S): 8.6 TABLET ORAL at 21:43

## 2023-05-10 RX ADMIN — POLYETHYLENE GLYCOL 3350 17 GRAM(S): 17 POWDER, FOR SOLUTION ORAL at 17:50

## 2023-05-10 RX ADMIN — MEGESTROL ACETATE 40 MILLIGRAM(S): 40 SUSPENSION ORAL at 17:52

## 2023-05-10 RX ADMIN — MEGESTROL ACETATE 40 MILLIGRAM(S): 40 SUSPENSION ORAL at 11:29

## 2023-05-10 RX ADMIN — AMIODARONE HYDROCHLORIDE 200 MILLIGRAM(S): 400 TABLET ORAL at 17:51

## 2023-05-10 RX ADMIN — Medication 5 MILLIGRAM(S): at 21:42

## 2023-05-10 RX ADMIN — Medication 7.14 MICROGRAM(S)/KG/MIN: at 05:09

## 2023-05-10 RX ADMIN — MEGESTROL ACETATE 40 MILLIGRAM(S): 40 SUSPENSION ORAL at 05:10

## 2023-05-10 RX ADMIN — Medication 500 MILLIGRAM(S): at 11:29

## 2023-05-10 RX ADMIN — AMIODARONE HYDROCHLORIDE 200 MILLIGRAM(S): 400 TABLET ORAL at 05:09

## 2023-05-10 RX ADMIN — BUMETANIDE 10 MG/HR: 0.25 INJECTION INTRAMUSCULAR; INTRAVENOUS at 04:02

## 2023-05-10 RX ADMIN — SODIUM CHLORIDE 50 MILLILITER(S): 5 INJECTION, SOLUTION INTRAVENOUS at 05:08

## 2023-05-10 RX ADMIN — PANTOPRAZOLE SODIUM 40 MILLIGRAM(S): 20 TABLET, DELAYED RELEASE ORAL at 05:11

## 2023-05-10 RX ADMIN — SODIUM CHLORIDE 50 MILLILITER(S): 5 INJECTION, SOLUTION INTRAVENOUS at 17:50

## 2023-05-10 RX ADMIN — CHLORHEXIDINE GLUCONATE 1 APPLICATION(S): 213 SOLUTION TOPICAL at 11:28

## 2023-05-10 RX ADMIN — APIXABAN 10 MILLIGRAM(S): 2.5 TABLET, FILM COATED ORAL at 11:26

## 2023-05-10 NOTE — SWALLOW BEDSIDE ASSESSMENT ADULT - NS SPL SWALLOW CLINIC TRIAL FT
+toleration of puree and mildly thick liquids w/ use of vol bolus hold, consecutive swallows and alternating bite/sip
+toleration of mildly thick liquids w/ min overt s/s of penetration/aspiration
+overt s/s of penetration/aspiration w/ thin and large sips of mildly thick liquids +toleration of controlled single sips of mildly thick liquids +toleration of puree, soft and bite sized w/o overt s/s of penetration/aspiration
pt c/o odynophagia, +overt symptoms of penetration/aspiration noted w/ trials; pt tolerated ice chips without overts & without c/o pain

## 2023-05-10 NOTE — PROGRESS NOTE ADULT - SUBJECTIVE AND OBJECTIVE BOX
NUTRITION SUPPORT TEAM  -  PROGRESS NOTE    resting comfortably, awake, sluggish, still on pressor  started po diet   on NGT feed  swallow evaluation  noted  abdomen soft n/d    REVIEW OF SYSTEMS:  Negative except as noted above.     VITALS:  T(F): 97.5 (05-10 @ 04:00), Max: 97.5 (05-10 @ 04:00)  HR: 108 (05-10 @ 10:00) (102 - 108)  BP: 97/55 (05-10 @ 10:00) (71/44 - 97/55)  RR: 23 (05-10 @ 10:00) (18 - 31)  SpO2: 98% (05-10 @ 10:00) (97% - 100%)    HEIGHT/WEIGHT/BMI:   Height (cm): 160 (05-05)  Weight (kg): 63.5 (05-05)  BMI (kg/m2): 24.8 (05-05)  05-09-23 @ 07:01  -  05-10-23 @ 07:00  --------------------------------------------------------  IN:    Bumetanide: 220 mL    DOBUTamine: 156.2 mL    Enteral Tube Flush: 250 mL    Enteral Tube Flush: 1020 mL    Heparin Infusion: 242 mL    IV PiggyBack: 350 mL    Milrinone: 104.9 mL    Oral Fluid: 1025 mL  Total IN: 3368.1 mL    OUT:    Ureteral Catheter (mL): 4410 mL  Total OUT: 4410 mL    Total NET: -1041.9 mL  MEDICATIONS:   aMIOdarone    Tablet 200 milliGRAM(s) Oral two times a day  apixaban 10 milliGRAM(s) Oral two times a day  apixaban 10 milliGRAM(s) Oral once  benzocaine 20% Spray 1 Spray(s) Topical four times a day PRN  buMETAnide Infusion 2 mG/Hr (10 mL/Hr) IV Continuous <Continuous>  chlorhexidine 2% Cloths 1 Application(s) Topical daily  DOBUTamine Infusion 7.5 MICROgram(s)/kG/Min (7.14 mL/Hr) IV Continuous <Continuous>  magnesium gluconate 500 milliGRAM(s) Oral daily  megestrol 40 milliGRAM(s) Oral four times a day  melatonin 5 milliGRAM(s) Oral at bedtime  metolazone 5 milliGRAM(s) Oral <User Schedule>  milrinone Infusion 0.25 MICROgram(s)/kG/Min (4.76 mL/Hr) IV Continuous <Continuous>  pantoprazole    Tablet 40 milliGRAM(s) Oral before breakfast  polyethylene glycol 3350 17 Gram(s) Oral two times a day  senna 2 Tablet(s) Oral at bedtime  sodium chloride 3% Bolus 150 milliLiter(s) IV Bolus once    LABS:                         10.3   10.09 )-----------( 396      ( 10 May 2023 04:30 )             31.1     128<L>  |  87<L>  |  36<H>  ----------------------------<  118<H>          (05-10-23 @ 04:30)  4.2   |  30  |  0.8    Ca    9.4          (05-10-23 @ 04:30)  Mg     2.0         (05-10-23 @ 04:30)  Triglycerides, Serum: 91 mg/dL (04-25 @ 04:50)  Prealbumin, Serum: 5 mg/dL (05-08-23 @ 05:03)  DIET:   Diet, DASH/TLC:   Sodium & Cholesterol Restricted  Pureed (PUREED)  1200mL Fluid Restriction ([id number])  Mildly Thick Liquids (MILDTHICKLIQS)  Tube Feeding Modality: Nasogastric  TwoCal HN  Total Volume for 24 Hours (mL): 720  Bolus  Total Volume of Bolus (mL):  240  Tube Feed Frequency: Every 8 hours   Tube Feed Start Time: 12:00  Bolus Feed Rate (mL per Hour): 500   Bolus Feed Duration (in Hours): 0.5  Bolus Feed Instructions:  feed via NG AFTER each attempted po meal - with pt seated upright during and half hour after a feeding  flush NG with 20 ml water brisk syringe push before & afer each feed  Supplement Feeding Modality:  Oral  Ensure Enlive Cans or Servings Per Day:  2       Frequency:  Daily (05-09-23 @ 16:49) [Active]  RADIOLOGY:   < from: Xray Chest 1 View-PORTABLE IMMEDIATE (Xray Chest 1 View-PORTABLE IMMEDIATE .) (05.07.23 @ 02:15) >  Impression:  Congestive changes. Bibasilar opacities/effusions, unchanged   NUTRITION SUPPORT TEAM  -  PROGRESS NOTE    resting comfortably, awake,alert  famiily at bedside  started po diet    NGT feed in place  swallow evaluation  noted  abdomen soft n/d    REVIEW OF SYSTEMS:  Negative except as noted above.     VITALS:  T(F): 97.5 (05-10 @ 04:00), Max: 97.5 (05-10 @ 04:00)  HR: 108 (05-10 @ 10:00) (102 - 108)  BP: 97/55 (05-10 @ 10:00) (71/44 - 97/55)  RR: 23 (05-10 @ 10:00) (18 - 31)  SpO2: 98% (05-10 @ 10:00) (97% - 100%)    HEIGHT/WEIGHT/BMI:   Height (cm): 160 (05-05)  Weight (kg): 63.5 (05-05)  BMI (kg/m2): 24.8 (05-05)  05-09-23 @ 07:01  -  05-10-23 @ 07:00  --------------------------------------------------------  IN:    Bumetanide: 220 mL    DOBUTamine: 156.2 mL    Enteral Tube Flush: 250 mL    Enteral Tube Flush: 1020 mL    Heparin Infusion: 242 mL    IV PiggyBack: 350 mL    Milrinone: 104.9 mL    Oral Fluid: 1025 mL  Total IN: 3368.1 mL    OUT:    Ureteral Catheter (mL): 4410 mL  Total OUT: 4410 mL    Total NET: -1041.9 mL  MEDICATIONS:   aMIOdarone    Tablet 200 milliGRAM(s) Oral two times a day  apixaban 10 milliGRAM(s) Oral two times a day  apixaban 10 milliGRAM(s) Oral once  benzocaine 20% Spray 1 Spray(s) Topical four times a day PRN  buMETAnide Infusion 2 mG/Hr (10 mL/Hr) IV Continuous <Continuous>  chlorhexidine 2% Cloths 1 Application(s) Topical daily  DOBUTamine Infusion 7.5 MICROgram(s)/kG/Min (7.14 mL/Hr) IV Continuous <Continuous>  magnesium gluconate 500 milliGRAM(s) Oral daily  megestrol 40 milliGRAM(s) Oral four times a day  melatonin 5 milliGRAM(s) Oral at bedtime  metolazone 5 milliGRAM(s) Oral <User Schedule>  milrinone Infusion 0.25 MICROgram(s)/kG/Min (4.76 mL/Hr) IV Continuous <Continuous>  pantoprazole    Tablet 40 milliGRAM(s) Oral before breakfast  polyethylene glycol 3350 17 Gram(s) Oral two times a day  senna 2 Tablet(s) Oral at bedtime  sodium chloride 3% Bolus 150 milliLiter(s) IV Bolus once    LABS:                         10.3   10.09 )-----------( 396      ( 10 May 2023 04:30 )             31.1     128<L>  |  87<L>  |  36<H>  ----------------------------<  118<H>          (05-10-23 @ 04:30)  4.2   |  30  |  0.8    Ca    9.4          (05-10-23 @ 04:30)  Mg     2.0         (05-10-23 @ 04:30)  Triglycerides, Serum: 91 mg/dL (04-25 @ 04:50)  Prealbumin, Serum: 5 mg/dL (05-08-23 @ 05:03)  DIET:   Diet, DASH/TLC:   Sodium & Cholesterol Restricted  Pureed (PUREED)  1200mL Fluid Restriction ([id number])  Mildly Thick Liquids (MILDTHICKLIQS)  Tube Feeding Modality: Nasogastric  TwoCal HN  Total Volume for 24 Hours (mL): 720  Bolus  Total Volume of Bolus (mL):  240  Tube Feed Frequency: Every 8 hours   Tube Feed Start Time: 12:00  Bolus Feed Rate (mL per Hour): 500   Bolus Feed Duration (in Hours): 0.5  Bolus Feed Instructions:  feed via NG AFTER each attempted po meal - with pt seated upright during and half hour after a feeding  flush NG with 20 ml water brisk syringe push before & afer each feed  Supplement Feeding Modality:  Oral  Ensure Enlive Cans or Servings Per Day:  2       Frequency:  Daily (05-09-23 @ 16:49) [Active]  RADIOLOGY:   < from: Xray Chest 1 View-PORTABLE IMMEDIATE (Xray Chest 1 View-PORTABLE IMMEDIATE .) (05.07.23 @ 02:15) >  Impression:  Congestive changes. Bibasilar opacities/effusions, unchanged

## 2023-05-10 NOTE — PROGRESS NOTE ADULT - ASSESSMENT
ASSESSMENT  Cardiogenic Shock   LV thrombus  NICM  HFrEF 25% s/p AICD  HTN/HLD  - SOB x2 weeks  Post obstructive pneumonia  Suspected lung mass   - HAGMA, Lactic Acidosis  DAVID on CKD   Ischemic hepatitis   hyponatremia    PLAN  on po diet with post-prandial NGT feedings        TwoCal  HN 240ml after each attempted po meal at which intake < 50%      flush NG with only 20 ml water brisk syringe push before & after each feeding  correct phos to > 3.5

## 2023-05-10 NOTE — SWALLOW BEDSIDE ASSESSMENT ADULT - SWALLOW EVAL: RECOMMENDED FEEDING/EATING TECHNIQUES
oral hygiene/position upright (90 degrees)
vol bolus hold consecutive swallows/alternate food with liquid/oral hygiene/position upright (90 degrees)
oral hygiene/position upright (90 degrees)

## 2023-05-10 NOTE — SWALLOW BEDSIDE ASSESSMENT ADULT - NS ASR SWALLOW FINDINGS DISCUS
son at b/s/Physician/Nursing/Patient/Family
MD Carl Goodman/Physician/Nursing/Patient/Family
son at b/s/Physician/Nursing/Patient/Family
Physician/Nursing/Patient

## 2023-05-10 NOTE — SWALLOW BEDSIDE ASSESSMENT ADULT - CONSISTENCIES ADMINISTERED
8oz/thin liquid/mildly thick/pureed/soft & bite-sized
3oz/mildly thick
thin liquid/pureed
3oz/mildly thick/pureed

## 2023-05-10 NOTE — PROGRESS NOTE ADULT - ASSESSMENT
77 year old woman with PMHx of HTN, HLD, nonischemic cardiomyopathy, HFrEF s/p AICD presents to the ED with 2 weeks of worsening shortness of breath with increasing dry cough over this time period accepted to CCU for cardiogenic shock    #Cardiogenic Shock   #LV thrombus, suspected new PE  #NICM  #HFrEF 25% s/p AICD  #HTN/HLD  - CT CAP - cardiomegaly w/ RH strain  - CT Chest 5/4 subacute PE in R mainstem and subsegmental branches  - TTE 4/22/2023 EF <20%, large fixed thrombus vs mass on apical LV wall, GIIDD, mod-sev TR, mod MR, mild MO, mild PH  - LE venous duplex repeat -ve 5/6  - NSVT runs on monitor last 5/10 ~2 sec  - c/w amiodarone 200mg po qD, s/p amio bolus 5/8 - for periodic NSVT - continue to check telemetry for episodes   - c/w dobutamine and milrinone ggt - optimize therapy via PICC line in the setting of continued diuresis   - of heparin ggt - started eliquis 10mg BID   - c/w 3% NaCl 150cc IV bolus BID run over 3 hours + bumex ggt @ 2mg/hr Cr stable and no contraction alkalosis  - c/w metolazone 5mg IV BID  - BMP, Mg BID  - goal net -ve 1-2L/day  - HF following   - Mg >2, K>4  - daily weight  - strict I/O  - if persistent runs of NSVT will get EP consult       #Post obstructive pneumonia  #Suspected lung malignancy  - CT CAP - R hilar LAD, R perihilar soft tissue density w/ indentation/possible invasion of R pulmonary artery and narrowing of proximal R lobar and segmental branches w/ consolidation of RLL  - MRSA -ve  - completed zosyn 7 days course 4/27  - s/p EBUS with biopsy 5/5 - pathology negative for malignancy   - PET scan as outpatient to r/o lung malignancy?  - c/w Incentive Spirometer - patient educated about importance     #Positive COVID-19 PCR  - Asymptomatic, off isolation   - No need for further treatment or diagnostic w/u as per ID    #HAGMA - resolved   #Lactic Acidosis - resolved   #DAVID on CKD - resolved   #Elevated liver enzymes predominantly hepatocellular (AST>ALT) with hyperbilirubinemia likely due to ischemic liver injury - resolved   - lactate now wnl - repeat in AM   - previously holding lipitor 20mg qhs - can restart now w/ LFTs wnl     #Deconditioning   #Reduced appetite/po intake  #Hypotonic Hyponatremia  - hypervolemic   - Na 132-> 129 -> 132 -> 128  - prealbumin 5 low   - c/w megestrol 40mg QID  - s/p FEES - pureed w/ mildly thick liquids w/ supervised feeds  - c/w NG feeds TID for supplementation   - nutrition following   - continue to trend lytes, Cr   - PT/OT following - will f/u for PT tomorrow     #Follow Up  - monitor UOP, trend lytes  - continue diuretic therapy, monitor for alkalosis - if increasing can give additional diamox   - PT/OT, encourage OOBTC and ambulation   - monitor for hypotension if MAP < 60 can start levo     # Misc  - DVT Prophylaxis: eliquis  - GI Prophylaxis: pantoprazole    Tablet 40 milliGRAM(s) Oral before breakfast  - Diet: Diet, DASH/TLC with 1.2 L fluid restriction    - Activity: Activity - Ambulate as Tolerated  - Code Status: Full , Palliative on board     77 year old woman with PMHx of HTN, HLD, nonischemic cardiomyopathy, HFrEF s/p AICD presents to the ED with 2 weeks of worsening shortness of breath with increasing dry cough over this time period accepted to CCU for cardiogenic shock    #Cardiogenic Shock   #LV thrombus, suspected new PE  #NICM  #HFrEF 25% s/p AICD  #HTN/HLD  - CT CAP - cardiomegaly w/ RH strain  - CT Chest 5/4 subacute PE in R mainstem and subsegmental branches  - TTE 4/22/2023 EF <20%, large fixed thrombus vs mass on apical LV wall, GIIDD, mod-sev TR, mod MR, mild VA, mild PH  - LE venous duplex repeat -ve 5/6  - NSVT runs on monitor last 5/10 ~2 sec  - c/w amiodarone 200mg po qD, s/p amio bolus 5/8 - for periodic NSVT - continue to check telemetry for episodes   - c/w dobutamine and milrinone ggt - optimize therapy via PICC line in the setting of continued diuresis   - of heparin ggt - started eliquis 10mg BID   - c/w 3% NaCl 150cc IV bolus BID run over 3 hours + bumex ggt @ 2mg/hr Cr stable and no contraction alkalosis  - c/w metolazone 5mg IV BID  - BMP, Mg BID  - goal net -ve 1-2L/day  - HF following   - Mg >2, K>4  - daily weight  - strict I/O  - if persistent runs of NSVT will get EP consult       #Post obstructive pneumonia  #Suspected lung malignancy  - CT CAP - R hilar LAD, R perihilar soft tissue density w/ indentation/possible invasion of R pulmonary artery and narrowing of proximal R lobar and segmental branches w/ consolidation of RLL  - MRSA -ve  - completed zosyn 7 days course 4/27  - s/p EBUS with biopsy 5/5 - pathology negative for malignancy   - PET scan as outpatient to r/o lung malignancy?  - c/w Incentive Spirometer - patient educated about importance     #Positive COVID-19 PCR  - Asymptomatic, off isolation   - No need for further treatment or diagnostic w/u as per ID    #HAGMA - resolved   #Lactic Acidosis - resolved   #DAVID on CKD - resolved   #Elevated liver enzymes predominantly hepatocellular (AST>ALT) with hyperbilirubinemia likely due to ischemic liver injury - resolved   - lactate now wnl - repeat in AM   - previously holding lipitor 20mg qhs - can restart now w/ LFTs wnl     #Deconditioning   #Reduced appetite/po intake  #Hypotonic Hyponatremia  - hypervolemic   - Na 132-> 129 -> 132 -> 128  - prealbumin 5 low   - c/w megestrol 40mg QID  - s/p FEES - pureed w/ mildly thick liquids w/ supervised feeds  - c/w NG feeds TID for supplementation   - nutrition following   - continue to trend lytes, Cr   - PT/OT following - will f/u for PT tomorrow     #Constipation   - started senna qhs and miralax BID   - abdomen soft, non-distended, passing gas   - monitor for BM     #Follow Up  - monitor UOP, trend lytes  - continue diuretic therapy, monitor for alkalosis - if increasing can give additional diamox   - PT/OT, encourage OOBTC and ambulation   - monitor for hypotension if MAP < 60 can start levo     # Misc  - DVT Prophylaxis: eliquis  - GI Prophylaxis: pantoprazole    Tablet 40 milliGRAM(s) Oral before breakfast  - Diet: Diet, DASH/TLC with 1.2 L fluid restriction    - Activity: Activity - Ambulate as Tolerated  - Code Status: Full , Palliative on board

## 2023-05-10 NOTE — PROGRESS NOTE ADULT - NS ATTEND AMEND GEN_ALL_CORE FT
Patient found in bed in NAD. She remains depended on two inotropes, remains overloaded on exam. Markers of perfusion stable. She is getting intermittent NG feeds. Mediastinal biopsy negative for malignancy.     Continue inotropic support with dobutamine 7.5 mcg/kg/min and milrinone gtt 0.25 mcg/kg/min   Monitor daily lactate and LFTs   Continue A/C for recent PE   Continue amiodarone     Continue diuresis with Bumex gtt + HS + metolazone   Strict I/Os /Daily weight   Check pre-albumin weekly   Nutrition support   Aggressive PT - spoke with therapies re: daily PT  Continue incentive inspirometry   Will discuss during HF MDT meeting once ET improved, re: candidacy for LVAD   Discussed with family at bedside and primary team  Patient remains critically ill

## 2023-05-10 NOTE — PROGRESS NOTE ADULT - SUBJECTIVE AND OBJECTIVE BOX
HPI:  77 year old woman with PMHx of HTN, HLD, nonischemic cardiomyopathy, HFrEF s/p AICD presents to the ED with 2 weeks of worsening shortness of breath. Patient and her son state patient has been experiencing shortness of breath that has progressed from present on activity to now shortness of breath during conversation. Patient also notes an increasing dry cough over this time period. Denies history of smoking, fevers/chills, chest pain, nausea/vomiting, bloody sputum, dark/tarry/bloody bowel movements, upper respiratory symptoms.     In ED, patient's HR is 110 and saturating 98% on RA  CT C/A/P revealing cardiomegaly with evidence of right heart strain, R hilar lymphadenopathy with R perihilar soft tissue density with indentation/possible invasion of right pulmonary artery, and narrowing of proximal R lobar and segmental branches with associated consolidation of right lower lobe. 2.2 x 2.8 cm filling defect noted in apex of left ventricle.   Last ECHO 8/2021 revealing EF 20-25%, mild Mr, mild TR, mild Pulm HTN  Dimer 2600, trops negative BNP 95589  Duplex venous LE negative for DVT; CTA chest negative for PE  Cr 1.4 (0.7 in 2021), Na+ 128, T.Benjamin 2.3, LFTs elevated (hemolyzed)  Lactate 5.8-->4.6  Admitted to SDU (22 Apr 2023 01:10)    Currently admitted to medicine with the primary diagnosis of Lung mass       Today is hospital day 19d.     INTERVAL HPI / OVERNIGHT EVENTS:  Patient was examined and seen at bedside. This morning she is resting comfortably in bed, overnight patient had episode NSVT ~2sec. No other events, working with SLP for feeding, PT saw patient today for continued rehabilitation. Started bowel regimen     ROS: Otherwise unremarkable     PAST MEDICAL & SURGICAL HISTORY  CHF, stage C    HTN (hypertension)      ALLERGIES  epinephrine (Unknown)    MEDICATIONS  STANDING MEDICATIONS  aMIOdarone    Tablet 200 milliGRAM(s) Oral two times a day  apixaban 10 milliGRAM(s) Oral two times a day  buMETAnide Infusion 2 mG/Hr IV Continuous <Continuous>  chlorhexidine 2% Cloths 1 Application(s) Topical daily  DOBUTamine Infusion 7.5 MICROgram(s)/kG/Min IV Continuous <Continuous>  magnesium gluconate 500 milliGRAM(s) Oral daily  megestrol 40 milliGRAM(s) Oral four times a day  melatonin 5 milliGRAM(s) Oral at bedtime  metolazone 5 milliGRAM(s) Oral <User Schedule>  milrinone Infusion 0.25 MICROgram(s)/kG/Min IV Continuous <Continuous>  pantoprazole    Tablet 40 milliGRAM(s) Oral before breakfast  polyethylene glycol 3350 17 Gram(s) Oral two times a day  senna 2 Tablet(s) Oral at bedtime    PRN MEDICATIONS  benzocaine 20% Spray 1 Spray(s) Topical four times a day PRN    VITALS:  T(F): 97.8  HR: 104  BP: 77/52  RR: 16  SpO2: 99%    PHYSICAL EXAM  GEN: NAD, Resting comfortably in bed, cooperative  PULM: Clear to auscultation bilaterally, No wheezing, rales, rhonchi  CVS: Regular rate and rhythm, S1-S2, no murmurs, heaves, thrills  ABD: Soft, non-tender, non-distended, no guarding, BS +  EXT: No edema/cyanosis, extremities warm/dry, peripheral pulses palpable   NEURO: A&Ox3, no focal deficits    LABS                        10.3   10.09 )-----------( 396      ( 10 May 2023 04:30 )             31.1     05-10    128<L>  |  87<L>  |  36<H>  ----------------------------<  118<H>  4.2   |  30  |  0.8    Ca    9.4      10 May 2023 04:30  Mg     2.0     05-10      PTT - ( 10 May 2023 04:30 )  PTT:73.6 sec                RADIOLOGY     HPI:  77 year old woman with PMHx of HTN, HLD, nonischemic cardiomyopathy, HFrEF s/p AICD presents to the ED with 2 weeks of worsening shortness of breath. Patient and her son state patient has been experiencing shortness of breath that has progressed from present on activity to now shortness of breath during conversation. Patient also notes an increasing dry cough over this time period. Denies history of smoking, fevers/chills, chest pain, nausea/vomiting, bloody sputum, dark/tarry/bloody bowel movements, upper respiratory symptoms.     In ED, patient's HR is 110 and saturating 98% on RA  CT C/A/P revealing cardiomegaly with evidence of right heart strain, R hilar lymphadenopathy with R perihilar soft tissue density with indentation/possible invasion of right pulmonary artery, and narrowing of proximal R lobar and segmental branches with associated consolidation of right lower lobe. 2.2 x 2.8 cm filling defect noted in apex of left ventricle.   Last ECHO 8/2021 revealing EF 20-25%, mild Mr, mild TR, mild Pulm HTN  Dimer 2600, trops negative BNP 64613  Duplex venous LE negative for DVT; CTA chest negative for PE  Cr 1.4 (0.7 in 2021), Na+ 128, T.Benjamin 2.3, LFTs elevated (hemolyzed)  Lactate 5.8-->4.6  Admitted to SDU (22 Apr 2023 01:10)    Currently admitted to medicine with the primary diagnosis of Lung mass       Today is hospital day 19d.     INTERVAL HPI / OVERNIGHT EVENTS:  Patient was examined and seen at bedside. This morning she is resting comfortably in bed, overnight patient had episode NSVT ~2sec. No other events, working with SLP for feeding, PT saw patient today for continued rehabilitation. Started bowel regimen. Hemodynamically stable.     ROS: Otherwise unremarkable     PAST MEDICAL & SURGICAL HISTORY  CHF, stage C    HTN (hypertension)      ALLERGIES  epinephrine (Unknown)    MEDICATIONS  STANDING MEDICATIONS  aMIOdarone    Tablet 200 milliGRAM(s) Oral two times a day  apixaban 10 milliGRAM(s) Oral two times a day  buMETAnide Infusion 2 mG/Hr IV Continuous <Continuous>  chlorhexidine 2% Cloths 1 Application(s) Topical daily  DOBUTamine Infusion 7.5 MICROgram(s)/kG/Min IV Continuous <Continuous>  magnesium gluconate 500 milliGRAM(s) Oral daily  megestrol 40 milliGRAM(s) Oral four times a day  melatonin 5 milliGRAM(s) Oral at bedtime  metolazone 5 milliGRAM(s) Oral <User Schedule>  milrinone Infusion 0.25 MICROgram(s)/kG/Min IV Continuous <Continuous>  pantoprazole    Tablet 40 milliGRAM(s) Oral before breakfast  polyethylene glycol 3350 17 Gram(s) Oral two times a day  senna 2 Tablet(s) Oral at bedtime    PRN MEDICATIONS  benzocaine 20% Spray 1 Spray(s) Topical four times a day PRN    VITALS:  T(F): 97.8  HR: 104  BP: 77/52  RR: 16  SpO2: 99%    PHYSICAL EXAM  GEN: NAD, Resting comfortably in bed, cooperative, elderly, frail, NG tube in place  PULM: Clear to auscultation bilaterally, No wheezing, rales, rhonchi  CVS: Regular rate and rhythm, S1-S2, no murmurs, heaves, thrills  ABD: Soft, non-tender, non-distended, no guarding, BS +  EXT: No edema/cyanosis, extremities warm/dry, peripheral pulses palpable   NEURO: A&Ox3, no focal deficits    LABS                        10.3   10.09 )-----------( 396      ( 10 May 2023 04:30 )             31.1     05-10    128<L>  |  87<L>  |  36<H>  ----------------------------<  118<H>  4.2   |  30  |  0.8    Ca    9.4      10 May 2023 04:30  Mg     2.0     05-10      PTT - ( 10 May 2023 04:30 )  PTT:73.6 sec                RADIOLOGY  CTA Chest 5/4  Redemonstrated right hilar lesion with compression and possible invasion   of adjacent main pulmonary artery segment (unchanged from previous study.    Subacute right main and segmental pulmonary embolus.    Increased right lower lobe consolidative and groundglass opacities,   likely infectious/inflammatory in etiology.    4 mm left anterior upper lobe groundglass nodule.    US Kidney Bladder 4/22  No sonographic evidence of hydronephrosis.    b/l le venous duplex 5/6  No evidence of deep venous thrombosis in either lower extremity.    TTE 4/22:   1. Left ventricular ejection fraction, by visual estimation, is <20%.   2. Severely decreased global left ventricular systolic function.   3. Severely enlarged left atrium.   4. Elevated mean left atrial pressure.   5. Large, fixed thrombus vs. mass on the apical wall of the left   ventricle.   6. Spectral Doppler shows pseudonormal pattern of left ventricular   myocardial filling (Grade II diastolic dysfunction).   7. Moderately reduced RV systolic function.   8. Mildly enlarged right atrium.   9. Moderate mitral valve regurgitation.  10. Moderate-severe tricuspid regurgitation.  11. Mild pulmonic valve regurgitation.  12. Estimated pulmonary artery systolic pressure is 42.9 mmHg assuming a   right atrial pressure of 15 mmHg, which is consistent with mild pulmonary   hypertension.  13. Endocardial visualization was enhanced with intravenous echo contrast.      CXR 5/10  Stable congestive changes and bilateral pleural effusion/opacities.

## 2023-05-10 NOTE — SWALLOW BEDSIDE ASSESSMENT ADULT - SWALLOW EVAL: DIAGNOSIS
+toleration of mildly thick liquids w/ min overt s/s of penetration/aspiration
+toleration of puree and mildly thick liquids w/ use of vol bolus hold, consecutive swallows and alternating bite/sip
odynophagia, suspected pharyngeal dysphagia
+overt s/s of penetration/aspiration w/ thin and large sips of mildly thick liquids +toleration of controlled single sips of mildly thick liquids +toleration of puree, soft and bite sized w/o overt s/s of penetration/aspiration

## 2023-05-10 NOTE — SWALLOW BEDSIDE ASSESSMENT ADULT - SLP GENERAL OBSERVATIONS
Pt received in bed awake and alert on room air, NGT in-situ, pt w/ minimal po acceptance, admits to constipation, MD made aware.
Pt received OOB to chair awake and alert w/ NGT in situ. Pt denies h/o dysphagia in past
Pt received OOB to chair, family at b/s, pt w/ NGT in situ, on both po and NGT feeds

## 2023-05-10 NOTE — SWALLOW BEDSIDE ASSESSMENT ADULT - SWALLOW EVAL: CURRENT DIET
NGT feeds
Regular consistency w/ thin liquids w/ NGT feeds, however NPO (+NGT feeds) maintained until SLP assessment
puree diet w/ mildly thick
soft and bite sized w/ mildly thick liquids

## 2023-05-10 NOTE — SWALLOW BEDSIDE ASSESSMENT ADULT - PHARYNGEAL PHASE
Throat clear post oral intake
required constant cues to use compensatory strategies/Within functional limits
grimacing/Wet vocal quality post oral intake/Throat clear post oral intake/Multiple swallows
Cough post oral intake/Throat clear post oral intake

## 2023-05-10 NOTE — PROGRESS NOTE ADULT - SUBJECTIVE AND OBJECTIVE BOX
Date of Admission: 23    Interval History: Patient sitting up in bed, spouse present at bedside. Remains on dual inotropic support and Bumex gtt. 4.4L 24hr UOP, net negative 1L. Kidney function remains stable.                                                                                                                                              HISTORY OF PRESENT ILLNESS: 78 y/o F with PMHx of HTN, HLD, nonischemic cardiomyopathy, HFrEF s/p AICD presents to the ED with 2 weeks of worsening shortness of breath. Patient and her son state patient has been experiencing shortness of breath that has progressed from present on activity to now shortness of breath during conversation. Patient also notes an increasing dry cough over this time period.    In ED, patient's HR is 110 and saturating 98% on RA  CT C/A/P revealing cardiomegaly with evidence of right heart strain, R hilar lymphadenopathy with R perihilar soft tissue density with indentation/possible invasion of right pulmonary artery, and narrowing of proximal R lobar and segmental branches with associated consolidation of right lower lobe. 2.2 x 2.8 cm filling defect noted in apex of left ventricle.   Last ECHO 2021 revealing EF 20-25%, mild Mr, mild TR, mild Pulm HTN  Dimer 2600, trops negative BNP 05091  Duplex venous LE negative for DVT; CTA chest negative for PE  Cr 1.4 (0.7 in ), Na+ 128, T.Benjamin 2.3, LFTs elevated (hemolyzed)  Lactate 5.8-->4.6  Admitted to SDU (2023 01:10)      Patient reportedly was at baseline (active, ambulating long distances without issue) until about 2 weeks ago when patient began to have progressively worsening SOB with a dry cough. Patient reports Dr. Mead as per primary cardiologist, and Dr. Sherwood as her heart failure specialist (last seen in office in ). Endorses compliance with home medications. Family at bedside concerned about patient's recent weight loss as well.         PAST MEDICAL & SURGICAL HISTORY:  CHF, stage C  HTN (hypertension)    Allergies  epinephrine (Unknown)    Intolerances      Vitals:  ICU Vital Signs Last 24 Hrs  T(C): 36.4 (10 May 2023 04:00), Max: 36.6 (09 May 2023 16:00)  T(F): 97.5 (10 May 2023 04:00), Max: 97.8 (09 May 2023 16:00)  HR: 108 (10 May 2023 10:00) (101 - 109)  BP: 97/55 (10 May 2023 10:00) (71/44 - 97/55)  BP(mean): 72 (10 May 2023 10:00) (52 - 77)  ABP: --  ABP(mean): --  RR: 23 (10 May 2023 10:00) (18 - 31)  SpO2: 98% (10 May 2023 10:00) (97% - 100%)    O2 Parameters below as of 10 May 2023 10:00  Patient On (Oxygen Delivery Method): room air        Physical Exam:  General Appearance: NG tube, thin, frail, normal for age and gender. 	  Cardiovascular: +NSVT on tele, +S1, S2, No edema  Respiratory: decreased @ bases  Psychiatry: Fatigued, oriented x 3, Mood & affect appropriate  Skin/Integumentary: No rashes, No ecchymoses, No cyanosis  Neurologic: Non-focal  Musculoskeletal/ extremities: Normal range of motion, No clubbing, cyanosis or edema  Vascular: Peripheral pulses palpable 2+ bilaterally        LABS:	 	    CBC Full  -  ( 10 May 2023 04:30 )  WBC Count : 10.09 K/uL  RBC Count : 3.41 M/uL  Hemoglobin : 10.3 g/dL  Hematocrit : 31.1 %  Platelet Count - Automated : 396 K/uL  Mean Cell Volume : 91.2 fL  Mean Cell Hemoglobin : 30.2 pg  Mean Cell Hemoglobin Concentration : 33.1 g/dL  Auto Neutrophil # : x  Auto Lymphocyte # : x  Auto Monocyte # : x  Auto Eosinophil # : x  Auto Basophil # : x  Auto Neutrophil % : x  Auto Lymphocyte % : x  Auto Monocyte % : x  Auto Eosinophil % : x  Auto Basophil % : x    Basic Metabolic Panel in AM (05.10.23 @ 04:30)    Sodium, Serum: 128 mmol/L   Potassium, Serum: 4.2 mmol/L   Chloride, Serum: 87 mmol/L   Carbon Dioxide, Serum: 30 mmol/L   Anion Gap, Serum: 11 mmol/L   Blood Urea Nitrogen, Serum: 36 mg/dL   Creatinine, Serum: 0.8 mg/dL   Glucose, Serum: 118 mg/dL   Calcium, Total Serum: 9.4 mg/dL   eGFR: 75: The estimated glomerular filtration rate (eGFR) is calculated using the   CKD-EPI creatinine equation, which does not have a coefficient for  race and is validated in individuals 18 years of age and older (N Engl J  Med ; [id number]). Creatinine-based eGFR may be inaccurate in  various situations including but not limited to extremes of muscle mass,  altered dietary protein intake, or medications that affect renal tubular  creatinine secretion. mL/min/1.73m2        TELEMETRY EVENTS: 	    EC2023    Ventricular Rate 96 BPM  Atrial Rate 96 BPM  P-R Interval 164 ms  QRS Duration 116 ms  Q-T Interval 396 ms  QTC Calculation(Bazett) 500 ms  P Axis 51 degrees  R Axis -41 degrees  T Axis 87 degrees    Diagnosis Line Normal sinus rhythm  Possible Left atrial enlargement  Left axis deviation  Prolonged QT  Abnormal ECG    Confirmed by Seven Hensley ([id number]) on 2023 7:07:08 AM      CXR     Impression:  CHF. Support devices as described. Without difference.      CT Angio Chest PE Protocol   IMPRESSION:    No evidence of pulmonary embolism. (See discussion below for more   findings.)    Marked cardiomegaly.    There is a 2.2 x 2.8 cm rounded filling defect in the region of the apex   of the left ventricle (3/11; 607/95). Differential diagnosis includes an   intracardiac mass or thrombus.    The main pulmonary artery is dilated, measuring 3.6 cm in diameter, which   may be seen with pulmonary hypertension. There is reflux of contrast   material into the IVC and hepatic veins compatible with right heart   dysfunction.    Right hilar lymphadenopathy and right perihilar soft tissue density is   noted. There is segmental narrowing and consolidation noted in the medial   aspect of the right lower lobe. There is a small right pleural effusion.   There is extrinsic indentation and possible invasion of the right main   pulmonary artery (604/142; 605/129; ). There is associated narrowing   of the proximal right lobar and segmental branches. A right hilar / right   lower lobe mass with postobstructive pneumonitis should be ruled out.          PREVIOUS DIAGNOSTIC TESTING:    TTE 23    Summary:   1. Left ventricular ejection fraction, by visual estimation, is <20%.   2. Severely decreased global left ventricular systolic function.   3. Severely enlarged left atrium.   4. Elevated mean left atrial pressure.   5. Large, fixed thrombus vs. mass on the apical wall of the left   ventricle.   6. Spectral Doppler shows pseudonormal pattern of left ventricular   myocardial filling (Grade II diastolic dysfunction).   7. Moderately reduced RV systolic function.   8. Mildly enlarged right atrium.   9. Moderate mitral valve regurgitation.  10. Moderate-severe tricuspid regurgitation.  11. Mild pulmonic valve regurgitation.  12. Estimated pulmonary artery systolic pressure is 42.9 mmHg assuming a   right atrial pressure of 15 mmHg, which is consistent withmild pulmonary   hypertension.  13. Endocardial visualization was enhanced with intravenous echo contrast.        Right Heart Catheterization 23    FINDINGS:   Right Heart Cath  RA - 12  RV - 51/5/13  PA - 51//32  PCWP - 20    CO/CI Thermodilusion - 2.1/1.27  CO/CI Mehdi - 2.09/1.26    SVR (MAP 79 / RA 12 / CO 2.1) = 2552 dyn  PVR = 5.71 henriquez      [facility] : RA 14, RV 46/14, PA 56/26/37, PCWP: 25, CO/C.I mehdi 2.37/1.43, C.O. /C.I thermodilution 2.8/1.69      Home Medications:  atorvastatin 20 mg oral tablet: 1 tab(s) orally once a day (04 Aug 2021 10:05)  carvedilol 6.25 mg oral tablet: 1 tab(s) orally 2 times a day (2023 02:02)  Entresto 49 mg-51 mg oral tablet: 1 tab(s) orally 2 times a day (04 Aug 2021 10:05)  Farxiga 10 mg oral tablet: 1 tab(s) orally once a day (2023 02:05)  Lasix 20 mg oral tablet: 1 tab(s) orally once a day (2023 02:04)  spironolactone 25 mg oral tablet: 1 tab(s) orally once a day (04 Aug 2021 10:05)    MEDICATIONS  (STANDING):  MEDICATIONS  (STANDING):  buMETAnide Injectable 2 milliGRAM(s) IV Push every 8 hours  chlorhexidine 2% Cloths 1 Application(s) Topical daily  DOBUTamine Infusion 7.5 MICROgram(s)/kG/Min (7.14 mL/Hr) IV Continuous <Continuous>  heparin  Infusion.  Unit(s)/Hr (11 mL/Hr) IV Continuous <Continuous>  magnesium gluconate 500 milliGRAM(s) Oral daily  melatonin 5 milliGRAM(s) Oral at bedtime  milrinone Infusion 0.125 MICROgram(s)/kG/Min (2.38 mL/Hr) IV Continuous <Continuous>  norepinephrine Infusion 0.05 MICROgram(s)/kG/Min (2.98 mL/Hr) IV Continuous <Continuous>  pantoprazole    Tablet 40 milliGRAM(s) Oral before breakfast    MEDICATIONS  (PRN):  benzocaine 20% Spray 1 Spray(s) Topical four times a day PRN throat pain  heparin   Injectable 5000 Unit(s) IV Push every 6 hours PRN For aPTT less than 40  heparin   Injectable 2500 Unit(s) IV Push every 6 hours PRN For aPTT between 40 - 57

## 2023-05-10 NOTE — PROGRESS NOTE ADULT - ASSESSMENT
Assessment:   77 year old woman with pmh of HTN, HLD, nonischemic cardiomyopathy, HFrEF, s/p ICD and PPM presents to the ED with 2 weeks of worsening shortness of breath.       IMPRESSION:  Cardiogenic shock on inotropic support  HFrEF 20%, NICM  RLL Mass/opacity/ possible Pneumonia, r/o malignancy  LV Mass, ?Thrombus  Transaminitis in the setting of hypotension, shock liver      Plan:  s/p bronch/EBUS biopsy 5/05 - pathology resulted- negative for malignancy   Continue inotropic support through PICC line- dobutamine @ 7.5mcg/kg/min and milrinone @ 0.25 mcg/kg/min  POCUS exam: IVC 2.2cm, non collapsing   Continue Bumex gtt at 2mg/hr + 3% hypertonic saline BID  Maintain net negative goal  Monitor for contraction alkalosis- give Diamox PRN  Continue feeding tube as well as encourage food intake to maximize nutrition  Daily physical therapy follow-up / OOB as tolerated  Please encourage incentive spirometer 10x/hr while awake   Get BMP twice daily with magnesium   Maintain potassium >4.0, Mg >2.2  Strict intake and output  Daily weight   Rest of care as per primary team  Plan discussed with patient, family at bedside, and CCU team  Will continue to follow

## 2023-05-10 NOTE — SWALLOW BEDSIDE ASSESSMENT ADULT - SWALLOW EVAL: RECOMMENDED DIET
soft and bite sized w/ mildly thick liquids
puree diet w/ mildly thick liquids
continue NPO w/ NGT, allow ice chips/small sips w/ good oral care
soft and bite w/ mildly thick liquids

## 2023-05-10 NOTE — SWALLOW BEDSIDE ASSESSMENT ADULT - ORAL PHASE
Within functional limits
2' anticipation of discomfort/Delayed oral transit time

## 2023-05-11 LAB
ANION GAP SERPL CALC-SCNC: 12 MMOL/L — SIGNIFICANT CHANGE UP (ref 7–14)
ANION GAP SERPL CALC-SCNC: 16 MMOL/L — HIGH (ref 7–14)
ANION GAP SERPL CALC-SCNC: 16 MMOL/L — HIGH (ref 7–14)
BASE EXCESS BLDMV CALC-SCNC: 8.5 MMOL/L — SIGNIFICANT CHANGE UP
BUN SERPL-MCNC: 45 MG/DL — HIGH (ref 10–20)
BUN SERPL-MCNC: 45 MG/DL — HIGH (ref 10–20)
BUN SERPL-MCNC: 50 MG/DL — HIGH (ref 10–20)
CALCIUM SERPL-MCNC: 9.1 MG/DL — SIGNIFICANT CHANGE UP (ref 8.4–10.4)
CALCIUM SERPL-MCNC: 9.2 MG/DL — SIGNIFICANT CHANGE UP (ref 8.4–10.5)
CALCIUM SERPL-MCNC: 9.5 MG/DL — SIGNIFICANT CHANGE UP (ref 8.4–10.5)
CHLORIDE SERPL-SCNC: 78 MMOL/L — LOW (ref 98–110)
CHLORIDE SERPL-SCNC: 83 MMOL/L — LOW (ref 98–110)
CHLORIDE SERPL-SCNC: 84 MMOL/L — LOW (ref 98–110)
CO2 SERPL-SCNC: 28 MMOL/L — SIGNIFICANT CHANGE UP (ref 17–32)
CREAT SERPL-MCNC: 1 MG/DL — SIGNIFICANT CHANGE UP (ref 0.7–1.5)
EGFR: 58 ML/MIN/1.73M2 — LOW
GAS PNL BLDA: SIGNIFICANT CHANGE UP
GAS PNL BLDA: SIGNIFICANT CHANGE UP
GLUCOSE SERPL-MCNC: 125 MG/DL — HIGH (ref 70–99)
GLUCOSE SERPL-MCNC: 163 MG/DL — HIGH (ref 70–99)
GLUCOSE SERPL-MCNC: 98 MG/DL — SIGNIFICANT CHANGE UP (ref 70–99)
HCO3 BLDMV-SCNC: 33 MMOL/L — SIGNIFICANT CHANGE UP
HCT VFR BLD CALC: 34.6 % — LOW (ref 37–47)
HGB BLD-MCNC: 11.6 G/DL — LOW (ref 12–16)
LACTATE SERPL-SCNC: 2.3 MMOL/L — HIGH (ref 0.7–2)
MAGNESIUM SERPL-MCNC: 2.3 MG/DL — SIGNIFICANT CHANGE UP (ref 1.8–2.4)
MAGNESIUM SERPL-MCNC: 2.6 MG/DL — HIGH (ref 1.8–2.4)
MAGNESIUM SERPL-MCNC: 2.7 MG/DL — HIGH (ref 1.8–2.4)
MCHC RBC-ENTMCNC: 30.5 PG — SIGNIFICANT CHANGE UP (ref 27–31)
MCHC RBC-ENTMCNC: 33.5 G/DL — SIGNIFICANT CHANGE UP (ref 32–37)
MCV RBC AUTO: 91.1 FL — SIGNIFICANT CHANGE UP (ref 81–99)
NRBC # BLD: 0 /100 WBCS — SIGNIFICANT CHANGE UP (ref 0–0)
O2 CT VFR BLD CALC: 34 MMHG — SIGNIFICANT CHANGE UP
PCO2 BLDMV: 43 MMHG — SIGNIFICANT CHANGE UP
PH BLDMV: 7.49 — SIGNIFICANT CHANGE UP
PHOSPHATIDYLETHANOL (PETH) - RESULT: NEGATIVE — SIGNIFICANT CHANGE UP
PLATELET # BLD AUTO: 468 K/UL — HIGH (ref 130–400)
PMV BLD: 11.1 FL — HIGH (ref 7.4–10.4)
POTASSIUM SERPL-MCNC: 3.6 MMOL/L — SIGNIFICANT CHANGE UP (ref 3.5–5)
POTASSIUM SERPL-MCNC: 4 MMOL/L — SIGNIFICANT CHANGE UP (ref 3.5–5)
POTASSIUM SERPL-MCNC: 4 MMOL/L — SIGNIFICANT CHANGE UP (ref 3.5–5)
POTASSIUM SERPL-SCNC: 3.6 MMOL/L — SIGNIFICANT CHANGE UP (ref 3.5–5)
POTASSIUM SERPL-SCNC: 4 MMOL/L — SIGNIFICANT CHANGE UP (ref 3.5–5)
POTASSIUM SERPL-SCNC: 4 MMOL/L — SIGNIFICANT CHANGE UP (ref 3.5–5)
RBC # BLD: 3.8 M/UL — LOW (ref 4.2–5.4)
RBC # FLD: 16.4 % — HIGH (ref 11.5–14.5)
SAO2 % BLDMV: 62.6 % — SIGNIFICANT CHANGE UP
SODIUM SERPL-SCNC: 122 MMOL/L — LOW (ref 135–146)
SODIUM SERPL-SCNC: 123 MMOL/L — LOW (ref 135–146)
SODIUM SERPL-SCNC: 128 MMOL/L — LOW (ref 135–146)
WBC # BLD: 10.3 K/UL — SIGNIFICANT CHANGE UP (ref 4.8–10.8)
WBC # FLD AUTO: 10.3 K/UL — SIGNIFICANT CHANGE UP (ref 4.8–10.8)

## 2023-05-11 PROCEDURE — 71045 X-RAY EXAM CHEST 1 VIEW: CPT | Mod: 26

## 2023-05-11 PROCEDURE — 93010 ELECTROCARDIOGRAM REPORT: CPT

## 2023-05-11 PROCEDURE — 99291 CRITICAL CARE FIRST HOUR: CPT

## 2023-05-11 PROCEDURE — 99291 CRITICAL CARE FIRST HOUR: CPT | Mod: 25

## 2023-05-11 RX ORDER — FAMOTIDINE 10 MG/ML
20 INJECTION INTRAVENOUS ONCE
Refills: 0 | Status: COMPLETED | OUTPATIENT
Start: 2023-05-11 | End: 2023-05-11

## 2023-05-11 RX ORDER — POTASSIUM CHLORIDE 20 MEQ
20 PACKET (EA) ORAL
Refills: 0 | Status: COMPLETED | OUTPATIENT
Start: 2023-05-11 | End: 2023-05-12

## 2023-05-11 RX ORDER — ACETAZOLAMIDE 250 MG/1
500 TABLET ORAL ONCE
Refills: 0 | Status: DISCONTINUED | OUTPATIENT
Start: 2023-05-11 | End: 2023-05-11

## 2023-05-11 RX ORDER — MEGESTROL ACETATE 40 MG/ML
400 SUSPENSION ORAL DAILY
Refills: 0 | Status: DISCONTINUED | OUTPATIENT
Start: 2023-05-11 | End: 2023-05-11

## 2023-05-11 RX ORDER — ACETAZOLAMIDE 250 MG/1
500 TABLET ORAL ONCE
Refills: 0 | Status: COMPLETED | OUTPATIENT
Start: 2023-05-11 | End: 2023-05-11

## 2023-05-11 RX ORDER — MEGESTROL ACETATE 40 MG/ML
400 SUSPENSION ORAL DAILY
Refills: 0 | Status: DISCONTINUED | OUTPATIENT
Start: 2023-05-11 | End: 2023-05-17

## 2023-05-11 RX ORDER — ALBUMIN HUMAN 25 %
100 VIAL (ML) INTRAVENOUS ONCE
Refills: 0 | Status: COMPLETED | OUTPATIENT
Start: 2023-05-11 | End: 2023-05-11

## 2023-05-11 RX ADMIN — FAMOTIDINE 20 MILLIGRAM(S): 10 INJECTION INTRAVENOUS at 11:26

## 2023-05-11 RX ADMIN — Medication 50 MILLIEQUIVALENT(S): at 23:29

## 2023-05-11 RX ADMIN — SODIUM CHLORIDE 50 MILLILITER(S): 5 INJECTION, SOLUTION INTRAVENOUS at 05:13

## 2023-05-11 RX ADMIN — BUMETANIDE 10 MG/HR: 0.25 INJECTION INTRAMUSCULAR; INTRAVENOUS at 00:17

## 2023-05-11 RX ADMIN — Medication 25 GRAM(S): at 00:09

## 2023-05-11 RX ADMIN — Medication 5 MILLIGRAM(S): at 21:51

## 2023-05-11 RX ADMIN — Medication 50 MILLILITER(S): at 13:11

## 2023-05-11 RX ADMIN — AMIODARONE HYDROCHLORIDE 200 MILLIGRAM(S): 400 TABLET ORAL at 18:05

## 2023-05-11 RX ADMIN — Medication 50 MILLIEQUIVALENT(S): at 00:11

## 2023-05-11 RX ADMIN — POLYETHYLENE GLYCOL 3350 17 GRAM(S): 17 POWDER, FOR SOLUTION ORAL at 18:06

## 2023-05-11 RX ADMIN — MEGESTROL ACETATE 40 MILLIGRAM(S): 40 SUSPENSION ORAL at 05:13

## 2023-05-11 RX ADMIN — POLYETHYLENE GLYCOL 3350 17 GRAM(S): 17 POWDER, FOR SOLUTION ORAL at 05:13

## 2023-05-11 RX ADMIN — Medication 7.14 MICROGRAM(S)/KG/MIN: at 23:29

## 2023-05-11 RX ADMIN — Medication 500 MILLIGRAM(S): at 11:33

## 2023-05-11 RX ADMIN — Medication 50 MILLIEQUIVALENT(S): at 00:18

## 2023-05-11 RX ADMIN — MEGESTROL ACETATE 40 MILLIGRAM(S): 40 SUSPENSION ORAL at 11:26

## 2023-05-11 RX ADMIN — ACETAZOLAMIDE 110 MILLIGRAM(S): 250 TABLET ORAL at 03:23

## 2023-05-11 RX ADMIN — MILRINONE LACTATE 4.76 MICROGRAM(S)/KG/MIN: 1 INJECTION, SOLUTION INTRAVENOUS at 00:17

## 2023-05-11 RX ADMIN — APIXABAN 10 MILLIGRAM(S): 2.5 TABLET, FILM COATED ORAL at 05:13

## 2023-05-11 RX ADMIN — SENNA PLUS 2 TABLET(S): 8.6 TABLET ORAL at 21:50

## 2023-05-11 RX ADMIN — APIXABAN 10 MILLIGRAM(S): 2.5 TABLET, FILM COATED ORAL at 18:06

## 2023-05-11 RX ADMIN — AMIODARONE HYDROCHLORIDE 200 MILLIGRAM(S): 400 TABLET ORAL at 05:13

## 2023-05-11 RX ADMIN — CHLORHEXIDINE GLUCONATE 1 APPLICATION(S): 213 SOLUTION TOPICAL at 11:31

## 2023-05-11 RX ADMIN — PANTOPRAZOLE SODIUM 40 MILLIGRAM(S): 20 TABLET, DELAYED RELEASE ORAL at 05:13

## 2023-05-11 RX ADMIN — MEGESTROL ACETATE 40 MILLIGRAM(S): 40 SUSPENSION ORAL at 01:31

## 2023-05-11 RX ADMIN — MILRINONE LACTATE 4.76 MICROGRAM(S)/KG/MIN: 1 INJECTION, SOLUTION INTRAVENOUS at 23:30

## 2023-05-11 NOTE — PROGRESS NOTE ADULT - SUBJECTIVE AND OBJECTIVE BOX
HPI:  77 year old woman with PMHx of HTN, HLD, nonischemic cardiomyopathy, HFrEF s/p AICD presents to the ED with 2 weeks of worsening shortness of breath. Patient and her son state patient has been experiencing shortness of breath that has progressed from present on activity to now shortness of breath during conversation. Patient also notes an increasing dry cough over this time period. Denies history of smoking, fevers/chills, chest pain, nausea/vomiting, bloody sputum, dark/tarry/bloody bowel movements, upper respiratory symptoms.     In ED, patient's HR is 110 and saturating 98% on RA  CT C/A/P revealing cardiomegaly with evidence of right heart strain, R hilar lymphadenopathy with R perihilar soft tissue density with indentation/possible invasion of right pulmonary artery, and narrowing of proximal R lobar and segmental branches with associated consolidation of right lower lobe. 2.2 x 2.8 cm filling defect noted in apex of left ventricle.   Last ECHO 8/2021 revealing EF 20-25%, mild Mr, mild TR, mild Pulm HTN  Dimer 2600, trops negative BNP 09360  Duplex venous LE negative for DVT; CTA chest negative for PE  Cr 1.4 (0.7 in 2021), Na+ 128, T.Benjamin 2.3, LFTs elevated (hemolyzed)  Lactate 5.8-->4.6  Admitted to SDU (22 Apr 2023 01:10)    Currently admitted to medicine with the primary diagnosis of Lung mass       Today is hospital day 20d.     INTERVAL HPI / OVERNIGHT EVENTS:  Patient was examined and seen at bedside. This morning she is resting comfortably in bed. Overnight lactate elevated, repeat abg showed slight contraction alkalosis, diamox given, lactate improve. This AM hemodynamically stable, MAP borderline ~60s, given 100cc albumin infusion with improvement of BP. Patient tolerating minimal oral diet supplementing with tube feeds, voiding appropriately, on bowel regimen.     ROS: Otherwise unremarkable     PAST MEDICAL & SURGICAL HISTORY  CHF, stage C    HTN (hypertension)      ALLERGIES  epinephrine (Unknown)    MEDICATIONS  STANDING MEDICATIONS  aMIOdarone    Tablet 200 milliGRAM(s) Oral two times a day  apixaban 10 milliGRAM(s) Oral two times a day  chlorhexidine 2% Cloths 1 Application(s) Topical daily  DOBUTamine Infusion 7.5 MICROgram(s)/kG/Min IV Continuous <Continuous>  magnesium gluconate 500 milliGRAM(s) Oral daily  megestrol Suspension 400 milliGRAM(s) Oral daily  melatonin 5 milliGRAM(s) Oral at bedtime  milrinone Infusion 0.25 MICROgram(s)/kG/Min IV Continuous <Continuous>  pantoprazole    Tablet 40 milliGRAM(s) Oral before breakfast  polyethylene glycol 3350 17 Gram(s) Oral two times a day  senna 2 Tablet(s) Oral at bedtime  sodium chloride 3% Bolus 150 milliLiter(s) IV Bolus once    PRN MEDICATIONS  benzocaine 20% Spray 1 Spray(s) Topical four times a day PRN    VITALS:  T(F): 97.8  HR: 101  BP: 121/60  RR: 22  SpO2: 99%    PHYSICAL EXAM  GEN: NAD, Resting comfortably in bed, cooperative, elderly, frail, NG tube in place  PULM: Clear to auscultation bilaterally, No wheezing, rales, rhonchi  CVS: Regular rate and rhythm, S1-S2, no murmurs, heaves, thrills  ABD: Soft, non-tender, non-distended, no guarding, BS +  EXT: No edema/cyanosis, extremities warm/dry, peripheral pulses palpable   NEURO: A&Ox3, no focal deficits    LABS                        11.6   10.30 )-----------( 468      ( 11 May 2023 05:13 )             34.6     05-11    128<L>  |  84<L>  |  45<H>  ----------------------------<  125<H>  4.0   |  28  |  1.0    Ca    9.5      11 May 2023 05:13  Mg     2.6     05-11      PTT - ( 10 May 2023 04:30 )  PTT:73.6 sec    ABG - ( 11 May 2023 05:47 )  pH, Arterial: 7.52  pH, Blood: x     /  pCO2: 39    /  pO2: 97    / HCO3: 32    / Base Excess: 8.3   /  SaO2: 99.4              Lactate, Blood: 2.3 mmol/L *H* (05-11-23 @ 05:13)            RADIOLOGY  CTA Chest 5/4  Redemonstrated right hilar lesion with compression and possible invasion   of adjacent main pulmonary artery segment (unchanged from previous study.    Subacute right main and segmental pulmonary embolus.    Increased right lower lobe consolidative and groundglass opacities,   likely infectious/inflammatory in etiology.    4 mm left anterior upper lobe groundglass nodule.    US Kidney Bladder 4/22  No sonographic evidence of hydronephrosis.    b/l le venous duplex 5/6  No evidence of deep venous thrombosis in either lower extremity.    TTE 4/22:   1. Left ventricular ejection fraction, by visual estimation, is <20%.   2. Severely decreased global left ventricular systolic function.   3. Severely enlarged left atrium.   4. Elevated mean left atrial pressure.   5. Large, fixed thrombus vs. mass on the apical wall of the left   ventricle.   6. Spectral Doppler shows pseudonormal pattern of left ventricular   myocardial filling (Grade II diastolic dysfunction).   7. Moderately reduced RV systolic function.   8. Mildly enlarged right atrium.   9. Moderate mitral valve regurgitation.  10. Moderate-severe tricuspid regurgitation.  11. Mild pulmonic valve regurgitation.  12. Estimated pulmonary artery systolic pressure is 42.9 mmHg assuming a   right atrial pressure of 15 mmHg, which is consistent with mild pulmonary   hypertension.  13. Endocardial visualization was enhanced with intravenous echo contrast.    CXR 5/7  Congestive changes. Bibasilar opacities/effusions, unchanged

## 2023-05-11 NOTE — PROGRESS NOTE ADULT - SUBJECTIVE AND OBJECTIVE BOX
Date of Admission: 23    Interval History: Patient seen by PT this morning, seen sitting in chair with  at bedside  Patient remains on dual inotropic support  NSVT on tele today   On dual inotropic support (milrinone 0.25 mcg/kg/min and dobutamine 7.5 mcg/kg/min)  BP R arm  76/48 (58), R leg 110/56  Bumex drip and metolazone held last night as pt developed contraction alkalosis,  was given 500 mg IV Diamox last night   IV albumin given today morning   getting additional PO via NGT due to poor PO intake   24 hr UOP 3.01 L, net negative 1.2 L , BUN/Cr 45/1.0 (stable)       HISTORY OF PRESENT ILLNESS: 78 y/o F with PMHx of HTN, HLD, nonischemic cardiomyopathy, HFrEF s/p AICD presents to the ED with 2 weeks of worsening shortness of breath. Patient and her son state patient has been experiencing shortness of breath that has progressed from present on activity to now shortness of breath during conversation. Patient also notes an increasing dry cough over this time period, which is improving currently     In ED, patient's HR was 110 and was saturating 98% on RA  CT C/A/P revealing cardiomegaly with evidence of right heart strain, R hilar lymphadenopathy with R perihilar soft tissue density with indentation/possible invasion of right pulmonary artery, and narrowing of proximal R lobar and segmental branches with associated consolidation of right lower lobe. 2.2 x 2.8 cm filling defect noted in apex of left ventricle.   Last ECHO 2021 revealing EF 20-25%, mild Mr, mild TR, mild Pulm HTN  Dimer 2600, trops negative BNP 50154  Duplex venous LE negative for DVT; initial CTA chest negative for PE  Cr 1.4 (0.7 in ), Na+ 128, T.Benjamin 2.3, LFTs elevated (hemolyzed)  Lactate 5.8-->4.6--.2.3   Admitted to SDU (2023 01:10)    Patient reportedly was at baseline (active, ambulating long distances without issue) until about 2 weeks ago when patient began to have progressively worsening SOB with a dry cough. Patient reports Dr. Mead as per primary cardiologist, and Dr. Sherwood as her heart failure specialist (last seen in office in ). Endorses compliance with home medications. Family at bedside concerned about patient's recent weight loss as well.         PAST MEDICAL & SURGICAL HISTORY:  CHF, stage C  HTN (hypertension)    Allergies  epinephrine (Unknown)    Intolerances      Vitals:  ICU Vital Signs Last 24 Hrs  T(C): 36.6 (11 May 2023 12:00), Max: 36.7 (11 May 2023 00:00)  T(F): 97.8 (11 May 2023 12:00), Max: 98.1 (11 May 2023 00:00)  HR: 101 (11 May 2023 12:00) (99 - 112)  BP: 121/60 (11 May 2023 12:00) (73/41 - 121/60)  BP(mean): 82 (11 May 2023 12:00) (53 - 82)  ABP: --  ABP(mean): --  RR: 22 (11 May 2023 12:00) (12 - 29)  SpO2: 99% (11 May 2023 12:00) (96% - 100%)    O2 Parameters below as of 11 May 2023 12:00  Patient On (Oxygen Delivery Method): room air      Physical Exam:  General Appearance: NG tube, thin, frail, normal for age and gender. 	  Cardiovascular: +NSVT on tele, +S1, S2, No edema  Respiratory: decreased @ bases  Psychiatry: Fatigued, oriented x 3, Mood & affect appropriate  Skin/Integumentary: No rashes, No ecchymoses, No cyanosis  Neurologic: Non-focal  Musculoskeletal/ extremities: Normal range of motion, No clubbing, cyanosis or edema  Vascular: Peripheral pulses palpable 2+ bilaterally        LABS:	 	                        11.6   10.30 )-----------( 468      ( 11 May 2023 05:13 )             34.6     05-11    128<L>  |  84<L>  |  45<H>  ----------------------------<  125<H>  4.0   |  28  |  1.0    Ca    9.5      11 May 2023 05:13  Mg     2.6     05-11        PTT - ( 10 May 2023 04:30 )  PTT:73.6 sec        ABG - ( 11 May 2023 05:47 )  pH, Arterial: 7.52  pH, Blood: x     /  pCO2: 39    /  pO2: 97    / HCO3: 32    / Base Excess: 8.3   /  SaO2: 99.4      Lactate Trend   @ 05:13 Lactate:2.3   05-08 @ 05:03 Lactate:2.0   05-07 @ 04:51 Lactate:1.6   05-06 @ 22:50 Lactate:1.9     TELEMETRY EVENTS: 	    EC2023    Ventricular Rate 96 BPM  Atrial Rate 96 BPM  P-R Interval 164 ms  QRS Duration 116 ms  Q-T Interval 396 ms  QTC Calculation(Bazett) 500 ms  P Axis 51 degrees  R Axis -41 degrees  T Axis 87 degrees    Diagnosis Line Normal sinus rhythm  Possible Left atrial enlargement  Left axis deviation  Prolonged QT  Abnormal ECG  Confirmed by Seven Hensley ([id number]) on 2023 7:07:08 AM      CXR   Impression:  CHF. Support devices as described. Without difference    CXR 23  Stable congestive changes and bilateral pleural effusions/opacities,  No pneumothorax      CT Angio Chest PE Protocol   IMPRESSION:    No evidence of pulmonary embolism. (See discussion below for more   findings.)    Marked cardiomegaly.    There is a 2.2 x 2.8 cm rounded filling defect in the region of the apex   of the left ventricle (3/11; 607/95). Differential diagnosis includes an   intracardiac mass or thrombus.    The main pulmonary artery is dilated, measuring 3.6 cm in diameter, which   may be seen with pulmonary hypertension. There is reflux of contrast   material into the IVC and hepatic veins compatible with right heart   dysfunction.    Right hilar lymphadenopathy and right perihilar soft tissue density is   noted. There is segmental narrowing and consolidation noted in the medial   aspect of the right lower lobe. There is a small right pleural effusion.   There is extrinsic indentation and possible invasion of the right main   pulmonary artery (604/142; 605/129; 2/). There is associated narrowing   of the proximal right lobar and segmental branches. A right hilar / right   lower lobe mass with postobstructive pneumonitis should be ruled out.        < from: CT Angio Chest PE Protocol w/ IV Cont (23 @ 11:31)  Redemonstrated right hilar lesion with compression and possible invasion   of adjacent main pulmonary artery segment (unchanged from previous study.  Subacute right main and segmental pulmonary embolus.  Increased right lower lobe consolidative and groundglass opacities,   likely infectious/inflammatory in etiology.  4 mm left anterior upper lobe groundglass nodule.    PREVIOUS DIAGNOSTIC TESTING:    TTE 23    Summary:   1. Left ventricular ejection fraction, by visual estimation, is <20%.   2. Severely decreased global left ventricular systolic function.   3. Severely enlarged left atrium.   4. Elevated mean left atrial pressure.   5. Large, fixed thrombus vs. mass on the apical wall of the left   ventricle.   6. Spectral Doppler shows pseudonormal pattern of left ventricular   myocardial filling (Grade II diastolic dysfunction).   7. Moderately reduced RV systolic function.   8. Mildly enlarged right atrium.   9. Moderate mitral valve regurgitation.  10. Moderate-severe tricuspid regurgitation.  11. Mild pulmonic valve regurgitation.  12. Estimated pulmonary artery systolic pressure is 42.9 mmHg assuming a   right atrial pressure of 15 mmHg, which is consistent withmild pulmonary   hypertension.  13. Endocardial visualization was enhanced with intravenous echo contrast.      Right Heart Catheterization 23    FINDINGS:   Right Heart Cath  RA - 12  RV - 51/5/13  PA - 51//  PCWP - 20    CO/CI Thermodilusion - 2.1/1.27  CO/CI Mehdi - 2.09/1.26    SVR (MAP 79 / RA 12 / CO 2.1) = 2552 dyn  PVR = 5.71 henriquez      RHC : RA 14, RV 46/14, PA 56/26/37, PCWP: 25, CO/C.I mehdi 2.37/1.43, C.O. /C.I thermodilution 2.8/1.69    Home Medications:  atorvastatin 20 mg oral tablet: 1 tab(s) orally once a day (04 Aug 2021 10:05)  carvedilol 6.25 mg oral tablet: 1 tab(s) orally 2 times a day (2023 02:02)  Entresto 49 mg-51 mg oral tablet: 1 tab(s) orally 2 times a day (04 Aug 2021 10:05)  Farxiga 10 mg oral tablet: 1 tab(s) orally once a day (2023 02:05)  Lasix 20 mg oral tablet: 1 tab(s) orally once a day (2023 02:04)  spironolactone 25 mg oral tablet: 1 tab(s) orally once a day (04 Aug 2021 10:05)    MEDICATIONS  (STANDING):  buMETAnide Injectable 2 milliGRAM(s) IV Push every 8 hours  chlorhexidine 2% Cloths 1 Application(s) Topical daily  DOBUTamine Infusion 7.5 MICROgram(s)/kG/Min (7.14 mL/Hr) IV Continuous <Continuous>  heparin  Infusion.  Unit(s)/Hr (11 mL/Hr) IV Continuous <Continuous>  magnesium gluconate 500 milliGRAM(s) Oral daily  melatonin 5 milliGRAM(s) Oral at bedtime  milrinone Infusion 0.125 MICROgram(s)/kG/Min (2.38 mL/Hr) IV Continuous <Continuous>  norepinephrine Infusion 0.05 MICROgram(s)/kG/Min (2.98 mL/Hr) IV Continuous <Continuous>  pantoprazole    Tablet 40 milliGRAM(s) Oral before breakfast    MEDICATIONS  (PRN):  benzocaine 20% Spray 1 Spray(s) Topical four times a day PRN throat pain  heparin   Injectable 5000 Unit(s) IV Push every 6 hours PRN For aPTT less than 40  heparin   Injectable 2500 Unit(s) IV Push every 6 hours PRN For aPTT between 40 - 57

## 2023-05-11 NOTE — PROGRESS NOTE ADULT - ASSESSMENT
Assessment:   77 year old woman with pmh of HTN, HLD, nonischemic cardiomyopathy, HFrEF, s/p ICD and PPM presents to the ED with 2 weeks of worsening shortness of breath.       IMPRESSION:  Cardiogenic shock on inotropic support  HFrEF 20%, NICM  RLL Mass/opacity/ possible Pneumonia, r/o malignancy   Possible right main and segmental new pulmonary embolus  LV Mass, ?Thrombus   Transaminitis in the setting of hypotension, shock liver    Plan:  - s/p bronch/EBUS biopsy 5/05 - pathology resulted- negative for malignancy   - Continue inotropic support through PICC line- dobutamine @ 7.5mcg/kg/min and milrinone @ 0.25 mcg/kg/min  -consider go up on dobutamine drip if need pressor support due to hypotension (NSVT on tele)  -Bumex gtt  + 3% hypertonic saline BID on hold due to contraction alkalosis , now hypotension   -Maintain net negative goal  -Monitor for contraction alkalosis- give Diamox PRN  -Continue NG tube feeding as well as encourage food intake to maximize nutrition  -Daily physical therapy follow-up / OOB as tolerated  -Please encourage incentive spirometer 10x/hr while awake   -Get BMP twice daily with magnesium   -Maintain potassium >4.0, Mg >2.2  -Strict intake and output  -Daily weight   -Rest of care as per primary team  -Plan discussed with patient, family at bedside, and CCU team  -Will continue to follow

## 2023-05-11 NOTE — PROGRESS NOTE ADULT - NS ATTEND AMEND GEN_ALL_CORE FT
Diuretics held due to hypotension this morning, patient remains overloaded dependent on two inotropic agents. Lactate 1.7 this am from 2.3 overnight. Patient continues to have frequent ventricular ectopies and short runs of VT. She remains very weak but working with PT. She is eating <50% of her meals; getting NG feeds q8 hrs.       Resume diuretics once BP improves   Continue inotropic support with dobutamine 7.5 mcg/kg/min and milrinone 0.25 mcg/kg/min  Monitor lactate, LFTs, renal function daily  Check ferritin level tomorrow  Continue amiodarone for NSVT   NG feeds intermittently - appreciate nutrition service f/u   Daily PT / IS/ OOB to chair  Check weekly pre-albumin / procalcitonin

## 2023-05-11 NOTE — PROGRESS NOTE ADULT - CRITICAL CARE ATTENDING COMMENT
(Date of Service: 05/05/2023)
(Date of Service: 04/24/2023)
(Date of Service: 05/03/2023)
(Date of Service: 05/04/2023)
(Date of Service: 04/26/2023)
(Date of Service: 04/28/2023)
(Date of Service: 04/25/2023)
(Date of Service: 04/27/2023)
(Date of Service: 05/02/2023)
(Date of Service: 05/01/2023)
(Date of Service: 05/08/2023)
(Date of Service: 05/10/2023)
(Date of Service: 05/11/2023)

## 2023-05-11 NOTE — PROGRESS NOTE ADULT - ASSESSMENT
77 year old woman with PMHx of HTN, HLD, nonischemic cardiomyopathy, HFrEF s/p AICD presents to the ED with 2 weeks of worsening shortness of breath with increasing dry cough over this time period accepted to CCU for cardiogenic shock    #Cardiogenic Shock   #LV thrombus, suspected new PE  #NICM  #HFrEF 25% s/p AICD  #HTN/HLD  - CT CAP on admission - cardiomegaly w/ RH strain  - CT Chest 5/4 subacute PE in R mainstem and subsegmental branches  - TTE 4/22/2023 EF <20%, large fixed thrombus vs mass on apical LV wall, GIIDD, mod-sev TR, mod MR, mild MO, mild PH  - LE venous duplex repeat -ve 5/6  - NSVT runs on monitor last 5/10 ~2 sec - c/w amiodarone 200mg po qD  - c/w dobutamine and milrinone ggt -via PICC line  - c/w eliquis 10mg BID   - holding diuretics in setting of MAP ~60, s/p 100cc albumin bolus  - holding bumex ggt and metolazone, s/p diamox dose overnight 5/10  - lactate 1.7, pH 7.52/39/97/32 ABG 5/11  - BMP, Mg BID  - goal net -ve 1-2L/day  - HF following   - Mg >2, K>4  - daily weight  - strict I/O  - if persistent runs of NSVT will get EP consult     #Post obstructive pneumonia  #Subacute PE  #Positive COVID-19 PCR  - CT CAP - R hilar LAD, R perihilar soft tissue density w/ indentation/possible invasion of R pulmonary artery and narrowing of proximal R lobar and segmental branches w/ consolidation of RLL  - MRSA -ve  - completed zosyn 7 days course 4/27  - s/p EBUS with biopsy 5/5 - pathology negative for malignancy   - PET scan as outpatient to r/o lung malignancy?  - c/w Incentive Spirometer   - Asymptomatic COVID result, off isolation   - No need for further treatment or diagnostic w/u as per ID    #HAGMA - resolved   #Lactic Acidosis - resolved   #DAVID on CKD - resolved   #Elevated liver enzymes predominantly hepatocellular (AST>ALT) with hyperbilirubinemia likely due to ischemic liver injury - resolved   - lactate 1.7 pH 7.52/39/97/32 5/11  - previously holding lipitor 20mg qhs - can restart now w/ LFTs wnl     #Deconditioning   #Reduced appetite/po intake  #Hypotonic Hyponatremia  - hypervolemic   - Na 132-> 129 -> 132 -> 128  - prealbumin 5 low   - megestrol dose adjusted 400mg qD  - s/p FEES - pureed w/ mildly thick liquids w/ supervised feeds  - c/w NG feeds TID for supplementation   - nutrition following   - continue to trend lytes, Cr   - PT/OT following - needs daily PT for optimization     #Constipation   - c/w senna qhs and miralax BID   - abdomen soft, non-distended, passing gas   - monitor for BM     #Follow Up  - monitor UOP, trend lytes  - holding diuresis for now  - PT/OT, encourage OOBTC and ambulation   - monitor for hypotension if MAP < 60 can adjust pressor requirements     # Misc  - DVT Prophylaxis: eliquis  - GI Prophylaxis: pantoprazole    Tablet 40 milliGRAM(s) Oral before breakfast  - Diet: Diet, DASH/TLC with 1.2 L fluid restriction    - Activity: Activity - Ambulate as Tolerated  - Code Status: Full , Palliative on board 77 year old woman with PMHx of HTN, HLD, nonischemic cardiomyopathy, HFrEF s/p AICD presents to the ED with 2 weeks of worsening shortness of breath with increasing dry cough over this time period accepted to CCU for cardiogenic shock    #Cardiogenic Shock   #LV thrombus, suspected new PE  #NICM  #HFrEF 25% s/p AICD  #HTN/HLD  - CT CAP on admission - cardiomegaly w/ RH strain  - CT Chest 5/4 subacute PE in R mainstem and subsegmental branches  - TTE 4/22/2023 EF <20%, large fixed thrombus vs mass on apical LV wall, GIIDD, mod-sev TR, mod MR, mild WA, mild PH  - LE venous duplex repeat -ve 5/6  - NSVT runs on monitor last 5/10 ~2 sec - c/w amiodarone 200mg po qD  - c/w dobutamine and milrinone ggt -via PICC line  - c/w eliquis 10mg BID   - holding diuretics in setting of MAP ~60, s/p 100cc albumin bolus  - holding bumex ggt and metolazone, s/p diamox dose overnight 5/10  - lactate 1.7, pH 7.52/39/97/32 ABG 5/11  - BMP, Mg BID  - goal net -ve 1-2L/day  - HF following - MAP goal >55  - Mg >2, K>4  - daily weight  - strict I/O  - if persistent runs of NSVT will get EP consult     #Post obstructive pneumonia  #Subacute PE  #Positive COVID-19 PCR  - CT CAP - R hilar LAD, R perihilar soft tissue density w/ indentation/possible invasion of R pulmonary artery and narrowing of proximal R lobar and segmental branches w/ consolidation of RLL  - MRSA -ve  - completed zosyn 7 days course 4/27  - s/p EBUS with biopsy 5/5 - pathology negative for malignancy   - PET scan as outpatient to r/o lung malignancy?  - c/w Incentive Spirometer   - Asymptomatic COVID result, off isolation   - No need for further treatment or diagnostic w/u as per ID    #HAGMA - resolved   #Lactic Acidosis - resolved   #DAVID on CKD - resolved   #Elevated liver enzymes predominantly hepatocellular (AST>ALT) with hyperbilirubinemia likely due to ischemic liver injury - resolved   - lactate 1.7 pH 7.52/39/97/32 5/11  - previously holding lipitor 20mg qhs - can restart now w/ LFTs wnl     #Deconditioning   #Reduced appetite/po intake  #Hypotonic Hyponatremia  - hypervolemic   - Na 132-> 129 -> 132 -> 128  - prealbumin 5 low   - megestrol dose adjusted 400mg qD  - s/p FEES - pureed w/ mildly thick liquids w/ supervised feeds  - c/w NG feeds TID for supplementation   - nutrition following   - continue to trend lytes, Cr   - PT/OT following - needs daily PT for optimization     #Constipation   - c/w senna qhs and miralax BID   - abdomen soft, non-distended, passing gas   - monitor for BM     #Follow Up  - monitor UOP, trend lytes  - holding diuresis for now  - PT/OT, encourage OOBTC and ambulation   - monitor for hypotension if MAP < 60 can adjust pressor requirements     # Misc  - DVT Prophylaxis: eliquis  - GI Prophylaxis: pantoprazole    Tablet 40 milliGRAM(s) Oral before breakfast  - Diet: Diet, DASH/TLC with 1.2 L fluid restriction    - Activity: Activity - Ambulate as Tolerated  - Code Status: Full , Palliative on board

## 2023-05-12 LAB
ALBUMIN SERPL ELPH-MCNC: 2.9 G/DL — LOW (ref 3.5–5.2)
ALBUMIN SERPL ELPH-MCNC: 3 G/DL — LOW (ref 3.5–5.2)
ALP SERPL-CCNC: 143 U/L — HIGH (ref 30–115)
ALP SERPL-CCNC: 160 U/L — HIGH (ref 30–115)
ALT FLD-CCNC: 28 U/L — SIGNIFICANT CHANGE UP (ref 0–41)
ALT FLD-CCNC: 31 U/L — SIGNIFICANT CHANGE UP (ref 0–41)
ANION GAP SERPL CALC-SCNC: 13 MMOL/L — SIGNIFICANT CHANGE UP (ref 7–14)
ANION GAP SERPL CALC-SCNC: 14 MMOL/L — SIGNIFICANT CHANGE UP (ref 7–14)
ANION GAP SERPL CALC-SCNC: 14 MMOL/L — SIGNIFICANT CHANGE UP (ref 7–14)
ANION GAP SERPL CALC-SCNC: 16 MMOL/L — HIGH (ref 7–14)
AST SERPL-CCNC: 23 U/L — SIGNIFICANT CHANGE UP (ref 0–41)
AST SERPL-CCNC: 27 U/L — SIGNIFICANT CHANGE UP (ref 0–41)
BILIRUB DIRECT SERPL-MCNC: 0.9 MG/DL — HIGH (ref 0–0.3)
BILIRUB INDIRECT FLD-MCNC: 0.9 MG/DL — SIGNIFICANT CHANGE UP (ref 0.2–1.2)
BILIRUB SERPL-MCNC: 1.8 MG/DL — HIGH (ref 0.2–1.2)
BILIRUB SERPL-MCNC: 2 MG/DL — HIGH (ref 0.2–1.2)
BUN SERPL-MCNC: 53 MG/DL — HIGH (ref 10–20)
BUN SERPL-MCNC: 55 MG/DL — HIGH (ref 10–20)
BUN SERPL-MCNC: 58 MG/DL — HIGH (ref 10–20)
BUN SERPL-MCNC: 60 MG/DL — HIGH (ref 10–20)
CALCIUM SERPL-MCNC: 8.5 MG/DL — SIGNIFICANT CHANGE UP (ref 8.4–10.5)
CALCIUM SERPL-MCNC: 8.7 MG/DL — SIGNIFICANT CHANGE UP (ref 8.4–10.5)
CALCIUM SERPL-MCNC: 8.8 MG/DL — SIGNIFICANT CHANGE UP (ref 8.4–10.4)
CALCIUM SERPL-MCNC: 8.9 MG/DL — SIGNIFICANT CHANGE UP (ref 8.4–10.4)
CHLORIDE SERPL-SCNC: 78 MMOL/L — LOW (ref 98–110)
CHLORIDE SERPL-SCNC: 79 MMOL/L — LOW (ref 98–110)
CO2 SERPL-SCNC: 25 MMOL/L — SIGNIFICANT CHANGE UP (ref 17–32)
CO2 SERPL-SCNC: 25 MMOL/L — SIGNIFICANT CHANGE UP (ref 17–32)
CO2 SERPL-SCNC: 26 MMOL/L — SIGNIFICANT CHANGE UP (ref 17–32)
CO2 SERPL-SCNC: 28 MMOL/L — SIGNIFICANT CHANGE UP (ref 17–32)
CREAT ?TM UR-MCNC: 44 MG/DL — SIGNIFICANT CHANGE UP
CREAT SERPL-MCNC: 1.1 MG/DL — SIGNIFICANT CHANGE UP (ref 0.7–1.5)
CREAT SERPL-MCNC: 1.2 MG/DL — SIGNIFICANT CHANGE UP (ref 0.7–1.5)
EGFR: 46 ML/MIN/1.73M2 — LOW
EGFR: 51 ML/MIN/1.73M2 — LOW
GLUCOSE SERPL-MCNC: 121 MG/DL — HIGH (ref 70–99)
GLUCOSE SERPL-MCNC: 134 MG/DL — HIGH (ref 70–99)
GLUCOSE SERPL-MCNC: 175 MG/DL — HIGH (ref 70–99)
GLUCOSE SERPL-MCNC: 218 MG/DL — HIGH (ref 70–99)
HCT VFR BLD CALC: 29.1 % — LOW (ref 37–47)
HGB BLD-MCNC: 10.1 G/DL — LOW (ref 12–16)
IRON SATN MFR SERPL: 23 % — SIGNIFICANT CHANGE UP (ref 15–50)
IRON SATN MFR SERPL: 43 UG/DL — SIGNIFICANT CHANGE UP (ref 35–150)
LACTATE SERPL-SCNC: 1.3 MMOL/L — SIGNIFICANT CHANGE UP (ref 0.7–2)
MAGNESIUM SERPL-MCNC: 2.1 MG/DL — SIGNIFICANT CHANGE UP (ref 1.8–2.4)
MAGNESIUM SERPL-MCNC: 2.1 MG/DL — SIGNIFICANT CHANGE UP (ref 1.8–2.4)
MAGNESIUM SERPL-MCNC: 2.2 MG/DL — SIGNIFICANT CHANGE UP (ref 1.8–2.4)
MAGNESIUM SERPL-MCNC: 2.2 MG/DL — SIGNIFICANT CHANGE UP (ref 1.8–2.4)
MCHC RBC-ENTMCNC: 30.7 PG — SIGNIFICANT CHANGE UP (ref 27–31)
MCHC RBC-ENTMCNC: 34.7 G/DL — SIGNIFICANT CHANGE UP (ref 32–37)
MCV RBC AUTO: 88.4 FL — SIGNIFICANT CHANGE UP (ref 81–99)
NRBC # BLD: 0 /100 WBCS — SIGNIFICANT CHANGE UP (ref 0–0)
OSMOLALITY SERPL: 259 MOS/KG — LOW (ref 280–301)
OSMOLALITY UR: 328 MOS/KG — SIGNIFICANT CHANGE UP (ref 50–1200)
PLATELET # BLD AUTO: 452 K/UL — HIGH (ref 130–400)
PMV BLD: 10.6 FL — HIGH (ref 7.4–10.4)
POTASSIUM SERPL-MCNC: 4 MMOL/L — SIGNIFICANT CHANGE UP (ref 3.5–5)
POTASSIUM SERPL-MCNC: 4 MMOL/L — SIGNIFICANT CHANGE UP (ref 3.5–5)
POTASSIUM SERPL-MCNC: 4.2 MMOL/L — SIGNIFICANT CHANGE UP (ref 3.5–5)
POTASSIUM SERPL-MCNC: 4.5 MMOL/L — SIGNIFICANT CHANGE UP (ref 3.5–5)
POTASSIUM SERPL-SCNC: 4 MMOL/L — SIGNIFICANT CHANGE UP (ref 3.5–5)
POTASSIUM SERPL-SCNC: 4 MMOL/L — SIGNIFICANT CHANGE UP (ref 3.5–5)
POTASSIUM SERPL-SCNC: 4.2 MMOL/L — SIGNIFICANT CHANGE UP (ref 3.5–5)
POTASSIUM SERPL-SCNC: 4.5 MMOL/L — SIGNIFICANT CHANGE UP (ref 3.5–5)
POTASSIUM UR-SCNC: 60 MMOL/L — SIGNIFICANT CHANGE UP
POTASSIUM UR-SCNC: 75 MMOL/L — SIGNIFICANT CHANGE UP
PROCALCITONIN SERPL-MCNC: 0.56 NG/ML — HIGH (ref 0.02–0.1)
PROT ?TM UR-MCNC: 53 MG/DLG/24H — SIGNIFICANT CHANGE UP
PROT SERPL-MCNC: 6.5 G/DL — SIGNIFICANT CHANGE UP (ref 6–8)
PROT SERPL-MCNC: 6.7 G/DL — SIGNIFICANT CHANGE UP (ref 6–8)
PROT/CREAT UR-RTO: 1.2 RATIO — HIGH (ref 0–0.2)
RBC # BLD: 3.29 M/UL — LOW (ref 4.2–5.4)
RBC # FLD: 15.9 % — HIGH (ref 11.5–14.5)
SODIUM SERPL-SCNC: 117 MMOL/L — CRITICAL LOW (ref 135–146)
SODIUM SERPL-SCNC: 119 MMOL/L — CRITICAL LOW (ref 135–146)
SODIUM SERPL-SCNC: 120 MMOL/L — LOW (ref 135–146)
SODIUM SERPL-SCNC: 120 MMOL/L — LOW (ref 135–146)
SODIUM UR-SCNC: 39 MMOL/L — SIGNIFICANT CHANGE UP
SODIUM UR-SCNC: <20 MMOL/L — SIGNIFICANT CHANGE UP
T3 SERPL-MCNC: 56 NG/DL — LOW (ref 80–200)
T4 AB SER-ACNC: 7.8 UG/DL — SIGNIFICANT CHANGE UP (ref 4.6–12)
T4 FREE SERPL-MCNC: 1.5 NG/DL — SIGNIFICANT CHANGE UP (ref 0.9–1.8)
TIBC SERPL-MCNC: 184 UG/DL — LOW (ref 220–430)
TRANSFERRIN SERPL-MCNC: 148 MG/DL — LOW (ref 200–360)
TSH SERPL-MCNC: 2.26 UIU/ML — SIGNIFICANT CHANGE UP (ref 0.27–4.2)
UIBC SERPL-MCNC: 141 UG/DL — SIGNIFICANT CHANGE UP (ref 110–370)
UUN UR-MCNC: 405 MG/DL — SIGNIFICANT CHANGE UP
WBC # BLD: 11.39 K/UL — HIGH (ref 4.8–10.8)
WBC # FLD AUTO: 11.39 K/UL — HIGH (ref 4.8–10.8)

## 2023-05-12 PROCEDURE — 71045 X-RAY EXAM CHEST 1 VIEW: CPT | Mod: 26

## 2023-05-12 PROCEDURE — 99291 CRITICAL CARE FIRST HOUR: CPT

## 2023-05-12 PROCEDURE — 99291 CRITICAL CARE FIRST HOUR: CPT | Mod: 25

## 2023-05-12 PROCEDURE — 93010 ELECTROCARDIOGRAM REPORT: CPT

## 2023-05-12 RX ORDER — BUMETANIDE 0.25 MG/ML
3 INJECTION INTRAMUSCULAR; INTRAVENOUS ONCE
Refills: 0 | Status: COMPLETED | OUTPATIENT
Start: 2023-05-12 | End: 2023-05-12

## 2023-05-12 RX ORDER — SODIUM CHLORIDE 5 G/100ML
150 INJECTION, SOLUTION INTRAVENOUS ONCE
Refills: 0 | Status: COMPLETED | OUTPATIENT
Start: 2023-05-12 | End: 2023-05-12

## 2023-05-12 RX ORDER — DOBUTAMINE HCL 250MG/20ML
8 VIAL (ML) INTRAVENOUS
Qty: 1000 | Refills: 0 | Status: DISCONTINUED | OUTPATIENT
Start: 2023-05-12 | End: 2023-05-17

## 2023-05-12 RX ORDER — SODIUM CHLORIDE 5 G/100ML
150 INJECTION, SOLUTION INTRAVENOUS ONCE
Refills: 0 | Status: COMPLETED | OUTPATIENT
Start: 2023-05-13 | End: 2023-05-13

## 2023-05-12 RX ORDER — BUMETANIDE 0.25 MG/ML
2 INJECTION INTRAMUSCULAR; INTRAVENOUS
Qty: 20 | Refills: 0 | Status: DISCONTINUED | OUTPATIENT
Start: 2023-05-12 | End: 2023-05-14

## 2023-05-12 RX ORDER — SODIUM CHLORIDE 9 MG/ML
500 INJECTION INTRAMUSCULAR; INTRAVENOUS; SUBCUTANEOUS ONCE
Refills: 0 | Status: COMPLETED | OUTPATIENT
Start: 2023-05-12 | End: 2023-05-12

## 2023-05-12 RX ORDER — SODIUM CHLORIDE 5 G/100ML
150 INJECTION, SOLUTION INTRAVENOUS ONCE
Refills: 0 | Status: DISCONTINUED | OUTPATIENT
Start: 2023-05-12 | End: 2023-05-12

## 2023-05-12 RX ADMIN — Medication 5 MILLIGRAM(S): at 21:02

## 2023-05-12 RX ADMIN — Medication 500 MILLIGRAM(S): at 12:21

## 2023-05-12 RX ADMIN — AMIODARONE HYDROCHLORIDE 200 MILLIGRAM(S): 400 TABLET ORAL at 17:11

## 2023-05-12 RX ADMIN — BUMETANIDE 10 MG/HR: 0.25 INJECTION INTRAMUSCULAR; INTRAVENOUS at 20:53

## 2023-05-12 RX ADMIN — POLYETHYLENE GLYCOL 3350 17 GRAM(S): 17 POWDER, FOR SOLUTION ORAL at 05:12

## 2023-05-12 RX ADMIN — Medication 50 MILLIEQUIVALENT(S): at 00:29

## 2023-05-12 RX ADMIN — SODIUM CHLORIDE 1000 MILLILITER(S): 9 INJECTION INTRAMUSCULAR; INTRAVENOUS; SUBCUTANEOUS at 09:52

## 2023-05-12 RX ADMIN — APIXABAN 10 MILLIGRAM(S): 2.5 TABLET, FILM COATED ORAL at 17:11

## 2023-05-12 RX ADMIN — BUMETANIDE 124 MILLIGRAM(S): 0.25 INJECTION INTRAMUSCULAR; INTRAVENOUS at 11:45

## 2023-05-12 RX ADMIN — BUMETANIDE 10 MG/HR: 0.25 INJECTION INTRAMUSCULAR; INTRAVENOUS at 15:05

## 2023-05-12 RX ADMIN — CHLORHEXIDINE GLUCONATE 1 APPLICATION(S): 213 SOLUTION TOPICAL at 12:21

## 2023-05-12 RX ADMIN — AMIODARONE HYDROCHLORIDE 200 MILLIGRAM(S): 400 TABLET ORAL at 05:11

## 2023-05-12 RX ADMIN — APIXABAN 10 MILLIGRAM(S): 2.5 TABLET, FILM COATED ORAL at 05:12

## 2023-05-12 RX ADMIN — MEGESTROL ACETATE 400 MILLIGRAM(S): 40 SUSPENSION ORAL at 12:21

## 2023-05-12 RX ADMIN — Medication 7.62 MICROGRAM(S)/KG/MIN: at 12:37

## 2023-05-12 RX ADMIN — SODIUM CHLORIDE 50 MILLILITER(S): 5 INJECTION, SOLUTION INTRAVENOUS at 15:06

## 2023-05-12 RX ADMIN — PANTOPRAZOLE SODIUM 40 MILLIGRAM(S): 20 TABLET, DELAYED RELEASE ORAL at 05:12

## 2023-05-12 RX ADMIN — POLYETHYLENE GLYCOL 3350 17 GRAM(S): 17 POWDER, FOR SOLUTION ORAL at 17:11

## 2023-05-12 RX ADMIN — SODIUM CHLORIDE 50 MILLILITER(S): 5 INJECTION, SOLUTION INTRAVENOUS at 18:29

## 2023-05-12 RX ADMIN — SENNA PLUS 2 TABLET(S): 8.6 TABLET ORAL at 21:05

## 2023-05-12 NOTE — PROGRESS NOTE ADULT - NS ATTEND AMEND GEN_ALL_CORE FT
Patient remains critically ill on dual inotropic support. She is hyponatremic to low 120s which is likely due to hypervolemic in the context of advanced heart failure. Diuretics were stopped over 24 hrs ago. Renal function slightly worsening. BP has intermittent episodes of hypotension to 70s but remains asymptomatic and normal lactate. She is very weak but is working with PT. Her appetite remains poor but she is getting NG feeds 3 times/day. Telemetry shows frequent ventricular ectopic beats with runs of NSVT.     Continue dual inotropic support, increase dobutamine to 8.5 mcg/kg/min; continue milrinone gtt at 0.25 mcg/kg/min  Resume Bumex gtt + hypertonic saline + metolazone   PLEASE avoid giving any fluids, her hyponatremia is due to severe HF/ hypervolemia    Monitor BMP closely   Strict I/Os   Continue amiodarone   Aggressive PT / IS   Plan for CT chest without contrast and LHC early next week   Discussed with family at bedside and primary team

## 2023-05-12 NOTE — PROGRESS NOTE ADULT - SUBJECTIVE AND OBJECTIVE BOX
HPI:  77 year old woman with PMHx of HTN, HLD, nonischemic cardiomyopathy, HFrEF s/p AICD presents to the ED with 2 weeks of worsening shortness of breath. Patient and her son state patient has been experiencing shortness of breath that has progressed from present on activity to now shortness of breath during conversation. Patient also notes an increasing dry cough over this time period. Denies history of smoking, fevers/chills, chest pain, nausea/vomiting, bloody sputum, dark/tarry/bloody bowel movements, upper respiratory symptoms.     In ED, patient's HR is 110 and saturating 98% on RA  CT C/A/P revealing cardiomegaly with evidence of right heart strain, R hilar lymphadenopathy with R perihilar soft tissue density with indentation/possible invasion of right pulmonary artery, and narrowing of proximal R lobar and segmental branches with associated consolidation of right lower lobe. 2.2 x 2.8 cm filling defect noted in apex of left ventricle.   Last ECHO 8/2021 revealing EF 20-25%, mild Mr, mild TR, mild Pulm HTN  Dimer 2600, trops negative BNP 95561  Duplex venous LE negative for DVT; CTA chest negative for PE  Cr 1.4 (0.7 in 2021), Na+ 128, T.Benjamin 2.3, LFTs elevated (hemolyzed)  Lactate 5.8-->4.6  Admitted to SDU (22 Apr 2023 01:10)    Currently admitted to medicine with the primary diagnosis of Lung mass       Today is hospital day 21d.     INTERVAL HPI / OVERNIGHT EVENTS:  Patient was examined and seen at bedside. This morning she is resting comfortably in bed and reports no new issues or overnight events. Hemodynamically stable, MAP 56. Bedside IVC 2.0cm not compressible restarted diuresis today and increased inotrope support, patient tolerating minimal oral diet, supplementing with ng nutrition, voiding appropriately, having  BMs. Will continue to monitor.     ROS: Otherwise unremarkable     PAST MEDICAL & SURGICAL HISTORY  CHF, stage C    HTN (hypertension)      ALLERGIES  epinephrine (Unknown)    MEDICATIONS  STANDING MEDICATIONS  aMIOdarone    Tablet 200 milliGRAM(s) Oral two times a day  apixaban 10 milliGRAM(s) Oral two times a day  buMETAnide Infusion 2 mG/Hr IV Continuous <Continuous>  buMETAnide IVPB 3 milliGRAM(s) IV Intermittent once  chlorhexidine 2% Cloths 1 Application(s) Topical daily  DOBUTamine Infusion 8 MICROgram(s)/kG/Min IV Continuous <Continuous>  magnesium gluconate 500 milliGRAM(s) Oral daily  megestrol Suspension 400 milliGRAM(s) Oral daily  melatonin 5 milliGRAM(s) Oral at bedtime  milrinone Infusion 0.25 MICROgram(s)/kG/Min IV Continuous <Continuous>  pantoprazole    Tablet 40 milliGRAM(s) Oral before breakfast  polyethylene glycol 3350 17 Gram(s) Oral two times a day  senna 2 Tablet(s) Oral at bedtime  sodium chloride 3% Bolus 150 milliLiter(s) IV Bolus once    PRN MEDICATIONS  benzocaine 20% Spray 1 Spray(s) Topical four times a day PRN    VITALS:  T(F): 97  HR: 98  BP: 78/50  RR: 19  SpO2: 97%    PHYSICAL EXAM  GEN: NAD, Resting comfortably in bed, cooperative  PULM: Clear to auscultation bilaterally, No wheezing, rales, rhonchi  CVS: Regular rate and rhythm, S1-S2, no murmurs, heaves, thrills  ABD: Soft, non-tender, non-distended, no guarding, BS +  EXT: No edema/cyanosis, extremities warm/dry, peripheral pulses palpable   NEURO: A&Ox3, no focal deficits    LABS                        10.1   11.39 )-----------( 452      ( 12 May 2023 05:12 )             29.1     05-12    120<L>  |  79<L>  |  53<H>  ----------------------------<  134<H>  4.0   |  28  |  1.1    Ca    8.5      12 May 2023 12:15  Mg     2.1     05-12    TPro  6.5  /  Alb  2.9<L>  /  TBili  2.0<H>  /  DBili  x   /  AST  23  /  ALT  28  /  AlkPhos  143<H>  05-12        ABG - ( 11 May 2023 05:47 )  pH, Arterial: 7.52  pH, Blood: x     /  pCO2: 39    /  pO2: 97    / HCO3: 32    / Base Excess: 8.3   /  SaO2: 99.4              Lactate, Blood: 1.3 mmol/L (05-12-23 @ 07:05)            RADIOLOGY     HPI:  77 year old woman with PMHx of HTN, HLD, nonischemic cardiomyopathy, HFrEF s/p AICD presents to the ED with 2 weeks of worsening shortness of breath. Patient and her son state patient has been experiencing shortness of breath that has progressed from present on activity to now shortness of breath during conversation. Patient also notes an increasing dry cough over this time period. Denies history of smoking, fevers/chills, chest pain, nausea/vomiting, bloody sputum, dark/tarry/bloody bowel movements, upper respiratory symptoms.     In ED, patient's HR is 110 and saturating 98% on RA  CT C/A/P revealing cardiomegaly with evidence of right heart strain, R hilar lymphadenopathy with R perihilar soft tissue density with indentation/possible invasion of right pulmonary artery, and narrowing of proximal R lobar and segmental branches with associated consolidation of right lower lobe. 2.2 x 2.8 cm filling defect noted in apex of left ventricle.   Last ECHO 8/2021 revealing EF 20-25%, mild Mr, mild TR, mild Pulm HTN  Dimer 2600, trops negative BNP 53176  Duplex venous LE negative for DVT; CTA chest negative for PE  Cr 1.4 (0.7 in 2021), Na+ 128, T.Benjamin 2.3, LFTs elevated (hemolyzed)  Lactate 5.8-->4.6  Admitted to SDU (22 Apr 2023 01:10)    Currently admitted to medicine with the primary diagnosis of Lung mass       Today is hospital day 21d.     INTERVAL HPI / OVERNIGHT EVENTS:  Patient was examined and seen at bedside. This morning she is resting comfortably in bed and reports no new issues or overnight events. Hemodynamically stable, MAP 56. Bedside IVC 2.0cm not compressible restarted diuresis today and increased inotrope support, patient tolerating minimal oral diet, supplementing with ng nutrition, voiding appropriately, having  BMs. Will continue to monitor.     ROS: Otherwise unremarkable     PAST MEDICAL & SURGICAL HISTORY  CHF, stage C    HTN (hypertension)      ALLERGIES  epinephrine (Unknown)    MEDICATIONS  STANDING MEDICATIONS  aMIOdarone    Tablet 200 milliGRAM(s) Oral two times a day  apixaban 10 milliGRAM(s) Oral two times a day  buMETAnide Infusion 2 mG/Hr IV Continuous <Continuous>  buMETAnide IVPB 3 milliGRAM(s) IV Intermittent once  chlorhexidine 2% Cloths 1 Application(s) Topical daily  DOBUTamine Infusion 8 MICROgram(s)/kG/Min IV Continuous <Continuous>  magnesium gluconate 500 milliGRAM(s) Oral daily  megestrol Suspension 400 milliGRAM(s) Oral daily  melatonin 5 milliGRAM(s) Oral at bedtime  milrinone Infusion 0.25 MICROgram(s)/kG/Min IV Continuous <Continuous>  pantoprazole    Tablet 40 milliGRAM(s) Oral before breakfast  polyethylene glycol 3350 17 Gram(s) Oral two times a day  senna 2 Tablet(s) Oral at bedtime  sodium chloride 3% Bolus 150 milliLiter(s) IV Bolus once    PRN MEDICATIONS  benzocaine 20% Spray 1 Spray(s) Topical four times a day PRN    VITALS:  T(F): 97  HR: 98  BP: 78/50  RR: 19  SpO2: 97%    PHYSICAL EXAM  GEN: NAD, Resting comfortably in bed, cooperative, elderly, frail, NG tube in place  PULM: Clear to auscultation bilaterally, No wheezing, rales, rhonchi  CVS: Regular rate and rhythm, S1-S2, no murmurs, heaves, thrills  ABD: Soft, non-tender, non-distended, no guarding, BS +  EXT: No edema/cyanosis, extremities warm/dry, peripheral pulses palpable   NEURO: A&Ox3, no focal deficits    LABS                        10.1   11.39 )-----------( 452      ( 12 May 2023 05:12 )             29.1     05-12    120<L>  |  79<L>  |  53<H>  ----------------------------<  134<H>  4.0   |  28  |  1.1    Ca    8.5      12 May 2023 12:15  Mg     2.1     05-12    TPro  6.5  /  Alb  2.9<L>  /  TBili  2.0<H>  /  DBili  x   /  AST  23  /  ALT  28  /  AlkPhos  143<H>  05-12        ABG - ( 11 May 2023 05:47 )  pH, Arterial: 7.52  pH, Blood: x     /  pCO2: 39    /  pO2: 97    / HCO3: 32    / Base Excess: 8.3   /  SaO2: 99.4              Lactate, Blood: 1.3 mmol/L (05-12-23 @ 07:05)            RADIOLOGY  CTA Chest 5/4  Redemonstrated right hilar lesion with compression and possible invasion   of adjacent main pulmonary artery segment (unchanged from previous study.    Subacute right main and segmental pulmonary embolus.    Increased right lower lobe consolidative and groundglass opacities,   likely infectious/inflammatory in etiology.    4 mm left anterior upper lobe groundglass nodule.    US Kidney Bladder 4/22  No sonographic evidence of hydronephrosis.    b/l le venous duplex 5/6  No evidence of deep venous thrombosis in either lower extremity.    TTE 4/22:   1. Left ventricular ejection fraction, by visual estimation, is <20%.   2. Severely decreased global left ventricular systolic function.   3. Severely enlarged left atrium.   4. Elevated mean left atrial pressure.   5. Large, fixed thrombus vs. mass on the apical wall of the left   ventricle.   6. Spectral Doppler shows pseudonormal pattern of left ventricular   myocardial filling (Grade II diastolic dysfunction).   7. Moderately reduced RV systolic function.   8. Mildly enlarged right atrium.   9. Moderate mitral valve regurgitation.  10. Moderate-severe tricuspid regurgitation.  11. Mild pulmonic valve regurgitation.  12. Estimated pulmonary artery systolic pressure is 42.9 mmHg assuming a   right atrial pressure of 15 mmHg, which is consistent with mild pulmonary   hypertension.  13. Endocardial visualization was enhanced with intravenous echo contrast.    CXR 5/7  Congestive changes. Bibasilar opacities/effusions, unchanged

## 2023-05-12 NOTE — PROGRESS NOTE ADULT - ASSESSMENT
Assessment:   77 year old woman with pmh of HTN, HLD, nonischemic cardiomyopathy, HFrEF, s/p ICD and PPM presents to the ED with 2 weeks of worsening shortness of breath.       IMPRESSION:  Cardiogenic shock on inotropic support  HFrEF 20%, NICM  RLL Mass/opacity/ possible Pneumonia, r/o malignancy   Possible right main and segmental new pulmonary embolus  LV Mass, ?Thrombus   Transaminitis in the setting of hypotension, shock liver    Plan:  - s/p bronch/EBUS biopsy 5/05 - pathology resulted- negative for malignancy   - Remains on inotropic support through PICC line- currently on dobutamine @ 7.5mcg/kg/min and milrinone @ 0.25 mcg/kg/min  - Consider increasing dobutamine gtt to 8.5 mcg/kg/min to hep improve hypotension  - Give pressor support (levo/vaso) PRN to maintain MAP > 60-65 mmHg  - Diuretics held yesterday for contraction alkalosis, Na 119 on AM labs today likely 2/2 hypervolemia  - POCUS exam: IVC dilated to 2.2cm, non collapsing with respirations  - Give Bumex 3mg IVP x1 with 3% Hypertonic Saline 150ml (If infused throughout a central line, run over one hour, if a peripheral line is to be used run over 3 hours) *Start infusion, after 30 minutes- give Bumex IVP then continue rest of infusion*   - Then continue Bumex gtt @ 2mg/hr and 3% Hypertonic Saline 150ml BID  - Monitor for contraction alkalosis- give Diamox PRN  - Obtain procal and ferritin level   - Continue NG tube feeding as well as encourage food intake to maximize nutrition  - Daily physical therapy follow-up / OOB daily as tolerated  - Please encourage incentive spirometer 10x/hr while awake   -Get BMP twice daily with magnesium   - Maintain potassium >4.0, Mg >2.2  - Strict intake and output  - Daily weight   - R/LHC and CT Chest once patient more euvolemic  - Rest of care as per primary team  - Plan discussed with patient, spouse at bedside, and CCU team  - Will continue to follow Assessment:   77 year old woman with pmh of HTN, HLD, nonischemic cardiomyopathy, HFrEF, s/p ICD and PPM presents to the ED with 2 weeks of worsening shortness of breath.       IMPRESSION:  Cardiogenic shock on inotropic support  HFrEF 20%, NICM  RLL Mass/opacity/ possible Pneumonia, r/o malignancy   Possible right main and segmental new pulmonary embolus  LV Mass, ?Thrombus   Transaminitis in the setting of hypotension, shock liver    Plan:  - s/p bronch/EBUS biopsy 5/05 - pathology resulted- negative for malignancy   - Remains on inotropic support through PICC line- currently on dobutamine @ 7.5mcg/kg/min and milrinone @ 0.25 mcg/kg/min  - Consider increasing dobutamine gtt to 8.5 mcg/kg/min to hep improve hypotension  - Give pressor support (levo/vaso) PRN to maintain MAP > 60-65 mmHg  - Diuretics held yesterday for contraction alkalosis, Na 119 on AM labs today likely 2/2 hypervolemia  - POCUS exam: IVC dilated to 2.2cm, non collapsing with respirations  - Give Bumex 3mg IVP x1 with 3% Hypertonic Saline 150ml (If infused throughout a central line, run over one hour, if a peripheral line is to be used run over 3 hours) *Start infusion, after 30 minutes- give Bumex IVP then continue rest of infusion*   - Then continue Bumex gtt @ 2mg/hr and 3% Hypertonic Saline 150ml BID  - Monitor for contraction alkalosis- give Diamox PRN  - Obtain procal and ferritin level   - Continue NG tube feeding as well as encourage food intake to maximize nutrition- recheck pre-albumin level Monday 5/15  - Daily physical therapy follow-up / OOB daily as tolerated  - Please encourage incentive spirometer 10x/hr while awake   - Get BMP twice daily with magnesium   - Maintain potassium >4.0, Mg >2.2  - Strict intake and output  - Daily weight   - R/LHC and CT Chest once patient more euvolemic  - Rest of care as per primary team  - Plan discussed with patient, spouse at bedside, and CCU team  - Will continue to follow

## 2023-05-12 NOTE — OCCUPATIONAL THERAPY INITIAL EVALUATION ADULT - PERTINENT HX OF CURRENT PROBLEM, REHAB EVAL
78 y/o F with PMHx of HTN, HLD, nonischemic cardiomyopathy, HFrEF s/p AICD presents to the ED with 2 weeks of worsening shortness of breath. Patient and her son state patient has been experiencing shortness of breath that has progressed from present on activity to now shortness of breath during conversation. Patient also notes an increasing dry cough over this time period.  CT C/A/P revealing cardiomegaly with evidence of right heart strain, R hilar lymphadenopathy with R perihilar soft tissue density with indentation/possible invasion of right pulmonary artery, and narrowing of proximal R lobar and segmental branches with associated consolidation of right lower lobe
77 year old woman with PMHx of HTN, HLD, nonischemic cardiomyopathy, HFrEF s/p AICD presents to the ED with 2 weeks of worsening shortness of breath. Patient and her son state patient has been experiencing shortness of breath that has progressed from present on activity to now shortness of breath during conversation. Patient also notes an increasing dry cough over this time period. Denies history of smoking, fevers/chills, chest pain, nausea/vomiting, bloody sputum, dark/tarry/bloody bowel movements, upper respiratory symptoms

## 2023-05-12 NOTE — OCCUPATIONAL THERAPY INITIAL EVALUATION ADULT - NSOTDISCHREC_GEN_A_CORE
Pt requires assistance with ADls and functional transfers. Pending medical prognosis and plan, may benefit from acute inpatient rehab
Patient requires assistance with activities of daily living. OT recommends D/C to rehabilitation facility when medically appropriate. Refer to evaluation/treatment for details.

## 2023-05-12 NOTE — CONSULT NOTE ADULT - ASSESSMENT
77 year old woman with PMHx of HTN, HLD, nonischemic cardiomyopathy, HFrEF s/p AICD presents to the ED with 2 weeks of worsening shortness of breath with increasing dry cough over this time period accepted to CCU for cardiogenic shock    # hyponatremia rule out hypovolemic hyponatremia  # CHF cardiogenic shock on milrinone       - diuretics on hold   - suggest check U osmolarity serum osmolarity serum Uric acid/ TSH / Am cortisol   - suggest bolus of  cc x2 if ok with CCU team   - repeat U lytes in AM   - BMP qshift/ target rise of serum NA 6-8 meq in 24h   - no need for 3% saline yet     will follow

## 2023-05-12 NOTE — PROGRESS NOTE ADULT - SUBJECTIVE AND OBJECTIVE BOX
NUTRITION SUPPORT TEAM  -  PROGRESS NOTE    resting comfortably, awake,alert  famiily at bedside  poor po intake per speech and swallow eval    NGT feed in place  abdomen soft n/d  REVIEW OF SYSTEMS:  Negative except as noted above.     VITALS:  T(F): 97 (05-12 @ 08:00), Max: 97 (05-12 @ 04:00)  HR: 98 (05-12 @ 12:00) (98 - 109)  BP: 78/50 (05-12 @ 12:00) (70/46 - 92/54)  RR: 19 (05-12 @ 12:00) (9 - 30)  SpO2: 97% (05-12 @ 12:00) (96% - 100%)    HEIGHT/WEIGHT/BMI:   Height (cm): 160 (05-05)  Weight (kg): 63.5 (05-05)  BMI (kg/m2): 24.8 (05-05)  05-11-23 @ 07:01  -  05-12-23 @ 07:00  --------------------------------------------------------  IN:    Albumin 5% - 50 mL: 100 mL    DOBUTamine: 170.4 mL    Enteral Tube Flush: 60 mL    Milrinone: 117.6 mL    Oral Fluid: 418 mL    TwoCal HN: 240 mL    TwoCal HN: 240 mL  Total IN: 1346 mL  OUT:    Ureteral Catheter (mL): 2470 mL  Total OUT: 2470 mL  Total NET: -1124 mL        MEDICATIONS:   aMIOdarone    Tablet 200 milliGRAM(s) Oral two times a day  apixaban 10 milliGRAM(s) Oral two times a day  benzocaine 20% Spray 1 Spray(s) Topical four times a day PRN  buMETAnide Infusion 2 mG/Hr (10 mL/Hr) IV Continuous <Continuous>  buMETAnide IVPB 3 milliGRAM(s) IV Intermittent once  chlorhexidine 2% Cloths 1 Application(s) Topical daily  DOBUTamine Infusion 8 MICROgram(s)/kG/Min (7.62 mL/Hr) IV Continuous <Continuous>  magnesium gluconate 500 milliGRAM(s) Oral daily  megestrol Suspension 400 milliGRAM(s) Oral daily  melatonin 5 milliGRAM(s) Oral at bedtime  milrinone Infusion 0.25 MICROgram(s)/kG/Min (4.76 mL/Hr) IV Continuous <Continuous>  pantoprazole    Tablet 40 milliGRAM(s) Oral before breakfast  polyethylene glycol 3350 17 Gram(s) Oral two times a day  senna 2 Tablet(s) Oral at bedtime  sodium chloride 3% Bolus 150 milliLiter(s) IV Bolus once    LABS:                         10.1   11.39 )-----------( 452      ( 12 May 2023 05:12 )             29.1     120<L>  |  79<L>  |  53<H>  ----------------------------<  134<H>          (05-12-23 @ 12:15)  4.0   |  28  |  1.1    Ca    8.5          (05-12-23 @ 12:15)  Mg     2.1         (05-12-23 @ 12:15)  TPro  6.5  /  Alb  2.9<L>  /  TBili  2.0<H>  /  DBili  x   /  AST  23  /  ALT  28  /  AlkPhos  143<H>       05-12-23 @ 12:15  Triglycerides, Serum: 91 mg/dL (04-25 @ 04:50)  Prealbumin, Serum: 5 mg/dL (05-08-23 @ 05:03)  DIET:   Diet, DASH/TLC:   Sodium & Cholesterol Restricted  Pureed (PUREED)  1200mL Fluid Restriction ([id number])  Mildly Thick Liquids (MILDTHICKLIQS)  Tube Feeding Modality: Nasogastric  TwoCal HN  Total Volume for 24 Hours (mL): 720  Bolus  Total Volume of Bolus (mL):  240  Tube Feed Frequency: Every 8 hours   Tube Feed Start Time: 12:00  Bolus Feed Rate (mL per Hour): 500   Bolus Feed Duration (in Hours): 0.5  Bolus Feed Instructions:  feed via NG AFTER each attempted po meal - with pt seated upright during and half hour after a feeding  flush NG with 20 ml water brisk syringe push before & afer each feed  Supplement Feeding Modality:  Oral  Ensure Enlive Cans or Servings Per Day:  2       Frequency:  Daily (05-11-23 @ 13:53) [Active]        RADIOLOGY:  NUTRITION SUPPORT TEAM  -  PROGRESS NOTE    resting comfortably, awake,alert, no signif resp distress at present  famiily at bedside  poor po intake per speech and swallow eval    NGT in place  abdomen soft n/d, feedings tolerated  + issue obtaining prescribed formula earlier    REVIEW OF SYSTEMS:  Negative except as noted above.     VITALS:  T(F): 97 (05-12 @ 08:00), Max: 97 (05-12 @ 04:00)  HR: 98 (05-12 @ 12:00) (98 - 109)  BP: 78/50 (05-12 @ 12:00) (70/46 - 92/54)  RR: 19 (05-12 @ 12:00) (9 - 30)  SpO2: 97% (05-12 @ 12:00) (96% - 100%)    HEIGHT/WEIGHT/BMI:   Height (cm): 160 (05-05)  Weight (kg): 63.5 (05-05)  BMI (kg/m2): 24.8 (05-05)  05-11-23 @ 07:01  -  05-12-23 @ 07:00  --------------------------------------------------------  IN:    Albumin 5% - 50 mL: 100 mL    DOBUTamine: 170.4 mL    Enteral Tube Flush: 60 mL    Milrinone: 117.6 mL    Oral Fluid: 418 mL    TwoCal HN: 240 mL    TwoCal HN: 240 mL  Total IN: 1346 mL  OUT:    Ureteral Catheter (mL): 2470 mL  Total OUT: 2470 mL  Total NET: -1124 mL      MEDICATIONS:   aMIOdarone    Tablet 200 milliGRAM(s) Oral two times a day  apixaban 10 milliGRAM(s) Oral two times a day  benzocaine 20% Spray 1 Spray(s) Topical four times a day PRN  buMETAnide Infusion 2 mG/Hr (10 mL/Hr) IV Continuous <Continuous>  buMETAnide IVPB 3 milliGRAM(s) IV Intermittent once  chlorhexidine 2% Cloths 1 Application(s) Topical daily  DOBUTamine Infusion 8 MICROgram(s)/kG/Min (7.62 mL/Hr) IV Continuous <Continuous>  magnesium gluconate 500 milliGRAM(s) Oral daily  megestrol Suspension 400 milliGRAM(s) Oral daily  melatonin 5 milliGRAM(s) Oral at bedtime  milrinone Infusion 0.25 MICROgram(s)/kG/Min (4.76 mL/Hr) IV Continuous <Continuous>  pantoprazole    Tablet 40 milliGRAM(s) Oral before breakfast  polyethylene glycol 3350 17 Gram(s) Oral two times a day  senna 2 Tablet(s) Oral at bedtime  sodium chloride 3% Bolus 150 milliLiter(s) IV Bolus once    LABS:                         10.1   11.39 )-----------( 452      ( 12 May 2023 05:12 )             29.1     120<L>  |  79<L>  |  53<H>  ----------------------------<  134<H>          (05-12-23 @ 12:15)  4.0   |  28  |  1.1    Ca    8.5          (05-12-23 @ 12:15)  Mg     2.1         (05-12-23 @ 12:15)  TPro  6.5  /  Alb  2.9<L>  /  TBili  2.0<H>  /  DBili  x   /  AST  23  /  ALT  28  /  AlkPhos  143<H>       05-12-23 @ 12:15  Triglycerides, Serum: 91 mg/dL (04-25 @ 04:50)  Prealbumin, Serum: 5 mg/dL (05-08-23 @ 05:03)  DIET:   Diet, DASH/TLC:   Sodium & Cholesterol Restricted  Pureed (PUREED)  1200mL Fluid Restriction ([id number])  Mildly Thick Liquids (MILDTHICKLIQS)  Tube Feeding Modality: Nasogastric  TwoCal HN  Total Volume for 24 Hours (mL): 720  Bolus  Total Volume of Bolus (mL):  240  Tube Feed Frequency: Every 8 hours   Tube Feed Start Time: 12:00  Bolus Feed Rate (mL per Hour): 500   Bolus Feed Duration (in Hours): 0.5  Bolus Feed Instructions:  feed via NG AFTER each attempted po meal - with pt seated upright during and half hour after a feeding  flush NG with 20 ml water brisk syringe push before & afer each feed  Supplement Feeding Modality:  Oral  Ensure Enlive Cans or Servings Per Day:  2       Frequency:  Daily (05-11-23 @ 13:53) [Active]

## 2023-05-12 NOTE — OCCUPATIONAL THERAPY INITIAL EVALUATION ADULT - NS ASR FOLLOW COMMAND OT EVAL
requires increased processing time/100% of the time/able to follow single-step instructions
100% of the time

## 2023-05-12 NOTE — CONSULT NOTE ADULT - SUBJECTIVE AND OBJECTIVE BOX
Nephrology progress note    THIS IS AN INCOMPLETE NOTE . FULL NOTE TO FOLLOW SHORTLY    Patient is seen and examined, events over the last 24 h noted .    Allergies:  epinephrine (Unknown)    Hospital Medications:   MEDICATIONS  (STANDING):  aMIOdarone    Tablet 200 milliGRAM(s) Oral two times a day  apixaban 10 milliGRAM(s) Oral two times a day  chlorhexidine 2% Cloths 1 Application(s) Topical daily  DOBUTamine Infusion 7.5 MICROgram(s)/kG/Min (7.14 mL/Hr) IV Continuous <Continuous>  magnesium gluconate 500 milliGRAM(s) Oral daily  megestrol Suspension 400 milliGRAM(s) Oral daily  melatonin 5 milliGRAM(s) Oral at bedtime  milrinone Infusion 0.25 MICROgram(s)/kG/Min (4.76 mL/Hr) IV Continuous <Continuous>  pantoprazole    Tablet 40 milliGRAM(s) Oral before breakfast  polyethylene glycol 3350 17 Gram(s) Oral two times a day  senna 2 Tablet(s) Oral at bedtime        VITALS:  T(F): 97 (05-12-23 @ 08:00), Max: 98.2 (05-11-23 @ 16:00)  HR: 103 (05-12-23 @ 08:00)  BP: 89/53 (05-12-23 @ 08:00)  RR: 17 (05-12-23 @ 08:00)  SpO2: 98% (05-12-23 @ 08:00)  Wt(kg): --    05-10 @ 07:01  -  05-11 @ 07:00  --------------------------------------------------------  IN: 1741 mL / OUT: 3010 mL / NET: -1269 mL    05-11 @ 07:01  -  05-12 @ 07:00  --------------------------------------------------------  IN: 1346 mL / OUT: 2470 mL / NET: -1124 mL    05-12 @ 07:01  -  05-12 @ 09:15  --------------------------------------------------------  IN: 264 mL / OUT: 30 mL / NET: 234 mL          PHYSICAL EXAM:  Constitutional: NAD  HEENT: anicteric sclera, oropharynx clear, MMM  Neck: No JVD  Respiratory: CTAB, no wheezes, rales or rhonchi  Cardiovascular: S1, S2, RRR  Gastrointestinal: BS+, soft, NT/ND  Extremities: No cyanosis or clubbing. No peripheral edema  :  No yoon.   Skin: No rashes    LABS:  05-12    119<LL>  |  79<L>  |  55<H>  ----------------------------<  121<H>  4.5   |  26  |  1.2      SODIUM TREND:  Sodium 119 [05-12 @ 05:12]  Sodium 122 [05-11 @ 17:13]  Sodium 128 [05-11 @ 05:13]  Sodium 123 [05-11 @ 03:00]  Sodium 127 [05-10 @ 20:00]  Sodium 128 [05-10 @ 04:30]  Sodium 127 [05-09 @ 22:35]  Sodium 127 [05-09 @ 16:29]  Sodium 132 [05-09 @ 04:00]  Sodium 130 [05-09 @ 00:15]    Ca    8.7      12 May 2023 05:12  Mg     2.2     05-12    TPro  6.7  /  Alb  3.0<L>  /  TBili  1.8<H>  /  DBili  0.9<H>  /  AST  27  /  ALT  31  /  AlkPhos  160<H>  05-12                          10.1   11.39 )-----------( 452      ( 12 May 2023 05:12 )             29.1       Urine Studies:    Sodium, Random Urine: 39.0 mmoL/L (05-12 @ 07:40)  Potassium, Random Urine: 60 mmol/L (05-12 @ 07:40)      Iron 43, TIBC 184, %sat 23      [05-12-23 @ 05:12]  Ferritin 3159      [05-06-23 @ 11:00]  TSH 1.38      [04-25-23 @ 05:40]  Lipid: chol 104, TG 91, HDL 17, LDL --      [04-25-23 @ 04:50]    HBsAb Nonreact      [04-25-23 @ 05:40]  HBsAg Nonreact      [04-25-23 @ 05:40]  HBcAb Nonreact      [04-25-23 @ 05:40]  HIV Nonreact      [04-27-23 @ 05:10]    ALE: titer Negative, pattern --      [04-25-23 @ 05:40]  Immunofixation Serum:   No Monoclonal Band Identified    Reference Range: None Detected      [04-25-23 @ 05:40]  SPEP Interpretation: Hypoalbuminemia      [04-25-23 @ 05:40]      RADIOLOGY & ADDITIONAL STUDIES:   NEPHROLOGY CONSULTATION NOTE    77 year old woman with PMHx of HTN, HLD, nonischemic cardiomyopathy, HFrEF s/p AICD presents to the ED on 4/21  with 2 weeks of worsening shortness of breath. Patient and her son state patient has been experiencing shortness of breath that has progressed from present on activity to now shortness of breath during conversation. Patient also notes an increasing dry cough over this time period. Denies history of smoking, fevers/chills, chest pain, nausea/vomiting, bloody sputum, dark/tarry/bloody bowel movements, upper respiratory symptoms.   Patient was admitted to the hospital / interim hx c/w Cardiogenic Shock /LV thrombus, suspected new PE/NICM HFrEF 25% s/p AICD HTN/HLD  Renal called for hyponatremia   at bedside today lethargic weak     PAST MEDICAL & SURGICAL HISTORY:    CHF, stage C  HTN (hypertension)        Allergies:  epinephrine (Unknown)    Home Medications Reviewed  Hospital Medications:   MEDICATIONS  (STANDING):  aMIOdarone    Tablet 200 milliGRAM(s) Oral two times a day  apixaban 10 milliGRAM(s) Oral two times a day  DOBUTamine Infusion 7.5 MICROgram(s)/kG/Min (7.14 mL/Hr) IV Continuous <Continuous>  magnesium gluconate 500 milliGRAM(s) Oral daily  megestrol Suspension 400 milliGRAM(s) Oral daily  melatonin 5 milliGRAM(s) Oral at bedtime  milrinone Infusion 0.25 MICROgram(s)/kG/Min (4.76 mL/Hr) IV Continuous <Continuous>  pantoprazole    Tablet 40 milliGRAM(s) Oral before breakfast  polyethylene glycol 3350 17 Gram(s) Oral two times a day  senna 2 Tablet(s) Oral at bedtime      SOCIAL HISTORY:  Denies ETOH,Smoking,   FAMILY HISTORY:  FH: heart failure (Mother)          REVIEW OF SYSTEMS:  All other review of systems is negative unless indicated above.    VITALS:  T(F): 97 (05-12-23 @ 08:00), Max: 98.2 (05-11-23 @ 16:00)  HR: 100 (05-12-23 @ 10:00)  BP: 89/53 (05-12-23 @ 10:00)  RR: 21 (05-12-23 @ 10:00)  SpO2: 98% (05-12-23 @ 10:00)    05-11 @ 07:01 - 05-12 @ 07:00  --------------------------------------------------------  IN: 1346 mL / OUT: 2470 mL / NET: -1124 mL    05-12 @ 07:01 - 05-12 @ 10:56  --------------------------------------------------------  IN: 288 mL / OUT: 180 mL / NET: 108 mL          05-11-23 @ 07:01  -  05-12-23 @ 07:00  --------------------------------------------------------  IN: 0 mL / OUT: 2470 mL / NET: -2470 mL    05-12-23 @ 07:01  -  05-12-23 @ 10:56  --------------------------------------------------------  IN: 0 mL / OUT: 180 mL / NET: -180 mL      I&O's Detail    11 May 2023 07:01  -  12 May 2023 07:00  --------------------------------------------------------  IN:    Albumin 5% - 50 mL: 100 mL    DOBUTamine: 170.4 mL    Enteral Tube Flush: 60 mL    Milrinone: 117.6 mL    Oral Fluid: 418 mL    TwoCal HN: 240 mL    TwoCal HN: 240 mL  Total IN: 1346 mL    OUT:    Ureteral Catheter (mL): 2470 mL  Total OUT: 2470 mL    Total NET: -1124 mL      12 May 2023 07:01  -  12 May 2023 10:56  --------------------------------------------------------  IN:    DOBUTamine: 28.4 mL    Milrinone: 19.6 mL    TwoCal HN: 240 mL  Total IN: 288 mL    OUT:    Ureteral Catheter (mL): 180 mL  Total OUT: 180 mL    Total NET: 108 mL            PHYSICAL EXAM:  Constitutional: lethargic   Respiratory: CTAB, no wheezes, rales or rhonchi  Cardiovascular: S1, S2, RRR  Gastrointestinal: BS+, soft, NT/ND  Extremities: No cyanosis or clubbing. No peripheral edema  : No CVA tenderness. No yoon.   Skin: No rashes  Vascular Access:    LABS:  05-12    119<LL>  |  79<L>  |  55<H>  ----------------------------<  121<H>  4.5   |  26  |  1.2    SODIUM TREND:  Sodium 119 [05-12 @ 05:12]  Sodium 122 [05-11 @ 17:13]  Sodium 128 [05-11 @ 05:13]  Sodium 123 [05-11 @ 03:00]  Sodium 127 [05-10 @ 20:00]  Sodium 128 [05-10 @ 04:30]  Sodium 127 [05-09 @ 22:35]  Sodium 127 [05-09 @ 16:29]  Sodium 132 [05-09 @ 04:00]  Sodium 130 [05-09 @ 00:15]    Ca    8.7      12 May 2023 05:12  Mg     2.2     05-12    TPro  6.7  /  Alb  3.0<L>  /  TBili  1.8<H>  /  DBili  0.9<H>  /  AST  27  /  ALT  31  /  AlkPhos  160<H>  05-12    Creatinine Trend: 1.2 <--, 1.0 <--, 1.0 <--, 1.0 <--, 0.9 <--, 0.8 <--, 0.8 <--, 0.9 <--, 0.7 <--, 0.7 <--, 0.8 <--, 0.7 <--, 0.7 <--, 0.7 <--, 0.6 <--, 0.7 <--, 0.5 <--, 0.7 <--, 0.7 <--, 0.7 <--, 0.8 <--, 0.7 <--, 0.7 <--, 0.8 <--, 0.8 <--, 0.9 <--, 0.9 <--, 0.8 <--, 0.8 <--, 0.9 <--, 0.9 <--, 0.7 <--, 0.8 <--, 0.7 <--, 0.8 <--, 0.7 <--, 0.8 <--, 0.9 <--, 1.3 <--, 1.3 <--, 1.3 <--, 1.7 <--, 1.7 <--, 1.5 <--, 1.5 <--, 1.4 <--                        10.1   11.39 )-----------( 452      ( 12 May 2023 05:12 )             29.1     Urine Studies:    Sodium, Random Urine: 39.0 mmoL/L (05-12 @ 07:40)  Potassium, Random Urine: 60 mmol/L (05-12 @ 07:40)        Iron 43, TIBC 184, %sat 23      [05-12-23 @ 05:12]  Ferritin 3159      [05-06-23 @ 11:00]  TSH 1.38      [04-25-23 @ 05:40]  Lipid: chol 104, TG 91, HDL 17, LDL --      [04-25-23 @ 04:50]    HBsAb Nonreact      [04-25-23 @ 05:40]  HBsAg Nonreact      [04-25-23 @ 05:40]  HBcAb Nonreact      [04-25-23 @ 05:40]  HIV Nonreact      [04-27-23 @ 05:10]    ALE: titer Negative, pattern --      [04-25-23 @ 05:40]  Immunofixation Serum:   No Monoclonal Band Identified    Reference Range: None Detected      [04-25-23 @ 05:40]  SPEP Interpretation: Hypoalbuminemia      [04-25-23 @ 05:40]      RADIOLOGY & ADDITIONAL STUDIES:

## 2023-05-12 NOTE — OCCUPATIONAL THERAPY INITIAL EVALUATION ADULT - TRANSFER TRAINING, PT EVAL
Patient will perform bed<>chair transfer independently with use of appropriate assistive device by discharge.
By discharge pt will increase toilet transfer to min assistance with ae/dme

## 2023-05-12 NOTE — OCCUPATIONAL THERAPY INITIAL EVALUATION ADULT - LIVES WITH, PROFILE
pvt home, +10 JESSICA, +3 to main level, +1 flight to bedrooms, +bathtub/spouse
Pt lives in pvt home with

## 2023-05-12 NOTE — OCCUPATIONAL THERAPY INITIAL EVALUATION ADULT - ADL RETRAINING, OT EVAL
By discharge pt will increase lb dressing to min assistance
Patient will perform upper body dressing with supervsion by discharge. ; Patient will perform lower body dressing with minimal assistance with use of appropriate adaptive equipment as needed by discharge.

## 2023-05-12 NOTE — OCCUPATIONAL THERAPY INITIAL EVALUATION ADULT - LEVEL OF INDEPENDENCE: SUPINE/SIT, REHAB EVAL
moderate assist (50% patients effort) Pt BP 74/52 supine in bed with MAP of 56. Discussed case wi resident and RN. Encouraged to attempt oob. Pt BP 77/51 at eob. no symptoms/moderate assist (50% patients effort)

## 2023-05-12 NOTE — OCCUPATIONAL THERAPY INITIAL EVALUATION ADULT - PLANNED THERAPY INTERVENTIONS, OT EVAL
ADL retraining/transfer training
ADL retraining/balance training/bed mobility training/cognitive, visual perceptual/fine motor coordination training/motor coordination training/neuromuscular re-education/parent/caregiver training.../ROM/strengthening/stretching/transfer training

## 2023-05-12 NOTE — PROGRESS NOTE ADULT - ASSESSMENT
ASSESSMENT  Cardiogenic Shock   LV thrombus  NICM  HFrEF 25% s/p AICD  HTN/HLD  - SOB x2 weeks  Post obstructive pneumonia  Suspected lung mass   - HAGMA, Lactic Acidosis  DAVID on CKD   Ischemic hepatitis   hyponatremia    PLAN  spoke with speech ad swallow   food services to restock  tube feed for pt   on po diet with post-prandial NGT feedings        TwoCal  HN 240ml after each attempted po meal at which intake < 50%      flush NG with only 20 ml water brisk syringe push before & after each feeding  correct phos to > 3.5   ASSESSMENT  Cardiogenic Shock   LV thrombus  NICM  HFrEF 25% s/p AICD  HTN/HLD  - SOB x2 weeks  Post obstructive pneumonia  Suspected lung mass   - HAGMA, Lactic Acidosis  DAVID on CKD   Ischemic hepatitis   hyponatremia    PLAN  food services to restock  tube feed for pt - TwoCal HN now at bedside - pt should be getting 720 ml per day total  on po diet with post-prandial NGT feedings        TwoCal  HN 240ml after each attempted po meal at which intake < 50%      flush NG with only 20 ml water brisk syringe push before & after each feeding  correct phos to > 3.5

## 2023-05-12 NOTE — PROGRESS NOTE ADULT - ASSESSMENT
77 year old woman with PMHx of HTN, HLD, nonischemic cardiomyopathy, HFrEF s/p AICD presents to the ED with 2 weeks of worsening shortness of breath with increasing dry cough over this time period accepted to CCU for cardiogenic shock    #Cardiogenic Shock   #LV thrombus, suspected new PE  #NICM  #HFrEF 25% s/p AICD  #HTN/HLD  - CT CAP on admission - cardiomegaly w/ RH strain  - CT Chest 5/4 subacute PE in R mainstem and subsegmental branches  - TTE 4/22/2023 EF <20%, large fixed thrombus vs mass on apical LV wall, GIIDD, mod-sev TR, mod MR, mild ND, mild PH  - LE venous duplex repeat -ve 5/6  - NSVT runs on monitor last 5/11 c/w amiodarone 200mg po qD  - c/w dobutamine and milrinone ggt -via PICC line  - c/w eliquis 10mg BID   - IVC today 2.0cm without compressability  - restarted bumex ggt 2mg/hr with 3% NaCl, holding metolazone/diamox for now  - increased dobutamine to 8  - lactate 1.3 5/12, pH 7.52/39/97/32 ABG 5/11  - BMP, Mg BID, daily AM lactate  - goal net -ve 1-2L/day  - HF following - MAP goal >55, can add levo for additional pressor support if needed  - Mg >2, K>4  - daily weight  - strict I/O  - if persistent runs of NSVT will get EP consult     #Post obstructive pneumonia  #Subacute PE  #Positive COVID-19 PCR  - CT CAP - R hilar LAD, R perihilar soft tissue density w/ indentation/possible invasion of R pulmonary artery and narrowing of proximal R lobar and segmental branches w/ consolidation of RLL  - MRSA -ve  - completed zosyn 7 days course 4/27  - s/p EBUS with biopsy 5/5 - pathology negative for malignancy   - PET scan as outpatient to r/o lung malignancy?  - c/w Incentive Spirometer   - Asymptomatic COVID result, off isolation   - No need for further treatment or diagnostic w/u as per ID    #HAGMA - resolved   #Lactic Acidosis - resolved   #DAVID on CKD - resolved   #Elevated liver enzymes predominantly hepatocellular (AST>ALT) with hyperbilirubinemia likely due to ischemic liver injury - resolved   - lactate 1.3 5/12 pH 7.52/39/97/32 5/11  - previously holding lipitor 20mg qhs - can restart now w/ LFTs wnl     #Deconditioning   #Reduced appetite/po intake  #Hypotonic Hyponatremia  - hypervolemic   - Na 132-> 129 -> 132 -> 128  - prealbumin 5 low   - megestrol dose adjusted 400mg qD  - s/p FEES - pureed w/ mildly thick liquids w/ supervised feeds  - c/w NG feeds TID for supplementation   - nutrition following   - continue to trend lytes, Cr   - PT/OT following - needs daily PT for optimization     #Constipation   - c/w senna qhs and miralax BID   - abdomen soft, non-distended, passing gas   - monitor for BM     #Follow Up  - monitor UOP, trend lytes  - holding diuresis for now  - PT/OT, encourage OOBTC and ambulation   - monitor for hypotension if MAP < 60 can adjust pressor requirements     # Misc  - DVT Prophylaxis: eliquis  - GI Prophylaxis: pantoprazole    Tablet 40 milliGRAM(s) Oral before breakfast  - Diet: Diet, DASH/TLC with 1.2 L fluid restriction    - Activity: Activity - Ambulate as Tolerated  - Code Status: Full , Palliative on board   77 year old woman with PMHx of HTN, HLD, nonischemic cardiomyopathy, HFrEF s/p AICD presents to the ED with 2 weeks of worsening shortness of breath with increasing dry cough over this time period accepted to CCU for cardiogenic shock    #Cardiogenic Shock   #LV thrombus, suspected new PE  #NICM  #HFrEF 25% s/p AICD  #HTN/HLD  - CT CAP on admission - cardiomegaly w/ RH strain  - CT Chest 5/4 subacute PE in R mainstem and subsegmental branches  - TTE 4/22/2023 EF <20%, large fixed thrombus vs mass on apical LV wall, GIIDD, mod-sev TR, mod MR, mild ND, mild PH  - LE venous duplex repeat -ve 5/6  - NSVT runs on monitor last 5/11 c/w amiodarone 200mg po qD  - c/w dobutamine and milrinone ggt -via PICC line  - c/w eliquis 10mg BID   - IVC today 2.0cm without compressability  - restarted bumex ggt 2mg/hr with 3% NaCl, holding metolazone/diamox for now  - increased dobutamine to 8  - lactate 1.3 5/12, pH 7.52/39/97/32 ABG 5/11  - BMP, Mg BID, daily AM lactate  - goal net -ve 1-2L/day  - HF following - MAP goal >55, can add levo for additional pressor support if needed  - Mg >2, K>4  - daily weight  - strict I/O  - if persistent runs of NSVT will get EP consult     #Post obstructive pneumonia  #Subacute PE  #Positive COVID-19 PCR  - CT CAP - R hilar LAD, R perihilar soft tissue density w/ indentation/possible invasion of R pulmonary artery and narrowing of proximal R lobar and segmental branches w/ consolidation of RLL  - MRSA -ve  - completed zosyn 7 days course 4/27  - s/p EBUS with biopsy 5/5 - pathology negative for malignancy   - PET scan as outpatient to r/o lung malignancy?  - c/w Incentive Spirometer   - Asymptomatic COVID result, off isolation   - No need for further treatment or diagnostic w/u as per ID    #HAGMA - resolved   #Lactic Acidosis - resolved   #DAVID on CKD - resolved   #Elevated liver enzymes predominantly hepatocellular (AST>ALT) with hyperbilirubinemia likely due to ischemic liver injury - resolved   - lactate 1.3 5/12 pH 7.52/39/97/32 5/11  - trend daily lactate in AM to monitor perfusion   - previously holding lipitor 20mg qhs - can restart now w/ LFTs wnl     #Deconditioning   #Reduced appetite/po intake  #Hypotonic Hyponatremia  - hypervolemic   - Na 132-> 129 -> 132 -> 128 -.119  - Sosm 259, pending urine studies, serum uric acid, AM cortisol, TSH  - IVC dilated, noncompressible - suspected 2/2 volume overload (held diuresis for 2 days)  - c/w diuresis as above  - prealbumin 5 low   - megestrol dose adjusted 400mg qD  - s/p FEES - pureed w/ mildly thick liquids w/ supervised feeds  - c/w NG feeds TID for supplementation   - nutrition following   - continue to trend lytes, Cr   - PT/OT following - needs daily PT for optimization     #Constipation   - c/w senna qhs and miralax BID   - abdomen soft, non-distended, passing gas   - monitor for BM     #Follow Up  - monitor UOP, trend lytes TID   - restarted diuresis  - PT/OT, encourage OOBTC and ambulation   - monitor for hypotension if MAP < 55 can adjust pressor requirements     # Misc  - DVT Prophylaxis: eliquis  - GI Prophylaxis: pantoprazole    Tablet 40 milliGRAM(s) Oral before breakfast  - Diet: Diet, DASH/TLC with 1.2 L fluid restriction    - Activity: Activity - Ambulate as Tolerated  - Code Status: Full , Palliative on board

## 2023-05-12 NOTE — OCCUPATIONAL THERAPY INITIAL EVALUATION ADULT - LEVEL OF INDEPENDENCE: BED TO CHAIR, REHAB EVAL
moderate assist (50% patients effort) BP in chair 74/48. asymptomatic. Team made aware./moderate assist (50% patients effort)

## 2023-05-12 NOTE — OCCUPATIONAL THERAPY INITIAL EVALUATION ADULT - GENERAL OBSERVATIONS, REHAB EVAL
Pt received seated in b/s chair in NAD, spouse present, +multiple drips, +tele, +BP cuff, +pulse oxi, left in b/s chair in NAD, vitals stable RN aware
Pt seen bedside for OT eval [phone]

## 2023-05-12 NOTE — PROGRESS NOTE ADULT - SUBJECTIVE AND OBJECTIVE BOX
Date of Admission: 23    Interval History: Patient with PT this morning, sitting up in bed with  at bedside. BP 70s/50s, PT to return later today. Remains on dual inotropic support (milrinone 0.25 mcg/kg/min and dobutamine 7.5 mcg/kg/min). NSVT on tele earlier this AM. Diuretics held yesterday for contraction alkalosis. Na 119 on AM labs today.   24 hr UOP 2.4 L, net negative 1.1 L , BUN/Cr 55/1.2 stable for now, trending up.       HISTORY OF PRESENT ILLNESS: 76 y/o F with PMHx of HTN, HLD, nonischemic cardiomyopathy, HFrEF s/p AICD presents to the ED with 2 weeks of worsening shortness of breath. Patient and her son state patient has been experiencing shortness of breath that has progressed from present on activity to now shortness of breath during conversation. Patient also notes an increasing dry cough over this time period, which is improving currently     In ED, patient's HR was 110 and was saturating 98% on RA  CT C/A/P revealing cardiomegaly with evidence of right heart strain, R hilar lymphadenopathy with R perihilar soft tissue density with indentation/possible invasion of right pulmonary artery, and narrowing of proximal R lobar and segmental branches with associated consolidation of right lower lobe. 2.2 x 2.8 cm filling defect noted in apex of left ventricle.   Last ECHO 2021 revealing EF 20-25%, mild Mr, mild TR, mild Pulm HTN  Dimer 2600, trops negative BNP 42964  Duplex venous LE negative for DVT; initial CTA chest negative for PE  Cr 1.4 (0.7 in ), Na+ 128, T.Benjamin 2.3, LFTs elevated (hemolyzed)  Lactate 5.8-->4.6--.2.3   Admitted to SDU (2023 01:10)    Patient reportedly was at baseline (active, ambulating long distances without issue) until about 2 weeks ago when patient began to have progressively worsening SOB with a dry cough. Patient reports Dr. Mead as per primary cardiologist, and Dr. Sherwood as her heart failure specialist (last seen in office in ). Endorses compliance with home medications. Family at bedside concerned about patient's recent weight loss as well.         PAST MEDICAL & SURGICAL HISTORY:  CHF, stage C  HTN (hypertension)    Allergies  epinephrine (Unknown)    Intolerances      Vitals:  ICU Vital Signs Last 24 Hrs  T(C): 36.1 (12 May 2023 08:00), Max: 36.8 (11 May 2023 16:00)  T(F): 97 (12 May 2023 08:00), Max: 98.2 (11 May 2023 16:00)  HR: 98 (12 May 2023 12:00) (98 - 109)  BP: 78/50 (12 May 2023 12:00) (70/46 - 105/56)  BP(mean): 60 (12 May 2023 12:00) (54 - 77)  ABP: --  ABP(mean): --  RR: 19 (12 May 2023 12:00) (9 - 31)  SpO2: 97% (12 May 2023 12:00) (96% - 100%)    O2 Parameters below as of 12 May 2023 12:00  Patient On (Oxygen Delivery Method): room air          Physical Exam:  General Appearance: NG tube, thin, frail, normal for age and gender. 	  Cardiovascular: +NSVT on tele, +S1, S2, No edema  Respiratory: decreased @ bases  Psychiatry: Fatigued, oriented x 3, Mood & affect appropriate  Skin/Integumentary: No rashes, No ecchymoses, No cyanosis  Neurologic: Non-focal  Musculoskeletal/ extremities: Normal range of motion, No clubbing, cyanosis or edema  Vascular: Peripheral pulses palpable 2+ bilaterally        LABS:	 	             CBC Full  -  ( 12 May 2023 05:12 )  WBC Count : 11.39 K/uL  RBC Count : 3.29 M/uL  Hemoglobin : 10.1 g/dL  Hematocrit : 29.1 %  Platelet Count - Automated : 452 K/uL  Mean Cell Volume : 88.4 fL  Mean Cell Hemoglobin : 30.7 pg  Mean Cell Hemoglobin Concentration : 34.7 g/dL  Auto Neutrophil # : x  Auto Lymphocyte # : x  Auto Monocyte # : x  Auto Eosinophil # : x  Auto Basophil # : x  Auto Neutrophil % : x  Auto Lymphocyte % : x  Auto Monocyte % : x  Auto Eosinophil % : x  Auto Basophil % : x      Basic Metabolic Panel in AM (23 @ 05:12)    Sodium, Serum: 119: Critical value:  TYPE:(C=Critical, N=Notification, A=Abnormal) C  TESTS: NA  DATE/TIME CALLED: 2023 06:29:02 EDT  CALLED TO: MD ALLRED  READ BACK (2 Patient Identifiers)(Y/N): Y  READ BACK VALUES (Y/N): Y  CALLED BY: JM mmol/L   Potassium, Serum: 4.5 mmol/L   Chloride, Serum: 79 mmol/L   Carbon Dioxide, Serum: 26 mmol/L   Anion Gap, Serum: 14 mmol/L   Blood Urea Nitrogen, Serum: 55 mg/dL   Creatinine, Serum: 1.2 mg/dL   Glucose, Serum: 121 mg/dL   Calcium, Total Serum: 8.7 mg/dL   eGFR: 46: The estimated glomerular filtration rate (eGFR) is calculated using the   CKD-EPI creatinine equation, which does not have a coefficient for  race and is validated in individuals 18 years of age and older (N Engl J  Med ; [id number]). Creatinine-based eGFR may be inaccurate in  various situations including but not limited to extremes of muscle mass,  altered dietary protein intake, or medications that affect renal tubular  creatinine secretion. mL/min/1.73m2      Lactate Trend  05-12 @ 07:05 Lactate:1.3  05-11 @ 05:13 Lactate:2.3   05-08 @ 05:03 Lactate:2.0   05-07 @ 04:51 Lactate:1.6   05-06 @ 22:50 Lactate:1.9     TELEMETRY EVENTS: 	    EC2023    Ventricular Rate 96 BPM  Atrial Rate 96 BPM  P-R Interval 164 ms  QRS Duration 116 ms  Q-T Interval 396 ms  QTC Calculation(Bazett) 500 ms  P Axis 51 degrees  R Axis -41 degrees  T Axis 87 degrees    Diagnosis Line Normal sinus rhythm  Possible Left atrial enlargement  Left axis deviation  Prolonged QT  Abnormal ECG  Confirmed by Seven Hensley ([id number]) on 2023 7:07:08 AM      CXR   Impression:  CHF. Support devices as described. Without difference    CXR 23  Stable congestive changes and bilateral pleural effusions/opacities,  No pneumothorax      CT Angio Chest PE Protocol   IMPRESSION:    No evidence of pulmonary embolism. (See discussion below for more   findings.)    Marked cardiomegaly.    There is a 2.2 x 2.8 cm rounded filling defect in the region of the apex   of the left ventricle (3/11; 607/95). Differential diagnosis includes an   intracardiac mass or thrombus.    The main pulmonary artery is dilated, measuring 3.6 cm in diameter, which   may be seen with pulmonary hypertension. There is reflux of contrast   material into the IVC and hepatic veins compatible with right heart   dysfunction.    Right hilar lymphadenopathy and right perihilar soft tissue density is   noted. There is segmental narrowing and consolidation noted in the medial   aspect of the right lower lobe. There is a small right pleural effusion.   There is extrinsic indentation and possible invasion of the right main   pulmonary artery (604/142; 605/129; 2/42). There is associated narrowing   of the proximal right lobar and segmental branches. A right hilar / right   lower lobe mass with postobstructive pneumonitis should be ruled out.        < from: CT Angio Chest PE Protocol w/ IV Cont (23 @ 11:31)  Redemonstrated right hilar lesion with compression and possible invasion   of adjacent main pulmonary artery segment (unchanged from previous study.  Subacute right main and segmental pulmonary embolus.  Increased right lower lobe consolidative and groundglass opacities,   likely infectious/inflammatory in etiology.  4 mm left anterior upper lobe groundglass nodule.    PREVIOUS DIAGNOSTIC TESTING:    TTE 23    Summary:   1. Left ventricular ejection fraction, by visual estimation, is <20%.   2. Severely decreased global left ventricular systolic function.   3. Severely enlarged left atrium.   4. Elevated mean left atrial pressure.   5. Large, fixed thrombus vs. mass on the apical wall of the left   ventricle.   6. Spectral Doppler shows pseudonormal pattern of left ventricular   myocardial filling (Grade II diastolic dysfunction).   7. Moderately reduced RV systolic function.   8. Mildly enlarged right atrium.   9. Moderate mitral valve regurgitation.  10. Moderate-severe tricuspid regurgitation.  11. Mild pulmonic valve regurgitation.  12. Estimated pulmonary artery systolic pressure is 42.9 mmHg assuming a   right atrial pressure of 15 mmHg, which is consistent withmild pulmonary   hypertension.  13. Endocardial visualization was enhanced with intravenous echo contrast.      Right Heart Catheterization 23    FINDINGS:   Right Heart Cath  RA - 12  RV - 51//  PA -   PCWP - 20    CO/CI Thermodilusion - 2.1/1.27  CO/CI Mehdi - 2.09/1.26    SVR (MAP 79 / RA 12 / CO 2.1) = 2552 dyn  PVR = 5.71 henriquez      [facility] : RA 14, RV 46/14, PA 56/26/37, PCWP: 25, CO/C.I mehdi 2.37/1.43, C.O. /C.I thermodilution 2.8/1.69    Home Medications:  atorvastatin 20 mg oral tablet: 1 tab(s) orally once a day (04 Aug 2021 10:05)  carvedilol 6.25 mg oral tablet: 1 tab(s) orally 2 times a day (2023 02:02)  Entresto 49 mg-51 mg oral tablet: 1 tab(s) orally 2 times a day (04 Aug 2021 10:05)  Farxiga 10 mg oral tablet: 1 tab(s) orally once a day (2023 02:05)  Lasix 20 mg oral tablet: 1 tab(s) orally once a day (2023 02:04)  spironolactone 25 mg oral tablet: 1 tab(s) orally once a day (04 Aug 2021 10:05)    MEDICATIONS  (STANDING):  buMETAnide Injectable 2 milliGRAM(s) IV Push every 8 hours  chlorhexidine 2% Cloths 1 Application(s) Topical daily  DOBUTamine Infusion 7.5 MICROgram(s)/kG/Min (7.14 mL/Hr) IV Continuous <Continuous>  heparin  Infusion.  Unit(s)/Hr (11 mL/Hr) IV Continuous <Continuous>  magnesium gluconate 500 milliGRAM(s) Oral daily  melatonin 5 milliGRAM(s) Oral at bedtime  milrinone Infusion 0.125 MICROgram(s)/kG/Min (2.38 mL/Hr) IV Continuous <Continuous>  norepinephrine Infusion 0.05 MICROgram(s)/kG/Min (2.98 mL/Hr) IV Continuous <Continuous>  pantoprazole    Tablet 40 milliGRAM(s) Oral before breakfast    MEDICATIONS  (PRN):  benzocaine 20% Spray 1 Spray(s) Topical four times a day PRN throat pain  heparin   Injectable 5000 Unit(s) IV Push every 6 hours PRN For aPTT less than 40  heparin   Injectable 2500 Unit(s) IV Push every 6 hours PRN For aPTT between 40 - 57

## 2023-05-12 NOTE — PROGRESS NOTE ADULT - NS ATTEND OPT1 GEN_ALL_CORE
I independently performed the documented:
I attest my time as attending is greater than 50% of the total combined time spent on qualifying patient care activities by the PA/NP and attending.
I independently performed the documented:
I attest my time as attending is greater than 50% of the total combined time spent on qualifying patient care activities by the PA/NP and attending.

## 2023-05-13 LAB
ALBUMIN SERPL ELPH-MCNC: 3 G/DL — LOW (ref 3.5–5.2)
ALP SERPL-CCNC: 180 U/L — HIGH (ref 30–115)
ALT FLD-CCNC: 34 U/L — SIGNIFICANT CHANGE UP (ref 0–41)
ANION GAP SERPL CALC-SCNC: 13 MMOL/L — SIGNIFICANT CHANGE UP (ref 7–14)
ANION GAP SERPL CALC-SCNC: 16 MMOL/L — HIGH (ref 7–14)
ANION GAP SERPL CALC-SCNC: 17 MMOL/L — HIGH (ref 7–14)
ANION GAP SERPL CALC-SCNC: 18 MMOL/L — HIGH (ref 7–14)
ANION GAP SERPL CALC-SCNC: 21 MMOL/L — HIGH (ref 7–14)
AST SERPL-CCNC: 37 U/L — SIGNIFICANT CHANGE UP (ref 0–41)
BILIRUB SERPL-MCNC: 1.5 MG/DL — HIGH (ref 0.2–1.2)
BUN SERPL-MCNC: 58 MG/DL — HIGH (ref 10–20)
BUN SERPL-MCNC: 59 MG/DL — HIGH (ref 10–20)
BUN SERPL-MCNC: 60 MG/DL — HIGH (ref 10–20)
BUN SERPL-MCNC: 62 MG/DL — CRITICAL HIGH (ref 10–20)
CALCIUM SERPL-MCNC: 8.4 MG/DL — SIGNIFICANT CHANGE UP (ref 8.4–10.5)
CALCIUM SERPL-MCNC: 8.7 MG/DL — SIGNIFICANT CHANGE UP (ref 8.4–10.5)
CALCIUM SERPL-MCNC: 9.4 MG/DL — SIGNIFICANT CHANGE UP (ref 8.4–10.4)
CHLORIDE SERPL-SCNC: 79 MMOL/L — LOW (ref 98–110)
CHLORIDE SERPL-SCNC: 80 MMOL/L — LOW (ref 98–110)
CHLORIDE SERPL-SCNC: 81 MMOL/L — LOW (ref 98–110)
CO2 SERPL-SCNC: 22 MMOL/L — SIGNIFICANT CHANGE UP (ref 17–32)
CO2 SERPL-SCNC: 27 MMOL/L — SIGNIFICANT CHANGE UP (ref 17–32)
CO2 SERPL-SCNC: 29 MMOL/L — SIGNIFICANT CHANGE UP (ref 17–32)
CREAT ?TM UR-MCNC: 52 MG/DL — SIGNIFICANT CHANGE UP
CREAT SERPL-MCNC: 1.1 MG/DL — SIGNIFICANT CHANGE UP (ref 0.7–1.5)
CREAT SERPL-MCNC: 1.2 MG/DL — SIGNIFICANT CHANGE UP (ref 0.7–1.5)
CREAT SERPL-MCNC: 1.3 MG/DL — SIGNIFICANT CHANGE UP (ref 0.7–1.5)
EGFR: 42 ML/MIN/1.73M2 — LOW
EGFR: 46 ML/MIN/1.73M2 — LOW
EGFR: 51 ML/MIN/1.73M2 — LOW
FERRITIN SERPL-MCNC: 1826 NG/ML — HIGH (ref 15–150)
FERRITIN SERPL-MCNC: 2445 NG/ML — HIGH (ref 15–150)
GAS PNL BLDA: SIGNIFICANT CHANGE UP
GAS PNL BLDA: SIGNIFICANT CHANGE UP
GLUCOSE SERPL-MCNC: 102 MG/DL — HIGH (ref 70–99)
GLUCOSE SERPL-MCNC: 105 MG/DL — HIGH (ref 70–99)
GLUCOSE SERPL-MCNC: 135 MG/DL — HIGH (ref 70–99)
GLUCOSE SERPL-MCNC: 136 MG/DL — HIGH (ref 70–99)
GLUCOSE SERPL-MCNC: 155 MG/DL — HIGH (ref 70–99)
HCT VFR BLD CALC: 30.7 % — LOW (ref 37–47)
HGB BLD-MCNC: 10.3 G/DL — LOW (ref 12–16)
LACTATE SERPL-SCNC: 1.5 MMOL/L — SIGNIFICANT CHANGE UP (ref 0.7–2)
MAGNESIUM SERPL-MCNC: 2.1 MG/DL — SIGNIFICANT CHANGE UP (ref 1.8–2.4)
MAGNESIUM SERPL-MCNC: 2.1 MG/DL — SIGNIFICANT CHANGE UP (ref 1.8–2.4)
MCHC RBC-ENTMCNC: 29.9 PG — SIGNIFICANT CHANGE UP (ref 27–31)
MCHC RBC-ENTMCNC: 33.6 G/DL — SIGNIFICANT CHANGE UP (ref 32–37)
MCV RBC AUTO: 89.2 FL — SIGNIFICANT CHANGE UP (ref 81–99)
NRBC # BLD: 0 /100 WBCS — SIGNIFICANT CHANGE UP (ref 0–0)
PLATELET # BLD AUTO: 490 K/UL — HIGH (ref 130–400)
PMV BLD: 11 FL — HIGH (ref 7.4–10.4)
POTASSIUM SERPL-MCNC: 2.9 MMOL/L — LOW (ref 3.5–5)
POTASSIUM SERPL-MCNC: 3 MMOL/L — LOW (ref 3.5–5)
POTASSIUM SERPL-MCNC: 3.4 MMOL/L — LOW (ref 3.5–5)
POTASSIUM SERPL-MCNC: 3.9 MMOL/L — SIGNIFICANT CHANGE UP (ref 3.5–5)
POTASSIUM SERPL-MCNC: 4.1 MMOL/L — SIGNIFICANT CHANGE UP (ref 3.5–5)
POTASSIUM SERPL-SCNC: 2.9 MMOL/L — LOW (ref 3.5–5)
POTASSIUM SERPL-SCNC: 3 MMOL/L — LOW (ref 3.5–5)
POTASSIUM SERPL-SCNC: 3.4 MMOL/L — LOW (ref 3.5–5)
POTASSIUM SERPL-SCNC: 3.9 MMOL/L — SIGNIFICANT CHANGE UP (ref 3.5–5)
POTASSIUM SERPL-SCNC: 4.1 MMOL/L — SIGNIFICANT CHANGE UP (ref 3.5–5)
PROCALCITONIN SERPL-MCNC: 0.57 NG/ML — HIGH (ref 0.02–0.1)
PROT ?TM UR-MCNC: 86 MG/DLG/24H — SIGNIFICANT CHANGE UP
PROT SERPL-MCNC: 6.8 G/DL — SIGNIFICANT CHANGE UP (ref 6–8)
PROT/CREAT UR-RTO: 1.7 RATIO — HIGH (ref 0–0.2)
RBC # BLD: 3.44 M/UL — LOW (ref 4.2–5.4)
RBC # FLD: 16.3 % — HIGH (ref 11.5–14.5)
SODIUM SERPL-SCNC: 122 MMOL/L — LOW (ref 135–146)
SODIUM SERPL-SCNC: 122 MMOL/L — LOW (ref 135–146)
SODIUM SERPL-SCNC: 123 MMOL/L — LOW (ref 135–146)
SODIUM SERPL-SCNC: 123 MMOL/L — LOW (ref 135–146)
SODIUM SERPL-SCNC: 125 MMOL/L — LOW (ref 135–146)
UUN UR-MCNC: 393 MG/DL — SIGNIFICANT CHANGE UP
WBC # BLD: 11.66 K/UL — HIGH (ref 4.8–10.8)
WBC # FLD AUTO: 11.66 K/UL — HIGH (ref 4.8–10.8)

## 2023-05-13 PROCEDURE — 99291 CRITICAL CARE FIRST HOUR: CPT

## 2023-05-13 PROCEDURE — 93010 ELECTROCARDIOGRAM REPORT: CPT

## 2023-05-13 RX ORDER — SODIUM CHLORIDE 5 G/100ML
150 INJECTION, SOLUTION INTRAVENOUS
Refills: 0 | Status: DISCONTINUED | OUTPATIENT
Start: 2023-05-14 | End: 2023-05-14

## 2023-05-13 RX ORDER — POTASSIUM CHLORIDE 20 MEQ
20 PACKET (EA) ORAL
Refills: 0 | Status: COMPLETED | OUTPATIENT
Start: 2023-05-13 | End: 2023-05-14

## 2023-05-13 RX ORDER — POTASSIUM CHLORIDE 20 MEQ
40 PACKET (EA) ORAL ONCE
Refills: 0 | Status: COMPLETED | OUTPATIENT
Start: 2023-05-13 | End: 2023-05-13

## 2023-05-13 RX ORDER — POTASSIUM CHLORIDE 20 MEQ
20 PACKET (EA) ORAL
Refills: 0 | Status: COMPLETED | OUTPATIENT
Start: 2023-05-13 | End: 2023-05-13

## 2023-05-13 RX ORDER — ACETAZOLAMIDE 250 MG/1
500 TABLET ORAL ONCE
Refills: 0 | Status: COMPLETED | OUTPATIENT
Start: 2023-05-13 | End: 2023-05-13

## 2023-05-13 RX ADMIN — AMIODARONE HYDROCHLORIDE 200 MILLIGRAM(S): 400 TABLET ORAL at 05:42

## 2023-05-13 RX ADMIN — ACETAZOLAMIDE 110 MILLIGRAM(S): 250 TABLET ORAL at 08:36

## 2023-05-13 RX ADMIN — Medication 40 MILLIEQUIVALENT(S): at 23:08

## 2023-05-13 RX ADMIN — POLYETHYLENE GLYCOL 3350 17 GRAM(S): 17 POWDER, FOR SOLUTION ORAL at 17:58

## 2023-05-13 RX ADMIN — SODIUM CHLORIDE 50 MILLILITER(S): 5 INJECTION, SOLUTION INTRAVENOUS at 00:56

## 2023-05-13 RX ADMIN — APIXABAN 10 MILLIGRAM(S): 2.5 TABLET, FILM COATED ORAL at 05:41

## 2023-05-13 RX ADMIN — SODIUM CHLORIDE 50 MILLILITER(S): 5 INJECTION, SOLUTION INTRAVENOUS at 14:20

## 2023-05-13 RX ADMIN — POLYETHYLENE GLYCOL 3350 17 GRAM(S): 17 POWDER, FOR SOLUTION ORAL at 05:41

## 2023-05-13 RX ADMIN — APIXABAN 10 MILLIGRAM(S): 2.5 TABLET, FILM COATED ORAL at 17:57

## 2023-05-13 RX ADMIN — PANTOPRAZOLE SODIUM 40 MILLIGRAM(S): 20 TABLET, DELAYED RELEASE ORAL at 05:41

## 2023-05-13 RX ADMIN — SENNA PLUS 2 TABLET(S): 8.6 TABLET ORAL at 21:16

## 2023-05-13 RX ADMIN — Medication 50 MILLIEQUIVALENT(S): at 22:48

## 2023-05-13 RX ADMIN — MILRINONE LACTATE 4.76 MICROGRAM(S)/KG/MIN: 1 INJECTION, SOLUTION INTRAVENOUS at 00:55

## 2023-05-13 RX ADMIN — Medication 7.62 MICROGRAM(S)/KG/MIN: at 11:26

## 2023-05-13 RX ADMIN — Medication 50 MILLIEQUIVALENT(S): at 02:14

## 2023-05-13 RX ADMIN — MILRINONE LACTATE 4.76 MICROGRAM(S)/KG/MIN: 1 INJECTION, SOLUTION INTRAVENOUS at 22:48

## 2023-05-13 RX ADMIN — Medication 500 MILLIGRAM(S): at 13:08

## 2023-05-13 RX ADMIN — BUMETANIDE 10 MG/HR: 0.25 INJECTION INTRAMUSCULAR; INTRAVENOUS at 16:21

## 2023-05-13 RX ADMIN — Medication 5 MILLIGRAM(S): at 21:16

## 2023-05-13 RX ADMIN — Medication 50 MILLIEQUIVALENT(S): at 04:10

## 2023-05-13 RX ADMIN — AMIODARONE HYDROCHLORIDE 200 MILLIGRAM(S): 400 TABLET ORAL at 17:57

## 2023-05-13 NOTE — PROGRESS NOTE ADULT - SUBJECTIVE AND OBJECTIVE BOX
Nephrology progress note    THIS IS AN INCOMPLETE NOTE . FULL NOTE TO FOLLOW SHORTLY    Patient is seen and examined, events over the last 24 h noted .    Allergies:  epinephrine (Unknown)    Hospital Medications:   MEDICATIONS  (STANDING):  acetaZOLAMIDE  IVPB 500 milliGRAM(s) IV Intermittent once  aMIOdarone    Tablet 200 milliGRAM(s) Oral two times a day  apixaban 10 milliGRAM(s) Oral two times a day  buMETAnide Infusion 2 mG/Hr (10 mL/Hr) IV Continuous <Continuous>  chlorhexidine 2% Cloths 1 Application(s) Topical daily  DOBUTamine Infusion 8 MICROgram(s)/kG/Min (7.62 mL/Hr) IV Continuous <Continuous>  magnesium gluconate 500 milliGRAM(s) Oral daily  megestrol Suspension 400 milliGRAM(s) Oral daily  melatonin 5 milliGRAM(s) Oral at bedtime  milrinone Infusion 0.25 MICROgram(s)/kG/Min (4.76 mL/Hr) IV Continuous <Continuous>  pantoprazole    Tablet 40 milliGRAM(s) Oral before breakfast  polyethylene glycol 3350 17 Gram(s) Oral two times a day  senna 2 Tablet(s) Oral at bedtime  sodium chloride 3% Bolus 150 milliLiter(s) IV Bolus once        VITALS:  T(F): 96.7 (05-13-23 @ 04:00), Max: 97.2 (05-12-23 @ 12:00)  HR: 100 (05-13-23 @ 07:00)  BP: 92/56 (05-13-23 @ 07:00)  RR: 23 (05-13-23 @ 07:00)  SpO2: 98% (05-13-23 @ 07:00)  Wt(kg): --    05-11 @ 07:01  -  05-12 @ 07:00  --------------------------------------------------------  IN: 1346 mL / OUT: 2470 mL / NET: -1124 mL    05-12 @ 07:01  -  05-13 @ 07:00  --------------------------------------------------------  IN: 1399.8 mL / OUT: 2780 mL / NET: -1380.2 mL          PHYSICAL EXAM:  Constitutional: NAD  HEENT: anicteric sclera, oropharynx clear, MMM  Neck: No JVD  Respiratory: CTAB, no wheezes, rales or rhonchi  Cardiovascular: S1, S2, RRR  Gastrointestinal: BS+, soft, NT/ND  Extremities: No cyanosis or clubbing. No peripheral edema  :  No yoon.   Skin: No rashes    LABS:  05-13    123<L>  |  81<L>  |  58<H>  ----------------------------<  105<H>  3.9   |  29  |  1.1    SODIUM TREND:  Sodium 123 [05-13 @ 05:00]  Sodium 122 [05-13 @ 00:19]  Sodium 122 [05-12 @ 20:48]  Sodium 120 [05-12 @ 19:01]  Sodium 117 [05-12 @ 16:21]  Sodium 120 [05-12 @ 12:15]  Sodium 119 [05-12 @ 05:12]  Sodium 122 [05-11 @ 17:13]  Sodium 128 [05-11 @ 05:13]  Sodium 123 [05-11 @ 03:00]    Ca    9.4      13 May 2023 05:00  Mg     2.1     05-13    TPro  6.5  /  Alb  2.9<L>  /  TBili  2.0<H>  /  DBili      /  AST  23  /  ALT  28  /  AlkPhos  143<H>  05-12                          10.3   11.66 )-----------( 490      ( 13 May 2023 05:00 )             30.7       Urine Studies:    Sodium, Random Urine: <20.0 mmoL/L (05-12 @ 19:06)  Creatinine, Random Urine: 52 mg/dL (05-12 @ 19:06)  Protein/Creatinine Ratio Calculation: 1.7 Ratio (05-12 @ 19:06)  Osmolality, Random Urine: 328 mos/kg (05-12 @ 19:06)  Potassium, Random Urine: 75 mmol/L (05-12 @ 19:06)  Sodium, Random Urine: 39.0 mmoL/L (05-12 @ 07:40)  Creatinine, Random Urine: 44 mg/dL (05-12 @ 07:40)  Protein/Creatinine Ratio Calculation: 1.2 Ratio (05-12 @ 07:40)  Potassium, Random Urine: 60 mmol/L (05-12 @ 07:40)      Iron 43, TIBC 184, %sat 23      [05-12-23 @ 05:12]  Ferritin 1826      [05-12-23 @ 12:15]  TSH 2.26      [05-12-23 @ 12:15]  Lipid: chol 104, TG 91, HDL 17, LDL --      [04-25-23 @ 04:50]    HBsAb Nonreact      [04-25-23 @ 05:40]  HBsAg Nonreact      [04-25-23 @ 05:40]  HBcAb Nonreact      [04-25-23 @ 05:40]  HIV Nonreact      [04-27-23 @ 05:10]    ALE: titer Negative, pattern --      [04-25-23 @ 05:40]  Immunofixation Serum:   No Monoclonal Band Identified    Reference Range: None Detected      [04-25-23 @ 05:40]  SPEP Interpretation: Hypoalbuminemia      [04-25-23 @ 05:40]      RADIOLOGY & ADDITIONAL STUDIES:   Nephrology progress note    Patient is seen and examined, events over the last 24 h noted .  Lying in bed comfortable     Allergies:  epinephrine (Unknown)    Hospital Medications:   MEDICATIONS  (STANDING):  acetaZOLAMIDE  IVPB 500 milliGRAM(s) IV Intermittent once  aMIOdarone    Tablet 200 milliGRAM(s) Oral two times a day  apixaban 10 milliGRAM(s) Oral two times a day  buMETAnide Infusion 2 mG/Hr (10 mL/Hr) IV Continuous <Continuous>  DOBUTamine Infusion 8 MICROgram(s)/kG/Min (7.62 mL/Hr) IV Continuous <Continuous>  magnesium gluconate 500 milliGRAM(s) Oral daily  megestrol Suspension 400 milliGRAM(s) Oral daily  melatonin 5 milliGRAM(s) Oral at bedtime  milrinone Infusion 0.25 MICROgram(s)/kG/Min (4.76 mL/Hr) IV Continuous <Continuous>  pantoprazole    Tablet 40 milliGRAM(s) Oral before breakfast  polyethylene glycol 3350 17 Gram(s) Oral two times a day  senna 2 Tablet(s) Oral at bedtime  sodium chloride 3% Bolus 150 milliLiter(s) IV Bolus once        VITALS:  T(F): 96.7 (05-13-23 @ 04:00), Max: 97.2 (05-12-23 @ 12:00)  HR: 100 (05-13-23 @ 07:00)  BP: 92/56 (05-13-23 @ 07:00)  RR: 23 (05-13-23 @ 07:00)  SpO2: 98% (05-13-23 @ 07:00)      05-11 @ 07:01  -  05-12 @ 07:00  --------------------------------------------------------  IN: 1346 mL / OUT: 2470 mL / NET: -1124 mL    05-12 @ 07:01  -  05-13 @ 07:00  --------------------------------------------------------  IN: 1399.8 mL / OUT: 2780 mL / NET: -1380.2 mL          PHYSICAL EXAM:  Constitutional: NAD  Neck: No JVD  Respiratory: CTAB,   Cardiovascular: S1, S2, RRR  Gastrointestinal: BS+, soft, NT/ND  Extremities: No cyanosis or clubbing. No peripheral edema  :  No yoon.   Skin: No rashes    LABS:  05-13    123<L>  |  81<L>  |  58<H>  ----------------------------<  105<H>  3.9   |  29  |  1.1    SODIUM TREND:  Sodium 123 [05-13 @ 05:00]  Sodium 122 [05-13 @ 00:19]  Sodium 122 [05-12 @ 20:48]  Sodium 120 [05-12 @ 19:01]  Sodium 117 [05-12 @ 16:21]  Sodium 120 [05-12 @ 12:15]  Sodium 119 [05-12 @ 05:12]  Sodium 122 [05-11 @ 17:13]  Sodium 128 [05-11 @ 05:13]  Sodium 123 [05-11 @ 03:00]    Ca    9.4      13 May 2023 05:00  Mg     2.1     05-13    TPro  6.5  /  Alb  2.9<L>  /  TBili  2.0<H>  /  DBili      /  AST  23  /  ALT  28  /  AlkPhos  143<H>  05-12                          10.3   11.66 )-----------( 490      ( 13 May 2023 05:00 )             30.7       Urine Studies:    Sodium, Random Urine: <20.0 mmoL/L (05-12 @ 19:06)  Creatinine, Random Urine: 52 mg/dL (05-12 @ 19:06)  Protein/Creatinine Ratio Calculation: 1.7 Ratio (05-12 @ 19:06)  Osmolality, Random Urine: 328 mos/kg (05-12 @ 19:06)  Potassium, Random Urine: 75 mmol/L (05-12 @ 19:06)  Sodium, Random Urine: 39.0 mmoL/L (05-12 @ 07:40)  Creatinine, Random Urine: 44 mg/dL (05-12 @ 07:40)  Protein/Creatinine Ratio Calculation: 1.2 Ratio (05-12 @ 07:40)  Potassium, Random Urine: 60 mmol/L (05-12 @ 07:40)      Iron 43, TIBC 184, %sat 23      [05-12-23 @ 05:12]  Ferritin 1826      [05-12-23 @ 12:15]  TSH 2.26      [05-12-23 @ 12:15]  Lipid: chol 104, TG 91, HDL 17, LDL --      [04-25-23 @ 04:50]    HBsAb Nonreact      [04-25-23 @ 05:40]  HBsAg Nonreact      [04-25-23 @ 05:40]  HBcAb Nonreact      [04-25-23 @ 05:40]  HIV Nonreact      [04-27-23 @ 05:10]    ALE: titer Negative, pattern --      [04-25-23 @ 05:40]  Immunofixation Serum:   No Monoclonal Band Identified    Reference Range: None Detected      [04-25-23 @ 05:40]  SPEP Interpretation: Hypoalbuminemia      [04-25-23 @ 05:40]    Uric Acid, Serum: 5.8 mg/dL (04.25.23 @ 04:50)      RADIOLOGY & ADDITIONAL STUDIES:

## 2023-05-13 NOTE — PROGRESS NOTE ADULT - ASSESSMENT
77 year old woman with PMHx of HTN, HLD, nonischemic cardiomyopathy, HFrEF s/p AICD presents to the ED with 2 weeks of worsening shortness of breath with increasing dry cough over this time period accepted to CCU for cardiogenic shock    #Cardiogenic Shock   #LV thrombus, suspected new PE  #NICM  #HFrEF 25% s/p AICD  #HTN/HLD  - CT CAP on admission - cardiomegaly w/ RH strain  - CT Chest 5/4 subacute PE in R mainstem and subsegmental branches  - TTE 4/22/2023 EF <20%, large fixed thrombus vs mass on apical LV wall, GIIDD, mod-sev TR, mod MR, mild MN, mild PH  - LE venous duplex repeat -ve 5/6  - NSVT runs on monitor last 5/11 c/w amiodarone 200mg po qD  - c/w dobutamine and milrinone ggt -via PICC line  - c/w eliquis 10mg BID   - IVC today 2.0cm without compressability  - restarted bumex ggt 2mg/hr with 3% NaCl, metolazone/diamox prn  - increased dobutamine to 8  - lactate 1.3 5/12, pH 7.52/39/97/32 ABG 5/11  - BMP, Mg BID, daily AM lactate  - goal net -ve 1-2L/day  - HF following - MAP goal >55, can add levo for additional pressor support if needed  - Mg >2, K>4  - daily weight  - strict I/O  - if persistent runs of NSVT will get EP consult     #Post obstructive pneumonia  #Subacute PE  #Positive COVID-19 PCR  - CT CAP - R hilar LAD, R perihilar soft tissue density w/ indentation/possible invasion of R pulmonary artery and narrowing of proximal R lobar and segmental branches w/ consolidation of RLL  - MRSA -ve  - completed zosyn 7 days course 4/27  - s/p EBUS with biopsy 5/5 - pathology negative for malignancy   - PET scan as outpatient to r/o lung malignancy?  - c/w Incentive Spirometer   - Asymptomatic COVID result, off isolation   - No need for further treatment or diagnostic w/u as per ID    #HAGMA - resolved   #Lactic Acidosis - resolved   #DAVID on CKD - resolved   #Elevated liver enzymes predominantly hepatocellular (AST>ALT) with hyperbilirubinemia likely due to ischemic liver injury - resolved   - lactate 1.3 5/12 pH 7.52/39/97/32 5/11  - trend daily lactate in AM to monitor perfusion   - previously holding lipitor 20mg qhs - can restart now w/ LFTs wnl     #Deconditioning   #Reduced appetite/po intake  #Hypotonic Hyponatremia  - hypervolemic   - Na 132-> 129 -> 132 -> 128 -.119  - Sosm 259, pending urine studies, serum uric acid, AM cortisol, TSH  - IVC dilated, noncompressible - suspected 2/2 volume overload (held diuresis for 2 days)  - c/w diuresis as above  - prealbumin 5 low   - megestrol dose adjusted 400mg qD  - s/p FEES - pureed w/ mildly thick liquids w/ supervised feeds  - c/w NG feeds TID for supplementation   - nutrition following   - continue to trend lytes, Cr   - PT/OT following - needs daily PT for optimization     #Constipation   - c/w senna qhs and miralax BID   - abdomen soft, non-distended, passing gas   - monitor for BM     #Follow Up  - monitor UOP, trend lytes TID   - restarted diuresis  - PT/OT, encourage OOBTC and ambulation   - monitor for hypotension if MAP < 55 can adjust pressor requirements     # Misc  - DVT Prophylaxis: eliquis  - GI Prophylaxis: pantoprazole    Tablet 40 milliGRAM(s) Oral before breakfast  - Diet: Diet, DASH/TLC with 1.2 L fluid restriction    - Activity: Activity - Ambulate as Tolerated  - Code Status: Full , Palliative on board

## 2023-05-13 NOTE — PROGRESS NOTE ADULT - ASSESSMENT
77 year old woman with PMHx of HTN, HLD, nonischemic cardiomyopathy, HFrEF s/p AICD presents to the ED with 2 weeks of worsening shortness of breath with increasing dry cough over this time period accepted to CCU for cardiogenic shock    # hyponatremia rule out hypovolemic hyponatremia/ CHF with decreased effective intravascular volume   # CHF cardiogenic shock on milrinone       - on Bumex drip   - U osm noted c/e ADH presence / serum uric acid elevated not c/w SAIDH   - keep diuretics / can use 3% to increase IV volume   - repeat BMP qshift  -  target rise of serum NA 6-8 meq in 24h       will follow

## 2023-05-13 NOTE — PROGRESS NOTE ADULT - SUBJECTIVE AND OBJECTIVE BOX
MIKHAIL JOHNSON 77y Female  MRN#: [id number]   Hospital Day: 22d    SUBJECTIVE  Patient is a 77y old Female who presents with a chief complaint of intra cardiac mass (13 May 2023 07:45)  Currently admitted to medicine with the primary diagnosis of Lung mass      INTERVAL HPI AND OVERNIGHT EVENTS:  Patient was examined and seen at bedside. This morning she is resting comfortably in bed and reports no issues or overnight events.    OBJECTIVE  PAST MEDICAL & SURGICAL HISTORY  CHF, stage C    HTN (hypertension)      ALLERGIES:  epinephrine (Unknown)    MEDICATIONS:  STANDING MEDICATIONS  aMIOdarone    Tablet 200 milliGRAM(s) Oral two times a day  apixaban 10 milliGRAM(s) Oral two times a day  buMETAnide Infusion 2 mG/Hr IV Continuous <Continuous>  chlorhexidine 2% Cloths 1 Application(s) Topical daily  DOBUTamine Infusion 8 MICROgram(s)/kG/Min IV Continuous <Continuous>  magnesium gluconate 500 milliGRAM(s) Oral daily  megestrol Suspension 400 milliGRAM(s) Oral daily  melatonin 5 milliGRAM(s) Oral at bedtime  milrinone Infusion 0.25 MICROgram(s)/kG/Min IV Continuous <Continuous>  pantoprazole    Tablet 40 milliGRAM(s) Oral before breakfast  polyethylene glycol 3350 17 Gram(s) Oral two times a day  senna 2 Tablet(s) Oral at bedtime    PRN MEDICATIONS  benzocaine 20% Spray 1 Spray(s) Topical four times a day PRN      Vitals:  VITAL SIGNS: Last 24 Hours  T(C): 36.3 (13 May 2023 12:00), Max: 36.3 (13 May 2023 12:00)  T(F): 97.4 (13 May 2023 12:00), Max: 97.4 (13 May 2023 12:00)  HR: 97 (13 May 2023 19:00) (95 - 101)  BP: 110/57 (13 May 2023 19:00) (78/50 - 111/73)  BP(mean): 79 (13 May 2023 19:00) (59 - 85)  RR: 24 (13 May 2023 19:00) (20 - 40)  SpO2: 100% (13 May 2023 19:00) (96% - 100%)  Orthostatic Vitals:  Orthostatic VS    I's&O's:  I&O's Summary    12 May 2023 07:01  -  13 May 2023 07:00  --------------------------------------------------------  IN: 1399.8 mL / OUT: 2780 mL / NET: -1380.2 mL    13 May 2023 07:01  -  13 May 2023 19:51  --------------------------------------------------------  IN: 968.8 mL / OUT: 2225 mL / NET: -1256.2 mL    CBC:                        10.3   11.66 )-----------( 490      ( 13 May 2023 05:00 )             30.7     CMP:  05-13    123<L>  |  81<L>  |  58<H>  ----------------------------<  105<H>  3.9   |  29  |  1.1    Ca    9.4      13 May 2023 05:00  Mg     2.1     05-13    TPro  6.5  /  Alb  2.9<L>  /  TBili  2.0<H>  /  DBili  x   /  AST  23  /  ALT  28  /  AlkPhos  143<H>  05-12    POCS:  CAPILLARY BLOOD GLUCOSE            ABG - ( 13 May 2023 05:56 )  pH, Arterial: 7.59  pH, Blood: x     /  pCO2: 34    /  pO2: 117   / HCO3: 33    / Base Excess: 10.3  /  SaO2: 100.0             Lactate, Blood: 1.5 mmol/L (05-13-23 @ 05:00)          LIVER FUNCTIONS - ( 12 May 2023 12:15 )  Alb: 2.9 g/dL / Pro: 6.5 g/dL / ALK PHOS: 143 U/L / ALT: 28 U/L / AST: 23 U/L / GGT: x           COVID-19 PCR: NotDetec (02 May 2023 17:06)  COVID-19 PCR: NotDetec (02 May 2023 11:50)  COVID-19 PCR: NotDetec (01 May 2023 17:04)  COVID-19 PCR: Detected (30 Apr 2023 22:49)

## 2023-05-14 LAB
ALBUMIN SERPL ELPH-MCNC: 3.1 G/DL — LOW (ref 3.5–5.2)
ALP SERPL-CCNC: 190 U/L — HIGH (ref 30–115)
ALT FLD-CCNC: 34 U/L — SIGNIFICANT CHANGE UP (ref 0–41)
ANION GAP SERPL CALC-SCNC: 14 MMOL/L — SIGNIFICANT CHANGE UP (ref 7–14)
ANION GAP SERPL CALC-SCNC: 18 MMOL/L — HIGH (ref 7–14)
APTT BLD: 34 SEC — SIGNIFICANT CHANGE UP (ref 27–39.2)
AST SERPL-CCNC: 34 U/L — SIGNIFICANT CHANGE UP (ref 0–41)
BASOPHILS # BLD AUTO: 0.03 K/UL — SIGNIFICANT CHANGE UP (ref 0–0.2)
BASOPHILS NFR BLD AUTO: 0.2 % — SIGNIFICANT CHANGE UP (ref 0–1)
BILIRUB SERPL-MCNC: 1.7 MG/DL — HIGH (ref 0.2–1.2)
BUN SERPL-MCNC: 65 MG/DL — CRITICAL HIGH (ref 10–20)
BUN SERPL-MCNC: 71 MG/DL — CRITICAL HIGH (ref 10–20)
BUN SERPL-MCNC: 72 MG/DL — CRITICAL HIGH (ref 10–20)
CALCIUM SERPL-MCNC: 8.8 MG/DL — SIGNIFICANT CHANGE UP (ref 8.4–10.5)
CALCIUM SERPL-MCNC: 9.1 MG/DL — SIGNIFICANT CHANGE UP (ref 8.4–10.5)
CHLORIDE SERPL-SCNC: 77 MMOL/L — LOW (ref 98–110)
CHLORIDE SERPL-SCNC: 82 MMOL/L — LOW (ref 98–110)
CO2 SERPL-SCNC: 28 MMOL/L — SIGNIFICANT CHANGE UP (ref 17–32)
CO2 SERPL-SCNC: 30 MMOL/L — SIGNIFICANT CHANGE UP (ref 17–32)
CREAT SERPL-MCNC: 1.1 MG/DL — SIGNIFICANT CHANGE UP (ref 0.7–1.5)
CREAT SERPL-MCNC: 1.1 MG/DL — SIGNIFICANT CHANGE UP (ref 0.7–1.5)
EGFR: 51 ML/MIN/1.73M2 — LOW
EGFR: 51 ML/MIN/1.73M2 — LOW
EOSINOPHIL # BLD AUTO: 0.07 K/UL — SIGNIFICANT CHANGE UP (ref 0–0.7)
EOSINOPHIL NFR BLD AUTO: 0.6 % — SIGNIFICANT CHANGE UP (ref 0–8)
GAS PNL BLDA: SIGNIFICANT CHANGE UP
GLUCOSE SERPL-MCNC: 116 MG/DL — HIGH (ref 70–99)
GLUCOSE SERPL-MCNC: 82 MG/DL — SIGNIFICANT CHANGE UP (ref 70–99)
HCT VFR BLD CALC: 32.2 % — LOW (ref 37–47)
HGB BLD-MCNC: 11.1 G/DL — LOW (ref 12–16)
IMM GRANULOCYTES NFR BLD AUTO: 0.7 % — HIGH (ref 0.1–0.3)
LACTATE SERPL-SCNC: 1.7 MMOL/L — SIGNIFICANT CHANGE UP (ref 0.7–2)
LYMPHOCYTES # BLD AUTO: 1.23 K/UL — SIGNIFICANT CHANGE UP (ref 1.2–3.4)
LYMPHOCYTES # BLD AUTO: 9.7 % — LOW (ref 20.5–51.1)
MAGNESIUM SERPL-MCNC: 2.1 MG/DL — SIGNIFICANT CHANGE UP (ref 1.8–2.4)
MAGNESIUM SERPL-MCNC: 2.1 MG/DL — SIGNIFICANT CHANGE UP (ref 1.8–2.4)
MCHC RBC-ENTMCNC: 30.7 PG — SIGNIFICANT CHANGE UP (ref 27–31)
MCHC RBC-ENTMCNC: 34.5 G/DL — SIGNIFICANT CHANGE UP (ref 32–37)
MCV RBC AUTO: 89.2 FL — SIGNIFICANT CHANGE UP (ref 81–99)
MONOCYTES # BLD AUTO: 0.91 K/UL — HIGH (ref 0.1–0.6)
MONOCYTES NFR BLD AUTO: 7.2 % — SIGNIFICANT CHANGE UP (ref 1.7–9.3)
NEUTROPHILS # BLD AUTO: 10.35 K/UL — HIGH (ref 1.4–6.5)
NEUTROPHILS NFR BLD AUTO: 81.6 % — HIGH (ref 42.2–75.2)
NRBC # BLD: 0 /100 WBCS — SIGNIFICANT CHANGE UP (ref 0–0)
PLATELET # BLD AUTO: 523 K/UL — HIGH (ref 130–400)
PMV BLD: 10.6 FL — HIGH (ref 7.4–10.4)
POTASSIUM SERPL-MCNC: 3.5 MMOL/L — SIGNIFICANT CHANGE UP (ref 3.5–5)
POTASSIUM SERPL-MCNC: 4 MMOL/L — SIGNIFICANT CHANGE UP (ref 3.5–5)
POTASSIUM SERPL-SCNC: 3.5 MMOL/L — SIGNIFICANT CHANGE UP (ref 3.5–5)
POTASSIUM SERPL-SCNC: 4 MMOL/L — SIGNIFICANT CHANGE UP (ref 3.5–5)
PROT SERPL-MCNC: 7.2 G/DL — SIGNIFICANT CHANGE UP (ref 6–8)
RBC # BLD: 3.61 M/UL — LOW (ref 4.2–5.4)
RBC # FLD: 16.1 % — HIGH (ref 11.5–14.5)
SARS-COV-2 RNA SPEC QL NAA+PROBE: SIGNIFICANT CHANGE UP
SODIUM SERPL-SCNC: 123 MMOL/L — LOW (ref 135–146)
SODIUM SERPL-SCNC: 126 MMOL/L — LOW (ref 135–146)
WBC # BLD: 12.68 K/UL — HIGH (ref 4.8–10.8)
WBC # FLD AUTO: 12.68 K/UL — HIGH (ref 4.8–10.8)

## 2023-05-14 PROCEDURE — 71045 X-RAY EXAM CHEST 1 VIEW: CPT | Mod: 26

## 2023-05-14 PROCEDURE — 71250 CT THORAX DX C-: CPT | Mod: 26

## 2023-05-14 PROCEDURE — 99291 CRITICAL CARE FIRST HOUR: CPT

## 2023-05-14 RX ORDER — HEPARIN SODIUM 5000 [USP'U]/ML
5000 INJECTION INTRAVENOUS; SUBCUTANEOUS EVERY 6 HOURS
Refills: 0 | Status: DISCONTINUED | OUTPATIENT
Start: 2023-05-14 | End: 2023-05-17

## 2023-05-14 RX ORDER — HEPARIN SODIUM 5000 [USP'U]/ML
2500 INJECTION INTRAVENOUS; SUBCUTANEOUS EVERY 6 HOURS
Refills: 0 | Status: DISCONTINUED | OUTPATIENT
Start: 2023-05-14 | End: 2023-05-17

## 2023-05-14 RX ORDER — POTASSIUM CHLORIDE 20 MEQ
20 PACKET (EA) ORAL
Refills: 0 | Status: COMPLETED | OUTPATIENT
Start: 2023-05-14 | End: 2023-05-15

## 2023-05-14 RX ORDER — HEPARIN SODIUM 5000 [USP'U]/ML
1100 INJECTION INTRAVENOUS; SUBCUTANEOUS
Qty: 25000 | Refills: 0 | Status: DISCONTINUED | OUTPATIENT
Start: 2023-05-14 | End: 2023-05-15

## 2023-05-14 RX ORDER — ACETAZOLAMIDE 250 MG/1
500 TABLET ORAL ONCE
Refills: 0 | Status: COMPLETED | OUTPATIENT
Start: 2023-05-14 | End: 2023-05-14

## 2023-05-14 RX ADMIN — Medication 50 MILLIEQUIVALENT(S): at 02:00

## 2023-05-14 RX ADMIN — SENNA PLUS 2 TABLET(S): 8.6 TABLET ORAL at 21:17

## 2023-05-14 RX ADMIN — Medication 50 MILLIEQUIVALENT(S): at 23:10

## 2023-05-14 RX ADMIN — HEPARIN SODIUM 1100 UNIT(S)/HR: 5000 INJECTION INTRAVENOUS; SUBCUTANEOUS at 22:40

## 2023-05-14 RX ADMIN — CHLORHEXIDINE GLUCONATE 1 APPLICATION(S): 213 SOLUTION TOPICAL at 11:22

## 2023-05-14 RX ADMIN — AMIODARONE HYDROCHLORIDE 200 MILLIGRAM(S): 400 TABLET ORAL at 05:06

## 2023-05-14 RX ADMIN — Medication 500 MILLIGRAM(S): at 11:21

## 2023-05-14 RX ADMIN — Medication 50 MILLIEQUIVALENT(S): at 00:15

## 2023-05-14 RX ADMIN — AMIODARONE HYDROCHLORIDE 200 MILLIGRAM(S): 400 TABLET ORAL at 17:24

## 2023-05-14 RX ADMIN — SODIUM CHLORIDE 50 MILLILITER(S): 5 INJECTION, SOLUTION INTRAVENOUS at 00:39

## 2023-05-14 RX ADMIN — APIXABAN 10 MILLIGRAM(S): 2.5 TABLET, FILM COATED ORAL at 05:07

## 2023-05-14 RX ADMIN — ACETAZOLAMIDE 110 MILLIGRAM(S): 250 TABLET ORAL at 23:10

## 2023-05-14 RX ADMIN — Medication 5 MILLIGRAM(S): at 21:17

## 2023-05-14 RX ADMIN — MEGESTROL ACETATE 400 MILLIGRAM(S): 40 SUSPENSION ORAL at 11:22

## 2023-05-14 RX ADMIN — PANTOPRAZOLE SODIUM 40 MILLIGRAM(S): 20 TABLET, DELAYED RELEASE ORAL at 05:07

## 2023-05-14 NOTE — PROGRESS NOTE ADULT - ASSESSMENT
77 year old woman with PMHx of HTN, HLD, nonischemic cardiomyopathy, HFrEF s/p AICD presents to the ED with 2 weeks of worsening shortness of breath with increasing dry cough over this time period accepted to CCU for cardiogenic shock    # Hyponatremia d/t CHF with decreased effective intravascular volume   # Cardiogenic shock on milrinone     - high urine osm d/t high ADH state, low urine Na d/t low EAV  - off bumex drip  - needs strict fluid restriction to avoid worsening hyponatremia   - resume loop diuretics if Na level down-trending  - TSH noted ok

## 2023-05-14 NOTE — PROGRESS NOTE ADULT - ASSESSMENT
77 year old woman with PMHx of HTN, HLD, nonischemic cardiomyopathy, HFrEF s/p AICD presents to the ED with 2 weeks of worsening shortness of breath with increasing dry cough over this time period accepted to CCU for cardiogenic shock    #Cardiogenic Shock   #LV thrombus, suspected new PE  #NICM  #HFrEF 25% s/p AICD  #HTN/HLD  - CT CAP on admission - cardiomegaly w/ RH strain  - CT Chest 5/4 subacute PE in R mainstem and subsegmental branches  - TTE 4/22/2023 EF <20%, large fixed thrombus vs mass on apical LV wall, GIIDD, mod-sev TR, mod MR, mild WI, mild PH  - LE venous duplex repeat -ve 5/6  - NSVT runs on monitor last 5/11 c/w amiodarone 200mg po qD  - c/w dobutamine and milrinone ggt -via PICC line  - holding eliquis - started heparin ggt 6PM for RHC tomorrow 5/15  - IVC collapsible - holding 3% saline and bumex ggt - will reassess IVC in PM  - lactate 1.7 5/14, pH 7.54/37/97/32  - BMP, Mg BID, daily AM lactate - BMP/Mg/PTT - 4PM  - goal net -ve 1-2L/day  - HF following - MAP goal >55, can add levo for additional pressor support if needed  - Mg >2, K>4  - daily weight  - strict I/O  - if persistent runs of NSVT will get EP consult     #Post obstructive pneumonia  #Subacute PE  #Positive COVID-19 PCR  - CT CAP - R hilar LAD, R perihilar soft tissue density w/ indentation/possible invasion of R pulmonary artery and narrowing of proximal R lobar and segmental branches w/ consolidation of RLL  - MRSA -ve  - completed zosyn 7 days course 4/27  - s/p EBUS with biopsy 5/5 - pathology negative for malignancy   - PET scan as outpatient to r/o lung malignancy?  - c/w Incentive Spirometer   - Asymptomatic COVID result, off isolation   - No need for further treatment or diagnostic w/u as per ID    #HAGMA - resolved   #Lactic Acidosis - resolved   #DAVID on CKD - resolved   #Elevated liver enzymes predominantly hepatocellular (AST>ALT) with hyperbilirubinemia likely due to ischemic liver injury - resolved   - lactate 1.7 5/14, pH 7.54/37/97/32  - trend daily lactate in AM to monitor perfusion   - previously holding lipitor 20mg qhs - can restart now w/ LFTs wnl     #Deconditioning   #Reduced appetite/po intake  #Hypotonic Hyponatremia  - hypervolemic   - Na 132-> 129 -> 132 -> 128 ->119 ->126  - Sosm 259, Katherine <20, Pt/Cr 1.7, UUrea 393, Uosm 328  - FeUrea 15.4, <35% suggestive of pre-renal   - IVC collapsible this AM, holding diuresis, repeat in PM   - prealbumin 5 low - pending repeat 5/15  - megestrol dose adjusted 400mg qD  - s/p FEES - pureed w/ mildly thick liquids w/ supervised feeds  - c/w NG feeds TID for supplementation   - nutrition following   - continue to trend lytes, Cr   - PT/OT following - needs daily PT for optimization   - 5/14 - patient worked with PT, was able to get OOB and stand and take a few steps with walker then placed into chair     #Constipation   - c/w senna qhs and miralax BID   - abdomen soft, non-distended, passing gas   - monitor for BM     #Follow Up  - monitor UOP, trend lytes TID   - Assess IVC and volume status daily  - PT/OT, encourage OOBTC and ambulation with walker  - monitor for hypotension if MAP < 55 can adjust pressor requirements   - CT Scan results pending  - Heparing ggt @ 6PM, NPO for cath tomorrow 5/15    # Misc  - DVT Prophylaxis: heparin ggt   - GI Prophylaxis: pantoprazole    Tablet 40 milliGRAM(s) Oral before breakfast  - Diet: Diet, DASH/TLC with 1.2 L fluid restriction    - Activity: Activity - Ambulate as Tolerated  - Code Status: Full , Palliative on board   139

## 2023-05-14 NOTE — PROGRESS NOTE ADULT - SUBJECTIVE AND OBJECTIVE BOX
Nephrology Progress Note    MIKHAIL JOHNSON  MRN-[id number]  77y  Female    S:  Patient is seen and examined, events over the last 24h noted.    O:  Allergies:  epinephrine (Unknown)    Hospital Medications:   MEDICATIONS  (STANDING):  aMIOdarone    Tablet 200 milliGRAM(s) Oral two times a day  chlorhexidine 2% Cloths 1 Application(s) Topical daily  DOBUTamine Infusion 8 MICROgram(s)/kG/Min (7.62 mL/Hr) IV Continuous <Continuous>  magnesium gluconate 500 milliGRAM(s) Oral daily  megestrol Suspension 400 milliGRAM(s) Oral daily  melatonin 5 milliGRAM(s) Oral at bedtime  milrinone Infusion 0.25 MICROgram(s)/kG/Min (4.76 mL/Hr) IV Continuous <Continuous>  pantoprazole    Tablet 40 milliGRAM(s) Oral before breakfast  polyethylene glycol 3350 17 Gram(s) Oral two times a day  senna 2 Tablet(s) Oral at bedtime    MEDICATIONS  (PRN):  benzocaine 20% Spray 1 Spray(s) Topical four times a day PRN pain    Home Medications:  atorvastatin 20 mg oral tablet: 1 tab(s) orally once a day (04 Aug 2021 10:05)  carvedilol 6.25 mg oral tablet: 1 tab(s) orally 2 times a day (2023 02:02)  Entresto 49 mg-51 mg oral tablet: 1 tab(s) orally 2 times a day (04 Aug 2021 10:05)  Farxiga 10 mg oral tablet: 1 tab(s) orally once a day (2023 02:05)  Lasix 20 mg oral tablet: 1 tab(s) orally once a day (2023 02:04)  spironolactone 25 mg oral tablet: 1 tab(s) orally once a day (04 Aug 2021 10:05)      VITALS:  Daily     Daily Weight in k.9 (14 May 2023 05:00)  T(F): 96.7 (23 @ 13:00), Max: 96.9 (23 @ 20:00)  HR: 101 (23 @ 16:00)  BP: 98/57 (23 @ 16:00)  RR: 21 (23 @ 16:00)  SpO2: 94% (23 @ 16:00)  Wt(kg): --  I&O's Detail    13 May 2023 07:  -  14 May 2023 07:00  --------------------------------------------------------  IN:    Bumetanide: 210 mL    DOBUTamine: 182.4 mL    Enteral Tube Flush: 50 mL    Milrinone: 115.2 mL    Oral Fluid: 260 mL    sodium chloride 3%: 150 mL    TwoCal HN: 240 mL  Total IN: 1207.6 mL    OUT:    Ureteral Catheter (mL): 3670 mL  Total OUT: 3670 mL    Total NET: -2462.4 mL      14 May 2023 07:  -  14 May 2023 17:29  --------------------------------------------------------  IN:    DOBUTamine: 63.6 mL    Enteral Tube Flush: 40 mL    Milrinone: 43.2 mL    Oral Fluid: 210 mL    TwoCal HN: 240 mL  Total IN: 596.8 mL    OUT:    Ureteral Catheter (mL): 865 mL  Total OUT: 865 mL    Total NET: -268.2 mL        I&O's Summary    13 May 2023 07:  -  14 May 2023 07:00  --------------------------------------------------------  IN: 1207.6 mL / OUT: 3670 mL / NET: -2462.4 mL    14 May 2023 07:  -  14 May 2023 17:29  --------------------------------------------------------  IN: 596.8 mL / OUT: 865 mL / NET: -268.2 mL          PHYSICAL EXAM:  Gen: NAD  Resp: b/l breath sounds  Card: S1/S2  Abd: soft  Extremities: no edema      LABS:  ABG - ( 13 May 2023 22:49 )  pH, Arterial: 7.54  pH, Blood: x     /  pCO2: 37    /  pO2: 97    / HCO3: 32    / Base Excess: 8.5   /  SaO2: 99.1          126<L>  |  82<L>  |  71<HH>  ----------------------------<  116<H>  4.0   |  30  |  1.1    Ca    9.1      14 May 2023 05:00  Mg     2.1         TPro  7.2  /  Alb  3.1<L>  /  TBili  1.7<H>  /  DBili      /  AST  34  /  ALT  34  /  AlkPhos  190<H>      eGFR: 51 mL/min/1.73m2 (23 @ 05:00)  eGFR: 42 mL/min/1.73m2 (23 @ 20:26)    Phosphorus Level, Serum: 2.5 mg/dL (23 @ 05:03)  Phosphorus Level, Serum: 2.3 mg/dL (23 @ 04:51)                            11.1   12.68 )-----------( 523      ( 14 May 2023 05:00 )             32.2     Mean Cell Volume: 89.2 fL (23 @ 05:00)    Ferritin, Serum: 1826 ng/mL (23 @ 12:15)  Ferritin, Serum: 2445 ng/mL (23 @ 05:12)      Urine Studies:      Urea Nitrogen,  Random Urine: 393 mg/dL (23 @ 19:06)    Sodium, Random Urine: <20.0 mmoL/L ( @ 19:06)  Creatinine, Random Urine: 52 mg/dL ( @ 19:06)    Creatinine trend:  Creatinine, Serum: 1.1 mg/dL (23 @ 05:00)  Creatinine, Serum: 1.3 mg/dL (23 @ 20:26)  Creatinine, Serum: 1.2 mg/dL (23 @ 16:20)  Creatinine, Serum: 1.1 mg/dL (23 @ 05:00)  Creatinine, Serum: 1.2 mg/dL (23 @ 00:19)  Creatinine, Serum: 1.2 mg/dL (23 @ 20:48)    Sodium, Serum: 126 mmol/L (23 @ 05:00)  Sodium, Serum: 125 mmol/L (23 @ 20:26)  Sodium, Serum: 123 mmol/L (23 @ 16:20)  Sodium, Serum: 123 mmol/L (23 @ 05:00)  Sodium, Serum: 122 mmol/L (23 @ 00:19)  Sodium, Serum: 122 mmol/L (23 @ 20:48)  Sodium, Serum: 120 mmol/L (23 @ 19:01)  Sodium, Serum: 117 mmol/L (23 @ 16:21)    Osmolality, Random Urine: 328 mos/kg [50 - 1200] (23 @ 19:06)  Sodium, Random Urine: <20.0 mmoL/L (23 @ 19:06)  Osmolality, Serum: 259 mos/kg [280 - 301] (23 @ 08:05)  Thyroid Stimulating Hormone, Serum: 2.26 uIU/mL [0.27 - 4.20] (23 @ 12:15)

## 2023-05-14 NOTE — PROGRESS NOTE ADULT - SUBJECTIVE AND OBJECTIVE BOX
HPI:  77 year old woman with PMHx of HTN, HLD, nonischemic cardiomyopathy, HFrEF s/p AICD presents to the ED with 2 weeks of worsening shortness of breath. Patient and her son state patient has been experiencing shortness of breath that has progressed from present on activity to now shortness of breath during conversation. Patient also notes an increasing dry cough over this time period. Denies history of smoking, fevers/chills, chest pain, nausea/vomiting, bloody sputum, dark/tarry/bloody bowel movements, upper respiratory symptoms.     In ED, patient's HR is 110 and saturating 98% on RA  CT C/A/P revealing cardiomegaly with evidence of right heart strain, R hilar lymphadenopathy with R perihilar soft tissue density with indentation/possible invasion of right pulmonary artery, and narrowing of proximal R lobar and segmental branches with associated consolidation of right lower lobe. 2.2 x 2.8 cm filling defect noted in apex of left ventricle.   Last ECHO 8/2021 revealing EF 20-25%, mild Mr, mild TR, mild Pulm HTN  Dimer 2600, trops negative BNP 92318  Duplex venous LE negative for DVT; CTA chest negative for PE  Cr 1.4 (0.7 in 2021), Na+ 128, T.Benjamin 2.3, LFTs elevated (hemolyzed)  Lactate 5.8-->4.6  Admitted to SDU (22 Apr 2023 01:10)    Currently admitted to medicine with the primary diagnosis of Lung mass       Today is hospital day 23d.     INTERVAL HPI / OVERNIGHT EVENTS:  Patient was examined and seen at bedside. This morning she is resting comfortably in bed, very lethargic, no events overnight. Hemodynamically stable, patient removed NG tube, will replace, voiding appropriately, having regular BMs.     ROS: Otherwise unremarkable     PAST MEDICAL & SURGICAL HISTORY  CHF, stage C    HTN (hypertension)      ALLERGIES  epinephrine (Unknown)    MEDICATIONS  STANDING MEDICATIONS  aMIOdarone    Tablet 200 milliGRAM(s) Oral two times a day  chlorhexidine 2% Cloths 1 Application(s) Topical daily  DOBUTamine Infusion 8 MICROgram(s)/kG/Min IV Continuous <Continuous>  magnesium gluconate 500 milliGRAM(s) Oral daily  megestrol Suspension 400 milliGRAM(s) Oral daily  melatonin 5 milliGRAM(s) Oral at bedtime  milrinone Infusion 0.25 MICROgram(s)/kG/Min IV Continuous <Continuous>  pantoprazole    Tablet 40 milliGRAM(s) Oral before breakfast  polyethylene glycol 3350 17 Gram(s) Oral two times a day  senna 2 Tablet(s) Oral at bedtime  sodium chloride 3%. 150 milliLiter(s) IV Continuous <Continuous>  sodium chloride 3%. 150 milliLiter(s) IV Continuous <Continuous>    PRN MEDICATIONS  benzocaine 20% Spray 1 Spray(s) Topical four times a day PRN    VITALS:  T(F): 96.8  HR: 97  BP: 87/51  RR: 18  SpO2: 100%    PHYSICAL EXAM  GEN: NAD, Resting comfortably in bed, cooperative, elderly, frail, NG tube in place  PULM: Clear to auscultation bilaterally, No wheezing, rales, rhonchi  CVS: Regular rate and rhythm, S1-S2, no murmurs, heaves, thrills  ABD: Soft, non-tender, non-distended, no guarding, BS +  EXT: No edema/cyanosis, extremities warm/dry, peripheral pulses palpable   NEURO: A&Ox3, no focal deficits    LABS                        11.1   12.68 )-----------( 523      ( 14 May 2023 05:00 )             32.2     05-14    126<L>  |  82<L>  |  71<HH>  ----------------------------<  116<H>  4.0   |  30  |  1.1    Ca    9.1      14 May 2023 05:00  Mg     2.1     05-14    TPro  7.2  /  Alb  3.1<L>  /  TBili  1.7<H>  /  DBili  x   /  AST  34  /  ALT  34  /  AlkPhos  190<H>  05-14        ABG - ( 13 May 2023 22:49 )  pH, Arterial: 7.54  pH, Blood: x     /  pCO2: 37    /  pO2: 97    / HCO3: 32    / Base Excess: 8.5   /  SaO2: 99.1              Lactate, Blood: 1.7 mmol/L (05-14-23 @ 05:00)            RADIOLOGY  CTA Chest 5/4  Redemonstrated right hilar lesion with compression and possible invasion   of adjacent main pulmonary artery segment (unchanged from previous study.    Subacute right main and segmental pulmonary embolus.    Increased right lower lobe consolidative and groundglass opacities,   likely infectious/inflammatory in etiology.    4 mm left anterior upper lobe groundglass nodule.    US Kidney Bladder 4/22  No sonographic evidence of hydronephrosis.    b/l le venous duplex 5/6  No evidence of deep venous thrombosis in either lower extremity.    TTE 4/22:   1. Left ventricular ejection fraction, by visual estimation, is <20%.   2. Severely decreased global left ventricular systolic function.   3. Severely enlarged left atrium.   4. Elevated mean left atrial pressure.   5. Large, fixed thrombus vs. mass on the apical wall of the left   ventricle.   6. Spectral Doppler shows pseudonormal pattern of left ventricular   myocardial filling (Grade II diastolic dysfunction).   7. Moderately reduced RV systolic function.   8. Mildly enlarged right atrium.   9. Moderate mitral valve regurgitation.  10. Moderate-severe tricuspid regurgitation.  11. Mild pulmonic valve regurgitation.  12. Estimated pulmonary artery systolic pressure is 42.9 mmHg assuming a   right atrial pressure of 15 mmHg, which is consistent with mild pulmonary   hypertension.  13. Endocardial visualization was enhanced with intravenous echo contrast.    CXR 5/7  Congestive changes. Bibasilar opacities/effusions, unchanged

## 2023-05-15 DIAGNOSIS — Z87.891 PERSONAL HISTORY OF NICOTINE DEPENDENCE: ICD-10-CM

## 2023-05-15 LAB
ANION GAP SERPL CALC-SCNC: 15 MMOL/L — HIGH (ref 7–14)
ANION GAP SERPL CALC-SCNC: 16 MMOL/L — HIGH (ref 7–14)
ANION GAP SERPL CALC-SCNC: 18 MMOL/L — HIGH (ref 7–14)
APTT BLD: 33.1 SEC — SIGNIFICANT CHANGE UP (ref 27–39.2)
APTT BLD: 79.6 SEC — CRITICAL HIGH (ref 27–39.2)
BUN SERPL-MCNC: 74 MG/DL — CRITICAL HIGH (ref 10–20)
BUN SERPL-MCNC: 78 MG/DL — CRITICAL HIGH (ref 10–20)
BUN SERPL-MCNC: 79 MG/DL — CRITICAL HIGH (ref 10–20)
CALCIUM SERPL-MCNC: 8.3 MG/DL — LOW (ref 8.4–10.5)
CALCIUM SERPL-MCNC: 8.9 MG/DL — SIGNIFICANT CHANGE UP (ref 8.4–10.4)
CALCIUM SERPL-MCNC: 9 MG/DL — SIGNIFICANT CHANGE UP (ref 8.4–10.5)
CHLORIDE SERPL-SCNC: 75 MMOL/L — LOW (ref 98–110)
CHLORIDE SERPL-SCNC: 80 MMOL/L — LOW (ref 98–110)
CHLORIDE SERPL-SCNC: 81 MMOL/L — LOW (ref 98–110)
CO2 SERPL-SCNC: 27 MMOL/L — SIGNIFICANT CHANGE UP (ref 17–32)
CO2 SERPL-SCNC: 28 MMOL/L — SIGNIFICANT CHANGE UP (ref 17–32)
CO2 SERPL-SCNC: 28 MMOL/L — SIGNIFICANT CHANGE UP (ref 17–32)
CREAT SERPL-MCNC: 1.2 MG/DL — SIGNIFICANT CHANGE UP (ref 0.7–1.5)
EGFR: 46 ML/MIN/1.73M2 — LOW
FERRITIN SERPL-MCNC: 1462 NG/ML — HIGH (ref 15–150)
GLUCOSE SERPL-MCNC: 112 MG/DL — HIGH (ref 70–99)
GLUCOSE SERPL-MCNC: 154 MG/DL — HIGH (ref 70–99)
GLUCOSE SERPL-MCNC: 92 MG/DL — SIGNIFICANT CHANGE UP (ref 70–99)
HCT VFR BLD CALC: 31.7 % — LOW (ref 37–47)
HGB BLD-MCNC: 10.8 G/DL — LOW (ref 12–16)
LACTATE SERPL-SCNC: 1.5 MMOL/L — SIGNIFICANT CHANGE UP (ref 0.7–2)
LACTATE SERPL-SCNC: 1.5 MMOL/L — SIGNIFICANT CHANGE UP (ref 0.7–2)
MAGNESIUM SERPL-MCNC: 2 MG/DL — SIGNIFICANT CHANGE UP (ref 1.8–2.4)
MAGNESIUM SERPL-MCNC: 2.2 MG/DL — SIGNIFICANT CHANGE UP (ref 1.8–2.4)
MAGNESIUM SERPL-MCNC: 2.4 MG/DL — SIGNIFICANT CHANGE UP (ref 1.8–2.4)
MCHC RBC-ENTMCNC: 30.1 PG — SIGNIFICANT CHANGE UP (ref 27–31)
MCHC RBC-ENTMCNC: 34.1 G/DL — SIGNIFICANT CHANGE UP (ref 32–37)
MCV RBC AUTO: 88.3 FL — SIGNIFICANT CHANGE UP (ref 81–99)
NRBC # BLD: 0 /100 WBCS — SIGNIFICANT CHANGE UP (ref 0–0)
PLATELET # BLD AUTO: 447 K/UL — HIGH (ref 130–400)
PMV BLD: 11.1 FL — HIGH (ref 7.4–10.4)
POTASSIUM SERPL-MCNC: 3.3 MMOL/L — LOW (ref 3.5–5)
POTASSIUM SERPL-MCNC: 3.7 MMOL/L — SIGNIFICANT CHANGE UP (ref 3.5–5)
POTASSIUM SERPL-MCNC: 4.3 MMOL/L — SIGNIFICANT CHANGE UP (ref 3.5–5)
POTASSIUM SERPL-SCNC: 3.3 MMOL/L — LOW (ref 3.5–5)
POTASSIUM SERPL-SCNC: 3.7 MMOL/L — SIGNIFICANT CHANGE UP (ref 3.5–5)
POTASSIUM SERPL-SCNC: 4.3 MMOL/L — SIGNIFICANT CHANGE UP (ref 3.5–5)
PREALB SERPL-MCNC: 10 MG/DL — LOW (ref 20–40)
PREALB SERPL-MCNC: 11 MG/DL — LOW (ref 20–40)
PROCALCITONIN SERPL-MCNC: 0.45 NG/ML — HIGH (ref 0.02–0.1)
RBC # BLD: 3.59 M/UL — LOW (ref 4.2–5.4)
RBC # FLD: 16 % — HIGH (ref 11.5–14.5)
SODIUM SERPL-SCNC: 118 MMOL/L — CRITICAL LOW (ref 135–146)
SODIUM SERPL-SCNC: 124 MMOL/L — LOW (ref 135–146)
SODIUM SERPL-SCNC: 126 MMOL/L — LOW (ref 135–146)
WBC # BLD: 12.59 K/UL — HIGH (ref 4.8–10.8)
WBC # FLD AUTO: 12.59 K/UL — HIGH (ref 4.8–10.8)

## 2023-05-15 PROCEDURE — 99233 SBSQ HOSP IP/OBS HIGH 50: CPT

## 2023-05-15 PROCEDURE — 93306 TTE W/DOPPLER COMPLETE: CPT | Mod: 26

## 2023-05-15 PROCEDURE — 93010 ELECTROCARDIOGRAM REPORT: CPT

## 2023-05-15 RX ORDER — SODIUM CHLORIDE 5 G/100ML
150 INJECTION, SOLUTION INTRAVENOUS
Refills: 0 | Status: DISCONTINUED | OUTPATIENT
Start: 2023-05-16 | End: 2023-05-17

## 2023-05-15 RX ORDER — SODIUM CHLORIDE 5 G/100ML
150 INJECTION, SOLUTION INTRAVENOUS
Refills: 0 | Status: DISCONTINUED | OUTPATIENT
Start: 2023-05-15 | End: 2023-05-17

## 2023-05-15 RX ORDER — HEPARIN SODIUM 5000 [USP'U]/ML
INJECTION INTRAVENOUS; SUBCUTANEOUS
Qty: 25000 | Refills: 0 | Status: DISCONTINUED | OUTPATIENT
Start: 2023-05-15 | End: 2023-05-17

## 2023-05-15 RX ORDER — PIPERACILLIN AND TAZOBACTAM 4; .5 G/20ML; G/20ML
3.38 INJECTION, POWDER, LYOPHILIZED, FOR SOLUTION INTRAVENOUS EVERY 8 HOURS
Refills: 0 | Status: DISCONTINUED | OUTPATIENT
Start: 2023-05-15 | End: 2023-05-17

## 2023-05-15 RX ORDER — BUMETANIDE 0.25 MG/ML
1 INJECTION INTRAMUSCULAR; INTRAVENOUS
Qty: 20 | Refills: 0 | Status: DISCONTINUED | OUTPATIENT
Start: 2023-05-15 | End: 2023-05-17

## 2023-05-15 RX ORDER — ONDANSETRON 8 MG/1
2 TABLET, FILM COATED ORAL ONCE
Refills: 0 | Status: COMPLETED | OUTPATIENT
Start: 2023-05-15 | End: 2023-05-15

## 2023-05-15 RX ORDER — SODIUM CHLORIDE 5 G/100ML
150 INJECTION, SOLUTION INTRAVENOUS ONCE
Refills: 0 | Status: COMPLETED | OUTPATIENT
Start: 2023-05-15 | End: 2023-05-15

## 2023-05-15 RX ORDER — POTASSIUM CHLORIDE 20 MEQ
20 PACKET (EA) ORAL
Refills: 0 | Status: COMPLETED | OUTPATIENT
Start: 2023-05-15 | End: 2023-05-15

## 2023-05-15 RX ORDER — ONDANSETRON 8 MG/1
4 TABLET, FILM COATED ORAL ONCE
Refills: 0 | Status: DISCONTINUED | OUTPATIENT
Start: 2023-05-15 | End: 2023-05-15

## 2023-05-15 RX ORDER — BUMETANIDE 0.25 MG/ML
3 INJECTION INTRAMUSCULAR; INTRAVENOUS EVERY 8 HOURS
Refills: 0 | Status: DISCONTINUED | OUTPATIENT
Start: 2023-05-15 | End: 2023-05-15

## 2023-05-15 RX ORDER — SODIUM CHLORIDE 5 G/100ML
100 INJECTION, SOLUTION INTRAVENOUS ONCE
Refills: 0 | Status: DISCONTINUED | OUTPATIENT
Start: 2023-05-15 | End: 2023-05-15

## 2023-05-15 RX ADMIN — Medication 50 MILLIEQUIVALENT(S): at 03:06

## 2023-05-15 RX ADMIN — HEPARIN SODIUM 1100 UNIT(S)/HR: 5000 INJECTION INTRAVENOUS; SUBCUTANEOUS at 20:20

## 2023-05-15 RX ADMIN — PANTOPRAZOLE SODIUM 40 MILLIGRAM(S): 20 TABLET, DELAYED RELEASE ORAL at 05:25

## 2023-05-15 RX ADMIN — Medication 50 MILLIEQUIVALENT(S): at 00:07

## 2023-05-15 RX ADMIN — CHLORHEXIDINE GLUCONATE 1 APPLICATION(S): 213 SOLUTION TOPICAL at 14:03

## 2023-05-15 RX ADMIN — BUMETANIDE 5 MG/HR: 0.25 INJECTION INTRAMUSCULAR; INTRAVENOUS at 16:46

## 2023-05-15 RX ADMIN — Medication 7.62 MICROGRAM(S)/KG/MIN: at 11:30

## 2023-05-15 RX ADMIN — Medication 30 MILLILITER(S): at 20:16

## 2023-05-15 RX ADMIN — ONDANSETRON 2 MILLIGRAM(S): 8 TABLET, FILM COATED ORAL at 21:58

## 2023-05-15 RX ADMIN — AMIODARONE HYDROCHLORIDE 200 MILLIGRAM(S): 400 TABLET ORAL at 05:25

## 2023-05-15 RX ADMIN — MEGESTROL ACETATE 400 MILLIGRAM(S): 40 SUSPENSION ORAL at 14:03

## 2023-05-15 RX ADMIN — SODIUM CHLORIDE 50 MILLILITER(S): 5 INJECTION, SOLUTION INTRAVENOUS at 14:02

## 2023-05-15 RX ADMIN — AMIODARONE HYDROCHLORIDE 200 MILLIGRAM(S): 400 TABLET ORAL at 17:08

## 2023-05-15 RX ADMIN — Medication 50 MILLIEQUIVALENT(S): at 14:02

## 2023-05-15 RX ADMIN — Medication 500 MILLIGRAM(S): at 14:02

## 2023-05-15 RX ADMIN — PIPERACILLIN AND TAZOBACTAM 25 GRAM(S): 4; .5 INJECTION, POWDER, LYOPHILIZED, FOR SOLUTION INTRAVENOUS at 21:16

## 2023-05-15 RX ADMIN — Medication 50 MILLIEQUIVALENT(S): at 10:59

## 2023-05-15 RX ADMIN — POLYETHYLENE GLYCOL 3350 17 GRAM(S): 17 POWDER, FOR SOLUTION ORAL at 17:09

## 2023-05-15 RX ADMIN — HEPARIN SODIUM 1100 UNIT(S)/HR: 5000 INJECTION INTRAVENOUS; SUBCUTANEOUS at 06:02

## 2023-05-15 NOTE — PROGRESS NOTE ADULT - ASSESSMENT
Impression:       Cardiogenic shock   LV thrombus   Pulmonary embolism   Lung consolidations -  infarcts ? pneumonia ?   Right hilar density on CT s/p EBUS   Poor funcitonal status   Deconditioning      Plan:     Repeat CT noted  RLL infiltrates persist since this admission; new RUL infiltrate; opacity   Differential is infectious from aspiration pneumonia vs lung infarcts due to recently diagnosed PE  Check procal; No fevers; mild leukocytosis noted  S/P EBUS with no evidence of mass; cyto negative for malignancy   On room air; denies any respiratory issues   Consider CT chest in 6-8 weeks to document resolution of infiltrates/opacities  On IV heparin; Keep PTT therapeutic   Speech and swallow; aspiration precautions   Clarify goals of care; palliative care follow up   Will follow as needed

## 2023-05-15 NOTE — PROGRESS NOTE ADULT - SUBJECTIVE AND OBJECTIVE BOX
NUTRITION SUPPORT TEAM  -  PROGRESS NOTE    resting comfortabl  famiily at bedside  poor po intake per speech and swallow eval    NGT in place  abdomen soft n/d, feedings tolerated  npo for procedure test  REVIEW OF SYSTEMS:  Negative except as noted above.     VITALS:  T(F): 97.7 (05-15 @ 08:00), Max: 97.7 (05-15 @ 08:00)  HR: 96 (05-15 @ 10:00) (94 - 103)  BP: 93/50 (05-15 @ 10:00) (82/52 - 101/57)  RR: 24 (05-15 @ 10:00) (15 - 33)  SpO2: 100% (05-15 @ 10:00) (92% - 100%)  ABG - ( 14 May 2023 22:27 )  pH, Arterial: 7.59  pH, Blood: x     /  pCO2: 34    /  pO2: 114   / HCO3: 33    / Base Excess: 10.3  /  SaO2: 97.5    HEIGHT/WEIGHT/BMI:   Height (cm): 160 (05-05)  Weight (kg): 63.5 (05-05)  BMI (kg/m2): 24.8 (05-05)    05-14-23 @ 07:01  -  05-15-23 @ 07:00  --------------------------------------------------------  IN:    DOBUTamine: 168.6 mL    Enteral Tube Flush: 100 mL    Heparin Infusion: 121 mL    Milrinone: 114.9 mL    Oral Fluid: 450 mL    TwoCal HN: 480 mL  Total IN: 1434.5 mL    OUT:    Ureteral Catheter (mL): 1670 mL  Total OUT: 1670 mL    Total NET: -235.5 mL        MEDICATIONS:   aMIOdarone    Tablet 200 milliGRAM(s) Oral two times a day  benzocaine 20% Spray 1 Spray(s) Topical four times a day PRN  chlorhexidine 2% Cloths 1 Application(s) Topical daily  DOBUTamine Infusion 8 MICROgram(s)/kG/Min (7.62 mL/Hr) IV Continuous <Continuous>  heparin   Injectable 5000 Unit(s) IV Push every 6 hours PRN  heparin   Injectable 2500 Unit(s) IV Push every 6 hours PRN  heparin  Infusion. 1100 Unit(s)/Hr (11 mL/Hr) IV Continuous <Continuous>  magnesium gluconate 500 milliGRAM(s) Oral daily  megestrol Suspension 400 milliGRAM(s) Oral daily  melatonin 5 milliGRAM(s) Oral at bedtime  milrinone Infusion 0.25 MICROgram(s)/kG/Min (4.76 mL/Hr) IV Continuous <Continuous>  pantoprazole    Tablet 40 milliGRAM(s) Oral before breakfast  polyethylene glycol 3350 17 Gram(s) Oral two times a day  potassium chloride  20 mEq/100 mL IVPB 20 milliEquivalent(s) IV Intermittent every 2 hours  senna 2 Tablet(s) Oral at bedtime  sodium chloride 3% Bolus 100 milliLiter(s) IV Bolus once    LABS:                         10.8   12.59 )-----------( 447      ( 15 May 2023 04:48 )             31.7     124<L>  |  80<L>  |  78<HH>  ----------------------------<  112<H>          (05-15-23 @ 04:48)  3.7   |  28  |  1.2    Ca    9.0          (05-15-23 @ 04:48)  Mg     2.2         (05-15-23 @ 04:48)    TPro  7.2  /  Alb  3.1<L>  /  TBili  1.7<H>  /  DBili  x   /  AST  34  /  ALT  34  /  AlkPhos  190<H>       05-14-23 @ 05:00    Triglycerides, Serum: 91 mg/dL (04-25 @ 04:50)  DIET:   Diet, NPO after Midnight:      NPO Start Date: 14-May-2023,   NPO Start Time: 23:59  Except Medications (05-14-23 @ 09:14) [Active]  Diet, DASH/TLC:   Sodium & Cholesterol Restricted  Pureed (PUREED)  1200mL Fluid Restriction ([id number])  Mildly Thick Liquids (MILDTHICKLIQS)  Tube Feeding Modality: Nasogastric  TwoCal HN  Total Volume for 24 Hours (mL): 720  Bolus  Total Volume of Bolus (mL):  240  Tube Feed Frequency: Every 8 hours   Tube Feed Start Time: 12:00  Bolus Feed Rate (mL per Hour): 500   Bolus Feed Duration (in Hours): 0.5  Bolus Feed Instructions:  feed via NG AFTER each attempted po meal - with pt seated upright during and half hour after a feeding  flush NG with 20 ml water brisk syringe push before & afer each feed  Supplement Feeding Modality:  Oral  Ensure Enlive Cans or Servings Per Day:  2       Frequency:  Daily (05-11-23 @ 13:53) [Active]   NUTRITION SUPPORT TEAM  -  PROGRESS NOTE    resting comfortably  NPO now for procedure test   NGT in place  abdomen soft n/d, feedings tolerated  npo for procedure test  REVIEW OF SYSTEMS:  Negative except as noted above.     VITALS:  T(F): 97.7 (05-15 @ 08:00), Max: 97.7 (05-15 @ 08:00)  HR: 96 (05-15 @ 10:00) (94 - 103)  BP: 93/50 (05-15 @ 10:00) (82/52 - 101/57)  RR: 24 (05-15 @ 10:00) (15 - 33)  SpO2: 100% (05-15 @ 10:00) (92% - 100%)  ABG - ( 14 May 2023 22:27 )  pH, Arterial: 7.59  pH, Blood: x     /  pCO2: 34    /  pO2: 114   / HCO3: 33    / Base Excess: 10.3  /  SaO2: 97.5    HEIGHT/WEIGHT/BMI:   Height (cm): 160 (05-05)  Weight (kg): 63.5 (05-05)  BMI (kg/m2): 24.8 (05-05)    05-14-23 @ 07:01  -  05-15-23 @ 07:00  --------------------------------------------------------  IN:    DOBUTamine: 168.6 mL    Enteral Tube Flush: 100 mL    Heparin Infusion: 121 mL    Milrinone: 114.9 mL    Oral Fluid: 450 mL    TwoCal HN: 480 mL  Total IN: 1434.5 mL    OUT:    Ureteral Catheter (mL): 1670 mL  Total OUT: 1670 mL    Total NET: -235.5 mL        MEDICATIONS:   aMIOdarone    Tablet 200 milliGRAM(s) Oral two times a day  benzocaine 20% Spray 1 Spray(s) Topical four times a day PRN  chlorhexidine 2% Cloths 1 Application(s) Topical daily  DOBUTamine Infusion 8 MICROgram(s)/kG/Min (7.62 mL/Hr) IV Continuous <Continuous>  heparin   Injectable 5000 Unit(s) IV Push every 6 hours PRN  heparin   Injectable 2500 Unit(s) IV Push every 6 hours PRN  heparin  Infusion. 1100 Unit(s)/Hr (11 mL/Hr) IV Continuous <Continuous>  magnesium gluconate 500 milliGRAM(s) Oral daily  megestrol Suspension 400 milliGRAM(s) Oral daily  melatonin 5 milliGRAM(s) Oral at bedtime  milrinone Infusion 0.25 MICROgram(s)/kG/Min (4.76 mL/Hr) IV Continuous <Continuous>  pantoprazole    Tablet 40 milliGRAM(s) Oral before breakfast  polyethylene glycol 3350 17 Gram(s) Oral two times a day  potassium chloride  20 mEq/100 mL IVPB 20 milliEquivalent(s) IV Intermittent every 2 hours  senna 2 Tablet(s) Oral at bedtime  sodium chloride 3% Bolus 100 milliLiter(s) IV Bolus once    LABS:                         10.8   12.59 )-----------( 447      ( 15 May 2023 04:48 )             31.7     124<L>  |  80<L>  |  78<HH>  ----------------------------<  112<H>          (05-15-23 @ 04:48)  3.7   |  28  |  1.2    Ca    9.0          (05-15-23 @ 04:48)  Mg     2.2         (05-15-23 @ 04:48)    TPro  7.2  /  Alb  3.1<L>  /  TBili  1.7<H>  /  DBili  x   /  AST  34  /  ALT  34  /  AlkPhos  190<H>       05-14-23 @ 05:00    Triglycerides, Serum: 91 mg/dL (04-25 @ 04:50)  DIET:   Diet, NPO after Midnight:      NPO Start Date: 14-May-2023,   NPO Start Time: 23:59  Except Medications (05-14-23 @ 09:14) [Active]  Diet, DASH/TLC:   Sodium & Cholesterol Restricted  Pureed (PUREED)  1200mL Fluid Restriction ([id number])  Mildly Thick Liquids (MILDTHICKLIQS)  Tube Feeding Modality: Nasogastric  TwoCal HN  Total Volume for 24 Hours (mL): 720  Bolus  Total Volume of Bolus (mL):  240  Tube Feed Frequency: Every 8 hours   Tube Feed Start Time: 12:00  Bolus Feed Rate (mL per Hour): 500   Bolus Feed Duration (in Hours): 0.5  Bolus Feed Instructions:  feed via NG AFTER each attempted po meal - with pt seated upright during and half hour after a feeding  flush NG with 20 ml water brisk syringe push before & afer each feed  Supplement Feeding Modality:  Oral  Ensure Enlive Cans or Servings Per Day:  2       Frequency:  Daily (05-11-23 @ 13:53) [Active]

## 2023-05-15 NOTE — CONSULT NOTE ADULT - SUBJECTIVE AND OBJECTIVE BOX
MIKHAIL JOHNSON  77y, Female  Allergy: epinephrine (Unknown)      All historical available data reviewed.    HPI:  77 year old woman with PMHx of HTN, HLD, nonischemic cardiomyopathy, HFrEF s/p AICD presents to the ED with 2 weeks of worsening shortness of breath. Patient and her son state patient has been experiencing shortness of breath that has progressed from present on activity to now shortness of breath during conversation. Patient also notes an increasing dry cough over this time period. Denies history of smoking, fevers/chills, chest pain, nausea/vomiting, bloody sputum, dark/tarry/bloody bowel movements, upper respiratory symptoms.     In ED, patient's HR is 110 and saturating 98% on RA  CT C/A/P revealing cardiomegaly with evidence of right heart strain, R hilar lymphadenopathy with R perihilar soft tissue density with indentation/possible invasion of right pulmonary artery, and narrowing of proximal R lobar and segmental branches with associated consolidation of right lower lobe. 2.2 x 2.8 cm filling defect noted in apex of left ventricle.   Last ECHO 8/2021 revealing EF 20-25%, mild Mr, mild TR, mild Pulm HTN  Dimer 2600, trops negative BNP 74974  Duplex venous LE negative for DVT; CTA chest negative for PE  Cr 1.4 (0.7 in 2021), Na+ 128, T.Benjamin 2.3, LFTs elevated (hemolyzed)  Lactate 5.8-->4.6  Admitted to SDU (22 Apr 2023 01:10)    FAMILY HISTORY:  FH: heart failure (Mother)      PAST MEDICAL & SURGICAL HISTORY:  CHF, stage C      HTN (hypertension)            VITALS:  T(F): 97.7, Max: 97.7 (05-15-23 @ 08:00)  HR: 100  BP: 101/60  RR: 14Vital Signs Last 24 Hrs  T(C): 36.5 (15 May 2023 08:00), Max: 36.5 (15 May 2023 08:00)  T(F): 97.7 (15 May 2023 08:00), Max: 97.7 (15 May 2023 08:00)  HR: 100 (15 May 2023 13:53) (94 - 103)  BP: 101/60 (15 May 2023 13:53) (82/52 - 102/55)  BP(mean): 75 (15 May 2023 13:53) (58 - 75)  RR: 14 (15 May 2023 13:53) (14 - 33)  SpO2: 97% (15 May 2023 13:53) (92% - 100%)    Parameters below as of 15 May 2023 13:53  Patient On (Oxygen Delivery Method): room air        TESTS & MEASUREMENTS:                        10.8   12.59 )-----------( 447      ( 15 May 2023 04:48 )             31.7     05-15    124<L>  |  80<L>  |  78<HH>  ----------------------------<  112<H>  3.7   |  28  |  1.2    Ca    9.0      15 May 2023 04:48  Mg     2.2     05-15    TPro  7.2  /  Alb  3.1<L>  /  TBili  1.7<H>  /  DBili  x   /  AST  34  /  ALT  34  /  AlkPhos  190<H>  05-14    LIVER FUNCTIONS - ( 14 May 2023 05:00 )  Alb: 3.1 g/dL / Pro: 7.2 g/dL / ALK PHOS: 190 U/L / ALT: 34 U/L / AST: 34 U/L / GGT: x                   RADIOLOGY & ADDITIONAL TESTS:  Personal review of radiological diagnostics performed  Echo and EKG results noted when applicable.     MEDICATIONS:  aMIOdarone    Tablet 200 milliGRAM(s) Oral two times a day  benzocaine 20% Spray 1 Spray(s) Topical four times a day PRN  buMETAnide Infusion 1 mG/Hr IV Continuous <Continuous>  buMETAnide Injectable 3 milliGRAM(s) IV Push every 8 hours  chlorhexidine 2% Cloths 1 Application(s) Topical daily  DOBUTamine Infusion 8 MICROgram(s)/kG/Min IV Continuous <Continuous>  heparin   Injectable 5000 Unit(s) IV Push every 6 hours PRN  heparin   Injectable 2500 Unit(s) IV Push every 6 hours PRN  heparin  Infusion. 1100 Unit(s)/Hr IV Continuous <Continuous>  magnesium gluconate 500 milliGRAM(s) Oral daily  megestrol Suspension 400 milliGRAM(s) Oral daily  melatonin 5 milliGRAM(s) Oral at bedtime  milrinone Infusion 0.25 MICROgram(s)/kG/Min IV Continuous <Continuous>  pantoprazole    Tablet 40 milliGRAM(s) Oral before breakfast  polyethylene glycol 3350 17 Gram(s) Oral two times a day  senna 2 Tablet(s) Oral at bedtime  sodium chloride 3%. 150 milliLiter(s) IV Continuous <Continuous>      ANTIBIOTICS:

## 2023-05-15 NOTE — CONSULT NOTE ADULT - CONSULT REASON
cardiogenic shock  po diet
Hyponatremia
LV thrombus, sob
inc LFT
mediastinal mass
sob
ADHF / cardiogenic shock
GOC
COVID-19
sepsis

## 2023-05-15 NOTE — PROGRESS NOTE ADULT - SUBJECTIVE AND OBJECTIVE BOX
Patient is a 77y old  Female who presents with a chief complaint of intra cardiac mass (15 May 2023 10:45)    HPI:  77 year old woman with PMHx of HTN, HLD, nonischemic cardiomyopathy, HFrEF s/p AICD presents to the ED with 2 weeks of worsening shortness of breath. Patient and her son state patient has been experiencing shortness of breath that has progressed from present on activity to now shortness of breath during conversation. Patient also notes an increasing dry cough over this time period. Denies history of smoking, fevers/chills, chest pain, nausea/vomiting, bloody sputum, dark/tarry/bloody bowel movements, upper respiratory symptoms.     In ED, patient's HR is 110 and saturating 98% on RA  CT C/A/P revealing cardiomegaly with evidence of right heart strain, R hilar lymphadenopathy with R perihilar soft tissue density with indentation/possible invasion of right pulmonary artery, and narrowing of proximal R lobar and segmental branches with associated consolidation of right lower lobe. 2.2 x 2.8 cm filling defect noted in apex of left ventricle.   Last ECHO 2021 revealing EF 20-25%, mild Mr, mild TR, mild Pulm HTN  Dimer 2600, trops negative BNP 19988  Duplex venous LE negative for DVT; CTA chest negative for PE  Cr 1.4 (0.7 in ), Na+ 128, T.Benjamin 2.3, LFTs elevated (hemolyzed)  Lactate 5.8-->4.6  Admitted to SDU (2023 01:10)       INTERVAL HPI/OVERNIGHT EVENTS:   No overnight events   Afebrile, hemodynamically stable     Subjective:    ICU Vital Signs Last 24 Hrs  T(C): 36.5 (15 May 2023 08:00), Max: 36.5 (15 May 2023 08:00)  T(F): 97.7 (15 May 2023 08:00), Max: 97.7 (15 May 2023 08:00)  HR: 100 (15 May 2023 13:53) (94 - 103)  BP: 101/60 (15 May 2023 13:53) (82/52 - 102/55)  BP(mean): 75 (15 May 2023 13:53) (58 - 75)  ABP: --  ABP(mean): --  RR: 14 (15 May 2023 13:53) (14 - 33)  SpO2: 97% (15 May 2023 13:53) (92% - 100%)    O2 Parameters below as of 15 May 2023 13:53  Patient On (Oxygen Delivery Method): room air          I&O's Summary    14 May 2023 07:01  -  15 May 2023 07:00  --------------------------------------------------------  IN: 1434.5 mL / OUT: 1670 mL / NET: -235.5 mL    15 May 2023 07:01  -  15 May 2023 14:07  --------------------------------------------------------  IN: 208 mL / OUT: 445 mL / NET: -237 mL          Daily     Daily Weight in k (15 May 2023 05:00)    Adult Advanced Hemodynamics Last 24 Hrs  CVP(mm Hg): --  CVP(cm H2O): --  CO: --  CI: --  PA: --  PA(mean): --  PCWP: --  SVR: --  SVRI: --  PVR: --  PVRI: --    EKG/Telemetry Events:    MEDICATIONS  (STANDING):  aMIOdarone    Tablet 200 milliGRAM(s) Oral two times a day  buMETAnide Infusion 1 mG/Hr (5 mL/Hr) IV Continuous <Continuous>  buMETAnide Injectable 3 milliGRAM(s) IV Push every 8 hours  chlorhexidine 2% Cloths 1 Application(s) Topical daily  DOBUTamine Infusion 8 MICROgram(s)/kG/Min (7.62 mL/Hr) IV Continuous <Continuous>  heparin  Infusion. 1100 Unit(s)/Hr (11 mL/Hr) IV Continuous <Continuous>  magnesium gluconate 500 milliGRAM(s) Oral daily  megestrol Suspension 400 milliGRAM(s) Oral daily  melatonin 5 milliGRAM(s) Oral at bedtime  milrinone Infusion 0.25 MICROgram(s)/kG/Min (4.76 mL/Hr) IV Continuous <Continuous>  pantoprazole    Tablet 40 milliGRAM(s) Oral before breakfast  polyethylene glycol 3350 17 Gram(s) Oral two times a day  senna 2 Tablet(s) Oral at bedtime  sodium chloride 3%. 150 milliLiter(s) (50 mL/Hr) IV Continuous <Continuous>    MEDICATIONS  (PRN):  benzocaine 20% Spray 1 Spray(s) Topical four times a day PRN pain  heparin   Injectable 5000 Unit(s) IV Push every 6 hours PRN For aPTT less than 40  heparin   Injectable 2500 Unit(s) IV Push every 6 hours PRN For aPTT between 40 - 57      PHYSICAL EXAM:  GENERAL:   HEAD:  Atraumatic, Normocephalic  EYES: EOMI, PERRLA, conjunctiva and sclera clear  NECK: Supple, No JVD, Normal thyroid, no enlarged nodes  NERVOUS SYSTEM:  Alert & Awake.   CHEST/LUNG: B/L good air entry; No rales, rhonchi, or wheezing  HEART: S1S2 normal, no S3, Regular rate and rhythm; No murmurs  ABDOMEN: Soft, Nontender, Nondistended; Bowel sounds present  EXTREMITIES:  2+ Peripheral Pulses, No clubbing, cyanosis, or edema  LYMPH: No lymphadenopathy noted  SKIN: No rashes or lesions    LABS:                        10.8   12.59 )-----------( 447      ( 15 May 2023 04:48 )             31.7     05-15    124<L>  |  80<L>  |  78<HH>  ----------------------------<  112<H>  3.7   |  28  |  1.2    Ca    9.0      15 May 2023 04:48  Mg     2.2     -15    TPro  7.2  /  Alb  3.1<L>  /  TBili  1.7<H>  /  DBili  x   /  AST  34  /  ALT  34  /  AlkPhos  190<H>  05-    LIVER FUNCTIONS - ( 14 May 2023 05:00 )  Alb: 3.1 g/dL / Pro: 7.2 g/dL / ALK PHOS: 190 U/L / ALT: 34 U/L / AST: 34 U/L / GGT: x           PTT - ( 15 May 2023 04:48 )  PTT:79.6 sec  CAPILLARY BLOOD GLUCOSE        ABG - ( 14 May 2023 22:27 )  pH, Arterial: 7.59  pH, Blood: x     /  pCO2: 34    /  pO2: 114   / HCO3: 33    / Base Excess: 10.3  /  SaO2: 97.5                          RADIOLOGY & ADDITIONAL TESTS:  CXR: ACC: [id number] EXAM:  XR CHEST PORTABLE URGENT 1V   ORDERED BY: ANNALISA VEE     PROCEDURE DATE:  2023          INTERPRETATION:  Clinical History / Reason for exam: ng tube    Comparison : Chest radiograph: 2023    Technique/Positioning: Frontal view of the chest    Findings:    Support devices: Left AICD. There is an enteric tube coursing below the   diaphragm.    Cardiac/mediastinum/hilum: No significant change    Skeleton/soft tissues: No significant change.    Lung parenchyma/Pleura: Increased bilateral opacities. No definite   pneumothorax.    Impression:  Increased bilateral opacities    Lines and support devices as described above.        Care Discussed with Consultants/Other Providers [ x] YES  [ ] NO

## 2023-05-15 NOTE — PROGRESS NOTE ADULT - SUBJECTIVE AND OBJECTIVE BOX
Date of Admission: 23    Interval History: Patient resting in bed, in NAD. Family present at bedside. BP soft but improved. Remains on dual inotropic support (milrinone 0.25 mcg/kg/min and dobutamine 8 mcg/kg/min). Diuretics held yesterday for collapsible IVC findings by primary team. Na 124 on AM labs today.   24 hr UOP 1670cc L, net negative 235cc, BUN/Cr 78/1.2 creatinine remains stable, BUN continuing to trend up.    HISTORY OF PRESENT ILLNESS: 78 y/o F with PMHx of HTN, HLD, nonischemic cardiomyopathy, HFrEF s/p AICD presents to the ED with 2 weeks of worsening shortness of breath. Patient and her son state patient has been experiencing shortness of breath that has progressed from present on activity to now shortness of breath during conversation. Patient also notes an increasing dry cough over this time period, which is improving currently     In ED, patient's HR was 110 and was saturating 98% on RA  CT C/A/P revealing cardiomegaly with evidence of right heart strain, R hilar lymphadenopathy with R perihilar soft tissue density with indentation/possible invasion of right pulmonary artery, and narrowing of proximal R lobar and segmental branches with associated consolidation of right lower lobe. 2.2 x 2.8 cm filling defect noted in apex of left ventricle.   Last ECHO 2021 revealing EF 20-25%, mild Mr, mild TR, mild Pulm HTN  Dimer 2600, trops negative BNP 84573  Duplex venous LE negative for DVT; initial CTA chest negative for PE  Cr 1.4 (0.7 in ), Na+ 128, T.Benjamin 2.3, LFTs elevated (hemolyzed)  Lactate 5.8-->4.6--.2.3   Admitted to SDU (2023 01:10)    Patient reportedly was at baseline (active, ambulating long distances without issue) until about 2 weeks ago when patient began to have progressively worsening SOB with a dry cough. Patient reports Dr. Mead as per primary cardiologist, and Dr. Sherwood as her heart failure specialist (last seen in office in ). Endorses compliance with home medications. Family at bedside concerned about patient's recent weight loss as well.         PAST MEDICAL & SURGICAL HISTORY:  CHF, stage C  HTN (hypertension)    Allergies  epinephrine (Unknown)    Intolerances      Vitals:  ICU Vital Signs Last 24 Hrs  T(C): 36.5 (15 May 2023 08:00), Max: 36.5 (15 May 2023 08:00)  T(F): 97.7 (15 May 2023 08:00), Max: 97.7 (15 May 2023 08:00)  HR: 96 (15 May 2023 11:00) (94 - 103)  BP: 93/57 (15 May 2023 11:00) (82/52 - 102/55)  BP(mean): 70 (15 May 2023 11:00) (58 - 74)  ABP: --  ABP(mean): --  RR: 26 (15 May 2023 11:00) (15 - 33)  SpO2: 96% (15 May 2023 11:00) (92% - 100%)    O2 Parameters below as of 15 May 2023 11:00  Patient On (Oxygen Delivery Method): room air      Physical Exam:  General Appearance: NG tube, thin, frail, normal for age and gender. 	  Cardiovascular: Afib on tele, +S1, S2, No edema  Respiratory: decreased @ bases  Psychiatry: Fatigued, oriented x 3, Mood & affect appropriate  Skin/Integumentary: No rashes, No ecchymoses, No cyanosis  Neurologic: Non-focal  Musculoskeletal/ extremities: Normal range of motion, No clubbing, cyanosis or edema  Vascular: Peripheral pulses palpable 2+ bilaterally        LABS:	 	             CBC Full  -  ( 15 May 2023 04:48 )  WBC Count : 12.59 K/uL  RBC Count : 3.59 M/uL  Hemoglobin : 10.8 g/dL  Hematocrit : 31.7 %  Platelet Count - Automated : 447 K/uL  Mean Cell Volume : 88.3 fL  Mean Cell Hemoglobin : 30.1 pg  Mean Cell Hemoglobin Concentration : 34.1 g/dL  Auto Neutrophil # : x  Auto Lymphocyte # : x  Auto Monocyte # : x  Auto Eosinophil # : x  Auto Basophil # : x  Auto Neutrophil % : x  Auto Lymphocyte % : x  Auto Monocyte % : x  Auto Eosinophil % : x  Auto Basophil % : x    Basic Metabolic Panel in AM (05.15.23 @ 04:48)    Sodium, Serum: 124 mmol/L   Potassium, Serum: 3.7 mmol/L   Chloride, Serum: 80 mmol/L   Carbon Dioxide, Serum: 28 mmol/L   Anion Gap, Serum: 16 mmol/L   Blood Urea Nitrogen, Serum: 78: Critical value: mg/dL   Creatinine, Serum: 1.2 mg/dL   Glucose, Serum: 112 mg/dL   Calcium, Total Serum: 9.0 mg/dL   eGFR: 46: The estimated glomerular filtration rate (eGFR) is calculated using the   CKD-EPI creatinine equation, which does not have a coefficient for  race and is validated in individuals 18 years of age and older (N Engl J  Med ; [id number]). Creatinine-based eGFR may be inaccurate in  various situations including but not limited to extremes of muscle mass,  altered dietary protein intake, or medications that affect renal tubular  creatinine secretion. mL/min/1.73m2      Lactate, Blood: 1.5 mmol/L (05.15.23 @ 04:48)      TELEMETRY EVENTS: 	    EC2023    Ventricular Rate 96 BPM  Atrial Rate 96 BPM  P-R Interval 164 ms  QRS Duration 116 ms  Q-T Interval 396 ms  QTC Calculation(Bazett) 500 ms  P Axis 51 degrees  R Axis -41 degrees  T Axis 87 degrees    Diagnosis Line Normal sinus rhythm  Possible Left atrial enlargement  Left axis deviation  Prolonged QT  Abnormal ECG  Confirmed by Seven Hensley ([id number]) on 2023 7:07:08 AM      CXR   Impression:  CHF. Support devices as described. Without difference    CXR 23  Stable congestive changes and bilateral pleural effusions/opacities,  No pneumothorax      CT Angio Chest PE Protocol   IMPRESSION:    No evidence of pulmonary embolism. (See discussion below for more   findings.)    Marked cardiomegaly.    There is a 2.2 x 2.8 cm rounded filling defect in the region of the apex   of the left ventricle (3/11; 607/95). Differential diagnosis includes an   intracardiac mass or thrombus.    The main pulmonary artery is dilated, measuring 3.6 cm in diameter, which   may be seen with pulmonary hypertension. There is reflux of contrast   material into the IVC and hepatic veins compatible with right heart   dysfunction.    Right hilar lymphadenopathy and right perihilar soft tissue density is   noted. There is segmental narrowing and consolidation noted in the medial   aspect of the right lower lobe. There is a small right pleural effusion.   There is extrinsic indentation and possible invasion of the right main   pulmonary artery (604/142; 605/129; ). There is associated narrowing   of the proximal right lobar and segmental branches. A right hilar / right   lower lobe mass with postobstructive pneumonitis should be ruled out.        < from: CT Angio Chest PE Protocol w/ IV Cont (23 @ 11:31)  Redemonstrated right hilar lesion with compression and possible invasion   of adjacent main pulmonary artery segment (unchanged from previous study.  Subacute right main and segmental pulmonary embolus.  Increased right lower lobe consolidative and groundglass opacities,   likely infectious/inflammatory in etiology.  4 mm left anterior upper lobe groundglass nodule.    PREVIOUS DIAGNOSTIC TESTING:    TTE 23    Summary:   1. Left ventricular ejection fraction, by visual estimation, is <20%.   2. Severely decreased global left ventricular systolic function.   3. Severely enlarged left atrium.   4. Elevated mean left atrial pressure.   5. Large, fixed thrombus vs. mass on the apical wall of the left   ventricle.   6. Spectral Doppler shows pseudonormal pattern of left ventricular   myocardial filling (Grade II diastolic dysfunction).   7. Moderately reduced RV systolic function.   8. Mildly enlarged right atrium.   9. Moderate mitral valve regurgitation.  10. Moderate-severe tricuspid regurgitation.  11. Mild pulmonic valve regurgitation.  12. Estimated pulmonary artery systolic pressure is 42.9 mmHg assuming a   right atrial pressure of 15 mmHg, which is consistent withmild pulmonary   hypertension.  13. Endocardial visualization was enhanced with intravenous echo contrast.      Right Heart Catheterization 23    FINDINGS:   Right Heart Cath  RA - 12  RV - 51/5/13  PA - /32  PCWP - 20    CO/CI Thermodilusion - 2.1/1.27  CO/CI Mehdi - 2.09/1.26    SVR (MAP 79 / RA 12 / CO 2.1) = 2552 dyn  PVR = 5.71 henriquez      RHC : RA 14, RV 46/14, PA 56/26/37, PCWP: 25, CO/C.I mehdi 2.37/1.43, C.O. /C.I thermodilution 2.8/1.69    Home Medications:  atorvastatin 20 mg oral tablet: 1 tab(s) orally once a day (04 Aug 2021 10:05)  carvedilol 6.25 mg oral tablet: 1 tab(s) orally 2 times a day (2023 02:02)  Entresto 49 mg-51 mg oral tablet: 1 tab(s) orally 2 times a day (04 Aug 2021 10:05)  Farxiga 10 mg oral tablet: 1 tab(s) orally once a day (2023 02:05)  Lasix 20 mg oral tablet: 1 tab(s) orally once a day (2023 02:04)  spironolactone 25 mg oral tablet: 1 tab(s) orally once a day (04 Aug 2021 10:05)    MEDICATIONS  (STANDING):  buMETAnide Injectable 2 milliGRAM(s) IV Push every 8 hours  chlorhexidine 2% Cloths 1 Application(s) Topical daily  DOBUTamine Infusion 7.5 MICROgram(s)/kG/Min (7.14 mL/Hr) IV Continuous <Continuous>  heparin  Infusion.  Unit(s)/Hr (11 mL/Hr) IV Continuous <Continuous>  magnesium gluconate 500 milliGRAM(s) Oral daily  melatonin 5 milliGRAM(s) Oral at bedtime  milrinone Infusion 0.125 MICROgram(s)/kG/Min (2.38 mL/Hr) IV Continuous <Continuous>  norepinephrine Infusion 0.05 MICROgram(s)/kG/Min (2.98 mL/Hr) IV Continuous <Continuous>  pantoprazole    Tablet 40 milliGRAM(s) Oral before breakfast    MEDICATIONS  (PRN):  benzocaine 20% Spray 1 Spray(s) Topical four times a day PRN throat pain  heparin   Injectable 5000 Unit(s) IV Push every 6 hours PRN For aPTT less than 40  heparin   Injectable 2500 Unit(s) IV Push every 6 hours PRN For aPTT between 40 - 57

## 2023-05-15 NOTE — CONSULT NOTE ADULT - ASSESSMENT
77 year old woman with PMHx of HTN, HLD, nonischemic cardiomyopathy, HFrEF s/p AICD presents to the ED with 2 weeks of worsening shortness of breath. Patient and her son state patient has been experiencing shortness of breath that has progressed from present on activity to now shortness of breath during conversation. Patient also notes an increasing dry cough over this time period. Denies history of smoking, fevers/chills, chest pain, nausea/vomiting, bloody sputum, dark/tarry/bloody bowel movements, upper respiratory symptoms.     In ED, patient's HR is 110 and saturating 98% on RA  CT C/A/P revealing cardiomegaly with evidence of right heart strain, R hilar lymphadenopathy with R perihilar soft tissue density with indentation/possible invasion of right pulmonary artery, and narrowing of proximal R lobar and segmental branches with associated consolidation of right lower lobe. 2.2 x 2.8 cm filling defect noted in apex of left ventricle.   Last ECHO 8/2021 revealing EF 20-25%, mild Mr, mild TR, mild Pulm HTN  Dimer 2600, trops negative BNP 62598  Duplex venous LE negative for DVT; CTA chest negative for PE.    IMPRESSION/RECOMMENDATIONS   Possible post obstructive bacterial PNA on the right  -Urine for strep pneumonia/legionella antigen  -Nares ORSA  -Sputum gm stain, cultures  -BCx   -Zosyn 3.375 gm iv q8h over 4h  -Levoquin 500 mg iv q24h    Prognosis is very poor

## 2023-05-15 NOTE — CHART NOTE - NSCHARTNOTEFT_GEN_A_CORE
PRE-OP DIAGNOSIS:    Cardiogenic shock    PROCEDURE:     [x] Coronary Angiogram   [x] LHC   [x] RHC          PHYSICIAN:  Dr. Lord    ASSISTANT:  Dr. Gonzalez        PROCEDURE DESCRIPTION:     Consent:      [x] Patient     [] Family Member     []  Used        Anesthesia:     [] General     [x] Sedation   [x] Local        Access & Closure:   [x] 6 Fr right Femoral Artery > Perclose  [x]8.5 Fr right Femoral Vein > Vascade    IV Contrast: 20 mL        Intervention:   None    Implants:    None    FINDINGS:     Coronary Dominance:   Right    LM:   mild luminal irregularities     LAD:   mild luminal irregularities     CX:   mild luminal irregularities     RCA:   mild luminal irregularities          EF: less than 20 %       RHC   RA 13  RV 52/ 16   PA 54/25/36  PCWP 25 with prominent V wave  SVR 1750    CO/CI by TD 3.27/ 1.97 l/min  CO/CI by Radha 2.97/1.78 l/min  PA Sat 57.6%    On milrinone 0.25 mcg/kg/min and dobutamine 10   mcg/kg/min       ESTIMATED BLOOD LOSS: < 10 mL        CONDITION:     [x] Good     [] Fair     [] Critical        SPECIMEN REMOVED: N/A       POST-OP DIAGNOSIS:    Mild luminal irregularities of the coronary anatomy   Elevated RHC pressures   Low cardiac output with elevated PCWP           PLAN OF CARE:   [x] Return to In-patient bed   [x]Cont current management per the CCU and heart failure team

## 2023-05-15 NOTE — PROGRESS NOTE ADULT - ASSESSMENT
Assessment:   77 year old woman with pmh of HTN, HLD, nonischemic cardiomyopathy, HFrEF, s/p ICD and PPM presents to the ED with 2 weeks of worsening shortness of breath.       IMPRESSION:  Cardiogenic shock on inotropic support  HFrEF 20%, NICM  RLL Mass/opacity/ possible Pneumonia, r/o malignancy   Possible right main and segmental new pulmonary embolus  LV Mass, ?Thrombus   Transaminitis in the setting of hypotension, shock liver    Plan:  - s/p bronch/EBUS biopsy 5/05 - pathology resulted- negative for malignancy   - Remains on inotropic support through PICC line- currently on dobutamine @ 8 mcg/kg/min and milrinone @ 0.25 mcg/kg/min  - Give pressor support (levo/vaso) PRN to maintain MAP > 60-65 mmHg  - Diuretics held yesterday based on IVC findings by primary team  - Start 3% Hypertonic Saline 150ml BID- give first dose this morning  - This evening, start Bumex 3mg IVP TID   - Monitor for contraction alkalosis- give Diamox PRN   - Follow-up pre-albumin level  - Continue NG tube feeding as well as encourage food intake to maximize nutrition- recheck pre-albumin level Monday 5/15  - Daily physical therapy follow-up / OOB daily as tolerated  - Please encourage incentive spirometer 10x/hr while awake   - Get BMP twice daily with magnesium   - Maintain potassium >4.0, Mg >2.2  - Strict intake and output  - Daily weight   - R/LHC today  - Rest of care as per primary team  - Plan discussed with patient, son at bedside, and CCU team  - Will continue to follow Assessment:   77 year old woman with pmh of HTN, HLD, nonischemic cardiomyopathy, HFrEF, s/p ICD and PPM presents to the ED with 2 weeks of worsening shortness of breath.       IMPRESSION:  Cardiogenic shock on inotropic support  HFrEF 20%, NICM  RLL Mass/opacity/ possible Pneumonia, r/o malignancy   Possible right main and segmental new pulmonary embolus  LV Mass, ?Thrombus   Transaminitis in the setting of hypotension, shock liver    Plan:  *Patient scheduled for PFTs tomorrow 5/16 at 11:15am* (PLEASE plan for transport prior to test time)  - s/p bronch/EBUS biopsy 5/05 - pathology resulted- negative for malignancy   - Remains on inotropic support through PICC line- currently on dobutamine @ 8 mcg/kg/min and milrinone @ 0.25 mcg/kg/min  - Give pressor support (levo/vaso) PRN to maintain MAP > 60-65 mmHg  - Diuretics held yesterday based on IVC findings by primary team  - Start 3% Hypertonic Saline 150ml BID- give first dose this morning  - This evening, start Bumex 3mg IVP TID   - Monitor for contraction alkalosis- give Diamox PRN   - Follow-up pre-albumin level  - Continue NG tube feeding as well as encourage food intake to maximize nutrition- recheck pre-albumin level Monday 5/15  - Daily physical therapy follow-up / OOB daily as tolerated  - Please encourage incentive spirometer 10x/hr while awake   - Get BMP twice daily with magnesium   - Maintain potassium >4.0, Mg >2.2  - Strict intake and output  - Daily weight   - R/LHC today  - Rest of care as per primary team  - Plan discussed with patient, son at bedside, and CCU team  - Will continue to follow

## 2023-05-15 NOTE — CONSULT NOTE ADULT - PROVIDER SPECIALTY LIST ADULT
Cardiology
Thoracic Surgery
Heart Failure
Gastroenterology
Nephrology
Nutrition Support
Pulmonology
Palliative Care
Infectious Disease
Infectious Disease

## 2023-05-15 NOTE — PROGRESS NOTE ADULT - ASSESSMENT
ASSESSMENT  Cardiogenic Shock   LV thrombus  NICM  HFrEF 25% s/p AICD  HTN/HLD  - SOB x2 weeks  Post obstructive pneumonia  Suspected lung mass   - HAGMA, Lactic Acidosis  DAVID on CKD   Ischemic hepatitis   hyponatremia    PLAN  NPO now   on po diet with post-prandial NGT feedings        TwoCal  HN 240ml after each attempted po meal at which intake < 50%      flush NG with only 20 ml water brisk syringe push before & after each feeding  correct phos to > 3.5 ASSESSMENT  Cardiogenic Shock   LV thrombus  NICM  HFrEF 25% s/p AICD  HTN/HLD  - SOB x2 weeks  Post obstructive pneumonia  Suspected lung mass   - HAGMA, Lactic Acidosis  DAVID on CKD   Ischemic hepatitis   hyponatremia    PLAN  NPO now   when ok to restart po diet   continue with current nutrition   po diet with post-prandial NGT feedings        TwoCal  HN 240ml after each attempted po meal at which intake < 50%      flush NG with only 20 ml water brisk syringe push before & after each feeding  correct phos to > 3.5

## 2023-05-15 NOTE — CHART NOTE - NSCHARTNOTEFT_GEN_A_CORE
PREOPERATIVE DAY OF PROCEDURE EVALUATION:  I have personally seen and examined the patient. I agree with the history and physical which I have reviewed and noted any changes below. ( to be signed electronically by MD ) 05-15-23 @ 12:03    IV NS no given prior Cardiac Cath [ no IVF, due to LOW EF ]    RIGHT RADIAL ARTERY EVALUATION:  SATISH TEST: [x ] Negative          [ ] Positive    77 year old woman with pmh of HTN, HLD, nonischemic cardiomyopathy, HFrEF, s/p ICD and PPM presents with worsening shortness of breath.   CT C/A/P revealing cardiomegaly with evidence of right heart strain, R hilar lymphadenopathy with R perihilar soft tissue density with indentation/possible invasion of right pulmonary artery, and narrowing of proximal R lobar and segmental branches with associated consolidation of right lower lobe. 2.2 x 2.8 cm filling defect noted in apex of left ventricle. Patient follows with Dr. Frank, last ECHO 8/2021 revealing EF 20-25%, mild Mr, mild TR, mild Pulm HTN.  Patient presents to cardiac department for RHC /LHC    Cath Bleeding Risk: 21.1%    Prehydration: patient admitted for cardiogenic shock, mild pulmonary HTN, EF <20%, here for RHC/LHC - > no fluid bolus.

## 2023-05-15 NOTE — CONSULT NOTE ADULT - CONSULT REQUESTED DATE/TIME
21-Apr-2023 17:00
22-Apr-2023 09:20
22-Apr-2023 11:43
25-Apr-2023 16:42
21-Apr-2023 19:26
28-Apr-2023
12-May-2023 09:15
27-Apr-2023
15-May-2023 15:19
02-May-2023 08:28

## 2023-05-15 NOTE — PROGRESS NOTE ADULT - SUBJECTIVE AND OBJECTIVE BOX
Nephrology progress note    THIS IS AN INCOMPLETE NOTE . FULL NOTE TO FOLLOW SHORTLY    Patient is seen and examined, events over the last 24 h noted .    Allergies:  epinephrine (Unknown)    Hospital Medications:   MEDICATIONS  (STANDING):  aMIOdarone    Tablet 200 milliGRAM(s) Oral two times a day  chlorhexidine 2% Cloths 1 Application(s) Topical daily  DOBUTamine Infusion 8 MICROgram(s)/kG/Min (7.62 mL/Hr) IV Continuous <Continuous>  heparin  Infusion. 1100 Unit(s)/Hr (11 mL/Hr) IV Continuous <Continuous>  magnesium gluconate 500 milliGRAM(s) Oral daily  megestrol Suspension 400 milliGRAM(s) Oral daily  melatonin 5 milliGRAM(s) Oral at bedtime  milrinone Infusion 0.25 MICROgram(s)/kG/Min (4.76 mL/Hr) IV Continuous <Continuous>  pantoprazole    Tablet 40 milliGRAM(s) Oral before breakfast  polyethylene glycol 3350 17 Gram(s) Oral two times a day  senna 2 Tablet(s) Oral at bedtime  sodium chloride 3% Bolus 100 milliLiter(s) IV Bolus once        VITALS:  T(F): 97.7 (05-15-23 @ 08:00), Max: 97.7 (05-15-23 @ 08:00)  HR: 97 (05-15-23 @ 08:00)  BP: 85/52 (05-15-23 @ 08:00)  RR: 20 (05-15-23 @ 08:00)  SpO2: 100% (05-15-23 @ 08:00)  Wt(kg): --    05-13 @ 07:01  -  05-14 @ 07:00  --------------------------------------------------------  IN: 1207.6 mL / OUT: 3670 mL / NET: -2462.4 mL    05-14 @ 07:01  -  05-15 @ 07:00  --------------------------------------------------------  IN: 1434.5 mL / OUT: 1670 mL / NET: -235.5 mL    05-15 @ 07:01  -  05-15 @ 08:53  --------------------------------------------------------  IN: 22.8 mL / OUT: 50 mL / NET: -27.2 mL          PHYSICAL EXAM:  Constitutional: NAD  HEENT: anicteric sclera, oropharynx clear, MMM  Neck: No JVD  Respiratory: CTAB, no wheezes, rales or rhonchi  Cardiovascular: S1, S2, RRR  Gastrointestinal: BS+, soft, NT/ND  Extremities: No cyanosis or clubbing. No peripheral edema  :  No yoon.   Skin: No rashes    LABS:  05-15    124<L>  |  80<L>  |  78<HH>  ----------------------------<  112<H>  3.7   |  28  |  1.2    Ca    9.0      15 May 2023 04:48  Mg     2.2     05-15    TPro  7.2  /  Alb  3.1<L>  /  TBili  1.7<H>  /  DBili      /  AST  34  /  ALT  34  /  AlkPhos  190<H>  05-14                          10.8   12.59 )-----------( 447      ( 15 May 2023 04:48 )             31.7       Urine Studies:    Sodium, Random Urine: <20.0 mmoL/L (05-12 @ 19:06)  Creatinine, Random Urine: 52 mg/dL (05-12 @ 19:06)  Protein/Creatinine Ratio Calculation: 1.7 Ratio (05-12 @ 19:06)  Osmolality, Random Urine: 328 mos/kg (05-12 @ 19:06)  Potassium, Random Urine: 75 mmol/L (05-12 @ 19:06)  Sodium, Random Urine: 39.0 mmoL/L (05-12 @ 07:40)  Creatinine, Random Urine: 44 mg/dL (05-12 @ 07:40)  Protein/Creatinine Ratio Calculation: 1.2 Ratio (05-12 @ 07:40)  Potassium, Random Urine: 60 mmol/L (05-12 @ 07:40)      Iron 43, TIBC 184, %sat 23      [05-12-23 @ 05:12]  Ferritin 1826      [05-12-23 @ 12:15]  TSH 2.26      [05-12-23 @ 12:15]  Lipid: chol 104, TG 91, HDL 17, LDL --      [04-25-23 @ 04:50]    HBsAb Nonreact      [04-25-23 @ 05:40]  HBsAg Nonreact      [04-25-23 @ 05:40]  HBcAb Nonreact      [04-25-23 @ 05:40]  HIV Nonreact      [04-27-23 @ 05:10]    ALE: titer Negative, pattern --      [04-25-23 @ 05:40]  Immunofixation Serum:   No Monoclonal Band Identified    Reference Range: None Detected      [04-25-23 @ 05:40]  SPEP Interpretation: Hypoalbuminemia      [04-25-23 @ 05:40]      RADIOLOGY & ADDITIONAL STUDIES:   Nephrology progress note  Patient is seen and examined, events over the last 24 h noted .  Lying in bed comfortable     Allergies:  epinephrine (Unknown)    Hospital Medications:   MEDICATIONS  (STANDING):  aMIOdarone    Tablet 200 milliGRAM(s) Oral two times a day  DOBUTamine Infusion 8 MICROgram(s)/kG/Min (7.62 mL/Hr) IV Continuous <Continuous>  heparin  Infusion. 1100 Unit(s)/Hr (11 mL/Hr) IV Continuous <Continuous>  magnesium gluconate 500 milliGRAM(s) Oral daily  megestrol Suspension 400 milliGRAM(s) Oral daily  melatonin 5 milliGRAM(s) Oral at bedtime  milrinone Infusion 0.25 MICROgram(s)/kG/Min (4.76 mL/Hr) IV Continuous <Continuous>  pantoprazole    Tablet 40 milliGRAM(s) Oral before breakfast  polyethylene glycol 3350 17 Gram(s) Oral two times a day  senna 2 Tablet(s) Oral at bedtime  sodium chloride 3% Bolus 100 milliLiter(s) IV Bolus once        VITALS:  T(F): 97.7 (05-15-23 @ 08:00), Max: 97.7 (05-15-23 @ 08:00)  HR: 97 (05-15-23 @ 08:00)  BP: 85/52 (05-15-23 @ 08:00)  RR: 20 (05-15-23 @ 08:00)  SpO2: 100% (05-15-23 @ 08:00)      05-13 @ 07:01  -  05-14 @ 07:00  --------------------------------------------------------  IN: 1207.6 mL / OUT: 3670 mL / NET: -2462.4 mL    05-14 @ 07:01  -  05-15 @ 07:00  --------------------------------------------------------  IN: 1434.5 mL / OUT: 1670 mL / NET: -235.5 mL    05-15 @ 07:01  -  05-15 @ 08:53  --------------------------------------------------------  IN: 22.8 mL / OUT: 50 mL / NET: -27.2 mL          PHYSICAL EXAM:  Constitutional: NAD  Respiratory: CTAB,  Cardiovascular: S1, S2, RRR  Gastrointestinal: BS+, soft, NT/ND  Extremities: No cyanosis or clubbing. No peripheral edema  :  No yoon.   Skin: No rashes    LABS:  05-15    124<L>  |  80<L>  |  78<HH>  ----------------------------<  112<H>  3.7   |  28  |  1.2    SODIUM TREND:  Sodium 124 [05-15 @ 04:48]  Sodium 118 [05-15 @ 00:35]  Sodium 123 [05-14 @ 16:44]  Sodium 126 [05-14 @ 05:00]  Sodium 125 [05-13 @ 20:26]  Sodium 123 [05-13 @ 16:20]  Sodium 123 [05-13 @ 05:00]  Sodium 122 [05-13 @ 00:19]  Sodium 122 [05-12 @ 20:48]  Sodium 120 [05-12 @ 19:01]    Ca    9.0      15 May 2023 04:48  Mg     2.2     05-15    TPro  7.2  /  Alb  3.1<L>  /  TBili  1.7<H>  /  DBili      /  AST  34  /  ALT  34  /  AlkPhos  190<H>  05-14                          10.8   12.59 )-----------( 447      ( 15 May 2023 04:48 )             31.7       Urine Studies:    Sodium, Random Urine: <20.0 mmoL/L (05-12 @ 19:06)  Creatinine, Random Urine: 52 mg/dL (05-12 @ 19:06)  Protein/Creatinine Ratio Calculation: 1.7 Ratio (05-12 @ 19:06)  Osmolality, Random Urine: 328 mos/kg (05-12 @ 19:06)  Potassium, Random Urine: 75 mmol/L (05-12 @ 19:06)  Sodium, Random Urine: 39.0 mmoL/L (05-12 @ 07:40)  Creatinine, Random Urine: 44 mg/dL (05-12 @ 07:40)  Protein/Creatinine Ratio Calculation: 1.2 Ratio (05-12 @ 07:40)  Potassium, Random Urine: 60 mmol/L (05-12 @ 07:40)      Iron 43, TIBC 184, %sat 23      [05-12-23 @ 05:12]  Ferritin 1826      [05-12-23 @ 12:15]  TSH 2.26      [05-12-23 @ 12:15]  Lipid: chol 104, TG 91, HDL 17, LDL --      [04-25-23 @ 04:50]    HBsAb Nonreact      [04-25-23 @ 05:40]  HBsAg Nonreact      [04-25-23 @ 05:40]  HBcAb Nonreact      [04-25-23 @ 05:40]  HIV Nonreact      [04-27-23 @ 05:10]    ALE: titer Negative, pattern --      [04-25-23 @ 05:40]  Immunofixation Serum:   No Monoclonal Band Identified    Reference Range: None Detected      [04-25-23 @ 05:40]  SPEP Interpretation: Hypoalbuminemia      [04-25-23 @ 05:40]      RADIOLOGY & ADDITIONAL STUDIES:

## 2023-05-15 NOTE — PROGRESS NOTE ADULT - SUBJECTIVE AND OBJECTIVE BOX
Patient is a 77y old  Female who presents with a chief complaint of intra cardiac mass (15 May 2023 08:53)        Over Night Events:    No fevers  CXR and CT noted   Weak   Frail   On multiple infusions         ROS:  See HPI    PHYSICAL EXAM    ICU Vital Signs Last 24 Hrs  T(C): 36.5 (15 May 2023 08:00), Max: 36.5 (15 May 2023 08:00)  T(F): 97.7 (15 May 2023 08:00), Max: 97.7 (15 May 2023 08:00)  HR: 94 (15 May 2023 09:00) (94 - 103)  BP: 95/55 (15 May 2023 09:00) (82/52 - 104/54)  BP(mean): 68 (15 May 2023 09:00) (58 - 74)  ABP: --  ABP(mean): --  RR: 17 (15 May 2023 09:00) (15 - 33)  SpO2: 97% (15 May 2023 09:00) (92% - 100%)    O2 Parameters below as of 15 May 2023 09:00  Patient On (Oxygen Delivery Method): room air            General: Weak   HEENT: WANDA             Lymphatic system: No cervical LN   Lungs: Bilateral BS, clear   Cardiovascular: Regular   Gastrointestinal: Soft, Positive BS  Extremities: No clubbing.  Moves extremities.  Full Range of motion   Skin: Warm, intact  Neurological: No motor or sensory deficit       05-14-23 @ 07:01  -  05-15-23 @ 07:00  --------------------------------------------------------  IN:    DOBUTamine: 168.6 mL    Enteral Tube Flush: 100 mL    Heparin Infusion: 121 mL    Milrinone: 114.9 mL    Oral Fluid: 450 mL    TwoCal HN: 480 mL  Total IN: 1434.5 mL    OUT:    Ureteral Catheter (mL): 1670 mL  Total OUT: 1670 mL    Total NET: -235.5 mL      05-15-23 @ 07:01  -  05-15-23 @ 09:39  --------------------------------------------------------  IN:    DOBUTamine: 15.2 mL    Heparin Infusion: 22 mL    Milrinone: 9.6 mL  Total IN: 46.8 mL    OUT:    Ureteral Catheter (mL): 240 mL  Total OUT: 240 mL    Total NET: -193.2 mL          LABS:                            10.8   12.59 )-----------( 447      ( 15 May 2023 04:48 )             31.7                                               05-15    124<L>  |  80<L>  |  78<HH>  ----------------------------<  112<H>  3.7   |  28  |  1.2    Ca    9.0      15 May 2023 04:48  Mg     2.2     05-15    TPro  7.2  /  Alb  3.1<L>  /  TBili  1.7<H>  /  DBili  x   /  AST  34  /  ALT  34  /  AlkPhos  190<H>  05-14      PTT - ( 15 May 2023 04:48 )  PTT:79.6 sec                                                                                     LIVER FUNCTIONS - ( 14 May 2023 05:00 )  Alb: 3.1 g/dL / Pro: 7.2 g/dL / ALK PHOS: 190 U/L / ALT: 34 U/L / AST: 34 U/L / GGT: x                                                                                                                                   ABG - ( 14 May 2023 22:27 )  pH, Arterial: 7.59  pH, Blood: x     /  pCO2: 34    /  pO2: 114   / HCO3: 33    / Base Excess: 10.3  /  SaO2: 97.5                MEDICATIONS  (STANDING):  aMIOdarone    Tablet 200 milliGRAM(s) Oral two times a day  chlorhexidine 2% Cloths 1 Application(s) Topical daily  DOBUTamine Infusion 8 MICROgram(s)/kG/Min (7.62 mL/Hr) IV Continuous <Continuous>  heparin  Infusion. 1100 Unit(s)/Hr (11 mL/Hr) IV Continuous <Continuous>  magnesium gluconate 500 milliGRAM(s) Oral daily  megestrol Suspension 400 milliGRAM(s) Oral daily  melatonin 5 milliGRAM(s) Oral at bedtime  milrinone Infusion 0.25 MICROgram(s)/kG/Min (4.76 mL/Hr) IV Continuous <Continuous>  pantoprazole    Tablet 40 milliGRAM(s) Oral before breakfast  polyethylene glycol 3350 17 Gram(s) Oral two times a day  senna 2 Tablet(s) Oral at bedtime  sodium chloride 3% Bolus 100 milliLiter(s) IV Bolus once    MEDICATIONS  (PRN):  benzocaine 20% Spray 1 Spray(s) Topical four times a day PRN pain  heparin   Injectable 5000 Unit(s) IV Push every 6 hours PRN For aPTT less than 40  heparin   Injectable 2500 Unit(s) IV Push every 6 hours PRN For aPTT between 40 - 57      Xrays: new right opacity                                                                                     ECHO

## 2023-05-15 NOTE — PROGRESS NOTE ADULT - ASSESSMENT
77 year old woman with PMHx of HTN, HLD, nonischemic cardiomyopathy, HFrEF s/p AICD presents to the ED with 2 weeks of worsening shortness of breath with increasing dry cough over this time period accepted to CCU for cardiogenic shock    # Hyponatremia d/t CHF with decreased effective intravascular volume   # Cardiogenic shock on milrinone     - high urine osm d/t high ADH state, low urine Na d/t low EAV  - off bumex drip  - needs strict fluid restriction  -check IVC diameter and resume loop diuretics   - check BMP tonight   - TSH noted ok    will follow

## 2023-05-15 NOTE — CONSULT NOTE ADULT - REASON FOR ADMISSION
intra cardiac mass
lung mass, pneumia
intra cardiac mass
intra cardiac mass

## 2023-05-15 NOTE — CONSULT NOTE ADULT - OPHTHALMOLOGIC
Refill request received for Escitalopram 20 mg  LR:  03/23/2020  LOV: 02/28/2020  NOV: no appointment set at this time     Medication refilled per protocol.    
negative
negative

## 2023-05-15 NOTE — PROGRESS NOTE ADULT - ASSESSMENT
77 year old woman with PMHx of HTN, HLD, nonischemic cardiomyopathy, HFrEF s/p AICD presents to the ED with 2 weeks of worsening shortness of breath with increasing dry cough over this time period accepted to CCU for cardiogenic shock    #Cardiogenic Shock   #LV thrombus, suspected new PE  #NICM  #HFrEF 25% s/p AICD  #HTN/HLD  - CT CAP on admission - cardiomegaly w/ RH strain  - CT Chest 5/4 subacute PE in R mainstem and subsegmental branches  - TTE 4/22/2023 EF <20%, large fixed thrombus vs mass on apical LV wall, GIIDD, mod-sev TR, mod MR, mild PA, mild PH  - LE venous duplex repeat -ve 5/6  - NSVT runs on monitor last 5/11 c/w amiodarone 200mg po qD  - c/w dobutamine and milrinone ggt -via PICC line  - holding eliquis - started heparin ggt   - IVC collapsible - holding 3% saline and bumex ggt - will reassess IVC in PM  - lactate 1.7 5/14, pH 7.54/37/97/32  - BMP, Mg BID, daily AM lactate - BMP/Mg/PTT - 4PM  - goal net -ve 1-2L/day  - HF following - MAP goal >55, can add levo for additional pressor support if needed  - Mg >2, K>4  - daily weight  - strict I/O  - if persistent runs of NSVT will get EP consult   - Cardiac cath done today, Cardiac Index is 2.1 (1.9 by Radha's principle). Will start Bumex gtt at 1 mg/hr.    #Post obstructive pneumonia  #Subacute PE  #Positive COVID-19 PCR  - CT CAP - R hilar LAD, R perihilar soft tissue density w/ indentation/possible invasion of R pulmonary artery and narrowing of proximal R lobar and segmental branches w/ consolidation of RLL  - MRSA -ve  - completed zosyn 7 days course 4/27  - s/p EBUS with biopsy 5/5 - pathology negative for malignancy   - PET scan as outpatient to r/o lung malignancy?  - c/w Incentive Spirometer   - Asymptomatic COVID result, off isolation   - No need for further treatment or diagnostic w/u as per ID    #HAGMA - resolved   #Lactic Acidosis - resolved   #DAVID on CKD - resolved   #Elevated liver enzymes predominantly hepatocellular (AST>ALT) with hyperbilirubinemia likely due to ischemic liver injury - resolved   - lactate 1.7 5/14, pH 7.54/37/97/32  - trend daily lactate in AM to monitor perfusion   - previously holding lipitor 20mg qhs - can restart now w/ LFTs wnl     #Deconditioning   #Reduced appetite/po intake  #Hypotonic Hyponatremia  - hypervolemic   - Na 132-> 129 -> 132 -> 128 ->119 ->126  - Sosm 259, Katherine <20, Pt/Cr 1.7, UUrea 393, Uosm 328  - FeUrea 15.4, <35% suggestive of pre-renal   - IVC collapsible this AM, holding diuresis, repeat in PM   - prealbumin 5 low - pending repeat 5/15  - megestrol dose adjusted 400mg qD  - s/p FEES - pureed w/ mildly thick liquids w/ supervised feeds  - c/w NG feeds TID for supplementation   - nutrition following   - continue to trend lytes, Cr   - PT/OT following - needs daily PT for optimization   - 5/14 - patient worked with PT, was able to get OOB and stand and take a few steps with walker then placed into chair     #Constipation   - c/w senna qhs and miralax BID   - abdomen soft, non-distended, passing gas   - monitor for BM     #Follow Up  - monitor UOP, trend lytes TID   - Assess IVC and volume status daily  - PT/OT, encourage OOBTC and ambulation with walker  - monitor for hypotension if MAP < 55 can adjust pressor requirements   - CT Scan results pending  - Heparing ggt @ 6PM, NPO for cath tomorrow 5/15    # Misc  - DVT Prophylaxis: heparin ggt   - GI Prophylaxis: pantoprazole    Tablet 40 milliGRAM(s) Oral before breakfast  - Diet: Diet, DASH/TLC with 1.2 L fluid restriction    - Activity: Activity - Ambulate as Tolerated  - Code Status: Full , Palliative on board

## 2023-05-16 ENCOUNTER — APPOINTMENT (OUTPATIENT)
Dept: PULMONOLOGY | Facility: HOSPITAL | Age: 78
End: 2023-05-16

## 2023-05-16 LAB
ALBUMIN SERPL ELPH-MCNC: 2.9 G/DL — LOW (ref 3.5–5.2)
ALP SERPL-CCNC: 152 U/L — HIGH (ref 30–115)
ALT FLD-CCNC: 24 U/L — SIGNIFICANT CHANGE UP (ref 0–41)
ANION GAP SERPL CALC-SCNC: 17 MMOL/L — HIGH (ref 7–14)
ANION GAP SERPL CALC-SCNC: 23 MMOL/L — HIGH (ref 7–14)
APTT BLD: 32.3 SEC — SIGNIFICANT CHANGE UP (ref 27–39.2)
APTT BLD: 72.5 SEC — CRITICAL HIGH (ref 27–39.2)
APTT BLD: 77.6 SEC — CRITICAL HIGH (ref 27–39.2)
AST SERPL-CCNC: 30 U/L — SIGNIFICANT CHANGE UP (ref 0–41)
BASE EXCESS BLDA CALC-SCNC: 10.4 MMOL/L — HIGH (ref -2–3)
BASOPHILS # BLD AUTO: 0.04 K/UL — SIGNIFICANT CHANGE UP (ref 0–0.2)
BASOPHILS NFR BLD AUTO: 0.3 % — SIGNIFICANT CHANGE UP (ref 0–1)
BILIRUB SERPL-MCNC: 1.9 MG/DL — HIGH (ref 0.2–1.2)
BUN SERPL-MCNC: 68 MG/DL — CRITICAL HIGH (ref 10–20)
BUN SERPL-MCNC: 80 MG/DL — CRITICAL HIGH (ref 10–20)
CALCIUM SERPL-MCNC: 6.4 MG/DL — LOW (ref 8.4–10.5)
CALCIUM SERPL-MCNC: 9 MG/DL — SIGNIFICANT CHANGE UP (ref 8.4–10.5)
CHLORIDE SERPL-SCNC: 78 MMOL/L — LOW (ref 98–110)
CHLORIDE SERPL-SCNC: 89 MMOL/L — LOW (ref 98–110)
CO2 SERPL-SCNC: 16 MMOL/L — LOW (ref 17–32)
CO2 SERPL-SCNC: 27 MMOL/L — SIGNIFICANT CHANGE UP (ref 17–32)
CREAT SERPL-MCNC: 1.2 MG/DL — SIGNIFICANT CHANGE UP (ref 0.7–1.5)
CREAT SERPL-MCNC: 1.2 MG/DL — SIGNIFICANT CHANGE UP (ref 0.7–1.5)
EGFR: 46 ML/MIN/1.73M2 — LOW
EGFR: 46 ML/MIN/1.73M2 — LOW
EOSINOPHIL # BLD AUTO: 0.02 K/UL — SIGNIFICANT CHANGE UP (ref 0–0.7)
EOSINOPHIL NFR BLD AUTO: 0.1 % — SIGNIFICANT CHANGE UP (ref 0–8)
GAS PNL BLDA: SIGNIFICANT CHANGE UP
GLUCOSE SERPL-MCNC: 109 MG/DL — HIGH (ref 70–99)
GLUCOSE SERPL-MCNC: 118 MG/DL — HIGH (ref 70–99)
HCO3 BLDA-SCNC: 33 MMOL/L — HIGH (ref 21–28)
HCT VFR BLD CALC: 34.2 % — LOW (ref 37–47)
HCT VFR BLD CALC: 34.4 % — LOW (ref 37–47)
HCT VFR BLD CALC: 36.4 % — LOW (ref 37–47)
HGB BLD-MCNC: 11.6 G/DL — LOW (ref 12–16)
HGB BLD-MCNC: 11.7 G/DL — LOW (ref 12–16)
HGB BLD-MCNC: 12.6 G/DL — SIGNIFICANT CHANGE UP (ref 12–16)
IMM GRANULOCYTES NFR BLD AUTO: 0.9 % — HIGH (ref 0.1–0.3)
LACTATE SERPL-SCNC: 1.6 MMOL/L — SIGNIFICANT CHANGE UP (ref 0.7–2)
LACTATE SERPL-SCNC: 1.7 MMOL/L — SIGNIFICANT CHANGE UP (ref 0.7–2)
LACTATE SERPL-SCNC: 3.8 MMOL/L — HIGH (ref 0.7–2)
LYMPHOCYTES # BLD AUTO: 1.02 K/UL — LOW (ref 1.2–3.4)
LYMPHOCYTES # BLD AUTO: 6.4 % — LOW (ref 20.5–51.1)
MAGNESIUM SERPL-MCNC: 1.8 MG/DL — SIGNIFICANT CHANGE UP (ref 1.8–2.4)
MAGNESIUM SERPL-MCNC: 2.5 MG/DL — HIGH (ref 1.8–2.4)
MCHC RBC-ENTMCNC: 30.1 PG — SIGNIFICANT CHANGE UP (ref 27–31)
MCHC RBC-ENTMCNC: 30.2 PG — SIGNIFICANT CHANGE UP (ref 27–31)
MCHC RBC-ENTMCNC: 30.4 PG — SIGNIFICANT CHANGE UP (ref 27–31)
MCHC RBC-ENTMCNC: 33.9 G/DL — SIGNIFICANT CHANGE UP (ref 32–37)
MCHC RBC-ENTMCNC: 34 G/DL — SIGNIFICANT CHANGE UP (ref 32–37)
MCHC RBC-ENTMCNC: 34.6 G/DL — SIGNIFICANT CHANGE UP (ref 32–37)
MCV RBC AUTO: 87.9 FL — SIGNIFICANT CHANGE UP (ref 81–99)
MCV RBC AUTO: 88.4 FL — SIGNIFICANT CHANGE UP (ref 81–99)
MCV RBC AUTO: 89.1 FL — SIGNIFICANT CHANGE UP (ref 81–99)
MONOCYTES # BLD AUTO: 0.77 K/UL — HIGH (ref 0.1–0.6)
MONOCYTES NFR BLD AUTO: 4.8 % — SIGNIFICANT CHANGE UP (ref 1.7–9.3)
NEUTROPHILS # BLD AUTO: 14.01 K/UL — HIGH (ref 1.4–6.5)
NEUTROPHILS NFR BLD AUTO: 87.5 % — HIGH (ref 42.2–75.2)
NRBC # BLD: 0 /100 WBCS — SIGNIFICANT CHANGE UP (ref 0–0)
PCO2 BLDA: 36 MMHG — SIGNIFICANT CHANGE UP (ref 25–48)
PH BLDA: 7.57 — HIGH (ref 7.35–7.45)
PLATELET # BLD AUTO: 514 K/UL — HIGH (ref 130–400)
PLATELET # BLD AUTO: 531 K/UL — HIGH (ref 130–400)
PLATELET # BLD AUTO: 553 K/UL — HIGH (ref 130–400)
PMV BLD: 10.5 FL — HIGH (ref 7.4–10.4)
PMV BLD: 10.6 FL — HIGH (ref 7.4–10.4)
PMV BLD: 10.7 FL — HIGH (ref 7.4–10.4)
PO2 BLDA: 110 MMHG — HIGH (ref 83–108)
POTASSIUM SERPL-MCNC: 2.5 MMOL/L — CRITICAL LOW (ref 3.5–5)
POTASSIUM SERPL-MCNC: 2.9 MMOL/L — LOW (ref 3.5–5)
POTASSIUM SERPL-SCNC: 2.5 MMOL/L — CRITICAL LOW (ref 3.5–5)
POTASSIUM SERPL-SCNC: 2.9 MMOL/L — LOW (ref 3.5–5)
PROT SERPL-MCNC: 6.8 G/DL — SIGNIFICANT CHANGE UP (ref 6–8)
RBC # BLD: 3.84 M/UL — LOW (ref 4.2–5.4)
RBC # BLD: 3.89 M/UL — LOW (ref 4.2–5.4)
RBC # BLD: 4.14 M/UL — LOW (ref 4.2–5.4)
RBC # FLD: 16.4 % — HIGH (ref 11.5–14.5)
RBC # FLD: 16.5 % — HIGH (ref 11.5–14.5)
RBC # FLD: 16.5 % — HIGH (ref 11.5–14.5)
SAO2 % BLDA: 100 % — HIGH (ref 94–98)
SODIUM SERPL-SCNC: 122 MMOL/L — LOW (ref 135–146)
SODIUM SERPL-SCNC: 128 MMOL/L — LOW (ref 135–146)
WBC # BLD: 13.39 K/UL — HIGH (ref 4.8–10.8)
WBC # BLD: 16 K/UL — HIGH (ref 4.8–10.8)
WBC # BLD: 16.42 K/UL — HIGH (ref 4.8–10.8)
WBC # FLD AUTO: 13.39 K/UL — HIGH (ref 4.8–10.8)
WBC # FLD AUTO: 16 K/UL — HIGH (ref 4.8–10.8)
WBC # FLD AUTO: 16.42 K/UL — HIGH (ref 4.8–10.8)

## 2023-05-16 PROCEDURE — 99233 SBSQ HOSP IP/OBS HIGH 50: CPT

## 2023-05-16 PROCEDURE — 93010 ELECTROCARDIOGRAM REPORT: CPT

## 2023-05-16 PROCEDURE — 74018 RADEX ABDOMEN 1 VIEW: CPT | Mod: 26

## 2023-05-16 PROCEDURE — 93010 ELECTROCARDIOGRAM REPORT: CPT | Mod: 77

## 2023-05-16 PROCEDURE — 71045 X-RAY EXAM CHEST 1 VIEW: CPT | Mod: 26

## 2023-05-16 RX ORDER — ALBUMIN HUMAN 25 %
250 VIAL (ML) INTRAVENOUS ONCE
Refills: 0 | Status: COMPLETED | OUTPATIENT
Start: 2023-05-16 | End: 2023-05-16

## 2023-05-16 RX ORDER — POTASSIUM CHLORIDE 20 MEQ
20 PACKET (EA) ORAL
Refills: 0 | Status: DISCONTINUED | OUTPATIENT
Start: 2023-05-16 | End: 2023-05-17

## 2023-05-16 RX ORDER — NOREPINEPHRINE BITARTRATE/D5W 8 MG/250ML
0.05 PLASTIC BAG, INJECTION (ML) INTRAVENOUS
Qty: 8 | Refills: 0 | Status: DISCONTINUED | OUTPATIENT
Start: 2023-05-16 | End: 2023-05-17

## 2023-05-16 RX ORDER — ACETAMINOPHEN 500 MG
650 TABLET ORAL ONCE
Refills: 0 | Status: COMPLETED | OUTPATIENT
Start: 2023-05-16 | End: 2023-05-16

## 2023-05-16 RX ORDER — POTASSIUM CHLORIDE 20 MEQ
20 PACKET (EA) ORAL
Refills: 0 | Status: COMPLETED | OUTPATIENT
Start: 2023-05-16 | End: 2023-05-16

## 2023-05-16 RX ORDER — HYDRALAZINE HCL 50 MG
10 TABLET ORAL ONCE
Refills: 0 | Status: COMPLETED | OUTPATIENT
Start: 2023-05-16 | End: 2023-05-16

## 2023-05-16 RX ORDER — HYDRALAZINE HCL 50 MG
10 TABLET ORAL EVERY 8 HOURS
Refills: 0 | Status: DISCONTINUED | OUTPATIENT
Start: 2023-05-16 | End: 2023-05-16

## 2023-05-16 RX ORDER — LACTULOSE 10 G/15ML
10 SOLUTION ORAL
Refills: 0 | Status: DISCONTINUED | OUTPATIENT
Start: 2023-05-16 | End: 2023-05-17

## 2023-05-16 RX ORDER — ONDANSETRON 8 MG/1
4 TABLET, FILM COATED ORAL ONCE
Refills: 0 | Status: COMPLETED | OUTPATIENT
Start: 2023-05-16 | End: 2023-05-16

## 2023-05-16 RX ORDER — MAGNESIUM SULFATE 500 MG/ML
2 VIAL (ML) INJECTION ONCE
Refills: 0 | Status: COMPLETED | OUTPATIENT
Start: 2023-05-16 | End: 2023-05-16

## 2023-05-16 RX ORDER — POTASSIUM CHLORIDE 20 MEQ
40 PACKET (EA) ORAL ONCE
Refills: 0 | Status: COMPLETED | OUTPATIENT
Start: 2023-05-16 | End: 2023-05-16

## 2023-05-16 RX ADMIN — Medication 500 MILLIGRAM(S): at 11:34

## 2023-05-16 RX ADMIN — MEGESTROL ACETATE 400 MILLIGRAM(S): 40 SUSPENSION ORAL at 11:30

## 2023-05-16 RX ADMIN — LACTULOSE 10 GRAM(S): 10 SOLUTION ORAL at 18:26

## 2023-05-16 RX ADMIN — PIPERACILLIN AND TAZOBACTAM 25 GRAM(S): 4; .5 INJECTION, POWDER, LYOPHILIZED, FOR SOLUTION INTRAVENOUS at 14:37

## 2023-05-16 RX ADMIN — LACTULOSE 10 GRAM(S): 10 SOLUTION ORAL at 18:25

## 2023-05-16 RX ADMIN — AMIODARONE HYDROCHLORIDE 200 MILLIGRAM(S): 400 TABLET ORAL at 18:25

## 2023-05-16 RX ADMIN — PIPERACILLIN AND TAZOBACTAM 25 GRAM(S): 4; .5 INJECTION, POWDER, LYOPHILIZED, FOR SOLUTION INTRAVENOUS at 05:54

## 2023-05-16 RX ADMIN — Medication 5.95 MICROGRAM(S)/KG/MIN: at 23:45

## 2023-05-16 RX ADMIN — Medication 50 MILLIEQUIVALENT(S): at 14:37

## 2023-05-16 RX ADMIN — Medication 40 MILLIEQUIVALENT(S): at 09:30

## 2023-05-16 RX ADMIN — CHLORHEXIDINE GLUCONATE 1 APPLICATION(S): 213 SOLUTION TOPICAL at 21:46

## 2023-05-16 RX ADMIN — HEPARIN SODIUM 1100 UNIT(S)/HR: 5000 INJECTION INTRAVENOUS; SUBCUTANEOUS at 03:11

## 2023-05-16 RX ADMIN — Medication 10 MILLIGRAM(S): at 11:29

## 2023-05-16 RX ADMIN — BUMETANIDE 5 MG/HR: 0.25 INJECTION INTRAMUSCULAR; INTRAVENOUS at 11:26

## 2023-05-16 RX ADMIN — Medication 50 MILLIEQUIVALENT(S): at 08:34

## 2023-05-16 RX ADMIN — MILRINONE LACTATE 4.76 MICROGRAM(S)/KG/MIN: 1 INJECTION, SOLUTION INTRAVENOUS at 11:26

## 2023-05-16 RX ADMIN — ONDANSETRON 4 MILLIGRAM(S): 8 TABLET, FILM COATED ORAL at 14:00

## 2023-05-16 RX ADMIN — Medication 125 MILLILITER(S): at 21:36

## 2023-05-16 RX ADMIN — Medication 50 MILLIEQUIVALENT(S): at 10:34

## 2023-05-16 RX ADMIN — PANTOPRAZOLE SODIUM 40 MILLIGRAM(S): 20 TABLET, DELAYED RELEASE ORAL at 05:52

## 2023-05-16 RX ADMIN — PIPERACILLIN AND TAZOBACTAM 25 GRAM(S): 4; .5 INJECTION, POWDER, LYOPHILIZED, FOR SOLUTION INTRAVENOUS at 21:44

## 2023-05-16 RX ADMIN — SODIUM CHLORIDE 50 MILLILITER(S): 5 INJECTION, SOLUTION INTRAVENOUS at 18:28

## 2023-05-16 RX ADMIN — SODIUM CHLORIDE 50 MILLILITER(S): 5 INJECTION, SOLUTION INTRAVENOUS at 06:01

## 2023-05-16 RX ADMIN — Medication 25 GRAM(S): at 23:16

## 2023-05-16 RX ADMIN — Medication 50 MILLIEQUIVALENT(S): at 23:16

## 2023-05-16 RX ADMIN — HEPARIN SODIUM 1100 UNIT(S)/HR: 5000 INJECTION INTRAVENOUS; SUBCUTANEOUS at 11:27

## 2023-05-16 RX ADMIN — AMIODARONE HYDROCHLORIDE 200 MILLIGRAM(S): 400 TABLET ORAL at 05:52

## 2023-05-16 NOTE — PROGRESS NOTE ADULT - ATTENDING SUPERVISION STATEMENT
Fellow
Resident
Resident
Resident/Fellow
Resident/Fellow
Fellow
Fellow
Resident
Resident/Fellow
Resident/Fellow
Fellow
Resident
Fellow

## 2023-05-16 NOTE — PROGRESS NOTE ADULT - TIME BILLING
Cardiogenic shock, chronic systolic CHF, NICM, LV thormbus.
CHF, CMP, bronchial mass.
CHF, LV thrombus, bronchial mass.
cardiogenic shock, CHF, PE, LV thrombus.

## 2023-05-16 NOTE — PROGRESS NOTE ADULT - SUBJECTIVE AND OBJECTIVE BOX
Patient is a 77y old  Female who presents with a chief complaint of intra cardiac mass (16 May 2023 09:25)    HPI:  77 year old woman with PMHx of HTN, HLD, nonischemic cardiomyopathy, HFrEF s/p AICD presents to the ED with 2 weeks of worsening shortness of breath. Patient and her son state patient has been experiencing shortness of breath that has progressed from present on activity to now shortness of breath during conversation. Patient also notes an increasing dry cough over this time period. Denies history of smoking, fevers/chills, chest pain, nausea/vomiting, bloody sputum, dark/tarry/bloody bowel movements, upper respiratory symptoms.     In ED, patient's HR is 110 and saturating 98% on RA  CT C/A/P revealing cardiomegaly with evidence of right heart strain, R hilar lymphadenopathy with R perihilar soft tissue density with indentation/possible invasion of right pulmonary artery, and narrowing of proximal R lobar and segmental branches with associated consolidation of right lower lobe. 2.2 x 2.8 cm filling defect noted in apex of left ventricle.   Last ECHO 2021 revealing EF 20-25%, mild Mr, mild TR, mild Pulm HTN  Dimer 2600, trops negative BNP 74524  Duplex venous LE negative for DVT; CTA chest negative for PE  Cr 1.4 (0.7 in ), Na+ 128, T.Benjamin 2.3, LFTs elevated (hemolyzed)  Lactate 5.8-->4.6  Admitted to SDU (2023 01:10)       INTERVAL HPI/OVERNIGHT EVENTS:   No overnight events   Afebrile, hemodynamically stable     Subjective:    ICU Vital Signs Last 24 Hrs  T(C): 36.2 (16 May 2023 08:00), Max: 36.5 (16 May 2023 00:00)  T(F): 97.1 (16 May 2023 08:00), Max: 97.7 (16 May 2023 00:00)  HR: 96 (16 May 2023 10:00) (83 - 101)  BP: 100/55 (16 May 2023 10:00) (74/46 - 109/57)  BP(mean): 71 (16 May 2023 10:00) (56 - 78)  ABP: --  ABP(mean): --  RR: 22 (16 May 2023 10:00) (13 - 29)  SpO2: 97% (16 May 2023 08:00) (94% - 100%)    O2 Parameters below as of 16 May 2023 08:00  Patient On (Oxygen Delivery Method): room air          I&O's Summary    15 May 2023 07:01  -  16 May 2023 07:00  --------------------------------------------------------  IN: 1172 mL / OUT: 2075 mL / NET: -903 mL          Daily     Daily Weight in k.7 (16 May 2023 06:00)    Adult Advanced Hemodynamics Last 24 Hrs  CVP(mm Hg): --  CVP(cm H2O): --  CO: --  CI: --  PA: --  PA(mean): --  PCWP: --  SVR: --  SVRI: --  PVR: --  PVRI: --    EKG/Telemetry Events:    MEDICATIONS  (STANDING):  aMIOdarone    Tablet 200 milliGRAM(s) Oral two times a day  buMETAnide Infusion 1 mG/Hr (5 mL/Hr) IV Continuous <Continuous>  chlorhexidine 2% Cloths 1 Application(s) Topical daily  DOBUTamine Infusion 8 MICROgram(s)/kG/Min (7.62 mL/Hr) IV Continuous <Continuous>  heparin  Infusion.  Unit(s)/Hr (11 mL/Hr) IV Continuous <Continuous>  magnesium gluconate 500 milliGRAM(s) Oral daily  megestrol Suspension 400 milliGRAM(s) Oral daily  melatonin 5 milliGRAM(s) Oral at bedtime  milrinone Infusion 0.25 MICROgram(s)/kG/Min (4.76 mL/Hr) IV Continuous <Continuous>  pantoprazole    Tablet 40 milliGRAM(s) Oral before breakfast  piperacillin/tazobactam IVPB.. 3.375 Gram(s) IV Intermittent every 8 hours  polyethylene glycol 3350 17 Gram(s) Oral two times a day  potassium chloride   Powder 40 milliEquivalent(s) Oral once  potassium chloride  20 mEq/100 mL IVPB 20 milliEquivalent(s) IV Intermittent every 2 hours  senna 2 Tablet(s) Oral at bedtime  sodium chloride 3%. 150 milliLiter(s) (50 mL/Hr) IV Continuous <Continuous>  sodium chloride 3%. 150 milliLiter(s) (50 mL/Hr) IV Continuous <Continuous>  sodium chloride 3%. 150 milliLiter(s) (50 mL/Hr) IV Continuous <Continuous>    MEDICATIONS  (PRN):  aluminum hydroxide/magnesium hydroxide/simethicone Suspension 30 milliLiter(s) Oral every 6 hours PRN Dyspepsia  benzocaine 20% Spray 1 Spray(s) Topical four times a day PRN pain  heparin   Injectable 5000 Unit(s) IV Push every 6 hours PRN For aPTT less than 40  heparin   Injectable 2500 Unit(s) IV Push every 6 hours PRN For aPTT between 40 - 57      PHYSICAL EXAM:  GENERAL:   HEAD:  Atraumatic, Normocephalic  EYES: EOMI, PERRLA, conjunctiva and sclera clear  NECK: Supple, No JVD, Normal thyroid, no enlarged nodes  NERVOUS SYSTEM:  Alert & Awake.   CHEST/LUNG: B/L good air entry; No rales, rhonchi, or wheezing  HEART: S1S2 normal, no S3, Regular rate and rhythm; No murmurs  ABDOMEN: Soft, Nontender, Nondistended; Bowel sounds present  EXTREMITIES:  2+ Peripheral Pulses, No clubbing, cyanosis, or edema  LYMPH: No lymphadenopathy noted  SKIN: No rashes or lesions    LABS:                        11.7   16.00 )-----------( 514      ( 16 May 2023 04:55 )             34.4     05-    122<L>  |  78<L>  |  80<HH>  ----------------------------<  118<H>  2.9<L>   |  27  |  1.2    Ca    9.0      16 May 2023 04:55  Mg     2.5     -    TPro  6.8  /  Alb  2.9<L>  /  TBili  1.9<H>  /  DBili  x   /  AST  30  /  ALT  24  /  AlkPhos  152<H>      LIVER FUNCTIONS - ( 16 May 2023 04:55 )  Alb: 2.9 g/dL / Pro: 6.8 g/dL / ALK PHOS: 152 U/L / ALT: 24 U/L / AST: 30 U/L / GGT: x           PTT - ( 16 May 2023 09:39 )  PTT:77.6 sec  CAPILLARY BLOOD GLUCOSE        ABG - ( 15 May 2023 04:43 )  pH, Arterial: 7.57  pH, Blood: x     /  pCO2: 36    /  pO2: 110   / HCO3: 33    / Base Excess: 10.4  /  SaO2: 100.0                         RADIOLOGY & ADDITIONAL TESTS:  Post-Cardiac Cath: Access & Closure:   [x] 6 Fr right Femoral Artery > Perclose  [x]8.5 Fr right Femoral Vein > Vascade    IV Contrast: 20 mL        Intervention:   None    Implants:    None    FINDINGS:     Coronary Dominance:   Right    LM:   mild luminal irregularities     LAD:   mild luminal irregularities     CX:   mild luminal irregularities     RCA:   mild luminal irregularities          EF: less than 20 %       RHC   RA 13  RV 52/ 16   PA 54/25/36  PCWP 25 with prominent V wave  SVR 1750    CO/CI by TD 3.27/ 1.97 l/min  CO/CI by Radha 2.97/1.78 l/min  PA Sat 57.6%    On milrinone 0.25 mcg/kg/min and dobutamine 10   mcg/kg/min       ESTIMATED BLOOD LOSS: < 10 mL        CONDITION:     [x] Good     [] Fair     [] Critical        SPECIMEN REMOVED: N/A       POST-OP DIAGNOSIS:    Mild luminal irregularities of the coronary anatomy   Elevated RHC pressures   Low cardiac output with elevated PCWP           PLAN OF CARE:   [x] Return to In-patient bed   [x]Cont current management per the CCU and heart failure team.        Care Discussed with Consultants/Other Providers [ x] YES  [ ] NO

## 2023-05-16 NOTE — PROGRESS NOTE ADULT - ASSESSMENT
77 year old woman with PMHx of HTN, HLD, nonischemic cardiomyopathy, HFrEF s/p AICD presents to the ED with 2 weeks of worsening shortness of breath with increasing dry cough over this time period accepted to CCU for cardiogenic shock    #Cardiogenic Shock   #LV thrombus, suspected new PE  #NICM  #HFrEF 25% s/p AICD  #HTN/HLD  - CT CAP on admission - cardiomegaly w/ RH strain  - CT Chest 5/4 subacute PE in R mainstem and subsegmental branches  - TTE 4/22/2023 EF <20%, large fixed thrombus vs mass on apical LV wall, GIIDD, mod-sev TR, mod MR, mild IL, mild PH  - LE venous duplex repeat -ve 5/6  - NSVT runs on monitor last 5/11 c/w amiodarone 200mg po qD  - c/w dobutamine and milrinone ggt -via PICC line  - holding eliquis - started heparin ggt   - IVC collapsible - holding 3% saline and bumex ggt - will reassess IVC in PM  - lactate 1.7 5/14, pH 7.54/37/97/32  - BMP, Mg BID, daily AM lactate - BMP/Mg/PTT - 4PM  - goal net -ve 1-2L/day  - HF following - MAP goal >55, can add levo for additional pressor support if needed  - Mg >2, K>4  - daily weight  - strict I/O  - if persistent runs of NSVT will get EP consult   - Cardiac cath done 5/15, Cardiac Index is 2.1 (1.9 by Radha's principle).   - c/w Bumex gtt at 1mg/hr and hypertonic saline q12h    #Post obstructive pneumonia  #Subacute PE  #Positive COVID-19 PCR  - CT CAP - R hilar LAD, R perihilar soft tissue density w/ indentation/possible invasion of R pulmonary artery and narrowing of proximal R lobar and segmental branches w/ consolidation of RLL  - MRSA -ve  - completed zosyn 7 days course 4/27  - s/p EBUS with biopsy 5/5 - pathology negative for malignancy   - PET scan as outpatient to r/o lung malignancy?  - c/w Incentive Spirometer   - Asymptomatic COVID result, off isolation   - No need for further treatment or diagnostic w/u as per ID    #HAGMA - resolved   #Lactic Acidosis - resolved   #DAVID on CKD - resolved   #Elevated liver enzymes predominantly hepatocellular (AST>ALT) with hyperbilirubinemia likely due to ischemic liver injury - resolved   - lactate 1.7 5/14, pH 7.54/37/97/32  - trend daily lactate in AM to monitor perfusion   - previously holding lipitor 20mg qhs - can restart now w/ LFTs wnl     #Deconditioning   #Reduced appetite/po intake  #Hypotonic Hyponatremia  - hypervolemic   - Na 132-> 129 -> 132 -> 128 ->119 ->126  - Sosm 259, Katherine <20, Pt/Cr 1.7, UUrea 393, Uosm 328  - FeUrea 15.4, <35% suggestive of pre-renal   - IVC collapsible this AM, holding diuresis, repeat in PM   - prealbumin 5 low - pending repeat 5/15  - megestrol dose adjusted 400mg qD  - s/p FEES - pureed w/ mildly thick liquids w/ supervised feeds  - c/w NG feeds TID for supplementation   - nutrition following   - continue to trend lytes, Cr   - PT/OT following - needs daily PT for optimization   - 5/14 - patient worked with PT, was able to get OOB and stand and take a few steps with walker then placed into chair     #Constipation   - c/w senna qhs and miralax BID   - abdomen soft, non-distended, passing gas   - monitor for BM     #Follow Up  - monitor UOP, trend lytes TID   - Assess IVC and volume status daily  - PT/OT, encourage OOBTC and ambulation with walker  - monitor for hypotension if MAP < 55 can adjust pressor requirements     # Misc  - DVT Prophylaxis: heparin ggt   - GI Prophylaxis: pantoprazole    Tablet 40 milliGRAM(s) Oral before breakfast  - Diet: Diet, DASH/TLC with 1.2 L fluid restriction    - Activity: Activity - Ambulate as Tolerated  - Code Status: Full , Palliative on board

## 2023-05-16 NOTE — PROGRESS NOTE ADULT - SUBJECTIVE AND OBJECTIVE BOX
Date of Admission: 23    Interval History: Patient resting in bed, in NAD. Son present at bedside reports he was informed patient had episodes of n/v overnight- patient feeling better this AM but fatigued. Remains on dual inotropic support (milrinone 0.25 mcg/kg/min and dobutamine 9.5 mcg/kg/min). +NSVT on telemetry this AM. s/p R/LHC yesterday 5/15, CO remains low with elevated PCWP. Na 121 on AM labs today.   24 hr UOP 2 L, net negative 903cc, BUN 80 continuing to trend up.    HISTORY OF PRESENT ILLNESS: 76 y/o F with PMHx of HTN, HLD, nonischemic cardiomyopathy, HFrEF s/p AICD presents to the ED with 2 weeks of worsening shortness of breath. Patient and her son state patient has been experiencing shortness of breath that has progressed from present on activity to now shortness of breath during conversation. Patient also notes an increasing dry cough over this time period, which is improving currently     In ED, patient's HR was 110 and was saturating 98% on RA  CT C/A/P revealing cardiomegaly with evidence of right heart strain, R hilar lymphadenopathy with R perihilar soft tissue density with indentation/possible invasion of right pulmonary artery, and narrowing of proximal R lobar and segmental branches with associated consolidation of right lower lobe. 2.2 x 2.8 cm filling defect noted in apex of left ventricle.   Last ECHO 2021 revealing EF 20-25%, mild Mr, mild TR, mild Pulm HTN  Dimer 2600, trops negative BNP 10408  Duplex venous LE negative for DVT; initial CTA chest negative for PE  Cr 1.4 (0.7 in ), Na+ 128, T.Benjamin 2.3, LFTs elevated (hemolyzed)  Lactate 5.8-->4.6--.2.3   Admitted to SDU (2023 01:10)    Patient reportedly was at baseline (active, ambulating long distances without issue) until about 2 weeks ago when patient began to have progressively worsening SOB with a dry cough. Patient reports Dr. Mead as per primary cardiologist, and Dr. Sherwood as her heart failure specialist (last seen in office in ). Endorses compliance with home medications. Family at bedside concerned about patient's recent weight loss as well.         PAST MEDICAL & SURGICAL HISTORY:  CHF, stage C  HTN (hypertension)    Allergies  epinephrine (Unknown)    Intolerances      Vitals:  ICU Vital Signs Last 24 Hrs  T(C): 36.2 (16 May 2023 08:00), Max: 36.5 (16 May 2023 00:00)  T(F): 97.1 (16 May 2023 08:00), Max: 97.7 (16 May 2023 00:00)  HR: 96 (16 May 2023 10:00) (83 - 101)  BP: 100/55 (16 May 2023 10:00) (74/46 - 109/57)  BP(mean): 71 (16 May 2023 10:00) (56 - 78)  ABP: --  ABP(mean): --  RR: 22 (16 May 2023 10:00) (13 - 29)  SpO2: 97% (16 May 2023 08:00) (94% - 100%)    O2 Parameters below as of 16 May 2023 08:00  Patient On (Oxygen Delivery Method): room air        Physical Exam:  General Appearance: NG tube, thin, frail, normal for age and gender. 	  Cardiovascular: +NSVT on tele, S1, S2, No edema  Respiratory: decreased @ bases  Psychiatry: Fatigued, oriented x 3, Mood & affect appropriate  Skin/Integumentary: No rashes, No ecchymoses, No cyanosis  Neurologic: Non-focal  Musculoskeletal/ extremities: Normal range of motion, No clubbing, cyanosis or edema  Vascular: Peripheral pulses palpable 2+ bilaterally        LABS:	 	             CBC Full  -  ( 16 May 2023 04:55 )  WBC Count : 16.00 K/uL  RBC Count : 3.89 M/uL  Hemoglobin : 11.7 g/dL  Hematocrit : 34.4 %  Platelet Count - Automated : 514 K/uL  Mean Cell Volume : 88.4 fL  Mean Cell Hemoglobin : 30.1 pg  Mean Cell Hemoglobin Concentration : 34.0 g/dL  Auto Neutrophil # : 14.01 K/uL  Auto Lymphocyte # : 1.02 K/uL  Auto Monocyte # : 0.77 K/uL  Auto Eosinophil # : 0.02 K/uL  Auto Basophil # : 0.04 K/uL  Auto Neutrophil % : 87.5 %  Auto Lymphocyte % : 6.4 %  Auto Monocyte % : 4.8 %  Auto Eosinophil % : 0.1 %  Auto Basophil % : 0.3 %    Comprehensive Metabolic Panel (23 @ 04:55)    Sodium, Serum: 122 mmol/L   Potassium, Serum: 2.9 mmol/L   Chloride, Serum: 78 mmol/L   Carbon Dioxide, Serum: 27 mmol/L   Anion Gap, Serum: 17 mmol/L   Blood Urea Nitrogen, Serum: 80: Critical value: mg/dL   Creatinine, Serum: 1.2 mg/dL   Glucose, Serum: 118 mg/dL   Calcium, Total Serum: 9.0 mg/dL   Protein Total, Serum: 6.8 g/dL   Albumin, Serum: 2.9 g/dL   Bilirubin Total, Serum: 1.9 mg/dL   Alkaline Phosphatase, Serum: 152 U/L   Aspartate Aminotransferase (AST/SGOT): 30 U/L   Alanine Aminotransferase (ALT/SGPT): 24 U/L   eGFR: 46: The estimated glomerular filtration rate (eGFR) is calculated using the   CKD-EPI creatinine equation, which does not have a coefficient for  race and is validated in individuals 18 years of age and older (N Engl J  Med ; 385:3160-2523). Creatinine-based eGFR may be inaccurate in  various situations including but not limited to extremes of muscle mass,  altered dietary protein intake, or medications that affect renal tubular  creatinine secretion. mL/min/1.73m2      Lactate, Blood: 1.5 mmol/L (05.15.23 @ 04:48)      TELEMETRY EVENTS: 	    EC2023    Ventricular Rate 96 BPM  Atrial Rate 96 BPM  P-R Interval 164 ms  QRS Duration 116 ms  Q-T Interval 396 ms  QTC Calculation(Bazett) 500 ms  P Axis 51 degrees  R Axis -41 degrees  T Axis 87 degrees    Diagnosis Line Normal sinus rhythm  Possible Left atrial enlargement  Left axis deviation  Prolonged QT  Abnormal ECG  Confirmed by Seven Hensley ([id number]) on 2023 7:07:08 AM      CXR   Impression:  CHF. Support devices as described. Without difference    CXR 23  Stable congestive changes and bilateral pleural effusions/opacities,  No pneumothorax      CT Angio Chest PE Protocol   IMPRESSION:    No evidence of pulmonary embolism. (See discussion below for more   findings.)    Marked cardiomegaly.    There is a 2.2 x 2.8 cm rounded filling defect in the region of the apex   of the left ventricle (3/11; 607/95). Differential diagnosis includes an   intracardiac mass or thrombus.    The main pulmonary artery is dilated, measuring 3.6 cm in diameter, which   may be seen with pulmonary hypertension. There is reflux of contrast   material into the IVC and hepatic veins compatible with right heart   dysfunction.    Right hilar lymphadenopathy and right perihilar soft tissue density is   noted. There is segmental narrowing and consolidation noted in the medial   aspect of the right lower lobe. There is a small right pleural effusion.   There is extrinsic indentation and possible invasion of the right main   pulmonary artery (604/142; 605/129; 2/42). There is associated narrowing   of the proximal right lobar and segmental branches. A right hilar / right   lower lobe mass with postobstructive pneumonitis should be ruled out.      < from: CT Angio Chest PE Protocol w/ IV Cont (23 @ 11:31)  Redemonstrated right hilar lesion with compression and possible invasion   of adjacent main pulmonary artery segment (unchanged from previous study.  Subacute right main and segmental pulmonary embolus.  Increased right lower lobe consolidative and groundglass opacities,   likely infectious/inflammatory in etiology.  4 mm left anterior upper lobe groundglass nodule.      PREVIOUS DIAGNOSTIC TESTING:    TTE 23    Summary:   1. Left ventricular ejection fraction, by visual estimation, is <20%.   2. Severely decreased global left ventricular systolic function.   3. Severely enlarged left atrium.   4. Elevated mean left atrial pressure.   5. Large, fixed thrombus vs. mass on the apical wall of the left   ventricle.   6. Spectral Doppler shows pseudonormal pattern of left ventricular   myocardial filling (Grade II diastolic dysfunction).   7. Moderately reduced RV systolic function.   8. Mildly enlarged right atrium.   9. Moderate mitral valve regurgitation.  10. Moderate-severe tricuspid regurgitation.  11. Mild pulmonic valve regurgitation.  12. Estimated pulmonary artery systolic pressure is 42.9 mmHg assuming a   right atrial pressure of 15 mmHg, which is consistent withmild pulmonary   hypertension.  13. Endocardial visualization was enhanced with intravenous echo contrast.      Right and Left Heart Catheterization 5/15/23    RHC   RA 13  RV 52/ 16   PA 54/25/36  PCWP 25 with prominent V wave  SVR 1750    CO/CI by TD 3.27/ 1.97 l/min  CO/CI by Mehdi 2.97/1.78 l/min  PA Sat 57.6%    On milrinone 0.25 mcg/kg/min and dobutamine 10   mcg/kg/min      POST-OP DIAGNOSIS:    Mild luminal irregularities of the coronary anatomy   Elevated RHC pressures   Low cardiac output with elevated PCWP        Right Heart Catheterization 23    FINDINGS:   Right Heart Cath  RA - 12  RV - 51/5/13  PA - 51/24/32  PCWP - 20    CO/CI Thermodilusion - 2.1/1.27  CO/CI Mehdi - 2.09/1.26    SVR (MAP 79 / RA 12 / CO 2.1) = 2552 dyn  PVR = 5.71 henriquez      RHC : RA 14, RV 46/14, PA 56/26/37, PCWP: 25, CO/C.I mehdi 2.37/1.43, C.O. /C.I thermodilution 2.8/1.69    Home Medications:  atorvastatin 20 mg oral tablet: 1 tab(s) orally once a day (04 Aug 2021 10:05)  carvedilol 6.25 mg oral tablet: 1 tab(s) orally 2 times a day (2023 02:02)  Entresto 49 mg-51 mg oral tablet: 1 tab(s) orally 2 times a day (04 Aug 2021 10:05)  Farxiga 10 mg oral tablet: 1 tab(s) orally once a day (2023 02:05)  Lasix 20 mg oral tablet: 1 tab(s) orally once a day (2023 02:04)  spironolactone 25 mg oral tablet: 1 tab(s) orally once a day (04 Aug 2021 10:05)    MEDICATIONS  (STANDING):  buMETAnide Injectable 2 milliGRAM(s) IV Push every 8 hours  chlorhexidine 2% Cloths 1 Application(s) Topical daily  DOBUTamine Infusion 7.5 MICROgram(s)/kG/Min (7.14 mL/Hr) IV Continuous <Continuous>  heparin  Infusion.  Unit(s)/Hr (11 mL/Hr) IV Continuous <Continuous>  magnesium gluconate 500 milliGRAM(s) Oral daily  melatonin 5 milliGRAM(s) Oral at bedtime  milrinone Infusion 0.125 MICROgram(s)/kG/Min (2.38 mL/Hr) IV Continuous <Continuous>  norepinephrine Infusion 0.05 MICROgram(s)/kG/Min (2.98 mL/Hr) IV Continuous <Continuous>  pantoprazole    Tablet 40 milliGRAM(s) Oral before breakfast    MEDICATIONS  (PRN):  benzocaine 20% Spray 1 Spray(s) Topical four times a day PRN throat pain  heparin   Injectable 5000 Unit(s) IV Push every 6 hours PRN For aPTT less than 40  heparin   Injectable 2500 Unit(s) IV Push every 6 hours PRN For aPTT between 40 - 57   Date of Admission: 23    Interval History: Patient resting in bed, in NAD. Son present at bedside reports he was informed patient had brief episode of n/v last evening- patient feeling better this AM but fatigued. Remains on dual inotropic support (milrinone 0.25 mcg/kg/min and dobutamine 9.5 mcg/kg/min). +NSVT on telemetry this AM. s/p R/LHC yesterday 5/15, CO remains low with elevated PCWP. Na 122 on AM labs today.   24 hr UOP 2 L, net negative 903cc, BUN 80 continuing to trend up.    HISTORY OF PRESENT ILLNESS: 76 y/o F with PMHx of HTN, HLD, nonischemic cardiomyopathy, HFrEF s/p AICD presents to the ED with 2 weeks of worsening shortness of breath. Patient and her son state patient has been experiencing shortness of breath that has progressed from present on activity to now shortness of breath during conversation. Patient also notes an increasing dry cough over this time period, which is improving currently     In ED, patient's HR was 110 and was saturating 98% on RA  CT C/A/P revealing cardiomegaly with evidence of right heart strain, R hilar lymphadenopathy with R perihilar soft tissue density with indentation/possible invasion of right pulmonary artery, and narrowing of proximal R lobar and segmental branches with associated consolidation of right lower lobe. 2.2 x 2.8 cm filling defect noted in apex of left ventricle.   Last ECHO 2021 revealing EF 20-25%, mild Mr, mild TR, mild Pulm HTN  Dimer 2600, trops negative BNP 94311  Duplex venous LE negative for DVT; initial CTA chest negative for PE  Cr 1.4 (0.7 in ), Na+ 128, T.Benjamin 2.3, LFTs elevated (hemolyzed)  Lactate 5.8-->4.6--.2.3   Admitted to SDU (2023 01:10)    Patient reportedly was at baseline (active, ambulating long distances without issue) until about 2 weeks ago when patient began to have progressively worsening SOB with a dry cough. Patient reports Dr. Mead as per primary cardiologist, and Dr. Sherwood as her heart failure specialist (last seen in office in ). Endorses compliance with home medications. Family at bedside concerned about patient's recent weight loss as well.         PAST MEDICAL & SURGICAL HISTORY:  CHF, stage C  HTN (hypertension)    Allergies  epinephrine (Unknown)    Intolerances      Vitals:  ICU Vital Signs Last 24 Hrs  T(C): 36.2 (16 May 2023 08:00), Max: 36.5 (16 May 2023 00:00)  T(F): 97.1 (16 May 2023 08:00), Max: 97.7 (16 May 2023 00:00)  HR: 96 (16 May 2023 10:00) (83 - 101)  BP: 100/55 (16 May 2023 10:00) (74/46 - 109/57)  BP(mean): 71 (16 May 2023 10:00) (56 - 78)  ABP: --  ABP(mean): --  RR: 22 (16 May 2023 10:00) (13 - 29)  SpO2: 97% (16 May 2023 08:00) (94% - 100%)    O2 Parameters below as of 16 May 2023 08:00  Patient On (Oxygen Delivery Method): room air        Physical Exam:  General Appearance: NG tube, thin, frail, normal for age and gender. 	  Cardiovascular: +NSVT on tele, S1, S2, No edema  Respiratory: decreased @ bases  Psychiatry: Fatigued, oriented x 3, Mood & affect appropriate  Skin/Integumentary: No rashes, No ecchymoses, No cyanosis  Neurologic: Non-focal  Musculoskeletal/ extremities: Normal range of motion, No clubbing, cyanosis or edema  Vascular: Peripheral pulses palpable 2+ bilaterally        LABS:	 	             CBC Full  -  ( 16 May 2023 04:55 )  WBC Count : 16.00 K/uL  RBC Count : 3.89 M/uL  Hemoglobin : 11.7 g/dL  Hematocrit : 34.4 %  Platelet Count - Automated : 514 K/uL  Mean Cell Volume : 88.4 fL  Mean Cell Hemoglobin : 30.1 pg  Mean Cell Hemoglobin Concentration : 34.0 g/dL  Auto Neutrophil # : 14.01 K/uL  Auto Lymphocyte # : 1.02 K/uL  Auto Monocyte # : 0.77 K/uL  Auto Eosinophil # : 0.02 K/uL  Auto Basophil # : 0.04 K/uL  Auto Neutrophil % : 87.5 %  Auto Lymphocyte % : 6.4 %  Auto Monocyte % : 4.8 %  Auto Eosinophil % : 0.1 %  Auto Basophil % : 0.3 %    Comprehensive Metabolic Panel (23 @ 04:55)    Sodium, Serum: 122 mmol/L   Potassium, Serum: 2.9 mmol/L   Chloride, Serum: 78 mmol/L   Carbon Dioxide, Serum: 27 mmol/L   Anion Gap, Serum: 17 mmol/L   Blood Urea Nitrogen, Serum: 80: Critical value: mg/dL   Creatinine, Serum: 1.2 mg/dL   Glucose, Serum: 118 mg/dL   Calcium, Total Serum: 9.0 mg/dL   Protein Total, Serum: 6.8 g/dL   Albumin, Serum: 2.9 g/dL   Bilirubin Total, Serum: 1.9 mg/dL   Alkaline Phosphatase, Serum: 152 U/L   Aspartate Aminotransferase (AST/SGOT): 30 U/L   Alanine Aminotransferase (ALT/SGPT): 24 U/L   eGFR: 46: The estimated glomerular filtration rate (eGFR) is calculated using the   CKD-EPI creatinine equation, which does not have a coefficient for  race and is validated in individuals 18 years of age and older (N Engl J  Med ; 385:[id number]). Creatinine-based eGFR may be inaccurate in  various situations including but not limited to extremes of muscle mass,  altered dietary protein intake, or medications that affect renal tubular  creatinine secretion. mL/min/1.73m2      Lactate, Blood: 1.5 mmol/L (05.15.23 @ 04:48)      TELEMETRY EVENTS: 	    EC2023    Ventricular Rate 96 BPM  Atrial Rate 96 BPM  P-R Interval 164 ms  QRS Duration 116 ms  Q-T Interval 396 ms  QTC Calculation(Bazett) 500 ms  P Axis 51 degrees  R Axis -41 degrees  T Axis 87 degrees    Diagnosis Line Normal sinus rhythm  Possible Left atrial enlargement  Left axis deviation  Prolonged QT  Abnormal ECG  Confirmed by Seven Hensley ([id number]) on 2023 7:07:08 AM      CXR   Impression:  CHF. Support devices as described. Without difference    CXR 23  Stable congestive changes and bilateral pleural effusions/opacities,  No pneumothorax      CT Angio Chest PE Protocol   IMPRESSION:    No evidence of pulmonary embolism. (See discussion below for more   findings.)    Marked cardiomegaly.    There is a 2.2 x 2.8 cm rounded filling defect in the region of the apex   of the left ventricle (3/11; 607/95). Differential diagnosis includes an   intracardiac mass or thrombus.    The main pulmonary artery is dilated, measuring 3.6 cm in diameter, which   may be seen with pulmonary hypertension. There is reflux of contrast   material into the IVC and hepatic veins compatible with right heart   dysfunction.    Right hilar lymphadenopathy and right perihilar soft tissue density is   noted. There is segmental narrowing and consolidation noted in the medial   aspect of the right lower lobe. There is a small right pleural effusion.   There is extrinsic indentation and possible invasion of the right main   pulmonary artery (604/142; 605/129; /). There is associated narrowing   of the proximal right lobar and segmental branches. A right hilar / right   lower lobe mass with postobstructive pneumonitis should be ruled out.      < from: CT Angio Chest PE Protocol w/ IV Cont (23 @ 11:31)  Redemonstrated right hilar lesion with compression and possible invasion   of adjacent main pulmonary artery segment (unchanged from previous study.  Subacute right main and segmental pulmonary embolus.  Increased right lower lobe consolidative and groundglass opacities,   likely infectious/inflammatory in etiology.  4 mm left anterior upper lobe groundglass nodule.      PREVIOUS DIAGNOSTIC TESTING:    TTE 23    Summary:   1. Left ventricular ejection fraction, by visual estimation, is <20%.   2. Severely decreased global left ventricular systolic function.   3. Severely enlarged left atrium.   4. Elevated mean left atrial pressure.   5. Large, fixed thrombus vs. mass on the apical wall of the left   ventricle.   6. Spectral Doppler shows pseudonormal pattern of left ventricular   myocardial filling (Grade II diastolic dysfunction).   7. Moderately reduced RV systolic function.   8. Mildly enlarged right atrium.   9. Moderate mitral valve regurgitation.  10. Moderate-severe tricuspid regurgitation.  11. Mild pulmonic valve regurgitation.  12. Estimated pulmonary artery systolic pressure is 42.9 mmHg assuming a   right atrial pressure of 15 mmHg, which is consistent withmild pulmonary   hypertension.  13. Endocardial visualization was enhanced with intravenous echo contrast.      Right and Left Heart Catheterization 5/15/23    RHC   RA 13  RV 52/ 16   PA 54/25/36  PCWP 25 with prominent V wave  SVR 1750    CO/CI by TD 3.27/ 1.97 l/min  CO/CI by Mehdi 2.97/1.78 l/min  PA Sat 57.6%    On milrinone 0.25 mcg/kg/min and dobutamine 10   mcg/kg/min      POST-OP DIAGNOSIS:    Mild luminal irregularities of the coronary anatomy   Elevated RHC pressures   Low cardiac output with elevated PCWP        Right Heart Catheterization 23    FINDINGS:   Right Heart Cath  RA - 12  RV - 51/5/13  PA - 51/24/32  PCWP - 20    CO/CI Thermodilusion - 2.1/1.27  CO/CI Mehdi - 2.09/1.26    SVR (MAP 79 / RA 12 / CO 2.1) = 2552 dyn  PVR = 5.71 henriquez      RHC : RA 14, RV 46/14, PA 56/26/37, PCWP: 25, CO/C.I mehdi 2.37/1.43, C.O. /C.I thermodilution 2.8/1.69    Home Medications:  atorvastatin 20 mg oral tablet: 1 tab(s) orally once a day (04 Aug 2021 10:05)  carvedilol 6.25 mg oral tablet: 1 tab(s) orally 2 times a day (2023 02:02)  Entresto 49 mg-51 mg oral tablet: 1 tab(s) orally 2 times a day (04 Aug 2021 10:05)  Farxiga 10 mg oral tablet: 1 tab(s) orally once a day (2023 02:05)  Lasix 20 mg oral tablet: 1 tab(s) orally once a day (2023 02:04)  spironolactone 25 mg oral tablet: 1 tab(s) orally once a day (04 Aug 2021 10:05)    MEDICATIONS  (STANDING):  buMETAnide Injectable 2 milliGRAM(s) IV Push every 8 hours  chlorhexidine 2% Cloths 1 Application(s) Topical daily  DOBUTamine Infusion 7.5 MICROgram(s)/kG/Min (7.14 mL/Hr) IV Continuous <Continuous>  heparin  Infusion.  Unit(s)/Hr (11 mL/Hr) IV Continuous <Continuous>  magnesium gluconate 500 milliGRAM(s) Oral daily  melatonin 5 milliGRAM(s) Oral at bedtime  milrinone Infusion 0.125 MICROgram(s)/kG/Min (2.38 mL/Hr) IV Continuous <Continuous>  norepinephrine Infusion 0.05 MICROgram(s)/kG/Min (2.98 mL/Hr) IV Continuous <Continuous>  pantoprazole    Tablet 40 milliGRAM(s) Oral before breakfast    MEDICATIONS  (PRN):  benzocaine 20% Spray 1 Spray(s) Topical four times a day PRN throat pain  heparin   Injectable 5000 Unit(s) IV Push every 6 hours PRN For aPTT less than 40  heparin   Injectable 2500 Unit(s) IV Push every 6 hours PRN For aPTT between 40 - 57   Date of Admission: 23    Interval History: Patient resting in bed, in NAD. Son present at bedside reports he was informed patient had brief episode of n/v last evening- patient feeling better this AM but fatigued. Remains on dual inotropic support (milrinone 0.25 mcg/kg/min and dobutamine 10 mcg/kg/min). +NSVT on telemetry this AM. s/p R/LHC yesterday 5/15, CO remains low with elevated PCWP. Na 122 on AM labs today.   24 hr UOP 2 L, net negative 903cc, BUN 80 continuing to trend up.    HISTORY OF PRESENT ILLNESS: 76 y/o F with PMHx of HTN, HLD, nonischemic cardiomyopathy, HFrEF s/p AICD presents to the ED with 2 weeks of worsening shortness of breath. Patient and her son state patient has been experiencing shortness of breath that has progressed from present on activity to now shortness of breath during conversation. Patient also notes an increasing dry cough over this time period, which is improving currently     In ED, patient's HR was 110 and was saturating 98% on RA  CT C/A/P revealing cardiomegaly with evidence of right heart strain, R hilar lymphadenopathy with R perihilar soft tissue density with indentation/possible invasion of right pulmonary artery, and narrowing of proximal R lobar and segmental branches with associated consolidation of right lower lobe. 2.2 x 2.8 cm filling defect noted in apex of left ventricle.   Last ECHO 2021 revealing EF 20-25%, mild Mr, mild TR, mild Pulm HTN  Dimer 2600, trops negative BNP 22828  Duplex venous LE negative for DVT; initial CTA chest negative for PE  Cr 1.4 (0.7 in ), Na+ 128, T.Benjamin 2.3, LFTs elevated (hemolyzed)  Lactate 5.8-->4.6--.2.3   Admitted to SDU (2023 01:10)    Patient reportedly was at baseline (active, ambulating long distances without issue) until about 2 weeks ago when patient began to have progressively worsening SOB with a dry cough. Patient reports Dr. Mead as per primary cardiologist, and Dr. Sherwood as her heart failure specialist (last seen in office in ). Endorses compliance with home medications. Family at bedside concerned about patient's recent weight loss as well.         PAST MEDICAL & SURGICAL HISTORY:  CHF, stage C  HTN (hypertension)    Allergies  epinephrine (Unknown)    Intolerances      Vitals:  ICU Vital Signs Last 24 Hrs  T(C): 36.2 (16 May 2023 08:00), Max: 36.5 (16 May 2023 00:00)  T(F): 97.1 (16 May 2023 08:00), Max: 97.7 (16 May 2023 00:00)  HR: 96 (16 May 2023 10:00) (83 - 101)  BP: 100/55 (16 May 2023 10:00) (74/46 - 109/57)  BP(mean): 71 (16 May 2023 10:00) (56 - 78)  ABP: --  ABP(mean): --  RR: 22 (16 May 2023 10:00) (13 - 29)  SpO2: 97% (16 May 2023 08:00) (94% - 100%)    O2 Parameters below as of 16 May 2023 08:00  Patient On (Oxygen Delivery Method): room air        Physical Exam:  General Appearance: NG tube, thin, frail, normal for age and gender. 	  Cardiovascular: +NSVT on tele, S1, S2, No edema  Respiratory: decreased @ bases  Psychiatry: Fatigued, oriented x 3, Mood & affect appropriate  Skin/Integumentary: No rashes, No ecchymoses, No cyanosis  Neurologic: Non-focal  Musculoskeletal/ extremities: Normal range of motion, No clubbing, cyanosis or edema  Vascular: Peripheral pulses palpable 2+ bilaterally        LABS:	 	             CBC Full  -  ( 16 May 2023 04:55 )  WBC Count : 16.00 K/uL  RBC Count : 3.89 M/uL  Hemoglobin : 11.7 g/dL  Hematocrit : 34.4 %  Platelet Count - Automated : 514 K/uL  Mean Cell Volume : 88.4 fL  Mean Cell Hemoglobin : 30.1 pg  Mean Cell Hemoglobin Concentration : 34.0 g/dL  Auto Neutrophil # : 14.01 K/uL  Auto Lymphocyte # : 1.02 K/uL  Auto Monocyte # : 0.77 K/uL  Auto Eosinophil # : 0.02 K/uL  Auto Basophil # : 0.04 K/uL  Auto Neutrophil % : 87.5 %  Auto Lymphocyte % : 6.4 %  Auto Monocyte % : 4.8 %  Auto Eosinophil % : 0.1 %  Auto Basophil % : 0.3 %    Comprehensive Metabolic Panel (23 @ 04:55)    Sodium, Serum: 122 mmol/L   Potassium, Serum: 2.9 mmol/L   Chloride, Serum: 78 mmol/L   Carbon Dioxide, Serum: 27 mmol/L   Anion Gap, Serum: 17 mmol/L   Blood Urea Nitrogen, Serum: 80: Critical value: mg/dL   Creatinine, Serum: 1.2 mg/dL   Glucose, Serum: 118 mg/dL   Calcium, Total Serum: 9.0 mg/dL   Protein Total, Serum: 6.8 g/dL   Albumin, Serum: 2.9 g/dL   Bilirubin Total, Serum: 1.9 mg/dL   Alkaline Phosphatase, Serum: 152 U/L   Aspartate Aminotransferase (AST/SGOT): 30 U/L   Alanine Aminotransferase (ALT/SGPT): 24 U/L   eGFR: 46: The estimated glomerular filtration rate (eGFR) is calculated using the   CKD-EPI creatinine equation, which does not have a coefficient for  race and is validated in individuals 18 years of age and older (N Engl J  Med ; [id number]). Creatinine-based eGFR may be inaccurate in  various situations including but not limited to extremes of muscle mass,  altered dietary protein intake, or medications that affect renal tubular  creatinine secretion. mL/min/1.73m2      Lactate, Blood: 1.5 mmol/L (05.15.23 @ 04:48)      TELEMETRY EVENTS: 	    EC2023    Ventricular Rate 96 BPM  Atrial Rate 96 BPM  P-R Interval 164 ms  QRS Duration 116 ms  Q-T Interval 396 ms  QTC Calculation(Bazett) 500 ms  P Axis 51 degrees  R Axis -41 degrees  T Axis 87 degrees    Diagnosis Line Normal sinus rhythm  Possible Left atrial enlargement  Left axis deviation  Prolonged QT  Abnormal ECG  Confirmed by Seven Hensley ([id number]) on 2023 7:07:08 AM      CXR   Impression:  CHF. Support devices as described. Without difference    CXR 23  Stable congestive changes and bilateral pleural effusions/opacities,  No pneumothorax      CT Angio Chest PE Protocol   IMPRESSION:    No evidence of pulmonary embolism. (See discussion below for more   findings.)    Marked cardiomegaly.    There is a 2.2 x 2.8 cm rounded filling defect in the region of the apex   of the left ventricle (3/11; 607/95). Differential diagnosis includes an   intracardiac mass or thrombus.    The main pulmonary artery is dilated, measuring 3.6 cm in diameter, which   may be seen with pulmonary hypertension. There is reflux of contrast   material into the IVC and hepatic veins compatible with right heart   dysfunction.    Right hilar lymphadenopathy and right perihilar soft tissue density is   noted. There is segmental narrowing and consolidation noted in the medial   aspect of the right lower lobe. There is a small right pleural effusion.   There is extrinsic indentation and possible invasion of the right main   pulmonary artery (604/142; 605/129; 2/42). There is associated narrowing   of the proximal right lobar and segmental branches. A right hilar / right   lower lobe mass with postobstructive pneumonitis should be ruled out.      < from: CT Angio Chest PE Protocol w/ IV Cont (23 @ 11:31)  Redemonstrated right hilar lesion with compression and possible invasion   of adjacent main pulmonary artery segment (unchanged from previous study.  Subacute right main and segmental pulmonary embolus.  Increased right lower lobe consolidative and groundglass opacities,   likely infectious/inflammatory in etiology.  4 mm left anterior upper lobe groundglass nodule.      PREVIOUS DIAGNOSTIC TESTING:    TTE 23    Summary:   1. Left ventricular ejection fraction, by visual estimation, is <20%.   2. Severely decreased global left ventricular systolic function.   3. Severely enlarged left atrium.   4. Elevated mean left atrial pressure.   5. Large, fixed thrombus vs. mass on the apical wall of the left   ventricle.   6. Spectral Doppler shows pseudonormal pattern of left ventricular   myocardial filling (Grade II diastolic dysfunction).   7. Moderately reduced RV systolic function.   8. Mildly enlarged right atrium.   9. Moderate mitral valve regurgitation.  10. Moderate-severe tricuspid regurgitation.  11. Mild pulmonic valve regurgitation.  12. Estimated pulmonary artery systolic pressure is 42.9 mmHg assuming a   right atrial pressure of 15 mmHg, which is consistent withmild pulmonary   hypertension.  13. Endocardial visualization was enhanced with intravenous echo contrast.      Right and Left Heart Catheterization 5/15/23    RHC   RA 13  RV 52/ 16   PA 54/25/36  PCWP 25 with prominent V wave  SVR 1750    CO/CI by TD 3.27/ 1.97 l/min  CO/CI by Mehdi 2.97/1.78 l/min  PA Sat 57.6%    On milrinone 0.25 mcg/kg/min and dobutamine 10   mcg/kg/min      POST-OP DIAGNOSIS:    Mild luminal irregularities of the coronary anatomy   Elevated RHC pressures   Low cardiac output with elevated PCWP        Right Heart Catheterization 23    FINDINGS:   Right Heart Cath  RA - 12  RV - 51/5/13  PA - 51/24/32  PCWP - 20    CO/CI Thermodilusion - 2.1/1.27  CO/CI Mehdi - 2.09/1.26    SVR (MAP 79 / RA 12 / CO 2.1) = 2552 dyn  PVR = 5.71 henriquez      RHC : RA 14, RV 46/14, PA 56/26/37, PCWP: 25, CO/C.I mehdi 2.37/1.43, C.O. /C.I thermodilution 2.8/1.69    Home Medications:  atorvastatin 20 mg oral tablet: 1 tab(s) orally once a day (04 Aug 2021 10:05)  carvedilol 6.25 mg oral tablet: 1 tab(s) orally 2 times a day (2023 02:02)  Entresto 49 mg-51 mg oral tablet: 1 tab(s) orally 2 times a day (04 Aug 2021 10:05)  Farxiga 10 mg oral tablet: 1 tab(s) orally once a day (2023 02:05)  Lasix 20 mg oral tablet: 1 tab(s) orally once a day (2023 02:04)  spironolactone 25 mg oral tablet: 1 tab(s) orally once a day (04 Aug 2021 10:05)    MEDICATIONS  (STANDING):  buMETAnide Injectable 2 milliGRAM(s) IV Push every 8 hours  chlorhexidine 2% Cloths 1 Application(s) Topical daily  DOBUTamine Infusion 7.5 MICROgram(s)/kG/Min (7.14 mL/Hr) IV Continuous <Continuous>  heparin  Infusion.  Unit(s)/Hr (11 mL/Hr) IV Continuous <Continuous>  magnesium gluconate 500 milliGRAM(s) Oral daily  melatonin 5 milliGRAM(s) Oral at bedtime  milrinone Infusion 0.125 MICROgram(s)/kG/Min (2.38 mL/Hr) IV Continuous <Continuous>  norepinephrine Infusion 0.05 MICROgram(s)/kG/Min (2.98 mL/Hr) IV Continuous <Continuous>  pantoprazole    Tablet 40 milliGRAM(s) Oral before breakfast    MEDICATIONS  (PRN):  benzocaine 20% Spray 1 Spray(s) Topical four times a day PRN throat pain  heparin   Injectable 5000 Unit(s) IV Push every 6 hours PRN For aPTT less than 40  heparin   Injectable 2500 Unit(s) IV Push every 6 hours PRN For aPTT between 40 - 57

## 2023-05-16 NOTE — PROGRESS NOTE ADULT - REASON FOR ADMISSION
intra cardiac mass

## 2023-05-16 NOTE — PROGRESS NOTE ADULT - ASSESSMENT
77 year old woman with PMHx of HTN, HLD, nonischemic cardiomyopathy, HFrEF s/p AICD presents to the ED with 2 weeks of worsening shortness of breath with increasing dry cough over this time period accepted to CCU for cardiogenic shock    # Hyponatremia d/t CHF with decreased effective intravascular volume   # Cardiogenic shock on milrinone     - high urine osm d/t high ADH state, low urine Na d/t low EAV  - off bumex drip  - IVC collapsible hold diuretics  - check BMP tonight   - TSH noted ok    will follow

## 2023-05-16 NOTE — PROGRESS NOTE ADULT - SUBJECTIVE AND OBJECTIVE BOX
Nephrology progress note    Patient was seen and examined, events over the last 24 h noted .    NA     Allergies:  epinephrine (Unknown)    Hospital Medications:   MEDICATIONS  (STANDING):  aMIOdarone    Tablet 200 milliGRAM(s) Oral two times a day  buMETAnide Infusion 1 mG/Hr (5 mL/Hr) IV Continuous <Continuous>  chlorhexidine 2% Cloths 1 Application(s) Topical daily  DOBUTamine Infusion 8 MICROgram(s)/kG/Min (7.62 mL/Hr) IV Continuous <Continuous>  heparin  Infusion.  Unit(s)/Hr (11 mL/Hr) IV Continuous <Continuous>  hydrALAZINE 10 milliGRAM(s) Oral every 8 hours  magnesium gluconate 500 milliGRAM(s) Oral daily  megestrol Suspension 400 milliGRAM(s) Oral daily  melatonin 5 milliGRAM(s) Oral at bedtime  milrinone Infusion 0.25 MICROgram(s)/kG/Min (4.76 mL/Hr) IV Continuous <Continuous>  ondansetron Injectable 4 milliGRAM(s) IV Push once  pantoprazole    Tablet 40 milliGRAM(s) Oral before breakfast  piperacillin/tazobactam IVPB.. 3.375 Gram(s) IV Intermittent every 8 hours  senna 2 Tablet(s) Oral at bedtime  sodium chloride 3%. 150 milliLiter(s) (50 mL/Hr) IV Continuous <Continuous>  sodium chloride 3%. 150 milliLiter(s) (50 mL/Hr) IV Continuous <Continuous>  sodium chloride 3%. 150 milliLiter(s) (50 mL/Hr) IV Continuous <Continuous>        VITALS:  T(F): 97.4 (05-16-23 @ 12:00), Max: 97.7 (05-16-23 @ 00:00)  HR: 92 (05-16-23 @ 16:00)  BP: 102/57 (05-16-23 @ 16:00)  RR: 20 (05-16-23 @ 16:00)  SpO2: 98% (05-16-23 @ 16:00)  Wt(kg): --    05-14 @ 07:01  -  05-15 @ 07:00  --------------------------------------------------------  IN: 1434.5 mL / OUT: 1670 mL / NET: -235.5 mL    05-15 @ 07:01  -  05-16 @ 07:00  --------------------------------------------------------  IN: 1172 mL / OUT: 2075 mL / NET: -903 mL    05-16 @ 07:01  -  05-16 @ 16:43  --------------------------------------------------------  IN: 859.8 mL / OUT: 420 mL / NET: 439.8 mL          PHYSICAL EXAM:  Constitutional: NAD  HEENT: anicteric sclera, oropharynx clear, MMM  Neck: No JVD  Respiratory: CTAB, no wheezes, rales or rhonchi  Cardiovascular: S1, S2, RRR  Gastrointestinal: BS+, soft, NT/ND  Extremities: No cyanosis or clubbing. No peripheral edema  :  No yoon.   Skin: No rashes    LABS:  05-16    122<L>  |  78<L>  |  80<HH>  ----------------------------<  118<H>  2.9<L>   |  27  |  1.2    Ca    9.0      16 May 2023 04:55  Mg     2.5     05-16    TPro  6.8  /  Alb  2.9<L>  /  TBili  1.9<H>  /  DBili      /  AST  30  /  ALT  24  /  AlkPhos  152<H>  05-16                          11.7   16.00 )-----------( 514      ( 16 May 2023 04:55 )             34.4       Urine Studies:    Sodium, Random Urine: <20.0 mmoL/L (05-12 @ 19:06)  Creatinine, Random Urine: 52 mg/dL (05-12 @ 19:06)  Protein/Creatinine Ratio Calculation: 1.7 Ratio (05-12 @ 19:06)  Osmolality, Random Urine: 328 mos/kg (05-12 @ 19:06)  Potassium, Random Urine: 75 mmol/L (05-12 @ 19:06)  Sodium, Random Urine: 39.0 mmoL/L (05-12 @ 07:40)  Creatinine, Random Urine: 44 mg/dL (05-12 @ 07:40)  Protein/Creatinine Ratio Calculation: 1.2 Ratio (05-12 @ 07:40)  Potassium, Random Urine: 60 mmol/L (05-12 @ 07:40)    RADIOLOGY & ADDITIONAL STUDIES:

## 2023-05-16 NOTE — PROGRESS NOTE ADULT - ATTENDING COMMENTS
[facility] Cardiology  Progress Note    Meri Garcia Patient Status:  Observation    10/27/1970 MRN [id number]   [facility] 2NE-A Attending Adan Oliveira, [address] [facility] [address] Day # 2 PCP Moncho Austin MD     Subjective:   No lory
Extremities do not demonstrate any evidence of peripheral edema. No cyanosis or clubbing of the digits is appreciated. Neurologic:  Alert and oriented. Normal affect. Integument:  No visible rashes are appreciated.       Laboratory/Data:   Recent Labs
77 y o  F with cardiogenic shock seen for elevated liver tests.  Comfortable No icterus  No JVD  Abdomens oft non tender  Transaminases peaked with AST 2051 and  likely due to ischemic hepatitis.  Agree with plan as above
Continue  and Milrinone. Diuresing well with metolazone, bumex gtt and HTS.  Continue nutritional supplementation with tube feeds. Prealbumin noted to be low.  EBUS FNA pathology came back negative for malignancy.  OOB to chair. Continue daily PT.
In brief, this is a 77-year-old woman with history of HFrEF 2/2 NICM s/p ICD admitted for cardiogenic shock with course complicated by diagnosis of PE (right main/segmental; also though to have a lung mass which via EBUS was found to be negative for malignancy), LV thrombus, frequent runs of NSVT, hypervolemic hyponatremia, and decreased PO intake prompting NGT placement.    She has been maintained on  and high dose diuretics with hypertonic saline.  Today, holding diuretics in light of a small, collapsible IVC. Reassess need for diuretics tomorrow.  Plan for RHC/LHC early this week (likely tomorrow) with consideration for LVAD pending candidacy.  Meanwhile attempting aggressive physical/pulmonary therapy.  Keep NPO at MN in anticipation of RHC/LHC.
Ms. Tellez is a 77-year-old female admitted for worsening shortness of breath, CT chest revealed right hilar adenopathy with possible involvement of the pulmonary artery. Significant pmh of HTN, HLD, nonischemic cardiomyopathy, HFrEF, s/p ICD and PPM. Patient currently in ICU undergoing cardiology assessment and treatment. I reviewed the CT images, not well defined hilar mass and does not seem amenable for EBUS/FNA but once patient is more compensated will benefit from bronchoscopy for diagnosis.   04/24/23: Undergoing cardiology treatment for decompensated CHF, improving clinically. I discussed with the patient CT findings and recommended bronchoscopy when she is more stable. She is on heparin gtt and dobutamine.  Plan: will follow.
Patient seen, case d/w CCU team on rounds and HF team.  Agree with assessment and plan.  Continue treatment.  Will try to add Hydralazine today for afterload reduction if BP tolerates.  Nose bleed this afternoon, will hold Heparin for 1-2 hours.  Patient is very constipated, will increase Laxatives and consider an enema.  Unable to participate in PT today.  Pure prognosis.
Patient seen, case d/w CCU team on rounds.  Agree with assessment and plan.  Today, switched to PO Torsemide.  Will try to reduce Dobutamine to 7.5 mcg.  Unclear if patient would benefit from additional cardiac testing, given overall prognosis and poor functional status. Family prefers conservative approach as well. Will discuss with HF team.  Will recheck labs, CXR, IVC in AM.  Continue CCU monitoring.
Patient seen, case d/w CCU team on rounds.  Long discussion with patient's family about prognosis and goals of care.  Unable to tolerate reduced dose of Dobutamine, back to 10 mcg now.  Appeared overloaded after PO Torsemide today, Bumex 1 mg given in the afternoon.  Family will discuss further treatment with Dr. Sherwood tomorrow.
77 y o  F with HTN HLD non ischemic CMP HFrEF cardiogenic shock seen for elevated liver tests.  No complaints.   On Dobutamine infusion  Comfortable No icterus  No JVD  Abdomen soft non tender  Transaminases peaked with AST 2051 and    US abdomen dopplers no thrombus.  CTA chest - cardiomegaly LV mass R hilar mass RVD CT abdomen   Ischemic hepatitis cardiogenic shock CMP  Liver tests continue to trend down  Agree with plan as above .
Hold diuretics. Give albumin in view of hypotensive episodes.  Continue  and Milrinone. May increase  to 10 if with further hypotension.  Continue PT and nutritional supplementation.
In brief, this is a 77-year-old woman with history of HFrEF 2/2 NICM s/p ICD admitted for cardiogenic shock with course complicated by diagnosis of PE (right main/segmental; also though to have a lung mass which via EBUS was found to be negative for malignancy), LV thrombus, frequent runs of NSVT, hypervolemic hyponatremia, and decreased PO intake prompting NGT placement.    She has been maintained on  and high dose diuretics with hypertonic saline.    Plan for RHC/LHC and CT chest next week with consideration for LVAD pending candidacy.    Meanwhile attempting aggressive physical/pulmonary therapy.
events noted, on dopamine, Echo reviewed, on abx, see for transfer to CCU under cardio, not stable for invasive procedure
AM:  Remains inotrope dependent -  7.5 mcg/kg/min and Milrinone 0.25 mcg/kg/min.  Massively overloaded. Diurese with bumex gtt with boluses + HTS. Diamox 500mg x1.     PM:  Suboptimal urine output. Add Metolazone 5mg PO BID.
AM:  Was noted to have small collapsible IVC on POCUS and severe hyponatremia on labs. Given IVF.  Nephrology consulted and hyponatremia work-up was obtained.  Could not do much with PT due to low BP and lethargy.    PM:  IVC was re-assessed. Now noted to be >2.2 cm with no collapse with inspiration.  Seen by F. Given 3% HTS and Bumex gtt (2mg/hr) restarted.  Increase  to 8 mcg/kg/min.
IMPRESSION:    Cardiogenic shock on dopamine and milrinone likely exacerbated by underlying sepsis PNA  HFrEF 20%, NICM  RLL Mass/opacity/ possible Pneumonia, r/o malignancy  LV Mass likely Thrombus  worsening transaminitis in the setting of hypotension, shock liver      PLAN:    CNS: Avoid CNS depressant    HEENT: Oral care    PULMONARY: HOB @ 45 degrees. On RA, aspiration precaution, keep Sao2 92 to96%, ct sx following     CARDIOVASCULAR: c/w dopamine ggt @ 2.5, start milrinone ggt @ 0.25 mcg/kg/min  repeat lactate, check central venous O2 to calculate Radha CO  I/V Lasix once BP is better  Heparin ggt for LV Thrombus  Central line and A-line for hemodynamic monitoring    GI: GI prophylaxis. Feeding. Bowel regimen. F/U US liver with duplex. Hepatology eval. Trend LFTs    RENAL: Follow up lytes. Correct as needed    INFECTIOUS DISEASE: Continue zosyn - renal dose. F/u cultures.    HEMATOLOGICAL: DVT prophylaxis. cw heparin gtt    ENDOCRINE: Follow up FS. Insulin protocol if needed.    MUSCULOSKELETAL: OOBTC    CCU
Patient seen, case d/w CCU team on rounds.  Agree with assessment and plan.  RHC and LHC today. Restart Heparin drip after that.  HF f/u.  Continue CCU monitoring.
Plan as outlined above. Continue PT and nutritional supplementation.  Remains on 2 inotropes (Milrinone 0.25 mcg/kg/min and  7.5 mcg/kg/min). Monitor MAP and keep > 60.  Diuresing well with Bumex, Metolazone and HTS.  Add bowel regimen (constipation may be contributing to her loss of appetite and early satiety).
NICM s/p AICD  Chronic Systolic CHF    Subacute Decompensated Systolic CHF (hypoperfusion)  LV Thrombus  Undefined mediastinal mass s/p EBUS    Remains on Dobutamine / Milrinone  Slight negative  Stable renal function / lactate normal  Tachycardic / BP stable    Note, patient has a right brachial arterial line and it is not clear who placed it / documentation not found.  Not requested by me.    - Decrease Dobutamine per Dr. Sherwood  - Continue Milrinone  - Hold heparin.  Remove arterial line.  Start Eliquis.  - Follow-up EBUS / biopsy results

## 2023-05-16 NOTE — PROGRESS NOTE ADULT - ASSESSMENT
Assessment:   77 year old woman with pmh of HTN, HLD, nonischemic cardiomyopathy, HFrEF, s/p ICD and PPM presents to the ED with 2 weeks of worsening shortness of breath.       IMPRESSION:  Cardiogenic shock on dual inotropic support  HFrEF 20%, NICM  RLL Mass/opacity/- s/p bronch/EBUS biopsy 5/05 - pathology resulted- negative for malignancy   Possible right main and segmental new pulmonary embolus  LV Mass, ?Thrombus   Transaminitis in the setting of hypotension, shock liver      Plan:    - Patient s/p R/LHC 5/15- RA 13, PA 54/25/36, PCWP 25, low CO/CI while on dual inotropic support, SVR 1750   - Currently on dobutamine @ 9.5 mcg/kg/min and milrinone @ 0.25 mcg/kg/min via PICC line  - Trial hydralazine 10mg PO x1- monitor BP  - Continue Bumex gtt at 1mg/hr  - Continue 3% Hypertonic Saline 150ml BID (If infused throughout a central line, run over one hour, if a peripheral line is to be used run over 3 hours)  - Monitor for contraction alkalosis- give Diamox PRN   - Pre-albumin improved to 11 (from 5)   goal ~30  - Continue NG tube feeding as well as encourage food intake to maximize nutrition  - Daily physical therapy follow-up / OOB daily as tolerated  - Please encourage incentive spirometer 10x/hr while awake   - Get BMP twice daily with magnesium  - K 2.9- replete    - Maintain potassium >4.0, Mg >2.2  - Strict intake and output  - Daily weight   - Rest of care as per primary team  - Plan discussed with patient, son at bedside, and CCU team  - Will continue to follow Assessment:   77 year old woman with pmh of HTN, HLD, nonischemic cardiomyopathy, HFrEF, s/p ICD and PPM presents to the ED with 2 weeks of worsening shortness of breath.       IMPRESSION:  Cardiogenic shock on dual inotropic support  HFrEF 20%, NICM  RLL Mass/opacity/- s/p bronch/EBUS biopsy 5/05 - pathology resulted- negative for malignancy   Possible right main and segmental new pulmonary embolus  LV Mass, ?Thrombus   Transaminitis in the setting of hypotension, shock liver      Plan:    - Patient s/p R/LHC 5/15- RA 13, PA 54/25/36, PCWP 25, low CO/CI while on dual inotropic support, SVR 1750   - Currently on dobutamine @ 10 mcg/kg/min and milrinone @ 0.25 mcg/kg/min via PICC line  - Trial hydralazine 10mg PO x1- monitor BP  - Continue Bumex gtt at 1mg/hr  - Continue 3% Hypertonic Saline 150ml BID (If infused throughout a central line, run over one hour, if a peripheral line is to be used run over 3 hours)  - Monitor for contraction alkalosis- give Diamox PRN   - Pre-albumin improved to 11 (from 5)   goal ~30  - Continue NG tube feeding as well as encourage food intake to maximize nutrition  - Daily physical therapy follow-up / OOB daily as tolerated  - Please encourage incentive spirometer 10x/hr while awake   - Get BMP twice daily with magnesium  - K 2.9- replete    - Maintain potassium >4.0, Mg >2.2  - Strict intake and output  - Daily weight   - Rest of care as per primary team  - Plan discussed with patient, son at bedside, and CCU team  - Will continue to follow Assessment:   77 year old woman with pmh of HTN, HLD, nonischemic cardiomyopathy, HFrEF, s/p ICD and PPM presents to the ED with 2 weeks of worsening shortness of breath.       IMPRESSION:  Cardiogenic shock on dual inotropic support  HFrEF 20%, NICM  RLL Mass/opacity/- s/p bronch/EBUS biopsy 5/05 - pathology resulted- negative for malignancy   Possible right main and segmental new pulmonary embolus  LV Mass, ?Thrombus   Transaminitis in the setting of hypotension, shock liver      Plan:    - Patient s/p R/LHC 5/15- RA 13, PA 54/25/36, PCWP 25, low CO/CI while on dual inotropic support, SVR 1750   - Currently on dobutamine @ 10 mcg/kg/min and milrinone @ 0.25 mcg/kg/min via PICC line  - Trial hydralazine 10mg PO x1- monitor BP, if well tolerated- give TID  - Continue Bumex gtt at 1mg/hr  - Continue 3% Hypertonic Saline 150ml BID (If infused throughout a central line, run over one hour, if a peripheral line is to be used run over 3 hours)  - Monitor for contraction alkalosis- give Diamox PRN   - Pre-albumin improved to 11 (from 5)   goal ~30  - Continue NG tube feeding as well as encourage food intake to maximize nutrition  - Daily physical therapy follow-up / OOB daily as tolerated  - Please encourage incentive spirometer 10x/hr while awake   - Get BMP twice daily with magnesium  - K 2.9- replete    - Maintain potassium >4.0, Mg >2.2  - Strict intake and output  - Daily weight   - Rest of care as per primary team  - Plan discussed with patient, son at bedside, and CCU team  - Will continue to follow

## 2023-05-17 VITALS — RESPIRATION RATE: 3 BRPM

## 2023-05-17 LAB — GAS PNL BLDA: SIGNIFICANT CHANGE UP

## 2023-05-17 RX ORDER — HYDROMORPHONE HYDROCHLORIDE 2 MG/ML
2 INJECTION INTRAMUSCULAR; INTRAVENOUS; SUBCUTANEOUS ONCE
Refills: 0 | Status: DISCONTINUED | OUTPATIENT
Start: 2023-05-17 | End: 2023-05-17

## 2023-05-17 RX ORDER — VASOPRESSIN 20 [USP'U]/ML
0.04 INJECTION INTRAVENOUS
Qty: 40 | Refills: 0 | Status: DISCONTINUED | OUTPATIENT
Start: 2023-05-17 | End: 2023-05-17

## 2023-05-17 RX ORDER — SODIUM BICARBONATE 1 MEQ/ML
100 SYRINGE (ML) INTRAVENOUS ONCE
Refills: 0 | Status: COMPLETED | OUTPATIENT
Start: 2023-05-17 | End: 2023-05-17

## 2023-05-17 RX ORDER — ROBINUL 0.2 MG/ML
0.4 INJECTION INTRAMUSCULAR; INTRAVENOUS EVERY 4 HOURS
Refills: 0 | Status: DISCONTINUED | OUTPATIENT
Start: 2023-05-17 | End: 2023-05-17

## 2023-05-17 RX ORDER — ALBUMIN HUMAN 25 %
250 VIAL (ML) INTRAVENOUS ONCE
Refills: 0 | Status: COMPLETED | OUTPATIENT
Start: 2023-05-17 | End: 2023-05-17

## 2023-05-17 RX ADMIN — HYDROMORPHONE HYDROCHLORIDE 2 MILLIGRAM(S): 2 INJECTION INTRAMUSCULAR; INTRAVENOUS; SUBCUTANEOUS at 02:53

## 2023-05-17 RX ADMIN — HYDROMORPHONE HYDROCHLORIDE 2 MILLIGRAM(S): 2 INJECTION INTRAMUSCULAR; INTRAVENOUS; SUBCUTANEOUS at 02:21

## 2023-05-17 RX ADMIN — Medication 125 MILLILITER(S): at 02:02

## 2023-05-17 RX ADMIN — Medication 100 MILLIEQUIVALENT(S): at 02:20

## 2023-05-17 NOTE — CHART NOTE - NSCHARTNOTESELECT_GEN_ALL_CORE
Brief post cath procedure note
Post RHC Procedure Note
Cardiology NP/Event Note
Event Note
Event Note
Interventional Cardiology/Event Note
Interventional Radiology/Event Note
Post Cath Brief OP report
Pre cath attestation/Event Note
Pre-Op
Pre-op Note
Sign Off/Event Note
Transfer Note

## 2023-05-17 NOTE — CHART NOTE - NSCHARTNOTEFT_GEN_A_CORE
As per the Cardiac fellow, the patient is DNR/DNI after speaking with the patient family. The family was updated regarding the patient clinical condition

## 2023-05-17 NOTE — DISCHARGE NOTE FOR THE EXPIRED PATIENT - HOSPITAL COURSE
77 year old woman with PMHx of HTN, HLD, nonischemic cardiomyopathy, HFrEF s/p AICD presents to the ED with 2 weeks of worsening shortness of breath with increasing dry cough over this time period accepted to CCU for cardiogenic shock.    The patient was being treated and managed for Cardiogenic shock, CHF with reduced EF of 25%, ECHO showed thrombus in the LV, and suspected PE, she has hx of NICM and had AICD. During the hospitalization she has multiple episodes of NSVT and was started on Amiodarone. She was on Dobutamine and Milrnone in CCU. She was consistently hypotensive and pressors were adjusted accordingly. During overnight her lactate started going up with Electrolytes imbalance and she vitals were monitored frequently.   She was also started on Bumex and Hypertonic saline due to fluid oveload. But due to hypotension it was stopped and pressors were increased.   She was also suspected to have Post obstructive Pneumonia as CT chest showed hilar LAD along with a mass but cannot be confirmed with the biopsy. She was also positive for COVID. and underwent EBUS for the lung mass which was negative for malignancy

## 2023-05-21 DIAGNOSIS — E83.42 HYPOMAGNESEMIA: ICD-10-CM

## 2023-05-21 DIAGNOSIS — I26.99 OTHER PULMONARY EMBOLISM WITHOUT ACUTE COR PULMONALE: ICD-10-CM

## 2023-05-21 DIAGNOSIS — Z66 DO NOT RESUSCITATE: ICD-10-CM

## 2023-05-21 DIAGNOSIS — I27.20 PULMONARY HYPERTENSION, UNSPECIFIED: ICD-10-CM

## 2023-05-21 DIAGNOSIS — Z78.1 PHYSICAL RESTRAINT STATUS: ICD-10-CM

## 2023-05-21 DIAGNOSIS — I13.0 HYPERTENSIVE HEART AND CHRONIC KIDNEY DISEASE WITH HEART FAILURE AND STAGE 1 THROUGH STAGE 4 CHRONIC KIDNEY DISEASE, OR UNSPECIFIED CHRONIC KIDNEY DISEASE: ICD-10-CM

## 2023-05-21 DIAGNOSIS — K76.0 FATTY (CHANGE OF) LIVER, NOT ELSEWHERE CLASSIFIED: ICD-10-CM

## 2023-05-21 DIAGNOSIS — N17.9 ACUTE KIDNEY FAILURE, UNSPECIFIED: ICD-10-CM

## 2023-05-21 DIAGNOSIS — E87.1 HYPO-OSMOLALITY AND HYPONATREMIA: ICD-10-CM

## 2023-05-21 DIAGNOSIS — I08.8 OTHER RHEUMATIC MULTIPLE VALVE DISEASES: ICD-10-CM

## 2023-05-21 DIAGNOSIS — E87.3 ALKALOSIS: ICD-10-CM

## 2023-05-21 DIAGNOSIS — E78.5 HYPERLIPIDEMIA, UNSPECIFIED: ICD-10-CM

## 2023-05-21 DIAGNOSIS — G47.00 INSOMNIA, UNSPECIFIED: ICD-10-CM

## 2023-05-21 DIAGNOSIS — I26.93 SINGLE SUBSEGMENTAL PULMONARY EMBOLISM WITHOUT ACUTE COR PULMONALE: ICD-10-CM

## 2023-05-21 DIAGNOSIS — K72.00 ACUTE AND SUBACUTE HEPATIC FAILURE WITHOUT COMA: ICD-10-CM

## 2023-05-21 DIAGNOSIS — N18.9 CHRONIC KIDNEY DISEASE, UNSPECIFIED: ICD-10-CM

## 2023-05-21 DIAGNOSIS — I47.20 VENTRICULAR TACHYCARDIA, UNSPECIFIED: ICD-10-CM

## 2023-05-21 DIAGNOSIS — E87.20 ACIDOSIS, UNSPECIFIED: ICD-10-CM

## 2023-05-21 DIAGNOSIS — I50.23 ACUTE ON CHRONIC SYSTOLIC (CONGESTIVE) HEART FAILURE: ICD-10-CM

## 2023-05-21 DIAGNOSIS — R64 CACHEXIA: ICD-10-CM

## 2023-05-21 DIAGNOSIS — I51.3 INTRACARDIAC THROMBOSIS, NOT ELSEWHERE CLASSIFIED: ICD-10-CM

## 2023-05-21 DIAGNOSIS — Z95.810 PRESENCE OF AUTOMATIC (IMPLANTABLE) CARDIAC DEFIBRILLATOR: ICD-10-CM

## 2023-05-21 DIAGNOSIS — R57.0 CARDIOGENIC SHOCK: ICD-10-CM

## 2023-05-21 DIAGNOSIS — U07.1 COVID-19: ICD-10-CM

## 2023-05-21 DIAGNOSIS — R59.0 LOCALIZED ENLARGED LYMPH NODES: ICD-10-CM

## 2023-05-21 DIAGNOSIS — I42.8 OTHER CARDIOMYOPATHIES: ICD-10-CM

## 2023-05-21 DIAGNOSIS — Z88.8 ALLERGY STATUS TO OTHER DRUGS, MEDICAMENTS AND BIOLOGICAL SUBSTANCES: ICD-10-CM

## 2023-05-21 DIAGNOSIS — J18.9 PNEUMONIA, UNSPECIFIED ORGANISM: ICD-10-CM

## 2023-05-21 DIAGNOSIS — R91.8 OTHER NONSPECIFIC ABNORMAL FINDING OF LUNG FIELD: ICD-10-CM

## 2023-05-21 DIAGNOSIS — K59.00 CONSTIPATION, UNSPECIFIED: ICD-10-CM

## 2023-08-24 NOTE — H&P ADULT - VTE RISK ASSESSMENT
Additional Notes: Patient did not have any additional treatment after the original biopsy, but only scar present today. Will monitor for recurrence with regular skin exams. Detail Level: Simple Render Risk Assessment In Note?: no VTE Assessment already completed for this visit

## 2024-02-08 NOTE — PATIENT PROFILE ADULT - ARRIVAL FROM
[facility]  Hospitalist Discharge Summary      Date of Admission:  2/7/2024  Date of Discharge:  2/8/2024  Discharging Provider: Ivonne Barahona PA-C    Discharge Diagnoses   Hyperkalemia  THELMA on CKD   Elevated troponin  Encephalopathy  Sepsis    Clinically Significant Risk Factors          Follow-ups Needed After Discharge     Vickie Gonzalez is being readmitted to inpatient hospice    Discharge Disposition   Discharged to inpatient hospice  Condition at discharge: Guarded    Hospital Course   Vickie Gonzalez is a 89 year old female who was admitted on 2/7/2024.  She  is being discharged from the current hospital encounter and will be readmitted to inpatient hospice.    She was admitted from her nursing home with 3-4 days of worsening abdominal pain, confusion, altered mental status, and inability to tolerate oral intake. She was hypotensive and somnolent on presentation to ED. Labs notable for K 7.8, GFR 4 (previously ~80), troponin 143, procalcitonin 2.27, WBC 13.7, urinalysis appears infected vs contaminated / dehydrated. CT head unrevealing. CT a/p without contrast was unrevealing for acute intra-abdominal pathology. Her overall clinical picture is concerning for severe sepsis, possible urinary vs unknown intraabdominal source. Severity of her illness was reviewed with family. Family notes several months of general functional decline, decreasing oral intake, weight loss since her hip fracture. Patient transitioned to comfort cares per family's wishes.     Consultations This Hospital Stay   PHARMACY TO DOSE VANCO  NURSING TO CONSULT FOR VASCULAR ACCESS CARE IP CONSULT  NURSING TO CONSULT FOR VASCULAR ACCESS CARE IP CONSULT  PALLIATIVE CARE ADULT IP CONSULT  [facility] INPATIENT HOSPICE ADULT CONSULT    Code Status   No CPR- Do NOT Intubate    Time Spent on this Encounter   Time Spent on this Encounter   I, Ivonne Barahona PA-C, personally saw the patient today  and spent greater than 30 minutes discharging this patient.        [facility] EMERGENCY DEPARTMENT  [address]  [facility] [place] [address]  Phone: [phone]  ______________________________________________________________________    Physical Exam   Vital Signs: Temp: 98.3  F (36.8  C) Temp src: Oral BP: 102/40 Pulse: 71   Resp: 15 SpO2: 98 % O2 Device: None (Room air)    Weight: 0 lbs 0 oz    Physical exam deferred given current goals of care       Primary Care Physician   Cheyanne Robles    Discharge Orders   No discharge procedures on file.    Significant Results and Procedures   ROUTINE IP LABS (Last four results)  Recent Labs   Lab 02/08/24  0418 02/08/24  0328 02/08/24  0243 02/08/24  0202 02/08/24  0159 02/08/24  0129 02/08/24  0104 02/08/24  0009 02/07/24  2247   NA  --   --   --   --   --   --   --   --  134*  132*   POTASSIUM 6.8*  --   --   --  6.7*  --  7.0*  --  7.6*  7.8*   CHLORIDE  --   --   --   --   --   --   --   --  94*   CO2  --   --   --   --   --   --   --   --  13*   ANIONGAP  --   --   --   --   --   --   --   --  27*   GLC  --  194* 200* 226*  --  192*  --    < > 58*  59*   BUN  --   --   --   --   --   --   --   --  169.0*   CR  --   --   --   --   --   --   --   --  9.20*   WILFRID  --   --   --   --   --   --   --   --  9.9*   PROTTOTAL  --   --   --   --   --   --   --   --  7.7   ALBUMIN  --   --   --   --   --   --   --   --  3.3*   BILITOTAL  --   --   --   --   --   --   --   --  0.3   ALKPHOS  --   --   --   --   --   --   --   --  190*   AST  --   --   --   --   --   --   --   --  20   ALT  --   --   --   --   --   --   --   --  17    < > = values in this interval not displayed.     Recent Labs   Lab 02/07/24 2247 02/07/24 2026   WBC  --  13.7*   RBC  --  3.70*   HGB 13.3 10.9*   HCT 39 35.2   MCV  --  95   MCH  --  29.5   MCHC  --  31.0*   RDW  --  17.0*   PLT  --  299     Recent Labs   Lab 02/07/24 2026   INR 1.24*          Discharge  Medications     Current Facility-Administered Medications:     acetaminophen (TYLENOL) Suppository 650 mg, 650 mg, Rectal, Q6H PRN, Ivonne Barahona PA-C    albuterol (PROVENTIL) neb solution 2.5 mg, 2.5 mg, Nebulization, 4x Daily PRN, Jonathan Cavazos MD    artificial saliva (BIOTENE MT) solution 2 spray, 2 spray, Mouth/Throat, Q1H PRN, Ivonne Barahona PA-C    atropine 1 % ophthalmic solution 2 drop, 2 drop, Sublingual, Q4H PRN, Ivonne Barahona PA-C    benzocaine-menthol (CHLORASEPTIC MAX) 15-10 MG lozenge 1 lozenge, 1 lozenge, Buccal, Q1H PRN, Jonathan Cavazos MD    bisacodyl (DULCOLAX) suppository 10 mg, 10 mg, Rectal, Daily PRN, Jonathan Cavazos MD    calcium carbonate (TUMS) chewable tablet 1,000 mg, 1,000 mg, Oral, 4x Daily PRN, Jonathan Cavazos MD    carboxymethylcellulose PF (REFRESH PLUS) 0.5 % ophthalmic solution 1-2 drop, 1-2 drop, Both Eyes, Q1H PRN, Ivonne Barahona PA-C    Contraindications to both pharmacological and mechanical prophylaxis (must document contraindications for both in this order), , Does not apply, See Admin Instructions, Jonathan Cavazos MD    glycopyrrolate (ROBINUL) injection 0.2 mg, 0.2 mg, Intravenous, Q4H PRN, Ivonne Barahona PA-C    HYDROmorphone (PF) (DILAUDID) injection 0.3 mg, 0.3 mg, Intravenous, Q15 Min PRN, Ivonne Barahona PA-C, 0.3 mg at 02/08/24 1125    lidocaine (LMX4) cream, , Topical, Q1H PRN, Jonathan Cavazos MD    lidocaine 1 % 0.1-1 mL, 0.1-1 mL, Other, Q1H PRN, Jonathan Cavazos MD    LORazepam (ATIVAN) injection 1 mg, 1 mg, Intravenous, Q3H PRN **OR** LORazepam (ATIVAN) tablet 1 mg, 1 mg, Sublingual, Q3H PRN, Ivonne Barahona PA-C    mineral oil-hydrophilic petrolatum (AQUAPHOR), , Topical, Q1H PRN, Ivonne Barahona PA-C    naloxone (NARCAN) injection 0.1 mg, 0.1 mg, Intravenous, Q2 Min PRN, Ivonne Barahona PA-C    naloxone (NARCAN) injection 0.2 mg, 0.2 mg,  Intravenous, Q2 Min PRN, Ivonne Barahona PA-C    OLANZapine (zyPREXA) injection 2.5 mg, 2.5 mg, Intramuscular, BID PRN, Jonathan Cavazos MD    ondansetron (ZOFRAN ODT) ODT tab 4 mg, 4 mg, Oral, Q6H PRN **OR** ondansetron (ZOFRAN) injection 4 mg, 4 mg, Intravenous, Q6H PRN, Jonathan Cavazos MD    prochlorperazine (COMPAZINE) injection 5 mg, 5 mg, Intravenous, Q6H PRN **OR** prochlorperazine (COMPAZINE) tablet 5 mg, 5 mg, Oral, Q6H PRN **OR** prochlorperazine (COMPAZINE) suppository 12.5 mg, 12.5 mg, Rectal, Q12H PRN, Jonathan Cavazos MD    sodium chloride (PF) 0.9% PF flush 3 mL, 3 mL, Intracatheter, Q8H, Jonathan Cavazos MD, 3 mL at 02/08/24 1127    sodium chloride (PF) 0.9% PF flush 3 mL, 3 mL, Intracatheter, q1 min prn, Jonathan Cavazos MD    Current Outpatient Medications:     acetaminophen (TYLENOL) 325 MG tablet, Take 2 tablets (650 mg) by mouth 3 times daily, Disp: , Rfl:     albuterol (PROVENTIL) (2.5 MG/3ML) 0.083% neb solution, Take 2.5 mg by nebulization 4 times daily as needed for shortness of breath, wheezing or cough, Disp: , Rfl:     aspirin (ASA) 81 MG chewable tablet, Take 1 tablet (81 mg) by mouth daily, Disp: , Rfl:     atorvastatin (LIPITOR) 10 MG tablet, Take 1 tablet (10 mg) by mouth daily, Disp: 90 tablet, Rfl: 3    calcium carbonate-vitamin D3 (CALCIUM 600 + D,3,) 600 mg(1,500mg) -200 unit per tablet, [CALCIUM CARBONATE-VITAMIN D3 (CALCIUM 600 + D,3,) 600 MG(1,500MG) -200 UNIT PER TABLET] Take 1 tablet by mouth daily. As directed., Disp: , Rfl:     cyanocobalamin (VITAMIN B-12) 2000 MCG tablet, [CYANOCOBALAMIN (VITAMIN B-12) 2000 MCG TABLET] Take 2,000 mcg by mouth daily., Disp: , Rfl:     lisinopril (ZESTRIL) 10 MG tablet, Take 1 tablet (10 mg) by mouth daily, Disp: , Rfl:     LORazepam (ATIVAN) 0.5 MG tablet, Take 1 tablet (0.5 mg) by mouth every 8 hours as needed for anxiety or agitation, Disp: 30 tablet, Rfl: 0    multivitamin, therapeutic (THERA-VIT) TABS tablet, Take 1  tablet by mouth daily, Disp: , Rfl:     propranolol (INDERAL) 40 MG tablet, Take 1 tablet (40 mg) by mouth 2 times daily, Disp: 180 tablet, Rfl: 3    psyllium (METAMUCIL/KONSYL) 58.6 % powder, Take 6 g (1 teaspoonful) by mouth 2 times daily (with meals), Disp: , Rfl:     senna (SENOKOT) 8.6 MG tablet, Take 1 tablet by mouth daily, Disp: , Rfl:     Allergies   Allergies   Allergen Reactions    Tamoxifen Analogues [Tamoxifen] Unknown     Annotation: hepatitis        Home

## 2024-12-16 NOTE — PATIENT PROFILE ADULT - CAREGIVER ADDRESS
HISTORY OF PRESENT ILLNESS:      The patient is a 73 year old female with history of chronic low back pain no radiculopathy, no numbness, tingling sensation, no sphincter compromises, Valsalva maneuvers do not elicit her pain.  Her visual analog pain score is up to 8/10.  The pain is aggravated by exertion, alleviated by rest.   She underwent physical therapy without improvement of her pain.           Imaging:  Narrative & Impression   EXAM: XR HIPS 2 VIEWS BILATERAL AND PELVIS     CLINICAL INDICATION: pain     TECHNIQUE: Frontal view of the pelvis. 2 views of both hips.     COMPARISON: CT abdomen/pelvis 4/3/2024.     FINDINGS:      Lower lumbar spine is better assessed on same day lumbar spine radiographs.     Dystrophic calcifications are noted over the pelvis bilaterally. No acute  displaced pelvic fracture. No acute fracture or dislocation involving the  bilateral hips. Minimal enthesopathy near the left greater trochanter. Mild  bilateral hip joint space narrowing, commensurate for age. Mild pubic  symphysis degenerative changes. Surgical clips in the pelvis.        IMPRESSION:     Mild degenerative findings. No acute osseous abnormality.           Electronically Signed by: NOEL MIJARES M.D.   Signed on: 7/15/2024 2:42 PM   Workstation ID: [id number]      Narrative & Impression   EXAM: XR LUMBAR SPINE 2 OR 3 VIEWS     CLINICAL INDICATION: Back pain.     TECHNIQUE: Radiographs of the lumbar spine.  Number of views: 3.     COMPARISON: 10/13/2016. CT abdomen/pelvis 4/3/2024.     FINDINGS:      Grade 1 anterolisthesis of L3 over L4 and L5 over S1. Lumbar vertebral  bodies are normal in height. No acute vertebral body collapse.     Mild bone demineralization.     Small multilevel endplate osteophytes in the lumbar spine. Mild multilevel  facet arthropathy. Mild to moderate intervertebral disc height reduction  posteriorly at L5-S1.     Mild to moderate degenerative findings in the included lower  thoracic  levels.      Coarsened trabeculation of the T9 vertebral body corresponds with a  hemangioma on prior CT abdomen/pelvis.        IMPRESSION:     Bone demineralization with mild to moderate degenerative findings, as  above.           Electronically Signed by: NOEL MIJARES M.D.   Signed on: 7/15/2024 2:40 PM   Workstation ID: [id number]      Narrative & Impression   MRI LUMBAR SPINE WO CONTRAST     TECHNIQUE: MRI of the lumbar spine was performed without intravenous  contrast.     CLINICAL INDICATION: Low back pain radiating to right lower extremity     Comparison is made to March 2, 2018.     FINDINGS:     There is multilevel degenerative change including vertebral body  osteophytes.  There is a multilevel  disc degeneration with related   vertebral endplate signal abnormalities.  There is also multilevel facet  degenerative change.  Vertebral body heights appear normal. There is no evidence for discitis or  osteomyelitis.  No abnormal signal detected within the conus medullaris.   No extrinsic compression of the conus.  Vertebral alignment appears normal.     IMPRESSION:     Multilevel degenerative change as described above.     L1-L2 level: No significant narrowing of the a central spinal canal or  neural foramina. Moderate-sized benign hemangioma within the L1 diffuse  disc bulge. Vertebral body.     L2-L3 level: No significant narrowing of the a central spinal canal or  neural foramina. Moderate facet arthropathy. Diffuse disc bulge.     L3-L4 level: Diffuse disc bulge. Superimposed small central disc  protrusion. Moderate facet arthropathy with moderate fluid in the right  facet joint. Minimal degenerative anterolisthesis similar to the prior  study.     L4-L5 level: Diffuse disc bulge. Moderate facet arthropathy. Annular tear  along the posterior margin of the disc appears less conspicuous when  compared to the prior study from 2018.     L5-S1 level: No significant narrowing of the a central  spinal canal or  neural foramina. Moderate facet arthropathy.     Read full report.                 Electronically Signed by: ANTHONY FIGUEROA MD   Signed on: 8/11/2024 4:11 PM   Workstation ID: [id number]            Recent Imaging:  MAMMO DIAGNOSTIC RIGHT W STEPHANIE  Narrative: #[id number] - MAMMO DIAGNOSTIC RIGHT W STEPHANIE  #[id number] - US BREAST DIAGNOSTIC 1-3 QUADRANTS LEFT     UNILATERAL RIGHT DIGITAL DIAGNOSTIC MAMMOGRAM 3D/2D: 6/28/2024     CLINICAL HISTORY:Asymptomatic high risk surveillance (genetic or remote history of breast neoplasm). Symptomatic pain.       COMPARISON:   Comparison is made to exams dated: 5/10/2023 mammogram, 2/21/2022 mammogram, 2/18/2021 mammogram, 11/25/2020 aspiration, 5/10/2023 ultrasound, and 3/1/2021 ultrasound - [facility].       BREAST COMPOSITION: There are scattered fibroglandular elements (ACR Breast Composition Category B) in right breast.       American College of Radiology Breast Composition Categories     A - The breast tissue is predominantly fatty.     B - There are scattered fibroglandular elements in the breast tissue.    C - The breast tissue is heterogeneously dense. This may lower the sensitivity of mammography.    D - The breast tissue is extremely dense, which lowers the sensitivity of mammography.        FINDINGS: No new suspicious abnormality on mammography.       No significant masses, calcifications, or other findings are seen in the breast.  Digital Breast Tomosynthesis (DBT) images were obtained and used to assist in the interpretation of this examination.   Impression: MAMMOGRAPHY  NEGATIVE     There is no mammographic evidence of malignancy.      COMPLETE ULTRASOUND OF LEFT BREAST AND AXILLA: 6/28/2024     COMPARISON:   Comparison is made to exams dated: 5/10/2023 mammogram, 2/21/2022 mammogram, 2/18/2021 mammogram, 11/25/2020 aspiration, 5/10/2023 ultrasound, and 3/1/2021 ultrasound - [facility].       FINDINGS:   Real-time  ultrasound of the left breast four quadrants, retroareolar, and axilla regions was performed.  All representative images including gray scale of the real time examination were reviewed.       The patient is status post mastectomy left breast.       No significant abnormalities were seen sonographically in the left chest wall or the left axilla.  No sonographic abnormality in the area of pain. There are no suspicious solid or cystic masses identified.       IMPRESSION: ULTRASOUND NEGATIVE   There is no sonographic evidence of malignancy.    Return to annual mammogram screening schedule is recommended.       MAMMOGRAPHY BI-RADS: 1 NEGATIVE ULTRASOUND BI-RADS: 1 NEGATIVE    Electronically Signed by: Indy Fu M.D.            si/:6/28/2024 08:30:51       letter sent: Patient Handed Results  US BREAST DIAGNOSTIC 1-3 QUADRANTS LEFT  Narrative: #[id number] - MAMMO DIAGNOSTIC RIGHT W STEPHANIE  #[id number] - US BREAST DIAGNOSTIC 1-3 QUADRANTS LEFT     UNILATERAL RIGHT DIGITAL DIAGNOSTIC MAMMOGRAM 3D/2D: 6/28/2024     CLINICAL HISTORY:Asymptomatic high risk surveillance (genetic or remote history of breast neoplasm). Symptomatic pain.       COMPARISON:   Comparison is made to exams dated: 5/10/2023 mammogram, 2/21/2022 mammogram, 2/18/2021 mammogram, 11/25/2020 aspiration, 5/10/2023 ultrasound, and 3/1/2021 ultrasound - [facility].       BREAST COMPOSITION: There are scattered fibroglandular elements (ACR Breast Composition Category B) in right breast.       American College of Radiology Breast Composition Categories     A - The breast tissue is predominantly fatty.     B - There are scattered fibroglandular elements in the breast tissue.    C - The breast tissue is heterogeneously dense. This may lower the sensitivity of mammography.    D - The breast tissue is extremely dense, which lowers the sensitivity of mammography.        FINDINGS: No new suspicious abnormality on mammography.       No significant  [address] masses, calcifications, or other findings are seen in the breast.  Digital Breast Tomosynthesis (DBT) images were obtained and used to assist in the interpretation of this examination.   Impression: MAMMOGRAPHY  NEGATIVE     There is no mammographic evidence of malignancy.      COMPLETE ULTRASOUND OF LEFT BREAST AND AXILLA: 6/28/2024     COMPARISON:   Comparison is made to exams dated: 5/10/2023 mammogram, 2/21/2022 mammogram, 2/18/2021 mammogram, 11/25/2020 aspiration, 5/10/2023 ultrasound, and 3/1/2021 ultrasound - [facility].       FINDINGS:   Real-time ultrasound of the left breast four quadrants, retroareolar, and axilla regions was performed.  All representative images including gray scale of the real time examination were reviewed.       The patient is status post mastectomy left breast.       No significant abnormalities were seen sonographically in the left chest wall or the left axilla.  No sonographic abnormality in the area of pain. There are no suspicious solid or cystic masses identified.       IMPRESSION: ULTRASOUND NEGATIVE   There is no sonographic evidence of malignancy.    Return to annual mammogram screening schedule is recommended.       MAMMOGRAPHY BI-RADS: 1 NEGATIVE ULTRASOUND BI-RADS: 1 NEGATIVE    Electronically Signed by: Indy Fu M.D.            si/:6/28/2024 08:30:51       letter sent: Patient Handed Results        Past Medical History:   History - past medical           Past Medical History:   Diagnosis Date    Allergy      Arthritis      Back pain      Change in hearing      Depressive disorder      Diabetes mellitus  (CMD)       \"pre diabetic\" per patient     Essential (primary) hypertension      GERD (gastroesophageal reflux disease)      Helicobacter positive gastritis 02/06/2018    Malignant neoplasm  (CMD)       Left breast cancer    Nipple discharge       Left breast - patient describes the discharge as \"black fluid\"     Rheumatoid arthritis  (CMD)       Vaccine counseling       covid vacine complete            Past Surgical History:   History - past surgical             Past Surgical History:   Procedure Laterality Date    Adenoidectomy        Arthroscopy shoulder repair of slap lesion        Bladder surgery        Breast biopsy Left       3 site right core biopsy    Breast surgery Left 07/02/2020     lumpectomy    Breast surgery Left 08/04/2020     Re-excision lumpectomy    Breast surgery Left       mastectomy    Breast surgery Left 09/2020     Simple Mastectomy    Cholecystectomy        Hb lithotripsy        Hb temporal artery ligation or biopsy Right 03/26/2024    Ovarian cyst drainage        Removal gallbladder        Vaginal delivery                Medications:  Medications               Current Outpatient Medications   Medication Sig Dispense Refill    olopatadine (PATADAY) 0.2 % ophthalmic solution Place 1 drop into both eyes daily as needed.        Aspirin Low Dose 81 MG EC tablet Take 81 mg by mouth every morning. STOP 5 DAYS PRIOR TO PROCEDURE PER DR. THAKUR        omeprazole (PrilOSEC) 20 MG capsule as needed. (Patient not taking: Reported on 6/28/2024)        ACETAMINOPHEN PO Take by mouth 2 times daily.        losartan (COZAAR) 100 MG tablet Take 1 tablet by mouth daily. (Patient taking differently: Take 100 mg by mouth every morning.) 90 tablet 1    metFORMIN (GLUCOPHAGE) 500 MG tablet Take 1 tablet by mouth daily (with breakfast). 90 tablet 1      No current facility-administered medications for this encounter.         Current medications               Current Outpatient Medications   Medication Sig Dispense Refill    olopatadine (PATADAY) 0.2 % ophthalmic solution Place 1 drop into both eyes daily as needed.        Aspirin Low Dose 81 MG EC tablet Take 81 mg by mouth every morning. STOP 5 DAYS PRIOR TO PROCEDURE PER DR. THAKUR        omeprazole (PrilOSEC) 20 MG capsule as needed. (Patient not taking: Reported on 6/28/2024)        ACETAMINOPHEN PO  Take by mouth 2 times daily.        losartan (COZAAR) 100 MG tablet Take 1 tablet by mouth daily. (Patient taking differently: Take 100 mg by mouth every morning.) 90 tablet 1    metFORMIN (GLUCOPHAGE) 500 MG tablet Take 1 tablet by mouth daily (with breakfast). 90 tablet 1      No current facility-administered medications for this encounter.            Allergies:  ALLERGIES:  Penicillins     Social History:   Social History                 Tobacco Use    Smoking status: Never    Smokeless tobacco: Never   Vaping Use    Vaping status: never used   Substance Use Topics    Alcohol use: No    Drug use: No            Family History:   History - family             Family History   Problem Relation Age of Onset    Osteoporosis Mother      Myocardial Infarction Father      Cancer Paternal Aunt 50         stomach            REVIEW OF SYSTEMS:  Review of Systems      PHYSICAL EXAM:  This is an alert and  Obese patient in no acute distress at the moment of exam.     Vitals:    Blood pressure (!) 150/72, pulse 78, temperature 96.8 °F (36 °C), temperature source Temporal, resp. rate 16, SpO2 98%.  There is no height or weight on file to calculate BMI.          HEENT: No scleral icterus, no nystagmus.   Cardiovascular: Normal peripheral perfusion.   Pulmonary/Chest: Non labored respirations, symmetrical chest expansion.   Skin: No rash noted.   Abdomen: Soft, no rebound tenderness.  Ext: No edema or erythema.     Neurologic: Cranial nerves II through XII are grossly intact.  Deep tendon reflexes are normal 2/4, strength of upper extremities is normal 5/5, strength of the lower extremities is normal at 5/5.  No signs of allodynia or trophic changes.     Musculoskeletal:     The lumbar spine has  diminished range with pain.  Straight leg raising test is negative bilateral.  There is no pain at palpation of the lumbar spinous processes.  There is moderate pain on palpation of the bilateral lumbar facet joints.  There is moderate  pain at palpation of the bilateral paraspinal muscles.  August maneuver is negative bilateral.  There is no pain at loading of  bilateral sacroiliac joints. Gaenslen's maneuver is negative bilateral.     Hips: There is mild pain at Internal and external  rotation  right.           IMPRESSION/RECOMMENDATIONS:     The patient is a 73 year old female with history of chronic low back pain no radiculopathy, no numbness, tingling sensation, no sphincter compromises, Valsalva maneuvers do not elicit her pain.  Her visual analog pain score is up to 8/10.  The pain is aggravated by exertion, alleviated by rest.   She underwent physical therapy without improvement of her pain.     The patient has history of at least 3 months of moderate to severe pain with functional impairment making every day activities extremely difficult and painful.  Pain is inadequately responsive to conservative care such as NSAIDs, acetaminophen and physical therapy.   The pain is predominantly axial.  There is no nonfacet pathology that could explain the source of the patient's pain such as untreated acute fracture, tumor, infection or significant deformity.  Clinical assessment implicates the facet joint as the putative source of pain.  The patient has severe pain at extension movement of the spine.     The patient underwent 2 bilateral lumbar facet joint medial branch diagnostic blocks  at the level of  L4-5, L5-S1 facet joints.     The date of the first block was 10/15/2024 ,with 80% improvement of  pain , more than 50% improvement of functional capacity including improvement in sitting, standing, walking, flexion/ extension of the back, the local anesthetic used was lidocaine 2%.  The block lasted for  more than 2 hours that correlates with the local anesthetic used, and after the effect of the local anesthetic went back to baseline. Pre procedure visual pain score of 8/10 and post procedure visual pain score of 1/10 was noted during the effect  of the local anesthetic.       The date of the second block was 11/26/2024 ,with 80% improvement of  pain , more than 50% improvement of functional capacity including improvement in sitting, standing, walking, flexion/ extension of the back, the local anesthetic used was bupivacaine 0.5%.  The block lasted for  more than 5 hours that correlates with the local anesthetic used, and after the effect of the local anesthetic went back to baseline. Pre procedure visual pain score of 8/10 and post procedure visual pain score of 1/10 was noted during the effect of the local anesthetic.     I am going to schedule the patient to have a bilateral lumbar facet joint median branches radiofrequency neurolysis at  L4-L5, L5-S1 under fluoroscopic   guidance and local.  I went over the procedure with the patient, potential benefits and complications, including bleeding, infection, nerve injury, worsening of the patient pain.  I went over the procedure and the complications in very simple terms.  I answered all the patient questions.  The patient understood, agrees and wants to proceed. The risks and benefits as well as alternative options were explained to the patient in detail.    As it pertains to any injection there is an inherent risks of bleeding and infection.   If the patient experiences paresthesia during the procedure, it is expected to be transient.   If the patient experiences numbness or heaviness of the extremity blocked from the procedure, it is expected to be temporary, lasting only for the duration of the local anesthetic.  And as with any pain procedure, there is a possibility that the procedure may not completely relieve the pain to the satisfaction of the patient. The patient understands that the procedure is an elective procedure.      The plan is for this patient to receive anti anxiety medication before the procedure  consisting of benzodiazepine .         The patient expresses understanding and wishes to  proceed.                  Thank you very much.        PQRS :  The Patient states  osteoarthritis over knees, hips and lumbar spine.  OA functioning assesed patient able to ambulate  without assisting devices .  Pain assesed and documented in the chart, visual analog pain score is  8 in  0-10 scale  Current medications in then chart.  Radiology exposure is documented in operative reports  Quality of life with primary headache disorder , the patient does not have a primary headache disorder.  Falls plan of care METS >4. Sitting test performed.  Risk assesment for falls completed.  Osteoporosis treatment is being assesed and treated by primary doctor.  Tobacco prevention performed patient is a   non-smoker.  Screening blood pressure is  appropriate for age. The patient will follow with  primary doctor.  BMI severely obese (BMI 35 - 44.9) .  The patient will follow with  primary doctor.  Advance care plan is family member/POA/friend.    History Obtained From: patient, electronic medical record.        Rio Morgan MD  INTERVENTIONAL PAIN MANAGEMENT SPECIALIST  10:30 AM  12/16/24

## 2025-05-15 NOTE — PROGRESS NOTE ADULT - PROVIDER SPECIALTY LIST ADULT
Hospitalist
Internal Medicine
Cardiology
Hospitalist
Detail Level: Detailed
Detail Level: Generalized